# Patient Record
Sex: FEMALE | Race: BLACK OR AFRICAN AMERICAN | NOT HISPANIC OR LATINO | Employment: OTHER | URBAN - METROPOLITAN AREA
[De-identification: names, ages, dates, MRNs, and addresses within clinical notes are randomized per-mention and may not be internally consistent; named-entity substitution may affect disease eponyms.]

---

## 2017-01-04 ENCOUNTER — TELEPHONE (OUTPATIENT)
Dept: INTERNAL MEDICINE | Facility: CLINIC | Age: 72
End: 2017-01-04

## 2017-01-05 NOTE — TELEPHONE ENCOUNTER
Spoke with pt to let her know that A1c is improving but will need to make a follow-up appointment with Dr. Lovell. Pt verbalized understanding and scheduled for 1/9/17 at 9:40 am.

## 2017-01-19 ENCOUNTER — PATIENT OUTREACH (OUTPATIENT)
Dept: ADMINISTRATIVE | Facility: HOSPITAL | Age: 72
End: 2017-01-19

## 2017-01-19 NOTE — PROGRESS NOTES
I spoke with pt that stated she currently owes [employer] $1000.00 and only gets $201.00 a month. And can not afford to have the Dexa Scan done again. She said it was done but the spine part did not come out and they are trying to bill her for the scan and she us unable to pay for it. Pt stated it is not that she doesn't want to have the test she can not afford it. Pt will keep current appt.

## 2017-01-20 ENCOUNTER — PROCEDURE VISIT (OUTPATIENT)
Dept: OPHTHALMOLOGY | Facility: CLINIC | Age: 72
End: 2017-01-20
Payer: COMMERCIAL

## 2017-01-20 DIAGNOSIS — E11.3212 TYPE 2 DIABETES MELLITUS WITH LEFT EYE AFFECTED BY MILD NONPROLIFERATIVE RETINOPATHY AND MACULAR EDEMA, WITHOUT LONG-TERM CURRENT USE OF INSULIN: Primary | ICD-10-CM

## 2017-01-20 DIAGNOSIS — H10.11 ALLERGIC CONJUNCTIVITIS, RIGHT: ICD-10-CM

## 2017-01-20 PROCEDURE — 92134 CPTRZ OPH DX IMG PST SGM RTA: CPT | Mod: S$GLB,,, | Performed by: OPHTHALMOLOGY

## 2017-01-20 PROCEDURE — 67028 INJECTION EYE DRUG: CPT | Mod: LT,S$GLB,, | Performed by: OPHTHALMOLOGY

## 2017-01-20 PROCEDURE — 99499 UNLISTED E&M SERVICE: CPT | Mod: S$GLB,,, | Performed by: OPHTHALMOLOGY

## 2017-01-20 RX ORDER — CIPROFLOXACIN HYDROCHLORIDE 3 MG/ML
SOLUTION/ DROPS OPHTHALMIC
Qty: 5 ML | Refills: 0 | Status: SHIPPED | OUTPATIENT
Start: 2017-01-20 | End: 2017-01-27

## 2017-01-20 RX ORDER — NEOMYCIN SULFATE, POLYMYXIN B SULFATE AND DEXAMETHASONE 3.5; 10000; 1 MG/ML; [USP'U]/ML; MG/ML
1 SUSPENSION/ DROPS OPHTHALMIC 3 TIMES DAILY
Qty: 5 ML | Refills: 0 | Status: SHIPPED | OUTPATIENT
Start: 2017-01-20 | End: 2017-01-27

## 2017-01-20 RX ADMIN — Medication 1.25 MG: at 03:01

## 2017-01-20 NOTE — PROGRESS NOTES
===============================  Julia Schroeder,   72 y.o. female   Last visit [facility]: :11/30/2016   Last visit eye dept. 11/30/2016  [facility]:  Corrected distance visual acuity was 20/50 in the right eye and 20/30 in the left eye.   Not recorded         Not recorded         Not recorded        Chief Complaint   Patient presents with    dme     here for avastin os        HPI     dme    Additional comments: here for avastin os           Comments   Newly diagnosed diabetes by Dr. Muñoz 10/17/16  BDR with DME, [facility]  Avastin OS #2 11/30/16       Last edited by CJ Alfonso on 1/20/2017  3:13 PM. (History)      Read Studies: y  Vitalsy    ________________  1/20/2017  1. 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  Os dme no matt vsion... Better oct     1/20/2017  Diagnosis :  Os dme  Today:   avston  , OS   Follow up: rtc 1 mo >> eyela        Call 24/7 for any worsening of vision. Check  OU QD. Gave my home phone number.      Procedure  Note:   OS}  Eylea (afibercept) 2 mg/0.05 ml Intravitreal Injection    I have explained the Risks, Benefits and Alternatives of the procedure in detail.  The patient voices understanding and all questions have been answered.  The patient agrees to proceed as discussed.  LIDOCAINE 2%  subconj bleb  was used for anesthesia.  Topical betadine was used for antisepsis.  0.05 cc was  injected 3.7 mm from corneal limbus in the inferotemporal quadrant.  Following injection the IOP was less than thirty (<30) by tonopen.  The eye was then thoroughly irrigated with BSS.  Patient tolerated procedure well.  No complications were observed.  The Patient was educated that mild irritation tonight was normal secondary to topical antispsis use.  Pt was advised to call at any time day or night for pain, redness, or any decline in vision. I gave the patient my home number as well as the clinic  on call number. Daily visual checks and Amsler grid testing were reviewed.  ciloxan Antibiotic Drops to be used 4 times daily for 4 days  HUGH Muñoz MD  Procedure ordered: y  Consent: y  Pre auth: y  MAR:y  Opnote: y  Charge capture:y  Sided procedure note: y         ===========================

## 2017-01-20 NOTE — MR AVS SNAPSHOT
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      [facility] On Call     [facility] On Call Nurse [facility] -  Assistance  Registered nurses in the [facility] provide clinical advisement, health education, appointment booking, and other advisory services.  Call for this free service at [phone].             Medications           Message regarding Medications     Verify the changes and/or additions to your medication regime listed below are the same as discussed with your clinician today.  If any of these changes or additions are incorrect, please notify your healthcare provider.             Verify that the below list of medications is an accurate representation of the medications you are currently taking.  If none reported, the list may be blank. If incorrect, please contact your healthcare provider. Carry this list with you in case of emergency.           Current Medications     blood sugar diagnostic (BLOOD GLUCOSE TEST) Strp 1 strip by Misc.(Non-Drug; Combo Route) route once daily.    LANCETS MISC 1 lancet by Misc.(Non-Drug; Combo Route) route once daily.     metformin (GLUCOPHAGE) 500 MG tablet Take 1 tablet (500 mg total) by mouth 2 (two) times daily with meals.           Clinical Reference Information           Vital Signs - Last Recorded     LMP                   (LMP Unknown)           Allergies as of 1/20/2017     No Known Allergies      Immunizations Administered on Date of Encounter - 1/20/2017     None      Orders Placed During Today's Visit      Normal Orders This Visit    Posterior Segment OCT Retina-Both eyes     Prior Authorization Order

## 2017-01-24 ENCOUNTER — OFFICE VISIT (OUTPATIENT)
Dept: OPHTHALMOLOGY | Facility: CLINIC | Age: 72
End: 2017-01-24
Payer: COMMERCIAL

## 2017-01-24 ENCOUNTER — OFFICE VISIT (OUTPATIENT)
Dept: INTERNAL MEDICINE | Facility: CLINIC | Age: 72
End: 2017-01-24
Payer: COMMERCIAL

## 2017-01-24 VITALS
WEIGHT: 135.56 LBS | SYSTOLIC BLOOD PRESSURE: 122 MMHG | BODY MASS INDEX: 24.95 KG/M2 | OXYGEN SATURATION: 100 % | HEIGHT: 62 IN | TEMPERATURE: 97 F | HEART RATE: 77 BPM | DIASTOLIC BLOOD PRESSURE: 78 MMHG

## 2017-01-24 DIAGNOSIS — Z79.4 TYPE 2 DIABETES MELLITUS WITH LEFT EYE AFFECTED BY MILD NONPROLIFERATIVE RETINOPATHY AND MACULAR EDEMA, WITH LONG-TERM CURRENT USE OF INSULIN: Primary | ICD-10-CM

## 2017-01-24 DIAGNOSIS — H16.9 BLEPHAROKERATITIS, UNSPECIFIED LATERALITY: Primary | ICD-10-CM

## 2017-01-24 DIAGNOSIS — H01.009 BLEPHAROKERATITIS, UNSPECIFIED LATERALITY: Primary | ICD-10-CM

## 2017-01-24 DIAGNOSIS — E11.3212 TYPE 2 DIABETES MELLITUS WITH LEFT EYE AFFECTED BY MILD NONPROLIFERATIVE RETINOPATHY AND MACULAR EDEMA, WITH LONG-TERM CURRENT USE OF INSULIN: Primary | ICD-10-CM

## 2017-01-24 DIAGNOSIS — R19.00 PELVIC MASS: ICD-10-CM

## 2017-01-24 PROCEDURE — 1160F RVW MEDS BY RX/DR IN RCRD: CPT | Mod: S$GLB,,, | Performed by: INTERNAL MEDICINE

## 2017-01-24 PROCEDURE — 1157F ADVNC CARE PLAN IN RCRD: CPT | Mod: S$GLB,,, | Performed by: INTERNAL MEDICINE

## 2017-01-24 PROCEDURE — 3045F PR MOST RECENT HEMOGLOBIN A1C LEVEL 7.0-9.0%: CPT | Mod: S$GLB,,, | Performed by: INTERNAL MEDICINE

## 2017-01-24 PROCEDURE — 1159F MED LIST DOCD IN RCRD: CPT | Mod: S$GLB,,, | Performed by: INTERNAL MEDICINE

## 2017-01-24 PROCEDURE — 1126F AMNT PAIN NOTED NONE PRSNT: CPT | Mod: S$GLB,,, | Performed by: INTERNAL MEDICINE

## 2017-01-24 PROCEDURE — 99999 PR PBB SHADOW E&M-EST. PATIENT-LVL I: CPT | Mod: PBBFAC,,, | Performed by: OPTOMETRIST

## 2017-01-24 PROCEDURE — 99999 PR PBB SHADOW E&M-EST. PATIENT-LVL III: CPT | Mod: PBBFAC,,, | Performed by: INTERNAL MEDICINE

## 2017-01-24 PROCEDURE — 99213 OFFICE O/P EST LOW 20 MIN: CPT | Mod: S$GLB,,, | Performed by: INTERNAL MEDICINE

## 2017-01-24 PROCEDURE — 92012 INTRM OPH EXAM EST PATIENT: CPT | Mod: S$GLB,,, | Performed by: OPTOMETRIST

## 2017-01-24 RX ORDER — METFORMIN HYDROCHLORIDE 1000 MG/1
1000 TABLET ORAL 2 TIMES DAILY WITH MEALS
Qty: 180 TABLET | Refills: 1 | Status: SHIPPED | OUTPATIENT
Start: 2017-01-24 | End: 2017-05-22 | Stop reason: SDUPTHER

## 2017-01-24 NOTE — MR AVS SNAPSHOT
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, with long-term current use of insulin    -  Primary     Pelvic mass                To Do List           Future Appointments        Provider Department Dept Phone    2017 8:45 AM Pablo Lindo OD [facility] Ophthalmology [phone]    5/15/2017 7:55 AM LABORATORY, [facility] [phone]    2017 8:00 AM Devon Lovell MD [facility] Internal Medicine [phone]      Goals (5 Years of Data)     None      Follow-Up and Disposition     Return in about 4 months (around 2017), or if symptoms worsen or fail to improve.    Follow-up and Disposition History       These Medications        Disp Refills Start End    metformin (GLUCOPHAGE) 1000 MG tablet 180 tablet 1 2017    Take 1 tablet (1,000 mg total) by mouth 2 (two) times daily with meals. - Oral    Pharmacy: [address] #: [phone]         [facility] On Call     [facility] -  Assistance  Registered nurses in the [facility] provide clinical advisement, health education, appointment booking, and other advisory services.  Call for this free service at [phone].             Medications           Message regarding Medications     Verify the changes and/or additions to your medication regime listed below are the same as discussed with your clinician today.  If any of these changes or additions are incorrect, please notify  "your healthcare provider.        CHANGE how you are taking these medications     Start Taking Instead of    metformin (GLUCOPHAGE) 1000 MG tablet metformin (GLUCOPHAGE) 500 MG tablet    Dosage:  Take 1 tablet (1,000 mg total) by mouth 2 (two) times daily with meals. Dosage:  Take 1 tablet (500 mg total) by mouth 2 (two) times daily with meals.    Reason for Change:  Reorder            Verify that the below list of medications is an accurate representation of the medications you are currently taking.  If none reported, the list may be blank. If incorrect, please contact your healthcare provider. Carry this list with you in case of emergency.           Current Medications     blood sugar diagnostic (BLOOD GLUCOSE TEST) Strp 1 strip by Misc.(Non-Drug; Combo Route) route 2 (two) times daily.     ciprofloxacin HCl (CILOXAN) 0.3 % ophthalmic solution Administer 1 drop in left eye, 4 x a day for 4 days    LANCETS MISC 1 lancet by Misc.(Non-Drug; Combo Route) route 2 (two) times daily.     metformin (GLUCOPHAGE) 1000 MG tablet Take 1 tablet (1,000 mg total) by mouth 2 (two) times daily with meals.    neomycin-polymyxin-dexamethasone (MAXITROL) 3.5mg/mL-10,000 unit/mL-0.1 % DrpS Place 1 drop into the right eye 3 (three) times daily.           Clinical Reference Information           Vital Signs - Last Recorded  Most recent update: 1/24/2017  8:08 AM by Mila Hernandez MA    BP Pulse Temp Ht Wt LMP    122/78 (BP Location: Right arm, Patient Position: Sitting) 77 96.8 °F (36 °C) (Tympanic) 5' 1.5" (1.562 m) 61.5 kg (135 lb 9.3 oz) (LMP Unknown)    SpO2 BMI             100% 25.2 kg/m2         Blood Pressure          Most Recent Value    BP  122/78      Allergies as of 1/24/2017     No Known Allergies      Immunizations Administered on Date of Encounter - 1/24/2017     None      Orders Placed During Today's Visit      Normal Orders This Visit    Ambulatory referral to Gynecology     Future Labs/Procedures Expected by " Expires    Hemoglobin A1c  5/24/2017 (Approximate) 7/23/2017

## 2017-01-24 NOTE — PROGRESS NOTES
"Subjective:      Patient ID: Julia Schroeder is a 72 y.o. female.    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 Doing much better with diet and exercise. Did not do pelvic us as directed.  Compliant with meds without significant side effects.     Review of Systems   Constitutional: Negative for chills and fever.   Respiratory: Negative for cough.    Cardiovascular: Negative for chest pain.   Gastrointestinal: Negative for abdominal pain.     Objective:     Visit Vitals    /78 (BP Location: Right arm, Patient Position: Sitting)    Pulse 77    Temp 96.8 °F (36 °C) (Tympanic)    Ht 5' 1.5" (1.562 m)    Wt 61.5 kg (135 lb 9.3 oz)    LMP  (LMP Unknown)    SpO2 100%    BMI 25.2 kg/m2       Physical Exam   Constitutional: She appears well-developed and well-nourished. No distress.   Cardiovascular: Normal rate.    Pulmonary/Chest: Effort normal.   Neurological: She is alert.   Skin: Skin is warm and dry.   Psychiatric: She has a normal mood and affect. Her behavior is normal.     No visits with results within 2 Week(s) from this visit.  Latest known visit with results is:    Lab Visit on 12/30/2016   Component Date Value Ref Range Status    Hemoglobin A1C 12/30/2016 7.9* 4.5 - 6.2 % Final    Comment: According to ADA guidelines, hemoglobin A1C <7.0% represents  optimal control in non-pregnant diabetic patients.  Different  metrics may apply to specific populations.   Standards of Medical Care in Diabetes - 2016.  For the purpose of screening for the presence of diabetes:  <5.7%     Consistent with the absence of diabetes  5.7-6.4%  Consistent with increasing risk for diabetes   (prediabetes)  >or=6.5%  Consistent with " diabetes  Currently no consensus exists for use of hemoglobin A1C  for diagnosis of diabetes for children.      Estimated Avg Glucose 12/30/2016 180* 68 - 131 mg/dL Final       Assessment:     1. 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  Dispense: 180 tablet; Refill: 1  -     Cancel: Hemoglobin A1c; Future; Expected date: 5/24/17  -     Hemoglobin A1c; Future; Expected date: 5/24/17    Pelvic mass  -     Ambulatory referral to Gynecology    Other orders  -     Cancel: Hemoglobin A1c; Future; Expected date: 4/24/17        Lab Frequency Next Occurrence   US Pelvis Complete Non OB Once 11/28/2016         Return in about 4 months (around 5/24/2017), or if symptoms worsen or fail to improve.

## 2017-01-24 NOTE — PROGRESS NOTES
HPI     Last [facility] visit 01/20/2017  Chief complaint: right eye burning   Onset: about 3 weeks ago  DME, OS  Medication: (CILOXAN) 0.3%   (MAXITROL) 3.5mg/mL  Patient wanted to be seen for discomfort in the right eye.  No blurred vision  No light sensitivity  No flashes of light or floaters           Last edited by Xavier Foster MA on 1/24/2017  9:14 AM.         Assessment /Plan     For exam results, see Encounter Report.    Blepharokeratitis, unspecified laterality    Mild blepharitis causing keratitis OU    [facility] Rx'd Maxitrol tid OS, I instructed pt to start Maxitrol tid OU    Pt states next appt with [facility] is March 1st for retina check.    RTC prn

## 2017-03-10 DIAGNOSIS — Z12.31 OTHER SCREENING MAMMOGRAM: ICD-10-CM

## 2017-05-10 ENCOUNTER — PATIENT OUTREACH (OUTPATIENT)
Dept: ADMINISTRATIVE | Facility: HOSPITAL | Age: 72
End: 2017-05-10

## 2017-05-10 NOTE — LETTER
May 10, 2017    Julia Schroeder  [place] [id number]  [place] [address]             [facility]  [address]  [facility] [address]  Phone: [phone] Dear Mrs. Schroeder:       [facility] is committed to your overall health.  To help you get the most out of each of your visits, we will review your information to make sure you are up to date on all of your recommended tests and/or procedures.      Devon Lovell MD has found that you may be due for   Health Maintenance Due   Topic    TETANUS VACCINE     DEXA SCAN-Bone Density     Zoster Vaccine     Pneumococcal (65+) (1 of 2 - PCV13)    Mammogram         If you have had any of the above done at another facility, please bring the records or information with you so that your record at [facility] will be complete.    If you are currently taking medication, please bring it with you to your appointment for review.    We will be happy to assist you with scheduling any necessary appointments or you may contact the [facility] appointment desk at [phone] to schedule at your convenience.     Thank you for choosing [facility] for your healthcare needs,    Elise WREN LPN  Care Coordination Department  [facility], [facility]  Phone Number: [phone]

## 2017-05-15 ENCOUNTER — LAB VISIT (OUTPATIENT)
Dept: LAB | Facility: HOSPITAL | Age: 72
End: 2017-05-15
Attending: INTERNAL MEDICINE
Payer: COMMERCIAL

## 2017-05-15 DIAGNOSIS — E11.3212 TYPE 2 DIABETES MELLITUS WITH LEFT EYE AFFECTED BY MILD NONPROLIFERATIVE RETINOPATHY AND MACULAR EDEMA, WITH LONG-TERM CURRENT USE OF INSULIN: ICD-10-CM

## 2017-05-15 DIAGNOSIS — Z79.4 TYPE 2 DIABETES MELLITUS WITH LEFT EYE AFFECTED BY MILD NONPROLIFERATIVE RETINOPATHY AND MACULAR EDEMA, WITH LONG-TERM CURRENT USE OF INSULIN: ICD-10-CM

## 2017-05-15 PROCEDURE — 36415 COLL VENOUS BLD VENIPUNCTURE: CPT | Mod: PO

## 2017-05-15 PROCEDURE — 83036 HEMOGLOBIN GLYCOSYLATED A1C: CPT

## 2017-05-16 LAB
ESTIMATED AVG GLUCOSE: 143 MG/DL
HBA1C MFR BLD HPLC: 6.6 %

## 2017-05-22 ENCOUNTER — OFFICE VISIT (OUTPATIENT)
Dept: INTERNAL MEDICINE | Facility: CLINIC | Age: 72
End: 2017-05-22
Payer: COMMERCIAL

## 2017-05-22 VITALS
BODY MASS INDEX: 23.77 KG/M2 | TEMPERATURE: 97 F | HEIGHT: 62 IN | WEIGHT: 129.19 LBS | DIASTOLIC BLOOD PRESSURE: 72 MMHG | SYSTOLIC BLOOD PRESSURE: 122 MMHG

## 2017-05-22 DIAGNOSIS — E78.5 HYPERLIPIDEMIA ASSOCIATED WITH TYPE 2 DIABETES MELLITUS: ICD-10-CM

## 2017-05-22 DIAGNOSIS — R19.05 PERIUMBILICAL MASS: ICD-10-CM

## 2017-05-22 DIAGNOSIS — E11.69 HYPERLIPIDEMIA ASSOCIATED WITH TYPE 2 DIABETES MELLITUS: ICD-10-CM

## 2017-05-22 DIAGNOSIS — E11.3212 TYPE 2 DIABETES MELLITUS WITH LEFT EYE AFFECTED BY MILD NONPROLIFERATIVE RETINOPATHY AND MACULAR EDEMA, WITHOUT LONG-TERM CURRENT USE OF INSULIN: Primary | ICD-10-CM

## 2017-05-22 PROCEDURE — 1157F ADVNC CARE PLAN IN RCRD: CPT | Mod: 8P,S$GLB,, | Performed by: INTERNAL MEDICINE

## 2017-05-22 PROCEDURE — 1159F MED LIST DOCD IN RCRD: CPT | Mod: S$GLB,,, | Performed by: INTERNAL MEDICINE

## 2017-05-22 PROCEDURE — 1126F AMNT PAIN NOTED NONE PRSNT: CPT | Mod: S$GLB,,, | Performed by: INTERNAL MEDICINE

## 2017-05-22 PROCEDURE — 3044F HG A1C LEVEL LT 7.0%: CPT | Mod: S$GLB,,, | Performed by: INTERNAL MEDICINE

## 2017-05-22 PROCEDURE — 99214 OFFICE O/P EST MOD 30 MIN: CPT | Mod: S$GLB,,, | Performed by: INTERNAL MEDICINE

## 2017-05-22 PROCEDURE — 3060F POS MICROALBUMINURIA REV: CPT | Mod: 8P,S$GLB,, | Performed by: INTERNAL MEDICINE

## 2017-05-22 PROCEDURE — 1160F RVW MEDS BY RX/DR IN RCRD: CPT | Mod: S$GLB,,, | Performed by: INTERNAL MEDICINE

## 2017-05-22 PROCEDURE — 99999 PR PBB SHADOW E&M-EST. PATIENT-LVL III: CPT | Mod: PBBFAC,,, | Performed by: INTERNAL MEDICINE

## 2017-05-22 RX ORDER — FLUOROMETHOLONE 1 MG/ML
1 SUSPENSION/ DROPS OPHTHALMIC 2 TIMES DAILY
COMMUNITY
Start: 2017-05-17 | End: 2020-10-06 | Stop reason: ALTCHOICE

## 2017-05-22 RX ORDER — METFORMIN HYDROCHLORIDE 1000 MG/1
1000 TABLET ORAL 2 TIMES DAILY WITH MEALS
Qty: 180 TABLET | Refills: 3 | Status: SHIPPED | OUTPATIENT
Start: 2017-05-22 | End: 2017-09-08 | Stop reason: SDUPTHER

## 2017-05-22 RX ORDER — ROSUVASTATIN CALCIUM 10 MG/1
10 TABLET, COATED ORAL DAILY
Qty: 90 TABLET | Refills: 1 | Status: SHIPPED | OUTPATIENT
Start: 2017-05-22 | End: 2017-09-08 | Stop reason: SDUPTHER

## 2017-05-22 NOTE — PROGRESS NOTES
"Subjective:      Patient ID: Julia Schroeder is a 72 y.o. female.    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  Compliant with meds without significant side effects. Admits to some diet non compliance. Energy ok. Feeling well and in her usu state of health.    Pt reports that she does not want mmg and dxa due to cost. Advised pt to discuss this further with her insurance company.         Review of Systems   Constitutional: Negative for chills and fever.   Respiratory: Negative for cough.    Cardiovascular: Negative for chest pain.   Gastrointestinal: Negative for abdominal pain.     Objective:   /72 (BP Location: Right arm, Patient Position: Sitting)   Temp 96.9 °F (36.1 °C) (Tympanic)   Ht 5' 1.5" (1.562 m)   Wt 58.6 kg (129 lb 3 oz)   LMP  (LMP Unknown)   BMI 24.01 kg/m²     Physical Exam   Constitutional: She appears well-developed and well-nourished. No distress.   Cardiovascular: Normal rate.    Pulmonary/Chest: Effort normal.   Neurological: She is alert.   Skin: Skin is warm and dry.   Psychiatric: She has a normal mood and affect. Her behavior is normal.     Lab Visit on 05/15/2017   Component Date Value Ref Range Status    Hemoglobin A1C 05/16/2017 6.6* 4.5 - 6.2 % Final    Comment: According to ADA guidelines, hemoglobin A1C <7.0% represents  optimal control in non-pregnant diabetic patients.  Different  metrics may apply to specific populations.   Standards of Medical Care in Diabetes - 2016.  For the purpose of screening for the presence of diabetes:  <5.7%     Consistent with the absence of diabetes  5.7-6.4%  Consistent with increasing risk for diabetes   (prediabetes)  >or=6.5%  Consistent with diabetes  Currently no " consensus exists for use of hemoglobin A1C  for diagnosis of diabetes for children.      Estimated Avg Glucose 05/16/2017 143* 68 - 131 mg/dL Final       Assessment:     1. 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 Periumbilical mass    3. 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  Dispense: 180 tablet; Refill: 3  -     Hemoglobin A1c; Future; Expected date: 08/20/2017    Periumbilical mass  Pt reports dx with uterine fibroid  Considering surgery    Hyperlipidemia associated with type 2 diabetes mellitus  Not at goal  start  -     rosuvastatin (CRESTOR) 10 MG tablet; Take 1 tablet (10 mg total) by mouth once daily.  Dispense: 90 tablet; Refill: 1  -     Lipid panel; Future; Expected date: 08/20/2017  -     ALT (SGPT); Future; Expected date: 08/20/2017            Lab Frequency Next Occurrence   US Pelvis Complete Non OB Once 11/28/2016   Mammo Digital Screening Bilat with CAD Once 03/10/2017         Return if symptoms worsen or fail to improve.

## 2017-09-08 DIAGNOSIS — E78.5 HYPERLIPIDEMIA ASSOCIATED WITH TYPE 2 DIABETES MELLITUS: ICD-10-CM

## 2017-09-08 DIAGNOSIS — E11.69 HYPERLIPIDEMIA ASSOCIATED WITH TYPE 2 DIABETES MELLITUS: ICD-10-CM

## 2017-09-08 DIAGNOSIS — E11.3212 TYPE 2 DIABETES MELLITUS WITH LEFT EYE AFFECTED BY MILD NONPROLIFERATIVE RETINOPATHY AND MACULAR EDEMA, WITHOUT LONG-TERM CURRENT USE OF INSULIN: ICD-10-CM

## 2017-09-08 RX ORDER — ROSUVASTATIN CALCIUM 10 MG/1
10 TABLET, COATED ORAL DAILY
Qty: 90 TABLET | Refills: 3 | Status: SHIPPED | OUTPATIENT
Start: 2017-09-08 | End: 2018-08-23 | Stop reason: SDUPTHER

## 2017-09-08 RX ORDER — METFORMIN HYDROCHLORIDE 1000 MG/1
1000 TABLET ORAL 2 TIMES DAILY WITH MEALS
Qty: 180 TABLET | Refills: 3 | Status: SHIPPED | OUTPATIENT
Start: 2017-09-08 | End: 2018-08-17 | Stop reason: SDUPTHER

## 2017-11-10 DIAGNOSIS — E11.9 TYPE 2 DIABETES MELLITUS WITHOUT COMPLICATION: ICD-10-CM

## 2017-12-01 DIAGNOSIS — E11.9 TYPE 2 DIABETES MELLITUS WITHOUT COMPLICATION: ICD-10-CM

## 2018-01-05 DIAGNOSIS — E11.9 TYPE 2 DIABETES MELLITUS WITHOUT COMPLICATION: ICD-10-CM

## 2018-02-19 ENCOUNTER — PATIENT OUTREACH (OUTPATIENT)
Dept: ADMINISTRATIVE | Facility: HOSPITAL | Age: 73
End: 2018-02-19

## 2018-02-19 NOTE — PROGRESS NOTES
I have attempted without success to contact this patient by phone to schedule annual mammogram exam and other health maintenance. Patient not available, declined appointment.

## 2018-08-17 DIAGNOSIS — E11.3212 TYPE 2 DIABETES MELLITUS WITH LEFT EYE AFFECTED BY MILD NONPROLIFERATIVE RETINOPATHY AND MACULAR EDEMA, WITHOUT LONG-TERM CURRENT USE OF INSULIN: ICD-10-CM

## 2018-08-18 RX ORDER — METFORMIN HYDROCHLORIDE 1000 MG/1
TABLET ORAL
Qty: 180 TABLET | Refills: 3 | Status: SHIPPED | OUTPATIENT
Start: 2018-08-18 | End: 2019-05-03 | Stop reason: SDUPTHER

## 2018-08-23 DIAGNOSIS — E78.5 HYPERLIPIDEMIA ASSOCIATED WITH TYPE 2 DIABETES MELLITUS: ICD-10-CM

## 2018-08-23 DIAGNOSIS — E11.69 HYPERLIPIDEMIA ASSOCIATED WITH TYPE 2 DIABETES MELLITUS: ICD-10-CM

## 2018-08-23 RX ORDER — ROSUVASTATIN CALCIUM 10 MG/1
TABLET, COATED ORAL
Qty: 90 TABLET | Refills: 3 | Status: SHIPPED | OUTPATIENT
Start: 2018-08-23 | End: 2019-05-03 | Stop reason: SDUPTHER

## 2019-04-05 DIAGNOSIS — Z12.11 COLON CANCER SCREENING: ICD-10-CM

## 2019-04-30 ENCOUNTER — OFFICE VISIT (OUTPATIENT)
Dept: OPHTHALMOLOGY | Facility: CLINIC | Age: 74
End: 2019-04-30
Payer: MEDICARE

## 2019-04-30 DIAGNOSIS — H52.203 HYPEROPIA WITH ASTIGMATISM AND PRESBYOPIA, BILATERAL: Primary | ICD-10-CM

## 2019-04-30 DIAGNOSIS — H52.4 HYPEROPIA WITH ASTIGMATISM AND PRESBYOPIA, BILATERAL: Primary | ICD-10-CM

## 2019-04-30 DIAGNOSIS — H52.03 HYPEROPIA WITH ASTIGMATISM AND PRESBYOPIA, BILATERAL: Primary | ICD-10-CM

## 2019-04-30 PROCEDURE — 92015 PR REFRACTION: ICD-10-PCS | Mod: S$GLB,,, | Performed by: OPTOMETRIST

## 2019-04-30 PROCEDURE — 99999 PR PBB SHADOW E&M-EST. PATIENT-LVL I: ICD-10-PCS | Mod: PBBFAC,,, | Performed by: OPTOMETRIST

## 2019-04-30 PROCEDURE — 99999 PR PBB SHADOW E&M-EST. PATIENT-LVL I: CPT | Mod: PBBFAC,,, | Performed by: OPTOMETRIST

## 2019-04-30 PROCEDURE — 99211 OFF/OP EST MAY X REQ PHY/QHP: CPT | Mod: PBBFAC,PN | Performed by: OPTOMETRIST

## 2019-04-30 PROCEDURE — 99499 NO LOS: ICD-10-PCS | Mod: S$GLB,,, | Performed by: OPTOMETRIST

## 2019-04-30 PROCEDURE — 92015 DETERMINE REFRACTIVE STATE: CPT | Mod: S$GLB,,, | Performed by: OPTOMETRIST

## 2019-04-30 PROCEDURE — 99499 UNLISTED E&M SERVICE: CPT | Mod: S$GLB,,, | Performed by: OPTOMETRIST

## 2019-04-30 NOTE — PROGRESS NOTES
HPI     Blurred Vision      Additional comments: update gls              Comments     PTs last exam was 10/17/16 with [facility]. Currently seeing Dr. Lovell about   once a month for injections. Scheduled to return on Monday.   Pt here today to update gls only. Wearing an old pair after current gls   broke.     H/o BDR with DME, [employer]  Avastin OS #2 11/30/16               Last edited by Josephine Montano MA on 4/30/2019 10:19 AM. (History)            Assessment /Plan     For exam results, see Encounter Report.    Hyperopia with astigmatism and presbyopia, bilateral      Eyeglass Final Rx     Eyeglass Final Rx       Sphere Cylinder Axis Add    Right +1.00 +1.00 180 +2.50    Left +1.25 +1.00 180 +2.50    Expiration Date:  4/30/2020              RTC as scheduled with Dr Lovell or [facility]

## 2019-05-02 ENCOUNTER — PATIENT OUTREACH (OUTPATIENT)
Dept: ADMINISTRATIVE | Facility: HOSPITAL | Age: 74
End: 2019-05-02

## 2019-05-03 ENCOUNTER — PATIENT OUTREACH (OUTPATIENT)
Dept: ADMINISTRATIVE | Facility: HOSPITAL | Age: 74
End: 2019-05-03

## 2019-05-03 ENCOUNTER — LAB VISIT (OUTPATIENT)
Dept: LAB | Facility: HOSPITAL | Age: 74
End: 2019-05-03
Attending: INTERNAL MEDICINE
Payer: MEDICARE

## 2019-05-03 ENCOUNTER — OFFICE VISIT (OUTPATIENT)
Dept: INTERNAL MEDICINE | Facility: CLINIC | Age: 74
End: 2019-05-03
Payer: MEDICARE

## 2019-05-03 VITALS
WEIGHT: 126.75 LBS | TEMPERATURE: 97 F | HEART RATE: 74 BPM | DIASTOLIC BLOOD PRESSURE: 72 MMHG | OXYGEN SATURATION: 99 % | BODY MASS INDEX: 23.56 KG/M2 | SYSTOLIC BLOOD PRESSURE: 140 MMHG

## 2019-05-03 DIAGNOSIS — R01.1 MURMUR: ICD-10-CM

## 2019-05-03 DIAGNOSIS — E11.3212 TYPE 2 DIABETES MELLITUS WITH LEFT EYE AFFECTED BY MILD NONPROLIFERATIVE RETINOPATHY AND MACULAR EDEMA, WITHOUT LONG-TERM CURRENT USE OF INSULIN: ICD-10-CM

## 2019-05-03 DIAGNOSIS — Z78.0 ASYMPTOMATIC MENOPAUSAL STATE: ICD-10-CM

## 2019-05-03 DIAGNOSIS — E11.69 HYPERLIPIDEMIA ASSOCIATED WITH TYPE 2 DIABETES MELLITUS: ICD-10-CM

## 2019-05-03 DIAGNOSIS — Z23 NEED FOR PNEUMOCOCCAL VACCINATION: ICD-10-CM

## 2019-05-03 DIAGNOSIS — E78.5 HYPERLIPIDEMIA ASSOCIATED WITH TYPE 2 DIABETES MELLITUS: ICD-10-CM

## 2019-05-03 DIAGNOSIS — Z00.00 ROUTINE GENERAL MEDICAL EXAMINATION AT A HEALTH CARE FACILITY: Primary | ICD-10-CM

## 2019-05-03 DIAGNOSIS — N85.8 UTERINE MASS: ICD-10-CM

## 2019-05-03 LAB
ALBUMIN SERPL BCP-MCNC: 4.2 G/DL (ref 3.5–5.2)
ALP SERPL-CCNC: 59 U/L (ref 55–135)
ALT SERPL W/O P-5'-P-CCNC: 14 U/L (ref 10–44)
ANION GAP SERPL CALC-SCNC: 9 MMOL/L (ref 8–16)
AST SERPL-CCNC: 20 U/L (ref 10–40)
BASOPHILS # BLD AUTO: 0.04 K/UL (ref 0–0.2)
BASOPHILS NFR BLD: 0.7 % (ref 0–1.9)
BILIRUB SERPL-MCNC: 0.6 MG/DL (ref 0.1–1)
BUN SERPL-MCNC: 12 MG/DL (ref 8–23)
CALCIUM SERPL-MCNC: 10.1 MG/DL (ref 8.7–10.5)
CHLORIDE SERPL-SCNC: 103 MMOL/L (ref 95–110)
CHOLEST SERPL-MCNC: 182 MG/DL (ref 120–199)
CHOLEST/HDLC SERPL: 2.5 {RATIO} (ref 2–5)
CO2 SERPL-SCNC: 28 MMOL/L (ref 23–29)
CREAT SERPL-MCNC: 0.9 MG/DL (ref 0.5–1.4)
DIFFERENTIAL METHOD: ABNORMAL
EOSINOPHIL # BLD AUTO: 0.1 K/UL (ref 0–0.5)
EOSINOPHIL NFR BLD: 1.7 % (ref 0–8)
ERYTHROCYTE [DISTWIDTH] IN BLOOD BY AUTOMATED COUNT: 13 % (ref 11.5–14.5)
EST. GFR  (AFRICAN AMERICAN): >60 ML/MIN/1.73 M^2
EST. GFR  (NON AFRICAN AMERICAN): >60 ML/MIN/1.73 M^2
ESTIMATED AVG GLUCOSE: 154 MG/DL (ref 68–131)
ESTIMATED AVG GLUCOSE: 154 MG/DL (ref 68–131)
GLUCOSE SERPL-MCNC: 131 MG/DL (ref 70–110)
HBA1C MFR BLD HPLC: 7 % (ref 4–5.6)
HBA1C MFR BLD HPLC: 7 % (ref 4–5.6)
HCT VFR BLD AUTO: 36.3 % (ref 37–48.5)
HDLC SERPL-MCNC: 74 MG/DL (ref 40–75)
HDLC SERPL: 40.7 % (ref 20–50)
HGB BLD-MCNC: 11.8 G/DL (ref 12–16)
IMM GRANULOCYTES # BLD AUTO: 0.01 K/UL (ref 0–0.04)
IMM GRANULOCYTES NFR BLD AUTO: 0.2 % (ref 0–0.5)
LDLC SERPL CALC-MCNC: 97.4 MG/DL (ref 63–159)
LYMPHOCYTES # BLD AUTO: 1.6 K/UL (ref 1–4.8)
LYMPHOCYTES NFR BLD: 29.8 % (ref 18–48)
MCH RBC QN AUTO: 29.6 PG (ref 27–31)
MCHC RBC AUTO-ENTMCNC: 32.5 G/DL (ref 32–36)
MCV RBC AUTO: 91 FL (ref 82–98)
MONOCYTES # BLD AUTO: 0.4 K/UL (ref 0.3–1)
MONOCYTES NFR BLD: 7.2 % (ref 4–15)
NEUTROPHILS # BLD AUTO: 3.3 K/UL (ref 1.8–7.7)
NEUTROPHILS NFR BLD: 60.4 % (ref 38–73)
NONHDLC SERPL-MCNC: 108 MG/DL
NRBC BLD-RTO: 0 /100 WBC
PLATELET # BLD AUTO: 193 K/UL (ref 150–350)
PMV BLD AUTO: 10.9 FL (ref 9.2–12.9)
POTASSIUM SERPL-SCNC: 4.2 MMOL/L (ref 3.5–5.1)
PROT SERPL-MCNC: 7.5 G/DL (ref 6–8.4)
RBC # BLD AUTO: 3.98 M/UL (ref 4–5.4)
SODIUM SERPL-SCNC: 140 MMOL/L (ref 136–145)
T4 FREE SERPL-MCNC: 0.88 NG/DL (ref 0.71–1.51)
TRIGL SERPL-MCNC: 53 MG/DL (ref 30–150)
TSH SERPL DL<=0.005 MIU/L-ACNC: 0.32 UIU/ML (ref 0.4–4)
WBC # BLD AUTO: 5.43 K/UL (ref 3.9–12.7)

## 2019-05-03 PROCEDURE — 84439 ASSAY OF FREE THYROXINE: CPT

## 2019-05-03 PROCEDURE — G0009 ADMIN PNEUMOCOCCAL VACCINE: HCPCS | Mod: S$GLB,,, | Performed by: INTERNAL MEDICINE

## 2019-05-03 PROCEDURE — 83036 HEMOGLOBIN GLYCOSYLATED A1C: CPT

## 2019-05-03 PROCEDURE — 99999 PR PBB SHADOW E&M-EST. PATIENT-LVL IV: ICD-10-PCS | Mod: PBBFAC,,, | Performed by: INTERNAL MEDICINE

## 2019-05-03 PROCEDURE — 90732 PNEUMOCOCCAL POLYSACCHARIDE VACCINE 23-VALENT =>2YO SQ IM: ICD-10-PCS | Mod: S$GLB,,, | Performed by: INTERNAL MEDICINE

## 2019-05-03 PROCEDURE — 85025 COMPLETE CBC W/AUTO DIFF WBC: CPT

## 2019-05-03 PROCEDURE — 80061 LIPID PANEL: CPT

## 2019-05-03 PROCEDURE — 99397 PER PM REEVAL EST PAT 65+ YR: CPT | Mod: 25,S$GLB,, | Performed by: INTERNAL MEDICINE

## 2019-05-03 PROCEDURE — 99999 PR PBB SHADOW E&M-EST. PATIENT-LVL IV: CPT | Mod: PBBFAC,,, | Performed by: INTERNAL MEDICINE

## 2019-05-03 PROCEDURE — 3045F PR MOST RECENT HEMOGLOBIN A1C LEVEL 7.0-9.0%: CPT | Mod: CPTII,S$GLB,, | Performed by: INTERNAL MEDICINE

## 2019-05-03 PROCEDURE — 90732 PPSV23 VACC 2 YRS+ SUBQ/IM: CPT | Mod: S$GLB,,, | Performed by: INTERNAL MEDICINE

## 2019-05-03 PROCEDURE — G0009 PNEUMOCOCCAL POLYSACCHARIDE VACCINE 23-VALENT =>2YO SQ IM: ICD-10-PCS | Mod: S$GLB,,, | Performed by: INTERNAL MEDICINE

## 2019-05-03 PROCEDURE — 36415 COLL VENOUS BLD VENIPUNCTURE: CPT

## 2019-05-03 PROCEDURE — 99397 PR PREVENTIVE VISIT,EST,65 & OVER: ICD-10-PCS | Mod: 25,S$GLB,, | Performed by: INTERNAL MEDICINE

## 2019-05-03 PROCEDURE — 84443 ASSAY THYROID STIM HORMONE: CPT

## 2019-05-03 PROCEDURE — 80053 COMPREHEN METABOLIC PANEL: CPT

## 2019-05-03 PROCEDURE — 3045F PR MOST RECENT HEMOGLOBIN A1C LEVEL 7.0-9.0%: ICD-10-PCS | Mod: CPTII,S$GLB,, | Performed by: INTERNAL MEDICINE

## 2019-05-03 RX ORDER — ROSUVASTATIN CALCIUM 10 MG/1
10 TABLET, COATED ORAL DAILY
Qty: 90 TABLET | Refills: 2 | Status: SHIPPED | OUTPATIENT
Start: 2019-05-03 | End: 2019-05-16 | Stop reason: SDUPTHER

## 2019-05-03 RX ORDER — METFORMIN HYDROCHLORIDE 1000 MG/1
1000 TABLET ORAL 2 TIMES DAILY WITH MEALS
Qty: 180 TABLET | Refills: 2 | Status: SHIPPED | OUTPATIENT
Start: 2019-05-03 | End: 2019-05-16 | Stop reason: SDUPTHER

## 2019-05-03 NOTE — PROGRESS NOTES
Subjective:      Patient ID: Julia Schroeder is a 74 y.o. female.    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  Compliant with meds without significant side effects. Energy and appetite are good.   No new c/o.  Declines mmmg  Review of Systems   Constitutional: Negative for chills and fever.   HENT: Negative for ear pain and sore throat.    Respiratory: Negative for cough.    Cardiovascular: Negative for chest pain.   Gastrointestinal: Negative for abdominal pain and blood in stool.   Genitourinary: Negative for dysuria and hematuria.   Neurological: Negative for seizures and syncope.     Objective:   BP (!) 140/72 (BP Location: Right arm, Patient Position: Sitting, BP Method: Medium (Manual))   Pulse 74   Temp 96.5 °F (35.8 °C) (Tympanic)   Wt 57.5 kg (126 lb 12.2 oz)   LMP  (LMP Unknown)   SpO2 99%   BMI 23.56 kg/m²     Physical Exam   Constitutional: She is oriented to person, place, and time. She appears well-developed and well-nourished. No distress.   HENT:   Head: Normocephalic and atraumatic.   Mouth/Throat: Oropharynx is clear and moist.   Eyes: Pupils are equal, round, and reactive to light. EOM are normal.   Neck: Neck supple. No thyromegaly present.   Cardiovascular: Normal rate and regular rhythm.   Murmur heard.  Pulmonary/Chest: Breath sounds normal. She has no wheezes. She has no rales.   Abdominal: Soft.  Bowel sounds are normal. She exhibits mass. There is no tenderness. There is no rebound.   Musculoskeletal: She exhibits no edema.   Lymphadenopathy:     She has no cervical adenopathy.   Neurological: She is alert and oriented to person, place, and time.   Skin: Skin is warm and dry.   Psychiatric: She has a normal mood and affect. Her behavior is normal.       Assessment:     1. Routine general medical examination at a health care facility    2. 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 Need for pneumococcal vaccination    4. Asymptomatic menopausal state    5. Uterine mass    6. Murmur    7. 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  Dispense: 180 tablet; Refill: 2    Need for pneumococcal vaccination  -     (In Office Administered) Pneumococcal Polysaccharide Vaccine (23 Valent) (SQ/IM)    Asymptomatic menopausal state  -     DXA Bone Density Spine And Hip; Future; Expected date: 05/03/2019    Uterine mass  -     Ambulatory Referral to Gynecology    Murmur  -     2D echo with color flow doppler; Future; Expected date:  05/03/2019    Hyperlipidemia associated with type 2 diabetes mellitus  Continue statin  -     rosuvastatin (CRESTOR) 10 MG tablet; Take 1 tablet (10 mg total) by mouth once daily.  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, without long-term current use of insulin    Relevant Medications    metFORMIN (GLUCOPHAGE) 1000 MG tablet    Other Relevant Orders    Comprehensive metabolic panel    CBC auto differential    TSH    Lipid panel    Hemoglobin A1c    Hemoglobin A1c      Other Visit Diagnoses     Routine general medical examination at a health care facility    -  Primary    Need for pneumococcal vaccination        Relevant Orders    (In Office Administered) Pneumococcal Polysaccharide Vaccine (23 Valent) (SQ/IM)    Asymptomatic menopausal state        Relevant Orders    DXA Bone Density Spine And Hip    Uterine mass        Relevant Orders    Ambulatory Referral to Gynecology    Murmur        Relevant Orders    2D echo with color flow doppler    Hyperlipidemia associated with type 2 diabetes mellitus        Relevant Medications    metFORMIN (GLUCOPHAGE) 1000 MG tablet    rosuvastatin (CRESTOR) 10 MG tablet          Follow up in about 6 months (around 11/3/2019), or if symptoms worsen or fail to improve.

## 2019-05-08 ENCOUNTER — TELEPHONE (OUTPATIENT)
Dept: INTERNAL MEDICINE | Facility: CLINIC | Age: 74
End: 2019-05-08

## 2019-05-08 DIAGNOSIS — E05.90 SUBCLINICAL HYPERTHYROIDISM: Primary | ICD-10-CM

## 2019-05-08 DIAGNOSIS — R79.89 ABNORMAL THYROID BLOOD TEST: ICD-10-CM

## 2019-05-08 NOTE — TELEPHONE ENCOUNTER
Thyroid function is mildly abnormal.  Not at a level that needs treatment currently.  Have recommend recheck thyroid studies in 3 months.  Also let patient know very important to follow up with gynecology and complete echocardiogram.

## 2019-05-10 ENCOUNTER — PATIENT OUTREACH (OUTPATIENT)
Dept: ADMINISTRATIVE | Facility: HOSPITAL | Age: 74
End: 2019-05-10

## 2019-05-16 DIAGNOSIS — E78.5 HYPERLIPIDEMIA ASSOCIATED WITH TYPE 2 DIABETES MELLITUS: ICD-10-CM

## 2019-05-16 DIAGNOSIS — E11.69 HYPERLIPIDEMIA ASSOCIATED WITH TYPE 2 DIABETES MELLITUS: ICD-10-CM

## 2019-05-16 DIAGNOSIS — E11.3212 TYPE 2 DIABETES MELLITUS WITH LEFT EYE AFFECTED BY MILD NONPROLIFERATIVE RETINOPATHY AND MACULAR EDEMA, WITHOUT LONG-TERM CURRENT USE OF INSULIN: ICD-10-CM

## 2019-05-16 RX ORDER — ROSUVASTATIN CALCIUM 10 MG/1
10 TABLET, COATED ORAL DAILY
Qty: 90 TABLET | Refills: 2 | Status: SHIPPED | OUTPATIENT
Start: 2019-05-16 | End: 2020-03-18 | Stop reason: SDUPTHER

## 2019-05-16 RX ORDER — METFORMIN HYDROCHLORIDE 1000 MG/1
1000 TABLET ORAL 2 TIMES DAILY WITH MEALS
Qty: 180 TABLET | Refills: 2 | Status: SHIPPED | OUTPATIENT
Start: 2019-05-16 | End: 2020-03-18 | Stop reason: SDUPTHER

## 2019-05-16 NOTE — TELEPHONE ENCOUNTER
----- Message from Camilla Booth sent at 5/16/2019  9:25 AM CDT -----  Contact: self/[phone]  Type:  Test Results    Who Called: Julia Schroeder    Name of Test (Lab/Mammo/Etc): lab  Date of Test: 05-03-19  Ordering Provider: Dr Lovell  Where the test was performed: [facility] [place]  Would the patient rather a call back or a response via [facility]? Call back   Best Call Back Number: [phone]  Additional Information: Patient states her medication has not been call in. Thanks/ar

## 2019-05-16 NOTE — TELEPHONE ENCOUNTER
Notified pt that thyroid function is mildly abnormal, but not at a point that she needs treatment, and that thyroid level needs to be checked in 3 months.  Notified pt that it is very important to follow up with gynecology and complete the echocardiogram that was ordered.  Pt stated she is not ready to schedule appts and will call back at a later time.    Pt stated she has not received her prescriptions for metformin or rosuvastatin.  Notified pt that scripts were sent to [facility] on 5/3/19.  Pt stated she no longer uses [facility] and needs scripts sent to [facility].  Notified pt that request will be sent to doctor for approval.

## 2019-06-24 ENCOUNTER — PATIENT OUTREACH (OUTPATIENT)
Dept: ADMINISTRATIVE | Facility: HOSPITAL | Age: 74
End: 2019-06-24

## 2019-06-24 ENCOUNTER — OFFICE VISIT (OUTPATIENT)
Dept: INTERNAL MEDICINE | Facility: CLINIC | Age: 74
End: 2019-06-24
Payer: MEDICARE

## 2019-06-24 VITALS
OXYGEN SATURATION: 98 % | TEMPERATURE: 98 F | WEIGHT: 122.81 LBS | DIASTOLIC BLOOD PRESSURE: 79 MMHG | HEART RATE: 85 BPM | SYSTOLIC BLOOD PRESSURE: 133 MMHG | BODY MASS INDEX: 22.6 KG/M2 | HEIGHT: 62 IN

## 2019-06-24 DIAGNOSIS — R19.00 PELVIC MASS: ICD-10-CM

## 2019-06-24 DIAGNOSIS — Z12.31 ENCOUNTER FOR SCREENING MAMMOGRAM FOR BREAST CANCER: ICD-10-CM

## 2019-06-24 DIAGNOSIS — E11.3212 TYPE 2 DIABETES MELLITUS WITH LEFT EYE AFFECTED BY MILD NONPROLIFERATIVE RETINOPATHY AND MACULAR EDEMA, WITHOUT LONG-TERM CURRENT USE OF INSULIN: Primary | ICD-10-CM

## 2019-06-24 PROCEDURE — 1101F PT FALLS ASSESS-DOCD LE1/YR: CPT | Mod: CPTII,S$GLB,, | Performed by: INTERNAL MEDICINE

## 2019-06-24 PROCEDURE — 3045F PR MOST RECENT HEMOGLOBIN A1C LEVEL 7.0-9.0%: ICD-10-PCS | Mod: CPTII,S$GLB,, | Performed by: INTERNAL MEDICINE

## 2019-06-24 PROCEDURE — 1101F PR PT FALLS ASSESS DOC 0-1 FALLS W/OUT INJ PAST YR: ICD-10-PCS | Mod: CPTII,S$GLB,, | Performed by: INTERNAL MEDICINE

## 2019-06-24 PROCEDURE — 99213 OFFICE O/P EST LOW 20 MIN: CPT | Mod: S$GLB,,, | Performed by: INTERNAL MEDICINE

## 2019-06-24 PROCEDURE — 99999 PR PBB SHADOW E&M-EST. PATIENT-LVL IV: ICD-10-PCS | Mod: PBBFAC,,, | Performed by: INTERNAL MEDICINE

## 2019-06-24 PROCEDURE — 99213 PR OFFICE/OUTPT VISIT, EST, LEVL III, 20-29 MIN: ICD-10-PCS | Mod: S$GLB,,, | Performed by: INTERNAL MEDICINE

## 2019-06-24 PROCEDURE — 99999 PR PBB SHADOW E&M-EST. PATIENT-LVL IV: CPT | Mod: PBBFAC,,, | Performed by: INTERNAL MEDICINE

## 2019-06-24 PROCEDURE — 3045F PR MOST RECENT HEMOGLOBIN A1C LEVEL 7.0-9.0%: CPT | Mod: CPTII,S$GLB,, | Performed by: INTERNAL MEDICINE

## 2019-06-24 NOTE — PROGRESS NOTES
"Subjective:      Patient ID: Julia Schroeder is a 74 y.o. female.    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  Compliant with meds without significant side effects. Here today with family member.     Review of Systems   Constitutional: Negative for chills and fever.   HENT: Negative for ear pain and sore throat.    Respiratory: Negative for cough.    Cardiovascular: Negative for chest pain.   Gastrointestinal: Negative for abdominal pain and blood in stool.   Genitourinary: Negative for dysuria and hematuria.   Neurological: Negative for seizures and syncope.     Objective:   /79   Pulse 85   Temp 97.8 °F (36.6 °C) (Tympanic)   Ht 5' 1.5" (1.562 m)   Wt 55.7 kg (122 lb 12.7 oz)   LMP  (LMP Unknown)   SpO2 98%   BMI 22.83 kg/m²     Physical Exam   Constitutional: She is oriented to person, place, and time. She appears well-developed and well-nourished. No distress.   HENT:   Head: Normocephalic and atraumatic.   Mouth/Throat: Oropharynx is clear and moist.   Eyes: Pupils are equal, round, and reactive to light. EOM are normal.   Neck: Neck supple. No thyromegaly present.   Cardiovascular: Normal rate and regular rhythm.   Pulmonary/Chest: Breath sounds normal. She has no wheezes. She has no rales.   Abdominal: Soft. Bowel sounds are normal. She exhibits mass. There is no tenderness.   Lymphadenopathy:     She has no cervical adenopathy.   Neurological: She is alert and oriented to person, place, and time.   Skin: Skin is warm and dry.   Psychiatric: She has a normal mood and affect. Her behavior is normal.       Assessment:     1. Type 2 diabetes mellitus " with left eye affected by mild nonproliferative retinopathy and macular edema, without long-term current use of insulin    2. Encounter for screening mammogram for breast cancer    3. 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, without long-term current use of insulin  Controlled  Cont current meds    Encounter for screening mammogram for breast cancer  -     Mammo Digital Screening Bilat; Future; Expected date: 07/08/2019    Pelvic mass  -     Ambulatory Referral to Gynecology        Lab Frequency Next Occurrence   DXA Bone Density Spine And Hip Once 05/03/2019   TSH Once 08/08/2019   T4, free Once 08/08/2019   Microalbumin/creatinine urine ratio Once 05/31/2019   Mammo Digital Screening Bilat Once 07/08/2019       Problem List Items Addressed This Visit        Ophtho    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

## 2019-07-01 ENCOUNTER — HOSPITAL ENCOUNTER (OUTPATIENT)
Dept: RADIOLOGY | Facility: HOSPITAL | Age: 74
Discharge: HOME | End: 2019-07-01
Attending: INTERNAL MEDICINE
Payer: MEDICARE

## 2019-07-01 ENCOUNTER — OFFICE VISIT (OUTPATIENT)
Dept: OBSTETRICS AND GYNECOLOGY | Facility: CLINIC | Age: 74
End: 2019-07-01
Payer: MEDICARE

## 2019-07-01 VITALS
SYSTOLIC BLOOD PRESSURE: 114 MMHG | BODY MASS INDEX: 23 KG/M2 | HEIGHT: 62 IN | WEIGHT: 125 LBS | DIASTOLIC BLOOD PRESSURE: 70 MMHG

## 2019-07-01 VITALS — BODY MASS INDEX: 22.45 KG/M2 | WEIGHT: 122 LBS | HEIGHT: 62 IN

## 2019-07-01 DIAGNOSIS — Z87.898 HISTORY OF PELVIC MASS: ICD-10-CM

## 2019-07-01 DIAGNOSIS — Z01.419 ENCOUNTER FOR GYNECOLOGICAL EXAMINATION WITHOUT ABNORMAL FINDING: Primary | ICD-10-CM

## 2019-07-01 DIAGNOSIS — Z12.31 ENCOUNTER FOR SCREENING MAMMOGRAM FOR BREAST CANCER: ICD-10-CM

## 2019-07-01 PROCEDURE — 99999 PR PBB SHADOW E&M-EST. PATIENT-LVL III: CPT | Mod: PBBFAC,,, | Performed by: NURSE PRACTITIONER

## 2019-07-01 PROCEDURE — G0101 CA SCREEN;PELVIC/BREAST EXAM: HCPCS | Mod: S$GLB,,, | Performed by: NURSE PRACTITIONER

## 2019-07-01 PROCEDURE — 77063 MAMMO DIGITAL SCREENING BILAT WITH TOMOSYNTHESIS_CAD: ICD-10-PCS | Mod: 26,,, | Performed by: RADIOLOGY

## 2019-07-01 PROCEDURE — 77063 BREAST TOMOSYNTHESIS BI: CPT | Mod: 26,,, | Performed by: RADIOLOGY

## 2019-07-01 PROCEDURE — 99999 PR PBB SHADOW E&M-EST. PATIENT-LVL III: ICD-10-PCS | Mod: PBBFAC,,, | Performed by: NURSE PRACTITIONER

## 2019-07-01 PROCEDURE — 77067 SCR MAMMO BI INCL CAD: CPT | Mod: 26,,, | Performed by: RADIOLOGY

## 2019-07-01 PROCEDURE — G0101 PR CA SCREEN;PELVIC/BREAST EXAM: ICD-10-PCS | Mod: S$GLB,,, | Performed by: NURSE PRACTITIONER

## 2019-07-01 PROCEDURE — 77067 SCR MAMMO BI INCL CAD: CPT | Mod: TC

## 2019-07-01 PROCEDURE — 77067 MAMMO DIGITAL SCREENING BILAT WITH TOMOSYNTHESIS_CAD: ICD-10-PCS | Mod: 26,,, | Performed by: RADIOLOGY
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
## 2019-07-01 NOTE — LETTER
July 1, 2019      Devon Lovell MD  [id number] [facility] [address]           [facility]  [address] [facility] [address]  Phone: [phone]  Fax: [phone]          Patient: Julia Schroeder   MR Number: [id number]   YOB: 1944   Date of Visit: 7/1/2019       Dear Dr. Devon Lovell:    Thank you for referring Julia Schroeder to me for evaluation. Attached you will find relevant portions of my assessment and plan of care.    If you have questions, please do not hesitate to call me. I look forward to following Julia Schroeder along with you.    Sincerely,    Berta Espana, NP    Enclosure  CC:  No Recipients    If you would like to receive this communication electronically, please contact [email or web address] or [phone] to request more information on [facility] access.    For providers and/or their staff who would like to refer a patient to [facility], please contact us through our one-stop-shop provider referral line, [facility], at [phone].    If you feel you have received this communication in error or would no longer like to receive these types of communications, please e-mail [email or web address]

## 2019-07-05 ENCOUNTER — TELEPHONE (OUTPATIENT)
Dept: RADIOLOGY | Facility: HOSPITAL | Age: 74
End: 2019-07-05

## 2019-07-08 ENCOUNTER — TELEPHONE (OUTPATIENT)
Dept: OBSTETRICS AND GYNECOLOGY | Facility: CLINIC | Age: 74
End: 2019-07-08

## 2019-07-08 ENCOUNTER — HOSPITAL ENCOUNTER (OUTPATIENT)
Dept: RADIOLOGY | Facility: HOSPITAL | Age: 74
Discharge: HOME | End: 2019-07-08
Attending: NURSE PRACTITIONER
Payer: MEDICARE

## 2019-07-08 DIAGNOSIS — Z87.898 HISTORY OF PELVIC MASS: ICD-10-CM

## 2019-07-08 PROCEDURE — 76856 US EXAM PELVIC COMPLETE: CPT | Mod: 26,,, | Performed by: RADIOLOGY

## 2019-07-08 PROCEDURE — 76856 US PELVIS COMP WITH TRANSVAG NON-OB (XPD): ICD-10-PCS | Mod: 26,,, | Performed by: RADIOLOGY

## 2019-07-08 PROCEDURE — 76830 TRANSVAGINAL US NON-OB: CPT | Mod: 26,,, | Performed by: RADIOLOGY

## 2019-07-08 PROCEDURE — 76830 US PELVIS COMP WITH TRANSVAG NON-OB (XPD): ICD-10-PCS | Mod: 26,,, | Performed by: RADIOLOGY

## 2019-07-08 PROCEDURE — 76830 TRANSVAGINAL US NON-OB: CPT | Mod: TC

## 2019-07-08 NOTE — TELEPHONE ENCOUNTER
Patient given results of calcified fibroid and will keep appointment with Dr. Janell Leslie.  Patient verbalized understanding.

## 2019-07-08 NOTE — TELEPHONE ENCOUNTER
----- Message from Berta Espana NP sent at 7/8/2019  1:58 PM CDT -----  Limited exam pelvic u/s shows calcified fibroid, she has a f/u with Dr. Janell Leslie at [facility], make sure she keeps her f/u

## 2019-08-07 ENCOUNTER — OFFICE VISIT (OUTPATIENT)
Dept: OBSTETRICS AND GYNECOLOGY | Facility: CLINIC | Age: 74
End: 2019-08-07
Payer: MEDICARE

## 2019-08-07 ENCOUNTER — LAB VISIT (OUTPATIENT)
Dept: LAB | Facility: HOSPITAL | Age: 74
End: 2019-08-07
Attending: OBSTETRICS & GYNECOLOGY
Payer: MEDICARE

## 2019-08-07 VITALS
WEIGHT: 125 LBS | SYSTOLIC BLOOD PRESSURE: 122 MMHG | BODY MASS INDEX: 23 KG/M2 | HEIGHT: 62 IN | DIASTOLIC BLOOD PRESSURE: 78 MMHG

## 2019-08-07 DIAGNOSIS — R19.00 PELVIC MASS: Primary | ICD-10-CM

## 2019-08-07 DIAGNOSIS — R19.00 PELVIC MASS: ICD-10-CM

## 2019-08-07 DIAGNOSIS — D25.9 UTERINE LEIOMYOMA, UNSPECIFIED LOCATION: ICD-10-CM

## 2019-08-07 LAB
CREAT SERPL-MCNC: 0.8 MG/DL (ref 0.5–1.4)
EST. GFR  (AFRICAN AMERICAN): >60 ML/MIN/1.73 M^2
EST. GFR  (NON AFRICAN AMERICAN): >60 ML/MIN/1.73 M^2

## 2019-08-07 PROCEDURE — 1101F PR PT FALLS ASSESS DOC 0-1 FALLS W/OUT INJ PAST YR: ICD-10-PCS | Mod: CPTII,S$GLB,, | Performed by: OBSTETRICS & GYNECOLOGY

## 2019-08-07 PROCEDURE — 99999 PR PBB SHADOW E&M-EST. PATIENT-LVL III: ICD-10-PCS | Mod: PBBFAC,,, | Performed by: OBSTETRICS & GYNECOLOGY

## 2019-08-07 PROCEDURE — 82565 ASSAY OF CREATININE: CPT

## 2019-08-07 PROCEDURE — 99999 PR PBB SHADOW E&M-EST. PATIENT-LVL III: CPT | Mod: PBBFAC,,, | Performed by: OBSTETRICS & GYNECOLOGY

## 2019-08-07 PROCEDURE — 99214 PR OFFICE/OUTPT VISIT, EST, LEVL IV, 30-39 MIN: ICD-10-PCS | Mod: S$GLB,,, | Performed by: OBSTETRICS & GYNECOLOGY

## 2019-08-07 PROCEDURE — 1101F PT FALLS ASSESS-DOCD LE1/YR: CPT | Mod: CPTII,S$GLB,, | Performed by: OBSTETRICS & GYNECOLOGY

## 2019-08-07 PROCEDURE — 36415 COLL VENOUS BLD VENIPUNCTURE: CPT

## 2019-08-07 PROCEDURE — 99214 OFFICE O/P EST MOD 30 MIN: CPT | Mod: S$GLB,,, | Performed by: OBSTETRICS & GYNECOLOGY

## 2019-08-07 NOTE — PROGRESS NOTES
Subjective:       Patient ID: Julia Schroeder is a 74 y.o. female.    Chief Complaint:  Pelvic Mass      History of Present Illness  HPI  Presents for a consultation for a pelvic mass.  The patient is completely asymptomatic.  States the mass was palpated by her PCP.  CT Abd/pelvis done in  shows a 14 cm calcified mass consistent with a calcified fibroid.  The patient declined any further work-up for it at the time.  States she does not notice it all.  Has a good appetite.  Eats well.  Denies any early satiety.  No pelvic or abdominal pain.  No VB.  No urinary frequency or urgency.  No difficulty voiding or emptying her bladder.  No GUS.  No constipation.    GYN & OB History  No LMP recorded (lmp unknown). Patient is postmenopausal.   Date of Last Pap: No result found    OB History    Para Term  AB Living   0 0 0 0 0 0   SAB TAB Ectopic Multiple Live Births   0 0 0 0 0       Review of Systems  Review of Systems   Constitutional: Negative for fatigue, fever and unexpected weight change.   Gastrointestinal: Negative for abdominal pain, bloating, blood in stool, constipation, diarrhea, nausea and vomiting.   Endocrine: Negative for hot flashes.   Genitourinary: Negative for decreased libido, dysuria, flank pain, frequency, genital sores, hematuria, pelvic pain, urgency, vaginal bleeding, vaginal discharge, vaginal pain, urinary incontinence, postmenopausal bleeding and vaginal odor.   Integumentary:  Negative for rash and hair changes.   Psychiatric/Behavioral: Negative for depression. The patient is not nervous/anxious.            Objective:    Physical Exam:   Constitutional: She is oriented to person, place, and time. She appears well-developed and well-nourished. No distress.             Abdominal: Soft. She exhibits mass. She exhibits no distension. There is no tenderness. There is no rebound and no guarding. Hernia confirmed negative in the right inguinal area and confirmed negative in the left  "inguinal area.         Genitourinary: Pelvic exam was performed with patient supine. There is no rash, tenderness, lesion or injury on the right labia. There is no rash, tenderness, lesion or injury on the left labia. Uterus is enlarged. Uterus is not deviated, not fixed, not tender and not experiencing uterine prolapse. Right adnexum displays mass. Right adnexum displays no tenderness and no fullness. Left adnexum displays mass. Left adnexum displays no tenderness and no fullness. There is tenderness in the vagina. No erythema, bleeding, rectocele, cystocele or unspecified prolapse of vaginal walls in the vagina. No foreign body in the vagina. No signs of injury around the vagina. No vaginal discharge found. Cervix exhibits no motion tenderness, no discharge and no friability.   Genitourinary Comments: Bimanual exam is very limited by stenosis and tenderness of the vaginal introitus.  Mass is firm, midline, mobile, and extends to 4 cm above the umbilicus        Uterus Size: 6 cm       Neurological: She is alert and oriented to person, place, and time.     Psychiatric: She has a normal mood and affect.        CT Abd/pelvis in 2016: "No significant findings in the lung bases. No pleural effusions. The liver, spleen, gallbladder, adrenal glands and pancreas are unremarkable. Most of the orally ingested contrast material is within the stomach and proximal small bowel at the time of scanning. There are distal fluid filled loops of small bowel with no air-fluid levels appreciated. There is thickening of the prepyloric gastric antrum which could be physiologic. No biliary dilatation or ascites. Kidneys appear to be functioning bilaterally without a focal mass. there are prominent extrarenal pelves noted bilaterally more pronounced on the left. There is tortuous.   There is a calcified mass within the lower abdomen extending into the pelvis measuring seen 0.4 cm in transverse diameter by about 14 cm in craniocaudal " "dimension and 14.1 cm. It corresponds to the finding noted on ultrasound November 22. Uterine origin is favored. The bladder does not contain contrast at the time of scanning but is unremarkable. The ovaries are not identified with certainty. There is some early arteriosclerotic disease within the infrarenal abdominal aorta and both iliac and femoral arteries. There is grade 1 spondylolisthesis of L5 on S1 and multilevel degenerative change in the spine."    Pelvic ultrasound: limited by pelvic mass which was not completely visualized  Assessment:        1. Pelvic mass    2. Uterine leiomyoma, unspecified location                Plan:      Julia was seen today for pelvic mass.    Diagnoses and all orders for this visit:    Pelvic mass  -     MRI Female Pelvis W W/O Contrast; Future  -     CREATININE, SERUM; Future    Uterine leiomyoma, unspecified location  -     MRI Female Pelvis W W/O Contrast; Future     Reviewed findings with the patient.  Explained this is most likely a calcified fibroid, which is typically benign.  It does not seem to be causing any symptoms or other problems for her at this time.  Ultrasound was unable to completely visualize, measure, or characterize the mass.  At this point, I recommend a pelvic MRI.  If this appears most consistent with a fibroid, and is stable in size from imaging 3 years ago, I recommend observation since she is completely asymptomatic.  If this has grown significantly since imaging in 2016 or appears more concerning for a malignant process, then will recommend surgical management.     "

## 2019-08-14 ENCOUNTER — TELEPHONE (OUTPATIENT)
Dept: RADIOLOGY | Facility: HOSPITAL | Age: 74
End: 2019-08-14

## 2019-08-21 ENCOUNTER — TELEPHONE (OUTPATIENT)
Dept: RADIOLOGY | Facility: HOSPITAL | Age: 74
End: 2019-08-21

## 2019-10-28 ENCOUNTER — PATIENT OUTREACH (OUTPATIENT)
Dept: ADMINISTRATIVE | Facility: HOSPITAL | Age: 74
End: 2019-10-28

## 2019-10-28 DIAGNOSIS — E11.3212 TYPE 2 DIABETES MELLITUS WITH LEFT EYE AFFECTED BY MILD NONPROLIFERATIVE RETINOPATHY AND MACULAR EDEMA, WITHOUT LONG-TERM CURRENT USE OF INSULIN: Primary | ICD-10-CM

## 2019-10-28 DIAGNOSIS — Z12.11 SCREENING FOR COLON CANCER: ICD-10-CM

## 2019-10-29 ENCOUNTER — TELEPHONE (OUTPATIENT)
Dept: ENDOSCOPY | Facility: HOSPITAL | Age: 74
End: 2019-10-29

## 2019-11-01 ENCOUNTER — PATIENT OUTREACH (OUTPATIENT)
Dept: ADMINISTRATIVE | Facility: HOSPITAL | Age: 74
End: 2019-11-01

## 2019-11-16 ENCOUNTER — TELEPHONE (OUTPATIENT)
Dept: ENDOSCOPY | Facility: HOSPITAL | Age: 74
End: 2019-11-16

## 2020-03-18 DIAGNOSIS — E11.69 HYPERLIPIDEMIA ASSOCIATED WITH TYPE 2 DIABETES MELLITUS: ICD-10-CM

## 2020-03-18 DIAGNOSIS — E78.5 HYPERLIPIDEMIA ASSOCIATED WITH TYPE 2 DIABETES MELLITUS: ICD-10-CM

## 2020-03-18 DIAGNOSIS — E11.3212 TYPE 2 DIABETES MELLITUS WITH LEFT EYE AFFECTED BY MILD NONPROLIFERATIVE RETINOPATHY AND MACULAR EDEMA, WITHOUT LONG-TERM CURRENT USE OF INSULIN: ICD-10-CM

## 2020-03-18 RX ORDER — METFORMIN HYDROCHLORIDE 1000 MG/1
1000 TABLET ORAL 2 TIMES DAILY WITH MEALS
Qty: 180 TABLET | Refills: 2 | Status: SHIPPED | OUTPATIENT
Start: 2020-03-18 | End: 2021-01-20

## 2020-03-18 RX ORDER — METFORMIN HYDROCHLORIDE 1000 MG/1
TABLET ORAL
Qty: 180 TABLET | OUTPATIENT
Start: 2020-03-18

## 2020-03-18 RX ORDER — ROSUVASTATIN CALCIUM 10 MG/1
10 TABLET, COATED ORAL DAILY
Qty: 90 TABLET | Refills: 2 | Status: SHIPPED | OUTPATIENT
Start: 2020-03-18 | End: 2021-01-20

## 2020-03-18 RX ORDER — ROSUVASTATIN CALCIUM 10 MG/1
TABLET, COATED ORAL
Qty: 90 TABLET | OUTPATIENT
Start: 2020-03-18

## 2020-09-02 ENCOUNTER — PATIENT OUTREACH (OUTPATIENT)
Dept: ADMINISTRATIVE | Facility: HOSPITAL | Age: 75
End: 2020-09-02

## 2020-09-02 NOTE — PROGRESS NOTES
Called Pt to scheduled Physical and Labs, no answer      Goldie GUAJARDO LPN Care Coordinator  Care Coordination Department  [facility]  [phone]

## 2020-10-06 ENCOUNTER — OFFICE VISIT (OUTPATIENT)
Dept: INTERNAL MEDICINE | Facility: CLINIC | Age: 75
End: 2020-10-06
Payer: MEDICARE

## 2020-10-06 ENCOUNTER — LAB VISIT (OUTPATIENT)
Dept: LAB | Facility: HOSPITAL | Age: 75
End: 2020-10-06
Attending: INTERNAL MEDICINE
Payer: MEDICARE

## 2020-10-06 VITALS
WEIGHT: 127.88 LBS | BODY MASS INDEX: 23.77 KG/M2 | HEART RATE: 93 BPM | DIASTOLIC BLOOD PRESSURE: 80 MMHG | OXYGEN SATURATION: 98 % | SYSTOLIC BLOOD PRESSURE: 136 MMHG | TEMPERATURE: 96 F

## 2020-10-06 DIAGNOSIS — E11.3212 TYPE 2 DIABETES MELLITUS WITH LEFT EYE AFFECTED BY MILD NONPROLIFERATIVE RETINOPATHY AND MACULAR EDEMA, WITHOUT LONG-TERM CURRENT USE OF INSULIN: ICD-10-CM

## 2020-10-06 DIAGNOSIS — R19.00 PELVIC MASS: ICD-10-CM

## 2020-10-06 DIAGNOSIS — E05.90 SUBCLINICAL HYPERTHYROIDISM: ICD-10-CM

## 2020-10-06 DIAGNOSIS — Z12.31 ENCOUNTER FOR SCREENING MAMMOGRAM FOR BREAST CANCER: ICD-10-CM

## 2020-10-06 DIAGNOSIS — Z23 NEED FOR INFLUENZA VACCINATION: ICD-10-CM

## 2020-10-06 DIAGNOSIS — Z00.00 ROUTINE GENERAL MEDICAL EXAMINATION AT A HEALTH CARE FACILITY: Primary | ICD-10-CM

## 2020-10-06 DIAGNOSIS — E78.5 HYPERLIPIDEMIA ASSOCIATED WITH TYPE 2 DIABETES MELLITUS: ICD-10-CM

## 2020-10-06 DIAGNOSIS — Z01.818 PREOP EXAMINATION: ICD-10-CM

## 2020-10-06 DIAGNOSIS — Z78.0 ASYMPTOMATIC MENOPAUSAL STATE: ICD-10-CM

## 2020-10-06 DIAGNOSIS — E11.69 HYPERLIPIDEMIA ASSOCIATED WITH TYPE 2 DIABETES MELLITUS: ICD-10-CM

## 2020-10-06 LAB
ALBUMIN SERPL BCP-MCNC: 4.2 G/DL (ref 3.5–5.2)
ALP SERPL-CCNC: 47 U/L (ref 55–135)
ALT SERPL W/O P-5'-P-CCNC: 12 U/L (ref 10–44)
ANION GAP SERPL CALC-SCNC: 11 MMOL/L (ref 8–16)
AST SERPL-CCNC: 17 U/L (ref 10–40)
BASOPHILS # BLD AUTO: 0.03 K/UL (ref 0–0.2)
BASOPHILS NFR BLD: 0.5 % (ref 0–1.9)
BILIRUB SERPL-MCNC: 0.3 MG/DL (ref 0.1–1)
BUN SERPL-MCNC: 12 MG/DL (ref 8–23)
CALCIUM SERPL-MCNC: 9.3 MG/DL (ref 8.7–10.5)
CHLORIDE SERPL-SCNC: 102 MMOL/L (ref 95–110)
CHOLEST SERPL-MCNC: 139 MG/DL (ref 120–199)
CHOLEST/HDLC SERPL: 2 {RATIO} (ref 2–5)
CO2 SERPL-SCNC: 28 MMOL/L (ref 23–29)
CREAT SERPL-MCNC: 0.8 MG/DL (ref 0.5–1.4)
DIFFERENTIAL METHOD: ABNORMAL
EOSINOPHIL # BLD AUTO: 0.1 K/UL (ref 0–0.5)
EOSINOPHIL NFR BLD: 1.3 % (ref 0–8)
ERYTHROCYTE [DISTWIDTH] IN BLOOD BY AUTOMATED COUNT: 13 % (ref 11.5–14.5)
EST. GFR  (AFRICAN AMERICAN): >60 ML/MIN/1.73 M^2
EST. GFR  (NON AFRICAN AMERICAN): >60 ML/MIN/1.73 M^2
ESTIMATED AVG GLUCOSE: 171 MG/DL (ref 68–131)
GLUCOSE SERPL-MCNC: 193 MG/DL (ref 70–110)
HBA1C MFR BLD HPLC: 7.6 % (ref 4–5.6)
HCT VFR BLD AUTO: 36.6 % (ref 37–48.5)
HDLC SERPL-MCNC: 68 MG/DL (ref 40–75)
HDLC SERPL: 48.9 % (ref 20–50)
HGB BLD-MCNC: 11.7 G/DL (ref 12–16)
IMM GRANULOCYTES # BLD AUTO: 0.02 K/UL (ref 0–0.04)
IMM GRANULOCYTES NFR BLD AUTO: 0.4 % (ref 0–0.5)
LDLC SERPL CALC-MCNC: 63 MG/DL (ref 63–159)
LYMPHOCYTES # BLD AUTO: 1.2 K/UL (ref 1–4.8)
LYMPHOCYTES NFR BLD: 22.2 % (ref 18–48)
MCH RBC QN AUTO: 29.6 PG (ref 27–31)
MCHC RBC AUTO-ENTMCNC: 32 G/DL (ref 32–36)
MCV RBC AUTO: 93 FL (ref 82–98)
MONOCYTES # BLD AUTO: 0.3 K/UL (ref 0.3–1)
MONOCYTES NFR BLD: 6.2 % (ref 4–15)
NEUTROPHILS # BLD AUTO: 3.8 K/UL (ref 1.8–7.7)
NEUTROPHILS NFR BLD: 69.4 % (ref 38–73)
NONHDLC SERPL-MCNC: 71 MG/DL
NRBC BLD-RTO: 0 /100 WBC
PLATELET # BLD AUTO: 201 K/UL (ref 150–350)
PMV BLD AUTO: 11.1 FL (ref 9.2–12.9)
POTASSIUM SERPL-SCNC: 3.9 MMOL/L (ref 3.5–5.1)
PROT SERPL-MCNC: 7.1 G/DL (ref 6–8.4)
RBC # BLD AUTO: 3.95 M/UL (ref 4–5.4)
SODIUM SERPL-SCNC: 141 MMOL/L (ref 136–145)
TRIGL SERPL-MCNC: 40 MG/DL (ref 30–150)
TSH SERPL DL<=0.005 MIU/L-ACNC: 0.41 UIU/ML (ref 0.4–4)
WBC # BLD AUTO: 5.46 K/UL (ref 3.9–12.7)

## 2020-10-06 PROCEDURE — 3051F HG A1C>EQUAL 7.0%<8.0%: CPT | Mod: CPTII,S$GLB,, | Performed by: INTERNAL MEDICINE

## 2020-10-06 PROCEDURE — 84443 ASSAY THYROID STIM HORMONE: CPT

## 2020-10-06 PROCEDURE — 80061 LIPID PANEL: CPT

## 2020-10-06 PROCEDURE — 99999 PR PBB SHADOW E&M-EST. PATIENT-LVL IV: CPT | Mod: PBBFAC,,, | Performed by: INTERNAL MEDICINE

## 2020-10-06 PROCEDURE — 90694 VACC AIIV4 NO PRSRV 0.5ML IM: CPT | Mod: S$GLB,,, | Performed by: INTERNAL MEDICINE

## 2020-10-06 PROCEDURE — 3051F PR MOST RECENT HEMOGLOBIN A1C LEVEL 7.0 - < 8.0%: ICD-10-PCS | Mod: CPTII,S$GLB,, | Performed by: INTERNAL MEDICINE

## 2020-10-06 PROCEDURE — 85025 COMPLETE CBC W/AUTO DIFF WBC: CPT

## 2020-10-06 PROCEDURE — 99397 PR PREVENTIVE VISIT,EST,65 & OVER: ICD-10-PCS | Mod: 25,S$GLB,, | Performed by: INTERNAL MEDICINE

## 2020-10-06 PROCEDURE — 90694 FLU VACCINE - QUADRIVALENT - ADJUVANTED: ICD-10-PCS | Mod: S$GLB,,, | Performed by: INTERNAL MEDICINE

## 2020-10-06 PROCEDURE — 99397 PER PM REEVAL EST PAT 65+ YR: CPT | Mod: 25,S$GLB,, | Performed by: INTERNAL MEDICINE

## 2020-10-06 PROCEDURE — G0008 ADMIN INFLUENZA VIRUS VAC: HCPCS | Mod: S$GLB,,, | Performed by: INTERNAL MEDICINE

## 2020-10-06 PROCEDURE — 83036 HEMOGLOBIN GLYCOSYLATED A1C: CPT

## 2020-10-06 PROCEDURE — 99999 PR PBB SHADOW E&M-EST. PATIENT-LVL IV: ICD-10-PCS | Mod: PBBFAC,,, | Performed by: INTERNAL MEDICINE

## 2020-10-06 PROCEDURE — 36415 COLL VENOUS BLD VENIPUNCTURE: CPT

## 2020-10-06 PROCEDURE — G0008 FLU VACCINE - QUADRIVALENT - ADJUVANTED: ICD-10-PCS | Mod: S$GLB,,, | Performed by: INTERNAL MEDICINE

## 2020-10-06 PROCEDURE — 80053 COMPREHEN METABOLIC PANEL: CPT

## 2020-11-06 ENCOUNTER — PATIENT OUTREACH (OUTPATIENT)
Dept: ADMINISTRATIVE | Facility: HOSPITAL | Age: 75
End: 2020-11-06

## 2021-01-01 ENCOUNTER — PATIENT MESSAGE (OUTPATIENT)
Dept: ADMINISTRATIVE | Facility: HOSPITAL | Age: 76
End: 2021-01-01

## 2021-01-01 ENCOUNTER — IMMUNIZATION (OUTPATIENT)
Dept: PRIMARY CARE CLINIC | Facility: CLINIC | Age: 76
End: 2021-01-01
Payer: MEDICARE

## 2021-01-01 ENCOUNTER — TELEPHONE (OUTPATIENT)
Dept: NEUROLOGY | Facility: CLINIC | Age: 76
End: 2021-01-01

## 2021-01-01 ENCOUNTER — LAB VISIT (OUTPATIENT)
Dept: LAB | Facility: HOSPITAL | Age: 76
End: 2021-01-01
Attending: INTERNAL MEDICINE
Payer: MEDICARE

## 2021-01-01 ENCOUNTER — TELEPHONE (OUTPATIENT)
Dept: INTERNAL MEDICINE | Facility: CLINIC | Age: 76
End: 2021-01-01

## 2021-01-01 ENCOUNTER — HOSPITAL ENCOUNTER (OUTPATIENT)
Dept: RADIOLOGY | Facility: HOSPITAL | Age: 76
Discharge: HOME | End: 2021-11-03
Attending: PSYCHIATRY & NEUROLOGY
Payer: MEDICARE

## 2021-01-01 ENCOUNTER — PATIENT OUTREACH (OUTPATIENT)
Dept: ADMINISTRATIVE | Facility: HOSPITAL | Age: 76
End: 2021-01-01

## 2021-01-01 ENCOUNTER — PATIENT OUTREACH (OUTPATIENT)
Dept: ADMINISTRATIVE | Facility: OTHER | Age: 76
End: 2021-01-01

## 2021-01-01 ENCOUNTER — HOSPITAL ENCOUNTER (OUTPATIENT)
Dept: CARDIOLOGY | Facility: HOSPITAL | Age: 76
Discharge: HOME | End: 2021-09-29
Attending: PSYCHIATRY & NEUROLOGY
Payer: MEDICARE

## 2021-01-01 ENCOUNTER — OFFICE VISIT (OUTPATIENT)
Dept: NEUROLOGY | Facility: CLINIC | Age: 76
End: 2021-01-01
Payer: MEDICARE

## 2021-01-01 ENCOUNTER — OFFICE VISIT (OUTPATIENT)
Dept: INTERNAL MEDICINE | Facility: CLINIC | Age: 76
End: 2021-01-01
Payer: MEDICARE

## 2021-01-01 ENCOUNTER — PATIENT OUTREACH (OUTPATIENT)
Dept: ADMINISTRATIVE | Facility: HOSPITAL | Age: 76
End: 2021-01-01
Payer: MEDICARE

## 2021-01-01 ENCOUNTER — TELEPHONE (OUTPATIENT)
Dept: DIABETES | Facility: CLINIC | Age: 76
End: 2021-01-01

## 2021-01-01 ENCOUNTER — PATIENT MESSAGE (OUTPATIENT)
Dept: ADMINISTRATIVE | Facility: OTHER | Age: 76
End: 2021-01-01

## 2021-01-01 ENCOUNTER — IMMUNIZATION (OUTPATIENT)
Dept: INTERNAL MEDICINE | Facility: CLINIC | Age: 76
End: 2021-01-01
Payer: MEDICARE

## 2021-01-01 ENCOUNTER — TELEPHONE (OUTPATIENT)
Dept: RADIOLOGY | Facility: HOSPITAL | Age: 76
End: 2021-01-01
Payer: MEDICARE

## 2021-01-01 ENCOUNTER — HOSPITAL ENCOUNTER (OUTPATIENT)
Dept: RADIOLOGY | Facility: HOSPITAL | Age: 76
Discharge: HOME | End: 2021-04-08
Attending: INTERNAL MEDICINE
Payer: MEDICARE

## 2021-01-01 ENCOUNTER — PATIENT OUTREACH (OUTPATIENT)
Dept: ADMINISTRATIVE | Facility: OTHER | Age: 76
End: 2021-01-01
Payer: MEDICARE

## 2021-01-01 ENCOUNTER — EXTERNAL HOME HEALTH (OUTPATIENT)
Dept: HOME HEALTH SERVICES | Facility: HOSPITAL | Age: 76
End: 2021-01-01
Payer: MEDICARE

## 2021-01-01 VITALS
HEART RATE: 75 BPM | TEMPERATURE: 96 F | WEIGHT: 134.94 LBS | OXYGEN SATURATION: 97 % | HEIGHT: 62 IN | BODY MASS INDEX: 24.83 KG/M2 | SYSTOLIC BLOOD PRESSURE: 134 MMHG | RESPIRATION RATE: 18 BRPM | DIASTOLIC BLOOD PRESSURE: 72 MMHG

## 2021-01-01 VITALS
RESPIRATION RATE: 16 BRPM | SYSTOLIC BLOOD PRESSURE: 140 MMHG | DIASTOLIC BLOOD PRESSURE: 70 MMHG | HEIGHT: 62 IN | WEIGHT: 145.31 LBS | BODY MASS INDEX: 26.74 KG/M2

## 2021-01-01 VITALS
DIASTOLIC BLOOD PRESSURE: 76 MMHG | HEART RATE: 80 BPM | RESPIRATION RATE: 16 BRPM | WEIGHT: 140.88 LBS | HEIGHT: 62 IN | OXYGEN SATURATION: 97 % | BODY MASS INDEX: 25.92 KG/M2 | SYSTOLIC BLOOD PRESSURE: 120 MMHG

## 2021-01-01 VITALS
RESPIRATION RATE: 18 BRPM | SYSTOLIC BLOOD PRESSURE: 116 MMHG | OXYGEN SATURATION: 96 % | TEMPERATURE: 97 F | DIASTOLIC BLOOD PRESSURE: 64 MMHG | HEIGHT: 62 IN | WEIGHT: 134.69 LBS | BODY MASS INDEX: 24.78 KG/M2 | HEART RATE: 100 BPM

## 2021-01-01 VITALS
WEIGHT: 135.56 LBS | BODY MASS INDEX: 25.2 KG/M2 | DIASTOLIC BLOOD PRESSURE: 80 MMHG | TEMPERATURE: 98 F | SYSTOLIC BLOOD PRESSURE: 122 MMHG | HEART RATE: 75 BPM | OXYGEN SATURATION: 98 %

## 2021-01-01 VITALS
BODY MASS INDEX: 25.36 KG/M2 | SYSTOLIC BLOOD PRESSURE: 104 MMHG | HEART RATE: 82 BPM | TEMPERATURE: 98 F | WEIGHT: 137.81 LBS | DIASTOLIC BLOOD PRESSURE: 68 MMHG | HEIGHT: 62 IN | RESPIRATION RATE: 18 BRPM | OXYGEN SATURATION: 95 %

## 2021-01-01 DIAGNOSIS — F03.90 DEMENTIA WITHOUT BEHAVIORAL DISTURBANCE, UNSPECIFIED DEMENTIA TYPE: ICD-10-CM

## 2021-01-01 DIAGNOSIS — G30.1 LATE ONSET ALZHEIMER'S DISEASE WITHOUT BEHAVIORAL DISTURBANCE: Primary | ICD-10-CM

## 2021-01-01 DIAGNOSIS — E11.3212 TYPE 2 DIABETES MELLITUS WITH LEFT EYE AFFECTED BY MILD NONPROLIFERATIVE RETINOPATHY AND MACULAR EDEMA, WITHOUT LONG-TERM CURRENT USE OF INSULIN: Primary | ICD-10-CM

## 2021-01-01 DIAGNOSIS — Z78.0 ASYMPTOMATIC MENOPAUSAL STATE: ICD-10-CM

## 2021-01-01 DIAGNOSIS — E78.5 HYPERLIPIDEMIA ASSOCIATED WITH TYPE 2 DIABETES MELLITUS: ICD-10-CM

## 2021-01-01 DIAGNOSIS — E11.69 HYPERLIPIDEMIA ASSOCIATED WITH TYPE 2 DIABETES MELLITUS: ICD-10-CM

## 2021-01-01 DIAGNOSIS — G30.9 MODERATE MAJOR NEUROCOGNITIVE DISORDER DUE TO ALZHEIMER'S DISEASE WITH BEHAVIORAL DISTURBANCE: ICD-10-CM

## 2021-01-01 DIAGNOSIS — R41.3 MEMORY DEFICIT: ICD-10-CM

## 2021-01-01 DIAGNOSIS — E11.3212 TYPE 2 DIABETES MELLITUS WITH LEFT EYE AFFECTED BY MILD NONPROLIFERATIVE RETINOPATHY AND MACULAR EDEMA, WITHOUT LONG-TERM CURRENT USE OF INSULIN: ICD-10-CM

## 2021-01-01 DIAGNOSIS — Z00.00 ROUTINE GENERAL MEDICAL EXAMINATION AT A HEALTH CARE FACILITY: Primary | ICD-10-CM

## 2021-01-01 DIAGNOSIS — F02.B18 MODERATE MAJOR NEUROCOGNITIVE DISORDER DUE TO ALZHEIMER'S DISEASE WITH BEHAVIORAL DISTURBANCE: ICD-10-CM

## 2021-01-01 DIAGNOSIS — F02.80 LATE ONSET ALZHEIMER'S DISEASE WITHOUT BEHAVIORAL DISTURBANCE: Primary | ICD-10-CM

## 2021-01-01 DIAGNOSIS — F02.B18 MODERATE MAJOR NEUROCOGNITIVE DISORDER DUE TO ALZHEIMER'S DISEASE WITH BEHAVIORAL DISTURBANCE: Primary | ICD-10-CM

## 2021-01-01 DIAGNOSIS — Z12.31 ENCOUNTER FOR SCREENING MAMMOGRAM FOR MALIGNANT NEOPLASM OF BREAST: ICD-10-CM

## 2021-01-01 DIAGNOSIS — R19.05 PERIUMBILICAL MASS: ICD-10-CM

## 2021-01-01 DIAGNOSIS — Z23 NEED FOR VACCINATION: Primary | ICD-10-CM

## 2021-01-01 DIAGNOSIS — Z12.39 ENCOUNTER FOR SCREENING FOR MALIGNANT NEOPLASM OF BREAST, UNSPECIFIED SCREENING MODALITY: ICD-10-CM
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
## 2021-01-01 DIAGNOSIS — G30.9 MODERATE MAJOR NEUROCOGNITIVE DISORDER DUE TO ALZHEIMER'S DISEASE WITH BEHAVIORAL DISTURBANCE: Primary | ICD-10-CM

## 2021-01-01 DIAGNOSIS — Z82.0 FAMILY HISTORY OF ALZHEIMER'S DISEASE: ICD-10-CM

## 2021-01-01 DIAGNOSIS — F02.80 LATE ONSET ALZHEIMER'S DISEASE WITHOUT BEHAVIORAL DISTURBANCE: ICD-10-CM

## 2021-01-01 DIAGNOSIS — Z12.31 ENCOUNTER FOR SCREENING MAMMOGRAM FOR BREAST CANCER: ICD-10-CM

## 2021-01-01 DIAGNOSIS — S52.601D CLOSED FRACTURE OF DISTAL ENDS OF RIGHT RADIUS AND ULNA WITH ROUTINE HEALING, SUBSEQUENT ENCOUNTER: ICD-10-CM

## 2021-01-01 DIAGNOSIS — G30.1 LATE ONSET ALZHEIMER'S DISEASE WITHOUT BEHAVIORAL DISTURBANCE: ICD-10-CM

## 2021-01-01 DIAGNOSIS — S52.501D CLOSED FRACTURE OF DISTAL ENDS OF RIGHT RADIUS AND ULNA WITH ROUTINE HEALING, SUBSEQUENT ENCOUNTER: ICD-10-CM

## 2021-01-01 DIAGNOSIS — R19.00 PELVIC MASS: ICD-10-CM

## 2021-01-01 LAB
ESTIMATED AVG GLUCOSE: 206 MG/DL (ref 68–131)
ESTIMATED AVG GLUCOSE: 214 MG/DL (ref 68–131)
ESTIMATED AVG GLUCOSE: 217 MG/DL (ref 68–131)
FOLATE SERPL-MCNC: 12.2 NG/ML (ref 4–24)
HBA1C MFR BLD: 8.8 % (ref 4–5.6)
HBA1C MFR BLD: 9.1 % (ref 4–5.6)
HBA1C MFR BLD: 9.2 % (ref 4–5.6)
RPR SER QL: NORMAL
TSH SERPL DL<=0.005 MIU/L-ACNC: 0.5 UIU/ML (ref 0.4–4)
VIT B12 SERPL-MCNC: 422 PG/ML (ref 210–950)

## 2021-01-01 PROCEDURE — 3074F SYST BP LT 130 MM HG: CPT | Mod: CPTII,S$GLB,, | Performed by: INTERNAL MEDICINE

## 2021-01-01 PROCEDURE — 3074F SYST BP LT 130 MM HG: CPT | Mod: CPTII,S$GLB,, | Performed by: PSYCHIATRY & NEUROLOGY

## 2021-01-01 PROCEDURE — 3008F BODY MASS INDEX DOCD: CPT | Mod: CPTII,S$GLB,, | Performed by: INTERNAL MEDICINE

## 2021-01-01 PROCEDURE — 99999 PR PBB SHADOW E&M-EST. PATIENT-LVL IV: CPT | Mod: PBBFAC,,, | Performed by: INTERNAL MEDICINE

## 2021-01-01 PROCEDURE — 99999 PR PBB SHADOW E&M-EST. PATIENT-LVL III: CPT | Mod: PBBFAC,,, | Performed by: NURSE PRACTITIONER

## 2021-01-01 PROCEDURE — 1159F PR MEDICATION LIST DOCUMENTED IN MEDICAL RECORD: ICD-10-PCS | Mod: CPTII,S$GLB,, | Performed by: INTERNAL MEDICINE

## 2021-01-01 PROCEDURE — 3288F PR FALLS RISK ASSESSMENT DOCUMENTED: ICD-10-PCS | Mod: CPTII,S$GLB,, | Performed by: INTERNAL MEDICINE

## 2021-01-01 PROCEDURE — 3074F PR MOST RECENT SYSTOLIC BLOOD PRESSURE < 130 MM HG: ICD-10-PCS | Mod: CPTII,S$GLB,, | Performed by: PSYCHIATRY & NEUROLOGY

## 2021-01-01 PROCEDURE — 0002A COVID-19, MRNA, LNP-S, PF, 30 MCG/0.3 ML DOSE VACCINE: CPT | Mod: PBBFAC | Performed by: FAMILY MEDICINE

## 2021-01-01 PROCEDURE — 99205 OFFICE O/P NEW HI 60 MIN: CPT | Mod: S$GLB,,, | Performed by: PSYCHIATRY & NEUROLOGY

## 2021-01-01 PROCEDURE — 3078F DIAST BP <80 MM HG: CPT | Mod: CPTII,S$GLB,, | Performed by: PSYCHIATRY & NEUROLOGY

## 2021-01-01 PROCEDURE — 1101F PT FALLS ASSESS-DOCD LE1/YR: CPT | Mod: CPTII,S$GLB,, | Performed by: PSYCHIATRY & NEUROLOGY

## 2021-01-01 PROCEDURE — 1160F PR REVIEW ALL MEDS BY PRESCRIBER/CLIN PHARMACIST DOCUMENTED: ICD-10-PCS | Mod: CPTII,S$GLB,, | Performed by: PSYCHIATRY & NEUROLOGY

## 2021-01-01 PROCEDURE — 3074F PR MOST RECENT SYSTOLIC BLOOD PRESSURE < 130 MM HG: ICD-10-PCS | Mod: CPTII,S$GLB,, | Performed by: INTERNAL MEDICINE

## 2021-01-01 PROCEDURE — G0179 MD RECERTIFICATION HHA PT: HCPCS | Mod: ,,, | Performed by: INTERNAL MEDICINE

## 2021-01-01 PROCEDURE — 1126F PR PAIN SEVERITY QUANTIFIED, NO PAIN PRESENT: ICD-10-PCS | Mod: CPTII,S$GLB,, | Performed by: PSYCHIATRY & NEUROLOGY

## 2021-01-01 PROCEDURE — 99212 OFFICE O/P EST SF 10 MIN: CPT | Mod: S$GLB,,, | Performed by: INTERNAL MEDICINE

## 2021-01-01 PROCEDURE — 1159F MED LIST DOCD IN RCRD: CPT | Mod: S$GLB,,, | Performed by: INTERNAL MEDICINE

## 2021-01-01 PROCEDURE — 1126F AMNT PAIN NOTED NONE PRSNT: CPT | Mod: CPTII,S$GLB,, | Performed by: PSYCHIATRY & NEUROLOGY

## 2021-01-01 PROCEDURE — 70551 MRI BRAIN WITHOUT CONTRAST: ICD-10-PCS | Mod: 26,,, | Performed by: RADIOLOGY

## 2021-01-01 PROCEDURE — G0008 ADMIN INFLUENZA VIRUS VAC: HCPCS | Mod: S$GLB,,, | Performed by: INTERNAL MEDICINE

## 2021-01-01 PROCEDURE — 3060F POS MICROALBUMINURIA REV: CPT | Mod: CPTII,S$GLB,, | Performed by: INTERNAL MEDICINE

## 2021-01-01 PROCEDURE — 70551 MRI BRAIN STEM W/O DYE: CPT | Mod: TC

## 2021-01-01 PROCEDURE — 3008F PR BODY MASS INDEX (BMI) DOCUMENTED: ICD-10-PCS | Mod: CPTII,S$GLB,, | Performed by: INTERNAL MEDICINE

## 2021-01-01 PROCEDURE — 36415 COLL VENOUS BLD VENIPUNCTURE: CPT | Performed by: INTERNAL MEDICINE

## 2021-01-01 PROCEDURE — 1159F MED LIST DOCD IN RCRD: CPT | Mod: CPTII,S$GLB,, | Performed by: PSYCHIATRY & NEUROLOGY

## 2021-01-01 PROCEDURE — 3288F PR FALLS RISK ASSESSMENT DOCUMENTED: ICD-10-PCS | Mod: CPTII,S$GLB,, | Performed by: PSYCHIATRY & NEUROLOGY

## 2021-01-01 PROCEDURE — 3078F DIAST BP <80 MM HG: CPT | Mod: CPTII,S$GLB,, | Performed by: INTERNAL MEDICINE

## 2021-01-01 PROCEDURE — 82746 ASSAY OF FOLIC ACID SERUM: CPT | Performed by: INTERNAL MEDICINE

## 2021-01-01 PROCEDURE — 90694 VACC AIIV4 NO PRSRV 0.5ML IM: CPT | Mod: S$GLB,,, | Performed by: INTERNAL MEDICINE

## 2021-01-01 PROCEDURE — 83036 HEMOGLOBIN GLYCOSYLATED A1C: CPT | Performed by: INTERNAL MEDICINE

## 2021-01-01 PROCEDURE — 1101F PR PT FALLS ASSESS DOC 0-1 FALLS W/OUT INJ PAST YR: ICD-10-PCS | Mod: CPTII,S$GLB,, | Performed by: INTERNAL MEDICINE

## 2021-01-01 PROCEDURE — 77067 SCR MAMMO BI INCL CAD: CPT | Mod: TC

## 2021-01-01 PROCEDURE — G0179 PR HOME HEALTH MD RECERTIFICATION: ICD-10-PCS | Mod: ,,, | Performed by: INTERNAL MEDICINE

## 2021-01-01 PROCEDURE — 1101F PR PT FALLS ASSESS DOC 0-1 FALLS W/OUT INJ PAST YR: ICD-10-PCS | Mod: CPTII,S$GLB,, | Performed by: PSYCHIATRY & NEUROLOGY

## 2021-01-01 PROCEDURE — 0003A COVID-19, MRNA, LNP-S, PF, 30 MCG/0.3 ML DOSE VACCINE: CPT | Mod: CV19,PBBFAC | Performed by: FAMILY MEDICINE

## 2021-01-01 PROCEDURE — 1101F PT FALLS ASSESS-DOCD LE1/YR: CPT | Mod: CPTII,S$GLB,, | Performed by: INTERNAL MEDICINE

## 2021-01-01 PROCEDURE — 3046F PR MOST RECENT HEMOGLOBIN A1C LEVEL > 9.0%: ICD-10-PCS | Mod: CPTII,S$GLB,, | Performed by: INTERNAL MEDICINE

## 2021-01-01 PROCEDURE — 3075F SYST BP GE 130 - 139MM HG: CPT | Mod: CPTII,S$GLB,, | Performed by: INTERNAL MEDICINE

## 2021-01-01 PROCEDURE — 3075F PR MOST RECENT SYSTOLIC BLOOD PRESS GE 130-139MM HG: ICD-10-PCS | Mod: CPTII,S$GLB,, | Performed by: INTERNAL MEDICINE

## 2021-01-01 PROCEDURE — 1126F AMNT PAIN NOTED NONE PRSNT: CPT | Mod: S$GLB,,, | Performed by: INTERNAL MEDICINE

## 2021-01-01 PROCEDURE — 99999 PR PBB SHADOW E&M-EST. PATIENT-LVL III: ICD-10-PCS | Mod: PBBFAC,,, | Performed by: INTERNAL MEDICINE

## 2021-01-01 PROCEDURE — 99999 PR PBB SHADOW E&M-EST. PATIENT-LVL IV: ICD-10-PCS | Mod: PBBFAC,,, | Performed by: INTERNAL MEDICINE

## 2021-01-01 PROCEDURE — 99417 PROLNG OP E/M EACH 15 MIN: CPT | Mod: S$GLB,,, | Performed by: PSYCHIATRY & NEUROLOGY

## 2021-01-01 PROCEDURE — 3052F PR MOST RECENT HEMOGLOBIN A1C LEVEL 8.0 - < 9.0%: ICD-10-PCS | Mod: CPTII,S$GLB,, | Performed by: INTERNAL MEDICINE

## 2021-01-01 PROCEDURE — 1159F PR MEDICATION LIST DOCUMENTED IN MEDICAL RECORD: ICD-10-PCS | Mod: S$GLB,,, | Performed by: INTERNAL MEDICINE

## 2021-01-01 PROCEDURE — 3008F PR BODY MASS INDEX (BMI) DOCUMENTED: ICD-10-PCS | Mod: CPTII,S$GLB,, | Performed by: PSYCHIATRY & NEUROLOGY

## 2021-01-01 PROCEDURE — 1159F PR MEDICATION LIST DOCUMENTED IN MEDICAL RECORD: ICD-10-PCS | Mod: CPTII,S$GLB,, | Performed by: PSYCHIATRY & NEUROLOGY

## 2021-01-01 PROCEDURE — 3060F PR POS MICROALBUMINURIA RESULT DOCUMENTED/REVIEW: ICD-10-PCS | Mod: CPTII,S$GLB,, | Performed by: NURSE PRACTITIONER

## 2021-01-01 PROCEDURE — 3052F HG A1C>EQUAL 8.0%<EQUAL 9.0%: CPT | Mod: CPTII,S$GLB,, | Performed by: PSYCHIATRY & NEUROLOGY

## 2021-01-01 PROCEDURE — 1126F PR PAIN SEVERITY QUANTIFIED, NO PAIN PRESENT: ICD-10-PCS | Mod: CPTII,S$GLB,, | Performed by: INTERNAL MEDICINE

## 2021-01-01 PROCEDURE — 99397 PR PREVENTIVE VISIT,EST,65 & OVER: ICD-10-PCS | Mod: S$GLB,,, | Performed by: INTERNAL MEDICINE

## 2021-01-01 PROCEDURE — 1126F AMNT PAIN NOTED NONE PRSNT: CPT | Mod: CPTII,S$GLB,, | Performed by: INTERNAL MEDICINE

## 2021-01-01 PROCEDURE — 1160F RVW MEDS BY RX/DR IN RCRD: CPT | Mod: CPTII,S$GLB,, | Performed by: PSYCHIATRY & NEUROLOGY

## 2021-01-01 PROCEDURE — 99214 PR OFFICE/OUTPT VISIT, EST, LEVL IV, 30-39 MIN: ICD-10-PCS | Mod: S$GLB,,, | Performed by: INTERNAL MEDICINE

## 2021-01-01 PROCEDURE — G0180 MD CERTIFICATION HHA PATIENT: HCPCS | Mod: ,,, | Performed by: PSYCHIATRY & NEUROLOGY

## 2021-01-01 PROCEDURE — 3078F PR MOST RECENT DIASTOLIC BLOOD PRESSURE < 80 MM HG: ICD-10-PCS | Mod: CPTII,S$GLB,, | Performed by: PSYCHIATRY & NEUROLOGY

## 2021-01-01 PROCEDURE — 99417 PR PROLONGED SVC, OUTPT, W/WO DIRECT PT CONTACT,  EA ADDTL 15 MIN: ICD-10-PCS | Mod: S$GLB,,, | Performed by: PSYCHIATRY & NEUROLOGY

## 2021-01-01 PROCEDURE — 3052F HG A1C>EQUAL 8.0%<EQUAL 9.0%: CPT | Mod: CPTII,S$GLB,, | Performed by: INTERNAL MEDICINE

## 2021-01-01 PROCEDURE — 3288F FALL RISK ASSESSMENT DOCD: CPT | Mod: CPTII,S$GLB,, | Performed by: INTERNAL MEDICINE

## 2021-01-01 PROCEDURE — 99214 OFFICE O/P EST MOD 30 MIN: CPT | Mod: S$GLB,,, | Performed by: INTERNAL MEDICINE

## 2021-01-01 PROCEDURE — 3046F HEMOGLOBIN A1C LEVEL >9.0%: CPT | Mod: CPTII,S$GLB,, | Performed by: INTERNAL MEDICINE

## 2021-01-01 PROCEDURE — 1126F PR PAIN SEVERITY QUANTIFIED, NO PAIN PRESENT: ICD-10-PCS | Mod: S$GLB,,, | Performed by: INTERNAL MEDICINE

## 2021-01-01 PROCEDURE — 86592 SYPHILIS TEST NON-TREP QUAL: CPT | Performed by: INTERNAL MEDICINE

## 2021-01-01 PROCEDURE — 1159F MED LIST DOCD IN RCRD: CPT | Mod: CPTII,S$GLB,, | Performed by: INTERNAL MEDICINE

## 2021-01-01 PROCEDURE — 77063 MAMMO DIGITAL SCREENING BILAT WITH TOMO: ICD-10-PCS | Mod: 26,,, | Performed by: RADIOLOGY

## 2021-01-01 PROCEDURE — 3052F PR MOST RECENT HEMOGLOBIN A1C LEVEL 8.0 - < 9.0%: ICD-10-PCS | Mod: CPTII,S$GLB,, | Performed by: PSYCHIATRY & NEUROLOGY

## 2021-01-01 PROCEDURE — 3288F FALL RISK ASSESSMENT DOCD: CPT | Mod: CPTII,S$GLB,, | Performed by: PSYCHIATRY & NEUROLOGY

## 2021-01-01 PROCEDURE — 3078F PR MOST RECENT DIASTOLIC BLOOD PRESSURE < 80 MM HG: ICD-10-PCS | Mod: CPTII,S$GLB,, | Performed by: INTERNAL MEDICINE

## 2021-01-01 PROCEDURE — 99999 PR PBB SHADOW E&M-EST. PATIENT-LVL III: ICD-10-PCS | Mod: PBBFAC,,, | Performed by: NURSE PRACTITIONER

## 2021-01-01 PROCEDURE — 93005 ELECTROCARDIOGRAM TRACING: CPT

## 2021-01-01 PROCEDURE — 77067 SCR MAMMO BI INCL CAD: CPT | Mod: 26,,, | Performed by: RADIOLOGY

## 2021-01-01 PROCEDURE — 99205 PR OFFICE/OUTPT VISIT, NEW, LEVL V, 60-74 MIN: ICD-10-PCS | Mod: S$GLB,,, | Performed by: PSYCHIATRY & NEUROLOGY

## 2021-01-01 PROCEDURE — 99213 PR OFFICE/OUTPT VISIT, EST, LEVL III, 20-29 MIN: ICD-10-PCS | Mod: S$GLB,,, | Performed by: NURSE PRACTITIONER

## 2021-01-01 PROCEDURE — 77067 MAMMO DIGITAL SCREENING BILAT WITH TOMO: ICD-10-PCS | Mod: 26,,, | Performed by: RADIOLOGY

## 2021-01-01 PROCEDURE — G0180 PR HOME HEALTH MD CERTIFICATION: ICD-10-PCS | Mod: ,,, | Performed by: PSYCHIATRY & NEUROLOGY

## 2021-01-01 PROCEDURE — 77063 BREAST TOMOSYNTHESIS BI: CPT | Mod: 26,,, | Performed by: RADIOLOGY

## 2021-01-01 PROCEDURE — 99999 PR PBB SHADOW E&M-EST. PATIENT-LVL V: ICD-10-PCS | Mod: PBBFAC,,, | Performed by: PSYCHIATRY & NEUROLOGY

## 2021-01-01 PROCEDURE — G0008 FLU VACCINE - QUADRIVALENT - ADJUVANTED: ICD-10-PCS | Mod: S$GLB,,, | Performed by: INTERNAL MEDICINE

## 2021-01-01 PROCEDURE — 3066F NEPHROPATHY DOC TX: CPT | Mod: CPTII,S$GLB,, | Performed by: NURSE PRACTITIONER

## 2021-01-01 PROCEDURE — 84443 ASSAY THYROID STIM HORMONE: CPT | Performed by: INTERNAL MEDICINE

## 2021-01-01 PROCEDURE — 3066F PR DOCUMENTATION OF TREATMENT FOR NEPHROPATHY: ICD-10-PCS | Mod: CPTII,S$GLB,, | Performed by: INTERNAL MEDICINE

## 2021-01-01 PROCEDURE — 99999 PR PBB SHADOW E&M-EST. PATIENT-LVL III: CPT | Mod: PBBFAC,,, | Performed by: INTERNAL MEDICINE

## 2021-01-01 PROCEDURE — 99213 OFFICE O/P EST LOW 20 MIN: CPT | Mod: S$GLB,,, | Performed by: NURSE PRACTITIONER

## 2021-01-01 PROCEDURE — 3066F NEPHROPATHY DOC TX: CPT | Mod: CPTII,S$GLB,, | Performed by: INTERNAL MEDICINE

## 2021-01-01 PROCEDURE — 93010 ELECTROCARDIOGRAM REPORT: CPT | Mod: ,,, | Performed by: INTERNAL MEDICINE

## 2021-01-01 PROCEDURE — 3008F BODY MASS INDEX DOCD: CPT | Mod: CPTII,S$GLB,, | Performed by: PSYCHIATRY & NEUROLOGY

## 2021-01-01 PROCEDURE — 3060F POS MICROALBUMINURIA REV: CPT | Mod: CPTII,S$GLB,, | Performed by: NURSE PRACTITIONER

## 2021-01-01 PROCEDURE — 82607 VITAMIN B-12: CPT | Performed by: INTERNAL MEDICINE

## 2021-01-01 PROCEDURE — 70551 MRI BRAIN STEM W/O DYE: CPT | Mod: 26,,, | Performed by: RADIOLOGY

## 2021-01-01 PROCEDURE — 99999 PR PBB SHADOW E&M-EST. PATIENT-LVL V: CPT | Mod: PBBFAC,,, | Performed by: PSYCHIATRY & NEUROLOGY

## 2021-01-01 PROCEDURE — 91300 COVID-19, MRNA, LNP-S, PF, 30 MCG/0.3 ML DOSE VACCINE: CPT | Mod: PBBFAC | Performed by: FAMILY MEDICINE

## 2021-01-01 PROCEDURE — 3060F PR POS MICROALBUMINURIA RESULT DOCUMENTED/REVIEW: ICD-10-PCS | Mod: CPTII,S$GLB,, | Performed by: INTERNAL MEDICINE

## 2021-01-01 PROCEDURE — 93010 EKG 12-LEAD: ICD-10-PCS | Mod: ,,, | Performed by: INTERNAL MEDICINE

## 2021-01-01 PROCEDURE — 90694 FLU VACCINE - QUADRIVALENT - ADJUVANTED: ICD-10-PCS | Mod: S$GLB,,, | Performed by: INTERNAL MEDICINE

## 2021-01-01 PROCEDURE — 99212 PR OFFICE/OUTPT VISIT, EST, LEVL II, 10-19 MIN: ICD-10-PCS | Mod: S$GLB,,, | Performed by: INTERNAL MEDICINE

## 2021-01-01 PROCEDURE — 3066F PR DOCUMENTATION OF TREATMENT FOR NEPHROPATHY: ICD-10-PCS | Mod: CPTII,S$GLB,, | Performed by: NURSE PRACTITIONER

## 2021-01-01 PROCEDURE — 99397 PER PM REEVAL EST PAT 65+ YR: CPT | Mod: S$GLB,,, | Performed by: INTERNAL MEDICINE

## 2021-01-01 RX ORDER — EMPAGLIFLOZIN 10 MG/1
10 TABLET, FILM COATED ORAL DAILY
Qty: 90 TABLET | Refills: 0 | Status: SHIPPED | OUTPATIENT
Start: 2021-01-01 | End: 2021-01-01 | Stop reason: SDUPTHER

## 2021-01-01 RX ORDER — METFORMIN HYDROCHLORIDE 1000 MG/1
1000 TABLET ORAL 2 TIMES DAILY WITH MEALS
Qty: 180 TABLET | Refills: 0 | Status: ON HOLD | OUTPATIENT
Start: 2021-01-01 | End: 2022-01-01

## 2021-01-01 RX ORDER — EMPAGLIFLOZIN 25 MG/1
25 TABLET, FILM COATED ORAL DAILY
Qty: 90 TABLET | Refills: 0 | Status: SHIPPED | OUTPATIENT
Start: 2021-01-01 | End: 2021-01-01 | Stop reason: SDUPTHER

## 2021-01-01 RX ORDER — MEMANTINE HYDROCHLORIDE 10 MG/1
10 TABLET ORAL 2 TIMES DAILY
Qty: 180 TABLET | Refills: 3 | Status: SHIPPED | OUTPATIENT
Start: 2021-01-01 | End: 2021-01-01 | Stop reason: SDUPTHER

## 2021-01-01 RX ORDER — LOTEPREDNOL ETABONATE 5 MG/ML
SUSPENSION/ DROPS OPHTHALMIC
COMMUNITY
Start: 2021-01-01 | End: 2021-01-01

## 2021-01-01 RX ORDER — INSULIN PUMP SYRINGE, 3 ML
EACH MISCELLANEOUS
Qty: 1 EACH | Refills: 0 | Status: SHIPPED | OUTPATIENT
Start: 2021-01-01 | End: 2022-12-01

## 2021-01-01 RX ORDER — ROSUVASTATIN CALCIUM 10 MG/1
10 TABLET, COATED ORAL DAILY
Qty: 90 TABLET | Refills: 0 | Status: SHIPPED | OUTPATIENT
Start: 2021-01-01 | End: 2021-01-01 | Stop reason: SDUPTHER

## 2021-01-01 RX ORDER — MEMANTINE HYDROCHLORIDE 10 MG/1
10 TABLET ORAL 2 TIMES DAILY
Qty: 180 TABLET | Refills: 3 | Status: SHIPPED | OUTPATIENT
Start: 2021-01-01 | End: 2022-11-26

## 2021-01-01 RX ORDER — DONEPEZIL HYDROCHLORIDE 10 MG/1
10 TABLET, FILM COATED ORAL NIGHTLY
Qty: 30 TABLET | Refills: 11 | Status: SHIPPED | OUTPATIENT
Start: 2021-01-01 | End: 2021-01-01

## 2021-01-01 RX ORDER — EMPAGLIFLOZIN 25 MG/1
25 TABLET, FILM COATED ORAL DAILY
Qty: 90 TABLET | Refills: 0 | Status: ON HOLD | OUTPATIENT
Start: 2021-01-01 | End: 2022-01-01

## 2021-01-01 RX ORDER — DONEPEZIL HYDROCHLORIDE 10 MG/1
10 TABLET, FILM COATED ORAL NIGHTLY
Qty: 30 TABLET | Refills: 11 | Status: SHIPPED | OUTPATIENT
Start: 2021-01-01 | End: 2021-01-01 | Stop reason: SDUPTHER

## 2021-01-01 RX ORDER — GLIMEPIRIDE 1 MG/1
1 TABLET ORAL
Qty: 90 TABLET | Refills: 0 | Status: CANCELLED | OUTPATIENT
Start: 2021-01-01 | End: 2022-10-14

## 2021-01-01 RX ORDER — ROSUVASTATIN CALCIUM 10 MG/1
10 TABLET, COATED ORAL DAILY
Qty: 90 TABLET | Refills: 0 | Status: ON HOLD | OUTPATIENT
Start: 2021-01-01 | End: 2022-01-01

## 2021-01-01 RX ORDER — LANCETS
EACH MISCELLANEOUS
Qty: 200 EACH | Refills: 1 | Status: SHIPPED | OUTPATIENT
Start: 2021-01-01

## 2021-01-01 RX ORDER — EMPAGLIFLOZIN 10 MG/1
10 TABLET, FILM COATED ORAL DAILY
Qty: 90 TABLET | Refills: 0 | Status: SHIPPED | OUTPATIENT
Start: 2021-01-01 | End: 2021-01-01

## 2021-01-01 RX ORDER — EMPAGLIFLOZIN 25 MG/1
25 TABLET, FILM COATED ORAL DAILY
Qty: 90 TABLET | Refills: 0 | Status: SHIPPED | OUTPATIENT
Start: 2021-01-01 | End: 2021-01-01

## 2021-01-01 RX ORDER — METFORMIN HYDROCHLORIDE 1000 MG/1
1000 TABLET ORAL 2 TIMES DAILY WITH MEALS
Qty: 180 TABLET | Refills: 0 | Status: SHIPPED | OUTPATIENT
Start: 2021-01-01 | End: 2021-01-01 | Stop reason: SDUPTHER

## 2021-01-14 ENCOUNTER — LAB VISIT (OUTPATIENT)
Dept: LAB | Facility: HOSPITAL | Age: 76
End: 2021-01-14
Attending: NURSE PRACTITIONER
Payer: MEDICARE

## 2021-01-14 ENCOUNTER — OFFICE VISIT (OUTPATIENT)
Dept: INTERNAL MEDICINE | Facility: CLINIC | Age: 76
End: 2021-01-14
Payer: MEDICARE

## 2021-01-14 VITALS
SYSTOLIC BLOOD PRESSURE: 106 MMHG | DIASTOLIC BLOOD PRESSURE: 66 MMHG | TEMPERATURE: 98 F | WEIGHT: 138.25 LBS | RESPIRATION RATE: 16 BRPM | OXYGEN SATURATION: 98 % | BODY MASS INDEX: 25.7 KG/M2 | HEART RATE: 88 BPM

## 2021-01-14 DIAGNOSIS — E11.3212 TYPE 2 DIABETES MELLITUS WITH LEFT EYE AFFECTED BY MILD NONPROLIFERATIVE RETINOPATHY AND MACULAR EDEMA, WITHOUT LONG-TERM CURRENT USE OF INSULIN: ICD-10-CM

## 2021-01-14 DIAGNOSIS — E11.3212 TYPE 2 DIABETES MELLITUS WITH LEFT EYE AFFECTED BY MILD NONPROLIFERATIVE RETINOPATHY AND MACULAR EDEMA, WITHOUT LONG-TERM CURRENT USE OF INSULIN: Primary | ICD-10-CM

## 2021-01-14 LAB — GLUCOSE SERPL-MCNC: 238 MG/DL (ref 70–110)

## 2021-01-14 PROCEDURE — 3008F BODY MASS INDEX DOCD: CPT | Mod: CPTII,S$GLB,, | Performed by: NURSE PRACTITIONER

## 2021-01-14 PROCEDURE — 83036 HEMOGLOBIN GLYCOSYLATED A1C: CPT

## 2021-01-14 PROCEDURE — 99999 PR PBB SHADOW E&M-EST. PATIENT-LVL III: ICD-10-PCS | Mod: PBBFAC,,, | Performed by: NURSE PRACTITIONER

## 2021-01-14 PROCEDURE — 36415 COLL VENOUS BLD VENIPUNCTURE: CPT

## 2021-01-14 PROCEDURE — 99214 OFFICE O/P EST MOD 30 MIN: CPT | Mod: S$GLB,,, | Performed by: NURSE PRACTITIONER

## 2021-01-14 PROCEDURE — 3008F PR BODY MASS INDEX (BMI) DOCUMENTED: ICD-10-PCS | Mod: CPTII,S$GLB,, | Performed by: NURSE PRACTITIONER

## 2021-01-14 PROCEDURE — 80048 BASIC METABOLIC PNL TOTAL CA: CPT

## 2021-01-14 PROCEDURE — 99999 PR PBB SHADOW E&M-EST. PATIENT-LVL III: CPT | Mod: PBBFAC,,, | Performed by: NURSE PRACTITIONER

## 2021-01-14 PROCEDURE — 1159F MED LIST DOCD IN RCRD: CPT | Mod: S$GLB,,, | Performed by: NURSE PRACTITIONER

## 2021-01-14 PROCEDURE — 82962 GLUCOSE BLOOD TEST: CPT | Mod: S$GLB,,, | Performed by: NURSE PRACTITIONER

## 2021-01-14 PROCEDURE — 3051F PR MOST RECENT HEMOGLOBIN A1C LEVEL 7.0 - < 8.0%: ICD-10-PCS | Mod: CPTII,S$GLB,, | Performed by: NURSE PRACTITIONER

## 2021-01-14 PROCEDURE — 1159F PR MEDICATION LIST DOCUMENTED IN MEDICAL RECORD: ICD-10-PCS | Mod: S$GLB,,, | Performed by: NURSE PRACTITIONER

## 2021-01-14 PROCEDURE — 3051F HG A1C>EQUAL 7.0%<8.0%: CPT | Mod: CPTII,S$GLB,, | Performed by: NURSE PRACTITIONER

## 2021-01-14 PROCEDURE — 99214 PR OFFICE/OUTPT VISIT, EST, LEVL IV, 30-39 MIN: ICD-10-PCS | Mod: S$GLB,,, | Performed by: NURSE PRACTITIONER

## 2021-01-14 PROCEDURE — 82962 POCT GLUCOSE, HAND-HELD DEVICE: ICD-10-PCS | Mod: S$GLB,,, | Performed by: NURSE PRACTITIONER

## 2021-01-14 RX ORDER — LOTEPREDNOL ETABONATE 3.8 MG/G
GEL OPHTHALMIC
COMMUNITY
Start: 2021-01-11 | End: 2021-01-01

## 2021-01-15 ENCOUNTER — TELEPHONE (OUTPATIENT)
Dept: INTERNAL MEDICINE | Facility: CLINIC | Age: 76
End: 2021-01-15

## 2021-01-15 LAB
ANION GAP SERPL CALC-SCNC: 8 MMOL/L (ref 8–16)
BUN SERPL-MCNC: 17 MG/DL (ref 8–23)
CALCIUM SERPL-MCNC: 9.4 MG/DL (ref 8.7–10.5)
CHLORIDE SERPL-SCNC: 100 MMOL/L (ref 95–110)
CO2 SERPL-SCNC: 28 MMOL/L (ref 23–29)
CREAT SERPL-MCNC: 1 MG/DL (ref 0.5–1.4)
EST. GFR  (AFRICAN AMERICAN): >60 ML/MIN/1.73 M^2
EST. GFR  (NON AFRICAN AMERICAN): 56 ML/MIN/1.73 M^2
GLUCOSE SERPL-MCNC: 256 MG/DL (ref 70–110)
POTASSIUM SERPL-SCNC: 4.2 MMOL/L (ref 3.5–5.1)
SODIUM SERPL-SCNC: 136 MMOL/L (ref 136–145)

## 2021-01-16 LAB
ESTIMATED AVG GLUCOSE: 232 MG/DL (ref 68–131)
HBA1C MFR BLD HPLC: 9.7 % (ref 4–5.6)

## 2021-01-20 RX ORDER — EMPAGLIFLOZIN 10 MG/1
10 TABLET, FILM COATED ORAL DAILY
Qty: 30 TABLET | Refills: 0 | Status: SHIPPED | OUTPATIENT
Start: 2021-01-20 | End: 2021-02-11

## 2021-01-22 ENCOUNTER — TELEPHONE (OUTPATIENT)
Dept: INTERNAL MEDICINE | Facility: CLINIC | Age: 76
End: 2021-01-22

## 2021-02-11 RX ORDER — EMPAGLIFLOZIN 10 MG/1
TABLET, FILM COATED ORAL
Qty: 30 TABLET | Refills: 0 | Status: SHIPPED | OUTPATIENT
Start: 2021-02-11 | End: 2021-01-01 | Stop reason: SDUPTHER

## 2021-02-12 ENCOUNTER — IMMUNIZATION (OUTPATIENT)
Dept: INTERNAL MEDICINE | Facility: CLINIC | Age: 76
End: 2021-02-12
Payer: MEDICARE

## 2021-02-12 DIAGNOSIS — Z23 NEED FOR VACCINATION: Primary | ICD-10-CM

## 2021-02-12 PROCEDURE — 91300 COVID-19, MRNA, LNP-S, PF, 30 MCG/0.3 ML DOSE VACCINE: CPT | Mod: PBBFAC | Performed by: FAMILY MEDICINE

## 2021-05-23 PROBLEM — E11.69 HYPERLIPIDEMIA ASSOCIATED WITH TYPE 2 DIABETES MELLITUS: Status: ACTIVE | Noted: 2021-01-01

## 2021-05-23 PROBLEM — E78.5 HYPERLIPIDEMIA ASSOCIATED WITH TYPE 2 DIABETES MELLITUS: Status: ACTIVE | Noted: 2021-01-01

## 2021-09-29 PROBLEM — Z82.0 FAMILY HISTORY OF ALZHEIMER'S DISEASE: Status: ACTIVE | Noted: 2021-01-01

## 2021-09-29 PROBLEM — R41.3 MEMORY DEFICIT: Status: ACTIVE | Noted: 2021-01-01

## 2021-09-29 PROBLEM — F02.80 LATE ONSET ALZHEIMER'S DISEASE WITHOUT BEHAVIORAL DISTURBANCE: Status: ACTIVE | Noted: 2021-01-01

## 2021-09-29 PROBLEM — F03.90 DEMENTIA WITHOUT BEHAVIORAL DISTURBANCE: Status: ACTIVE | Noted: 2021-01-01

## 2021-09-29 PROBLEM — G30.1 LATE ONSET ALZHEIMER'S DISEASE WITHOUT BEHAVIORAL DISTURBANCE: Status: ACTIVE | Noted: 2021-01-01

## 2022-01-01 ENCOUNTER — DOCUMENTATION ONLY (OUTPATIENT)
Dept: TRANSPLANT | Facility: CLINIC | Age: 77
End: 2022-01-01
Payer: MEDICARE

## 2022-01-01 ENCOUNTER — HOSPITAL ENCOUNTER (INPATIENT)
Facility: HOSPITAL | Age: 77
LOS: 22 days | DRG: 023 | End: 2022-02-26
Attending: EMERGENCY MEDICINE | Admitting: PSYCHIATRY & NEUROLOGY
Payer: MEDICARE

## 2022-01-01 ENCOUNTER — DOCUMENTATION ONLY (OUTPATIENT)
Dept: CARDIOLOGY | Facility: HOSPITAL | Age: 77
End: 2022-01-01
Payer: MEDICARE

## 2022-01-01 ENCOUNTER — LAB VISIT (OUTPATIENT)
Dept: LAB | Facility: HOSPITAL | Age: 77
End: 2022-01-01
Attending: INTERNAL MEDICINE
Payer: MEDICARE

## 2022-01-01 ENCOUNTER — EXTERNAL HOME HEALTH (OUTPATIENT)
Dept: HOME HEALTH SERVICES | Facility: HOSPITAL | Age: 77
End: 2022-01-01
Payer: MEDICARE

## 2022-01-01 ENCOUNTER — OFFICE VISIT (OUTPATIENT)
Dept: INTERNAL MEDICINE | Facility: CLINIC | Age: 77
End: 2022-01-01
Payer: MEDICARE

## 2022-01-01 ENCOUNTER — HOSPITAL ENCOUNTER (INPATIENT)
Facility: HOSPITAL | Age: 77
LOS: 4 days | Discharge: HOME HEALTH | DRG: 280 | End: 2022-02-03
Attending: EMERGENCY MEDICINE | Admitting: INTERNAL MEDICINE
Payer: MEDICARE

## 2022-01-01 ENCOUNTER — HOSPITAL ENCOUNTER (EMERGENCY)
Facility: HOSPITAL | Age: 77
Discharge: ANOTHER HOSPITAL | End: 2022-02-04
Attending: EMERGENCY MEDICINE
Payer: MEDICARE

## 2022-01-01 ENCOUNTER — PATIENT OUTREACH (OUTPATIENT)
Dept: ADMINISTRATIVE | Facility: OTHER | Age: 77
End: 2022-01-01
Payer: MEDICARE

## 2022-01-01 ENCOUNTER — ANESTHESIA EVENT (OUTPATIENT)
Dept: NEUROLOGY | Facility: HOSPITAL | Age: 77
DRG: 023 | End: 2022-01-01
Payer: MEDICARE

## 2022-01-01 ENCOUNTER — PATIENT MESSAGE (OUTPATIENT)
Dept: CARDIOLOGY | Facility: HOSPITAL | Age: 77
End: 2022-01-01
Payer: MEDICARE

## 2022-01-01 ENCOUNTER — ANESTHESIA (OUTPATIENT)
Dept: NEUROLOGY | Facility: HOSPITAL | Age: 77
DRG: 023 | End: 2022-01-01
Payer: MEDICARE

## 2022-01-01 ENCOUNTER — OFFICE VISIT (OUTPATIENT)
Dept: NEUROLOGY | Facility: CLINIC | Age: 77
End: 2022-01-01
Payer: MEDICARE

## 2022-01-01 ENCOUNTER — HOSPITAL ENCOUNTER (EMERGENCY)
Facility: HOSPITAL | Age: 77
Discharge: HOME | End: 2022-01-20
Attending: FAMILY MEDICINE
Payer: MEDICARE

## 2022-01-01 ENCOUNTER — OFFICE VISIT (OUTPATIENT)
Dept: CARDIOLOGY | Facility: CLINIC | Age: 77
End: 2022-01-01
Payer: MEDICARE

## 2022-01-01 ENCOUNTER — PATIENT OUTREACH (OUTPATIENT)
Dept: ADMINISTRATIVE | Facility: CLINIC | Age: 77
End: 2022-01-01
Payer: MEDICARE

## 2022-01-01 ENCOUNTER — TELEPHONE (OUTPATIENT)
Dept: CARDIOLOGY | Facility: HOSPITAL | Age: 77
End: 2022-01-01
Payer: MEDICARE

## 2022-01-01 VITALS
TEMPERATURE: 100 F | OXYGEN SATURATION: 79 % | HEART RATE: 91 BPM | BODY MASS INDEX: 28.8 KG/M2 | WEIGHT: 156.5 LBS | RESPIRATION RATE: 9 BRPM | SYSTOLIC BLOOD PRESSURE: 63 MMHG | HEIGHT: 62 IN | DIASTOLIC BLOOD PRESSURE: 42 MMHG

## 2022-01-01 VITALS
RESPIRATION RATE: 16 BRPM | BODY MASS INDEX: 27.77 KG/M2 | OXYGEN SATURATION: 92 % | HEIGHT: 61 IN | HEART RATE: 93 BPM | WEIGHT: 147.06 LBS | SYSTOLIC BLOOD PRESSURE: 95 MMHG | TEMPERATURE: 98 F | DIASTOLIC BLOOD PRESSURE: 61 MMHG

## 2022-01-01 VITALS
TEMPERATURE: 99 F | HEART RATE: 71 BPM | DIASTOLIC BLOOD PRESSURE: 64 MMHG | BODY MASS INDEX: 25.87 KG/M2 | RESPIRATION RATE: 25 BRPM | SYSTOLIC BLOOD PRESSURE: 116 MMHG | OXYGEN SATURATION: 95 % | WEIGHT: 140.56 LBS | HEIGHT: 62 IN

## 2022-01-01 VITALS
RESPIRATION RATE: 16 BRPM | OXYGEN SATURATION: 99 % | HEART RATE: 79 BPM | WEIGHT: 142.19 LBS | SYSTOLIC BLOOD PRESSURE: 108 MMHG | BODY MASS INDEX: 26.85 KG/M2 | DIASTOLIC BLOOD PRESSURE: 62 MMHG | HEIGHT: 61 IN

## 2022-01-01 VITALS
RESPIRATION RATE: 18 BRPM | WEIGHT: 142.63 LBS | DIASTOLIC BLOOD PRESSURE: 66 MMHG | OXYGEN SATURATION: 98 % | TEMPERATURE: 98 F | SYSTOLIC BLOOD PRESSURE: 110 MMHG | BODY MASS INDEX: 26.95 KG/M2 | HEART RATE: 78 BPM

## 2022-01-01 VITALS
SYSTOLIC BLOOD PRESSURE: 136 MMHG | DIASTOLIC BLOOD PRESSURE: 75 MMHG | HEIGHT: 62 IN | WEIGHT: 141.56 LBS | TEMPERATURE: 97 F | BODY MASS INDEX: 26.05 KG/M2 | OXYGEN SATURATION: 96 % | HEART RATE: 50 BPM

## 2022-01-01 VITALS
WEIGHT: 147.69 LBS | HEIGHT: 61 IN | SYSTOLIC BLOOD PRESSURE: 122 MMHG | HEART RATE: 83 BPM | BODY MASS INDEX: 27.88 KG/M2 | OXYGEN SATURATION: 99 % | DIASTOLIC BLOOD PRESSURE: 72 MMHG

## 2022-01-01 DIAGNOSIS — G30.1 LATE ONSET ALZHEIMER'S DISEASE WITHOUT BEHAVIORAL DISTURBANCE: ICD-10-CM

## 2022-01-01 DIAGNOSIS — R94.31 ABNORMAL EKG: ICD-10-CM

## 2022-01-01 DIAGNOSIS — E78.5 HYPERLIPIDEMIA ASSOCIATED WITH TYPE 2 DIABETES MELLITUS: ICD-10-CM

## 2022-01-01 DIAGNOSIS — E11.3212 TYPE 2 DIABETES MELLITUS WITH LEFT EYE AFFECTED BY MILD NONPROLIFERATIVE RETINOPATHY AND MACULAR EDEMA, WITHOUT LONG-TERM CURRENT USE OF INSULIN: Primary | ICD-10-CM

## 2022-01-01 DIAGNOSIS — Z82.0 FAMILY HISTORY OF ALZHEIMER'S DISEASE: ICD-10-CM

## 2022-01-01 DIAGNOSIS — R13.10 DYSPHAGIA, UNSPECIFIED TYPE: ICD-10-CM

## 2022-01-01 DIAGNOSIS — R06.02 SHORTNESS OF BREATH: ICD-10-CM

## 2022-01-01 DIAGNOSIS — I63.9 CEREBROVASCULAR ACCIDENT (CVA), UNSPECIFIED MECHANISM: Primary | ICD-10-CM

## 2022-01-01 DIAGNOSIS — R07.9 CHEST PAIN, UNSPECIFIED TYPE: ICD-10-CM

## 2022-01-01 DIAGNOSIS — I95.9 HYPOTENSION: ICD-10-CM

## 2022-01-01 DIAGNOSIS — J18.9 PNEUMONIA OF LOWER LOBE DUE TO INFECTIOUS ORGANISM, UNSPECIFIED LATERALITY: Primary | ICD-10-CM

## 2022-01-01 DIAGNOSIS — F03.90 DEMENTIA WITHOUT BEHAVIORAL DISTURBANCE, UNSPECIFIED DEMENTIA TYPE: ICD-10-CM

## 2022-01-01 DIAGNOSIS — R09.02 HYPOXEMIA: ICD-10-CM

## 2022-01-01 DIAGNOSIS — I50.9 CHF (CONGESTIVE HEART FAILURE): ICD-10-CM

## 2022-01-01 DIAGNOSIS — I21.4 NSTEMI (NON-ST ELEVATED MYOCARDIAL INFARCTION): ICD-10-CM

## 2022-01-01 DIAGNOSIS — R07.9 CHEST PAIN: Primary | ICD-10-CM

## 2022-01-01 DIAGNOSIS — E11.69 HYPERLIPIDEMIA ASSOCIATED WITH TYPE 2 DIABETES MELLITUS: ICD-10-CM

## 2022-01-01 DIAGNOSIS — R41.3 MEMORY DEFICIT: ICD-10-CM

## 2022-01-01 DIAGNOSIS — A41.9 SEVERE SEPSIS: ICD-10-CM

## 2022-01-01 DIAGNOSIS — E11.3212 TYPE 2 DIABETES MELLITUS WITH LEFT EYE AFFECTED BY MILD NONPROLIFERATIVE RETINOPATHY AND MACULAR EDEMA, WITHOUT LONG-TERM CURRENT USE OF INSULIN: ICD-10-CM

## 2022-01-01 DIAGNOSIS — F02.80 LATE ONSET ALZHEIMER'S DISEASE WITHOUT BEHAVIORAL DISTURBANCE: ICD-10-CM

## 2022-01-01 DIAGNOSIS — I63.9 STROKE: ICD-10-CM

## 2022-01-01 DIAGNOSIS — R01.1 MURMUR: ICD-10-CM

## 2022-01-01 DIAGNOSIS — R07.9 CHEST PAIN SYNDROME: Primary | ICD-10-CM

## 2022-01-01 DIAGNOSIS — R79.89 TROPONIN LEVEL ELEVATED: ICD-10-CM

## 2022-01-01 DIAGNOSIS — R65.20 SEVERE SEPSIS: ICD-10-CM

## 2022-01-01 DIAGNOSIS — R60.9 EDEMA: ICD-10-CM

## 2022-01-01 DIAGNOSIS — F02.B18 MODERATE MAJOR NEUROCOGNITIVE DISORDER DUE TO ALZHEIMER'S DISEASE WITH BEHAVIORAL DISTURBANCE: Primary | ICD-10-CM

## 2022-01-01 DIAGNOSIS — Z78.9 IMPAIRED MOBILITY AND ADLS: ICD-10-CM

## 2022-01-01 DIAGNOSIS — Z74.09 IMPAIRED MOBILITY AND ADLS: ICD-10-CM

## 2022-01-01 DIAGNOSIS — G30.9 MODERATE MAJOR NEUROCOGNITIVE DISORDER DUE TO ALZHEIMER'S DISEASE WITH BEHAVIORAL DISTURBANCE: Primary | ICD-10-CM

## 2022-01-01 DIAGNOSIS — I63.231 STROKE DUE TO OCCLUSION OF RIGHT CAROTID ARTERY: ICD-10-CM

## 2022-01-01 DIAGNOSIS — I25.10 CAD, MULTIPLE VESSEL: ICD-10-CM

## 2022-01-01 DIAGNOSIS — I50.41 ACUTE COMBINED SYSTOLIC AND DIASTOLIC CONGESTIVE HEART FAILURE: ICD-10-CM

## 2022-01-01 LAB
ABO + RH BLD: NORMAL
ABO + RH BLD: NORMAL
ALBUMIN SERPL BCP-MCNC: 2 G/DL (ref 3.5–5.2)
ALBUMIN SERPL BCP-MCNC: 2.1 G/DL (ref 3.5–5.2)
ALBUMIN SERPL BCP-MCNC: 2.2 G/DL (ref 3.5–5.2)
ALBUMIN SERPL BCP-MCNC: 2.3 G/DL (ref 3.5–5.2)
ALBUMIN SERPL BCP-MCNC: 2.4 G/DL (ref 3.5–5.2)
ALBUMIN SERPL BCP-MCNC: 2.5 G/DL (ref 3.5–5.2)
ALBUMIN SERPL BCP-MCNC: 2.5 G/DL (ref 3.5–5.2)
ALBUMIN SERPL BCP-MCNC: 2.6 G/DL (ref 3.5–5.2)
ALBUMIN SERPL BCP-MCNC: 2.6 G/DL (ref 3.5–5.2)
ALBUMIN SERPL BCP-MCNC: 2.7 G/DL (ref 3.5–5.2)
ALBUMIN SERPL BCP-MCNC: 2.8 G/DL (ref 3.5–5.2)
ALBUMIN SERPL BCP-MCNC: 2.8 G/DL (ref 3.5–5.2)
ALBUMIN SERPL BCP-MCNC: 2.9 G/DL (ref 3.5–5.2)
ALBUMIN SERPL BCP-MCNC: 2.9 G/DL (ref 3.5–5.2)
ALBUMIN SERPL BCP-MCNC: 3 G/DL (ref 3.5–5.2)
ALBUMIN SERPL BCP-MCNC: 3 G/DL (ref 3.5–5.2)
ALBUMIN SERPL BCP-MCNC: 3.1 G/DL (ref 3.5–5.2)
ALBUMIN SERPL BCP-MCNC: 3.1 G/DL (ref 3.5–5.2)
ALBUMIN SERPL BCP-MCNC: 3.3 G/DL (ref 3.5–5.2)
ALBUMIN SERPL BCP-MCNC: 3.3 G/DL (ref 3.5–5.2)
ALBUMIN SERPL BCP-MCNC: 3.5 G/DL (ref 3.5–5.2)
ALBUMIN SERPL BCP-MCNC: 4.1 G/DL (ref 3.5–5.2)
ALLENS TEST: ABNORMAL
ALP SERPL-CCNC: 107 U/L (ref 55–135)
ALP SERPL-CCNC: 113 U/L (ref 55–135)
ALP SERPL-CCNC: 128 U/L (ref 55–135)
ALP SERPL-CCNC: 133 U/L (ref 55–135)
ALP SERPL-CCNC: 140 U/L (ref 55–135)
ALP SERPL-CCNC: 152 U/L (ref 55–135)
ALP SERPL-CCNC: 162 U/L (ref 55–135)
ALP SERPL-CCNC: 164 U/L (ref 55–135)
ALP SERPL-CCNC: 175 U/L (ref 55–135)
ALP SERPL-CCNC: 194 U/L (ref 55–135)
ALP SERPL-CCNC: 265 U/L (ref 55–135)
ALP SERPL-CCNC: 308 U/L (ref 55–135)
ALP SERPL-CCNC: 369 U/L (ref 55–135)
ALP SERPL-CCNC: 386 U/L (ref 55–135)
ALP SERPL-CCNC: 52 U/L (ref 55–135)
ALP SERPL-CCNC: 52 U/L (ref 55–135)
ALP SERPL-CCNC: 63 U/L (ref 55–135)
ALP SERPL-CCNC: 64 U/L (ref 55–135)
ALP SERPL-CCNC: 67 U/L (ref 55–135)
ALP SERPL-CCNC: 68 U/L (ref 55–135)
ALP SERPL-CCNC: 69 U/L (ref 55–135)
ALP SERPL-CCNC: 70 U/L (ref 55–135)
ALP SERPL-CCNC: 72 U/L (ref 55–135)
ALP SERPL-CCNC: 72 U/L (ref 55–135)
ALP SERPL-CCNC: 75 U/L (ref 55–135)
ALP SERPL-CCNC: 88 U/L (ref 55–135)
ALP SERPL-CCNC: 90 U/L (ref 55–135)
ALP SERPL-CCNC: 90 U/L (ref 55–135)
ALP SERPL-CCNC: 91 U/L (ref 55–135)
ALP SERPL-CCNC: 91 U/L (ref 55–135)
ALP SERPL-CCNC: 93 U/L (ref 55–135)
ALT SERPL W/O P-5'-P-CCNC: 13 U/L (ref 10–44)
ALT SERPL W/O P-5'-P-CCNC: 133 U/L (ref 10–44)
ALT SERPL W/O P-5'-P-CCNC: 137 U/L (ref 10–44)
ALT SERPL W/O P-5'-P-CCNC: 15 U/L (ref 10–44)
ALT SERPL W/O P-5'-P-CCNC: 153 U/L (ref 10–44)
ALT SERPL W/O P-5'-P-CCNC: 16 U/L (ref 10–44)
ALT SERPL W/O P-5'-P-CCNC: 16 U/L (ref 10–44)
ALT SERPL W/O P-5'-P-CCNC: 165 U/L (ref 10–44)
ALT SERPL W/O P-5'-P-CCNC: 177 U/L (ref 10–44)
ALT SERPL W/O P-5'-P-CCNC: 18 U/L (ref 10–44)
ALT SERPL W/O P-5'-P-CCNC: 19 U/L (ref 10–44)
ALT SERPL W/O P-5'-P-CCNC: 20 U/L (ref 10–44)
ALT SERPL W/O P-5'-P-CCNC: 20 U/L (ref 10–44)
ALT SERPL W/O P-5'-P-CCNC: 21 U/L (ref 10–44)
ALT SERPL W/O P-5'-P-CCNC: 22 U/L (ref 10–44)
ALT SERPL W/O P-5'-P-CCNC: 22 U/L (ref 10–44)
ALT SERPL W/O P-5'-P-CCNC: 23 U/L (ref 10–44)
ALT SERPL W/O P-5'-P-CCNC: 23 U/L (ref 10–44)
ALT SERPL W/O P-5'-P-CCNC: 24 U/L (ref 10–44)
ALT SERPL W/O P-5'-P-CCNC: 24 U/L (ref 10–44)
ALT SERPL W/O P-5'-P-CCNC: 25 U/L (ref 10–44)
ALT SERPL W/O P-5'-P-CCNC: 25 U/L (ref 10–44)
ALT SERPL W/O P-5'-P-CCNC: 26 U/L (ref 10–44)
ALT SERPL W/O P-5'-P-CCNC: 26 U/L (ref 10–44)
ALT SERPL W/O P-5'-P-CCNC: 32 U/L (ref 10–44)
ALT SERPL W/O P-5'-P-CCNC: 34 U/L (ref 10–44)
ALT SERPL W/O P-5'-P-CCNC: 36 U/L (ref 10–44)
ALT SERPL W/O P-5'-P-CCNC: 39 U/L (ref 10–44)
ALT SERPL W/O P-5'-P-CCNC: 44 U/L (ref 10–44)
AMPHET+METHAMPHET UR QL: NEGATIVE
ANION GAP SERPL CALC-SCNC: 10 MMOL/L (ref 8–16)
ANION GAP SERPL CALC-SCNC: 11 MMOL/L (ref 8–16)
ANION GAP SERPL CALC-SCNC: 12 MMOL/L (ref 8–16)
ANION GAP SERPL CALC-SCNC: 13 MMOL/L (ref 8–16)
ANION GAP SERPL CALC-SCNC: 14 MMOL/L (ref 8–16)
ANION GAP SERPL CALC-SCNC: 15 MMOL/L (ref 8–16)
ANION GAP SERPL CALC-SCNC: 16 MMOL/L (ref 8–16)
ANION GAP SERPL CALC-SCNC: 17 MMOL/L (ref 8–16)
ANION GAP SERPL CALC-SCNC: 18 MMOL/L (ref 8–16)
ANION GAP SERPL CALC-SCNC: 18 MMOL/L (ref 8–16)
ANION GAP SERPL CALC-SCNC: 19 MMOL/L (ref 8–16)
ANION GAP SERPL CALC-SCNC: 20 MMOL/L (ref 8–16)
ANION GAP SERPL CALC-SCNC: 21 MMOL/L (ref 8–16)
ANION GAP SERPL CALC-SCNC: 23 MMOL/L (ref 8–16)
ANION GAP SERPL CALC-SCNC: 24 MMOL/L (ref 8–16)
ANION GAP SERPL CALC-SCNC: 26 MMOL/L (ref 8–16)
ANION GAP SERPL CALC-SCNC: 27 MMOL/L (ref 8–16)
ANION GAP SERPL CALC-SCNC: 6 MMOL/L (ref 8–16)
ANION GAP SERPL CALC-SCNC: 6 MMOL/L (ref 8–16)
ANION GAP SERPL CALC-SCNC: 8 MMOL/L (ref 8–16)
ANION GAP SERPL CALC-SCNC: 9 MMOL/L (ref 8–16)
ANISOCYTOSIS BLD QL SMEAR: SLIGHT
ANISOCYTOSIS BLD QL SMEAR: SLIGHT
AORTIC ROOT ANNULUS: 2.04 CM
APTT BLDCRRT: 24 SEC (ref 21–32)
APTT BLDCRRT: 24.5 SEC (ref 21–32)
APTT BLDCRRT: 24.9 SEC (ref 21–32)
APTT BLDCRRT: 25.4 SEC (ref 21–32)
APTT BLDCRRT: 29.3 SEC (ref 21–32)
APTT BLDCRRT: 31.4 SEC (ref 21–32)
APTT BLDCRRT: 35 SEC (ref 21–32)
APTT BLDCRRT: 36.8 SEC (ref 21–32)
APTT BLDCRRT: 39.9 SEC (ref 21–32)
APTT BLDCRRT: 40 SEC (ref 21–32)
APTT BLDCRRT: 43.4 SEC (ref 21–32)
ASCENDING AORTA: 2.29 CM
ASCENDING AORTA: 2.82 CM
AST SERPL-CCNC: 136 U/L (ref 10–40)
AST SERPL-CCNC: 16 U/L (ref 10–40)
AST SERPL-CCNC: 173 U/L (ref 10–40)
AST SERPL-CCNC: 18 U/L (ref 10–40)
AST SERPL-CCNC: 19 U/L (ref 10–40)
AST SERPL-CCNC: 199 U/L (ref 10–40)
AST SERPL-CCNC: 20 U/L (ref 10–40)
AST SERPL-CCNC: 203 U/L (ref 10–40)
AST SERPL-CCNC: 21 U/L (ref 10–40)
AST SERPL-CCNC: 22 U/L (ref 10–40)
AST SERPL-CCNC: 25 U/L (ref 10–40)
AST SERPL-CCNC: 25 U/L (ref 10–40)
AST SERPL-CCNC: 28 U/L (ref 10–40)
AST SERPL-CCNC: 28 U/L (ref 10–40)
AST SERPL-CCNC: 31 U/L (ref 10–40)
AST SERPL-CCNC: 34 U/L (ref 10–40)
AST SERPL-CCNC: 35 U/L (ref 10–40)
AST SERPL-CCNC: 36 U/L (ref 10–40)
AST SERPL-CCNC: 37 U/L (ref 10–40)
AST SERPL-CCNC: 37 U/L (ref 10–40)
AST SERPL-CCNC: 38 U/L (ref 10–40)
AST SERPL-CCNC: 39 U/L (ref 10–40)
AST SERPL-CCNC: 39 U/L (ref 10–40)
AST SERPL-CCNC: 41 U/L (ref 10–40)
AST SERPL-CCNC: 47 U/L (ref 10–40)
AST SERPL-CCNC: 51 U/L (ref 10–40)
AST SERPL-CCNC: 55 U/L (ref 10–40)
AST SERPL-CCNC: 73 U/L (ref 10–40)
AST SERPL-CCNC: 83 U/L (ref 10–40)
AST SERPL-CCNC: 85 U/L (ref 10–40)
AST SERPL-CCNC: 89 U/L (ref 10–40)
AV INDEX (PROSTH): 0.37
AV INDEX (PROSTH): 0.54
AV MEAN GRADIENT: 5 MMHG
AV MEAN GRADIENT: 7 MMHG
AV PEAK GRADIENT: 10 MMHG
AV PEAK GRADIENT: 9 MMHG
AV VALVE AREA: 1.14 CM2
AV VALVE AREA: 1.54 CM2
AV VELOCITY RATIO: 0.4
AV VELOCITY RATIO: 0.49
B-OH-BUTYR BLD STRIP-SCNC: 2.5 MMOL/L (ref 0–0.5)
B-OH-BUTYR BLD STRIP-SCNC: 5 MMOL/L (ref 0–0.5)
B-OH-BUTYR BLD STRIP-SCNC: 5.5 MMOL/L (ref 0–0.5)
BACTERIA #/AREA URNS AUTO: ABNORMAL /HPF
BACTERIA #/AREA URNS HPF: NORMAL /HPF
BACTERIA #/AREA URNS HPF: NORMAL /HPF
BACTERIA BLD CULT: NORMAL
BACTERIA SPEC AEROBE CULT: NORMAL
BACTERIA SPEC AEROBE CULT: NORMAL
BARBITURATES UR QL SCN>200 NG/ML: NEGATIVE
BASOPHILS # BLD AUTO: 0.01 K/UL (ref 0–0.2)
BASOPHILS # BLD AUTO: 0.02 K/UL (ref 0–0.2)
BASOPHILS # BLD AUTO: 0.03 K/UL (ref 0–0.2)
BASOPHILS # BLD AUTO: 0.04 K/UL (ref 0–0.2)
BASOPHILS # BLD AUTO: ABNORMAL K/UL (ref 0–0.2)
BASOPHILS NFR BLD: 0 % (ref 0–1.9)
BASOPHILS NFR BLD: 0 % (ref 0–1.9)
BASOPHILS NFR BLD: 0.1 % (ref 0–1.9)
BASOPHILS NFR BLD: 0.2 % (ref 0–1.9)
BASOPHILS NFR BLD: 0.3 % (ref 0–1.9)
BASOPHILS NFR BLD: 0.4 % (ref 0–1.9)
BASOPHILS NFR BLD: 0.4 % (ref 0–1.9)
BASOPHILS NFR BLD: 0.5 % (ref 0–1.9)
BASOPHILS NFR BLD: 0.5 % (ref 0–1.9)
BASOPHILS NFR BLD: 0.7 % (ref 0–1.9)
BENZODIAZ UR QL SCN>200 NG/ML: NEGATIVE
BILIRUB SERPL-MCNC: 0.3 MG/DL (ref 0.1–1)
BILIRUB SERPL-MCNC: 0.4 MG/DL (ref 0.1–1)
BILIRUB SERPL-MCNC: 0.5 MG/DL (ref 0.1–1)
BILIRUB SERPL-MCNC: 0.6 MG/DL (ref 0.1–1)
BILIRUB SERPL-MCNC: 0.6 MG/DL (ref 0.1–1)
BILIRUB SERPL-MCNC: 0.7 MG/DL (ref 0.1–1)
BILIRUB SERPL-MCNC: 0.7 MG/DL (ref 0.1–1)
BILIRUB SERPL-MCNC: 0.9 MG/DL (ref 0.1–1)
BILIRUB SERPL-MCNC: 1.2 MG/DL (ref 0.1–1)
BILIRUB SERPL-MCNC: 1.5 MG/DL (ref 0.1–1)
BILIRUB UR QL STRIP: ABNORMAL
BILIRUB UR QL STRIP: NEGATIVE
BILIRUB UR QL STRIP: NEGATIVE
BLD GP AB SCN CELLS X3 SERPL QL: NORMAL
BLD GP AB SCN CELLS X3 SERPL QL: NORMAL
BLD PROD TYP BPU: NORMAL
BLOOD UNIT EXPIRATION DATE: NORMAL
BLOOD UNIT TYPE CODE: 5100
BLOOD UNIT TYPE: NORMAL
BNP SERPL-MCNC: 292 PG/ML (ref 0–99)
BNP SERPL-MCNC: 533 PG/ML (ref 0–99)
BNP SERPL-MCNC: 583 PG/ML (ref 0–99)
BNP SERPL-MCNC: 957 PG/ML (ref 0–99)
BSA FOR ECHO PROCEDURE: 1.67 M2
BSA FOR ECHO PROCEDURE: 1.68 M2
BSA FOR ECHO PROCEDURE: 1.69 M2
BUN SERPL-MCNC: 12 MG/DL (ref 8–23)
BUN SERPL-MCNC: 12 MG/DL (ref 8–23)
BUN SERPL-MCNC: 14 MG/DL (ref 8–23)
BUN SERPL-MCNC: 14 MG/DL (ref 8–23)
BUN SERPL-MCNC: 15 MG/DL (ref 8–23)
BUN SERPL-MCNC: 15 MG/DL (ref 8–23)
BUN SERPL-MCNC: 16 MG/DL (ref 8–23)
BUN SERPL-MCNC: 17 MG/DL (ref 8–23)
BUN SERPL-MCNC: 18 MG/DL (ref 8–23)
BUN SERPL-MCNC: 18 MG/DL (ref 8–23)
BUN SERPL-MCNC: 19 MG/DL (ref 8–23)
BUN SERPL-MCNC: 20 MG/DL (ref 8–23)
BUN SERPL-MCNC: 20 MG/DL (ref 8–23)
BUN SERPL-MCNC: 21 MG/DL (ref 8–23)
BUN SERPL-MCNC: 22 MG/DL (ref 8–23)
BUN SERPL-MCNC: 23 MG/DL (ref 8–23)
BUN SERPL-MCNC: 24 MG/DL (ref 8–23)
BUN SERPL-MCNC: 25 MG/DL (ref 8–23)
BUN SERPL-MCNC: 26 MG/DL (ref 8–23)
BUN SERPL-MCNC: 26 MG/DL (ref 8–23)
BUN SERPL-MCNC: 27 MG/DL (ref 8–23)
BUN SERPL-MCNC: 29 MG/DL (ref 8–23)
BUN SERPL-MCNC: 29 MG/DL (ref 8–23)
BUN SERPL-MCNC: 30 MG/DL (ref 8–23)
BUN SERPL-MCNC: 30 MG/DL (ref 8–23)
BUN SERPL-MCNC: 32 MG/DL (ref 8–23)
BUN SERPL-MCNC: 36 MG/DL (ref 8–23)
BUN SERPL-MCNC: 41 MG/DL (ref 8–23)
BZE UR QL SCN: NEGATIVE
CA-I BLDV-SCNC: 1.1 MMOL/L (ref 1.06–1.42)
CALCIUM SERPL-MCNC: 7.7 MG/DL (ref 8.7–10.5)
CALCIUM SERPL-MCNC: 7.9 MG/DL (ref 8.7–10.5)
CALCIUM SERPL-MCNC: 7.9 MG/DL (ref 8.7–10.5)
CALCIUM SERPL-MCNC: 8 MG/DL (ref 8.7–10.5)
CALCIUM SERPL-MCNC: 8.1 MG/DL (ref 8.7–10.5)
CALCIUM SERPL-MCNC: 8.2 MG/DL (ref 8.7–10.5)
CALCIUM SERPL-MCNC: 8.3 MG/DL (ref 8.7–10.5)
CALCIUM SERPL-MCNC: 8.4 MG/DL (ref 8.7–10.5)
CALCIUM SERPL-MCNC: 8.6 MG/DL (ref 8.7–10.5)
CALCIUM SERPL-MCNC: 8.7 MG/DL (ref 8.7–10.5)
CALCIUM SERPL-MCNC: 8.8 MG/DL (ref 8.7–10.5)
CALCIUM SERPL-MCNC: 8.9 MG/DL (ref 8.7–10.5)
CALCIUM SERPL-MCNC: 9 MG/DL (ref 8.7–10.5)
CALCIUM SERPL-MCNC: 9.1 MG/DL (ref 8.7–10.5)
CALCIUM SERPL-MCNC: 9.2 MG/DL (ref 8.7–10.5)
CALCIUM SERPL-MCNC: 9.3 MG/DL (ref 8.7–10.5)
CALCIUM SERPL-MCNC: 9.3 MG/DL (ref 8.7–10.5)
CALCIUM SERPL-MCNC: 9.4 MG/DL (ref 8.7–10.5)
CALCIUM SERPL-MCNC: 9.8 MG/DL (ref 8.7–10.5)
CALCIUM SERPL-MCNC: 9.8 MG/DL (ref 8.7–10.5)
CANNABINOIDS UR QL SCN: NEGATIVE
CATH EF QUANTITATIVE: 45 %
CHLORIDE SERPL-SCNC: 100 MMOL/L (ref 95–110)
CHLORIDE SERPL-SCNC: 101 MMOL/L (ref 95–110)
CHLORIDE SERPL-SCNC: 102 MMOL/L (ref 95–110)
CHLORIDE SERPL-SCNC: 103 MMOL/L (ref 95–110)
CHLORIDE SERPL-SCNC: 104 MMOL/L (ref 95–110)
CHLORIDE SERPL-SCNC: 106 MMOL/L (ref 95–110)
CHLORIDE SERPL-SCNC: 106 MMOL/L (ref 95–110)
CHLORIDE SERPL-SCNC: 107 MMOL/L (ref 95–110)
CHLORIDE SERPL-SCNC: 107 MMOL/L (ref 95–110)
CHLORIDE SERPL-SCNC: 108 MMOL/L (ref 95–110)
CHLORIDE SERPL-SCNC: 110 MMOL/L (ref 95–110)
CHLORIDE SERPL-SCNC: 111 MMOL/L (ref 95–110)
CHLORIDE SERPL-SCNC: 112 MMOL/L (ref 95–110)
CHLORIDE SERPL-SCNC: 112 MMOL/L (ref 95–110)
CHLORIDE SERPL-SCNC: 113 MMOL/L (ref 95–110)
CHLORIDE SERPL-SCNC: 113 MMOL/L (ref 95–110)
CHLORIDE SERPL-SCNC: 114 MMOL/L (ref 95–110)
CHLORIDE SERPL-SCNC: 115 MMOL/L (ref 95–110)
CHLORIDE SERPL-SCNC: 99 MMOL/L (ref 95–110)
CHLORIDE SERPL-SCNC: 99 MMOL/L (ref 95–110)
CHOLEST SERPL-MCNC: 153 MG/DL (ref 120–199)
CHOLEST/HDLC SERPL: 2.2 {RATIO} (ref 2–5)
CK SERPL-CCNC: 152 U/L (ref 20–180)
CLARITY UR REFRACT.AUTO: ABNORMAL
CLARITY UR: CLEAR
CLARITY UR: CLEAR
CO2 SERPL-SCNC: 12 MMOL/L (ref 23–29)
CO2 SERPL-SCNC: 14 MMOL/L (ref 23–29)
CO2 SERPL-SCNC: 18 MMOL/L (ref 23–29)
CO2 SERPL-SCNC: 19 MMOL/L (ref 23–29)
CO2 SERPL-SCNC: 19 MMOL/L (ref 23–29)
CO2 SERPL-SCNC: 20 MMOL/L (ref 23–29)
CO2 SERPL-SCNC: 21 MMOL/L (ref 23–29)
CO2 SERPL-SCNC: 22 MMOL/L (ref 23–29)
CO2 SERPL-SCNC: 23 MMOL/L (ref 23–29)
CO2 SERPL-SCNC: 24 MMOL/L (ref 23–29)
CO2 SERPL-SCNC: 25 MMOL/L (ref 23–29)
CO2 SERPL-SCNC: 26 MMOL/L (ref 23–29)
CO2 SERPL-SCNC: 27 MMOL/L (ref 23–29)
CO2 SERPL-SCNC: 28 MMOL/L (ref 23–29)
CO2 SERPL-SCNC: 29 MMOL/L (ref 23–29)
CO2 SERPL-SCNC: 31 MMOL/L (ref 23–29)
CO2 SERPL-SCNC: 31 MMOL/L (ref 23–29)
CO2 SERPL-SCNC: 32 MMOL/L (ref 23–29)
CO2 SERPL-SCNC: 33 MMOL/L (ref 23–29)
CO2 SERPL-SCNC: 34 MMOL/L (ref 23–29)
CO2 SERPL-SCNC: 35 MMOL/L (ref 23–29)
CO2 SERPL-SCNC: 36 MMOL/L (ref 23–29)
CO2 SERPL-SCNC: 7 MMOL/L (ref 23–29)
CO2 SERPL-SCNC: 9 MMOL/L (ref 23–29)
CODING SYSTEM: NORMAL
COLOR UR AUTO: ABNORMAL
COLOR UR: YELLOW
COLOR UR: YELLOW
CREAT SERPL-MCNC: 0.6 MG/DL (ref 0.5–1.4)
CREAT SERPL-MCNC: 0.7 MG/DL (ref 0.5–1.4)
CREAT SERPL-MCNC: 0.8 MG/DL (ref 0.5–1.4)
CREAT SERPL-MCNC: 0.9 MG/DL (ref 0.5–1.4)
CREAT SERPL-MCNC: 1 MG/DL (ref 0.5–1.4)
CREAT SERPL-MCNC: 1.1 MG/DL (ref 0.5–1.4)
CREAT SERPL-MCNC: 1.2 MG/DL (ref 0.5–1.4)
CREAT SERPL-MCNC: 1.3 MG/DL (ref 0.5–1.4)
CREAT SERPL-MCNC: 1.5 MG/DL (ref 0.5–1.4)
CREAT SERPL-MCNC: 1.5 MG/DL (ref 0.5–1.4)
CREAT SERPL-MCNC: 1.6 MG/DL (ref 0.5–1.4)
CREAT SERPL-MCNC: 1.6 MG/DL (ref 0.5–1.4)
CREAT UR-MCNC: 50 MG/DL (ref 15–325)
CTP QC/QA: YES
CV ECHO LV RWT: 0.24 CM
CV ECHO LV RWT: 0.24 CM
CV ECHO LV RWT: 0.5 CM
DELSYS: ABNORMAL
DIFFERENTIAL METHOD: ABNORMAL
DIFFERENTIAL METHOD: NORMAL
DISPENSE STATUS: NORMAL
DOP CALC AO PEAK VEL: 1.48 M/S
DOP CALC AO PEAK VEL: 1.62 M/S
DOP CALC AO VTI: 25.5 CM
DOP CALC AO VTI: 29 CM
DOP CALC LVOT AREA: 2.8 CM2
DOP CALC LVOT AREA: 3 CM2
DOP CALC LVOT DIAMETER: 1.9 CM
DOP CALC LVOT DIAMETER: 1.97 CM
DOP CALC LVOT PEAK VEL: 0.65 M/S
DOP CALC LVOT PEAK VEL: 0.72 M/S
DOP CALC LVOT STROKE VOLUME: 29.06 CM3
DOP CALC LVOT STROKE VOLUME: 44.77 CM3
DOP CALC MV VTI: 26.1 CM
DOP CALC RVOT PEAK VEL: 0.51 M/S
DOP CALC RVOT VTI: 9.3 CM
DOP CALCLVOT PEAK VEL VTI: 15.8 CM
DOP CALCLVOT PEAK VEL VTI: 9.54 CM
E WAVE DECELERATION TIME: 133.94 MSEC
E WAVE DECELERATION TIME: 175.74 MSEC
E/A RATIO: 1.28
E/A RATIO: 1.92
E/E' RATIO: 15.54 M/S
E/E' RATIO: 27.64 M/S
ECHO EF ESTIMATED: 41 %
ECHO LV POSTERIOR WALL: 0.58 CM (ref 0.6–1.1)
ECHO LV POSTERIOR WALL: 0.6 CM (ref 0.6–1.1)
ECHO LV POSTERIOR WALL: 1.03 CM (ref 0.6–1.1)
EJECTION FRACTION: 45 %
EJECTION FRACTION: 45 %
EJECTION FRACTION: 55 %
EOSINOPHIL # BLD AUTO: 0 K/UL (ref 0–0.5)
EOSINOPHIL # BLD AUTO: 0.1 K/UL (ref 0–0.5)
EOSINOPHIL # BLD AUTO: 0.2 K/UL (ref 0–0.5)
EOSINOPHIL # BLD AUTO: ABNORMAL K/UL (ref 0–0.5)
EOSINOPHIL NFR BLD: 0 % (ref 0–8)
EOSINOPHIL NFR BLD: 0.2 % (ref 0–8)
EOSINOPHIL NFR BLD: 0.4 % (ref 0–8)
EOSINOPHIL NFR BLD: 0.4 % (ref 0–8)
EOSINOPHIL NFR BLD: 0.5 % (ref 0–8)
EOSINOPHIL NFR BLD: 0.6 % (ref 0–8)
EOSINOPHIL NFR BLD: 0.7 % (ref 0–8)
EOSINOPHIL NFR BLD: 0.7 % (ref 0–8)
EOSINOPHIL NFR BLD: 0.8 % (ref 0–8)
EOSINOPHIL NFR BLD: 0.8 % (ref 0–8)
EOSINOPHIL NFR BLD: 0.9 % (ref 0–8)
EOSINOPHIL NFR BLD: 1.2 % (ref 0–8)
EOSINOPHIL NFR BLD: 1.3 % (ref 0–8)
EOSINOPHIL NFR BLD: 1.3 % (ref 0–8)
EOSINOPHIL NFR BLD: 1.4 % (ref 0–8)
EOSINOPHIL NFR BLD: 2.5 % (ref 0–8)
EOSINOPHIL NFR BLD: 2.7 % (ref 0–8)
EP: 5
EP: 6
ERYTHROCYTE [DISTWIDTH] IN BLOOD BY AUTOMATED COUNT: 12.7 % (ref 11.5–14.5)
ERYTHROCYTE [DISTWIDTH] IN BLOOD BY AUTOMATED COUNT: 12.8 % (ref 11.5–14.5)
ERYTHROCYTE [DISTWIDTH] IN BLOOD BY AUTOMATED COUNT: 12.9 % (ref 11.5–14.5)
ERYTHROCYTE [DISTWIDTH] IN BLOOD BY AUTOMATED COUNT: 13 % (ref 11.5–14.5)
ERYTHROCYTE [DISTWIDTH] IN BLOOD BY AUTOMATED COUNT: 13.1 % (ref 11.5–14.5)
ERYTHROCYTE [DISTWIDTH] IN BLOOD BY AUTOMATED COUNT: 13.2 % (ref 11.5–14.5)
ERYTHROCYTE [DISTWIDTH] IN BLOOD BY AUTOMATED COUNT: 13.3 % (ref 11.5–14.5)
ERYTHROCYTE [DISTWIDTH] IN BLOOD BY AUTOMATED COUNT: 13.4 % (ref 11.5–14.5)
ERYTHROCYTE [DISTWIDTH] IN BLOOD BY AUTOMATED COUNT: 13.4 % (ref 11.5–14.5)
ERYTHROCYTE [DISTWIDTH] IN BLOOD BY AUTOMATED COUNT: 13.5 % (ref 11.5–14.5)
ERYTHROCYTE [DISTWIDTH] IN BLOOD BY AUTOMATED COUNT: 13.6 % (ref 11.5–14.5)
ERYTHROCYTE [DISTWIDTH] IN BLOOD BY AUTOMATED COUNT: 13.7 % (ref 11.5–14.5)
ERYTHROCYTE [DISTWIDTH] IN BLOOD BY AUTOMATED COUNT: 13.7 % (ref 11.5–14.5)
ERYTHROCYTE [DISTWIDTH] IN BLOOD BY AUTOMATED COUNT: 13.8 % (ref 11.5–14.5)
ERYTHROCYTE [DISTWIDTH] IN BLOOD BY AUTOMATED COUNT: 13.9 % (ref 11.5–14.5)
ERYTHROCYTE [DISTWIDTH] IN BLOOD BY AUTOMATED COUNT: 14 % (ref 11.5–14.5)
ERYTHROCYTE [DISTWIDTH] IN BLOOD BY AUTOMATED COUNT: 14 % (ref 11.5–14.5)
ERYTHROCYTE [DISTWIDTH] IN BLOOD BY AUTOMATED COUNT: 14.1 % (ref 11.5–14.5)
ERYTHROCYTE [DISTWIDTH] IN BLOOD BY AUTOMATED COUNT: 14.4 % (ref 11.5–14.5)
ERYTHROCYTE [SEDIMENTATION RATE] IN BLOOD BY WESTERGREN METHOD: 14 MM/H
ERYTHROCYTE [SEDIMENTATION RATE] IN BLOOD BY WESTERGREN METHOD: 16 MM/H
EST. GFR  (AFRICAN AMERICAN): 36.3 ML/MIN/1.73 M^2
EST. GFR  (AFRICAN AMERICAN): 36.3 ML/MIN/1.73 M^2
EST. GFR  (AFRICAN AMERICAN): 39.3 ML/MIN/1.73 M^2
EST. GFR  (AFRICAN AMERICAN): 39.3 ML/MIN/1.73 M^2
EST. GFR  (AFRICAN AMERICAN): 46.7 ML/MIN/1.73 M^2
EST. GFR  (AFRICAN AMERICAN): 46.7 ML/MIN/1.73 M^2
EST. GFR  (AFRICAN AMERICAN): 47 ML/MIN/1.73 M^2
EST. GFR  (AFRICAN AMERICAN): 51 ML/MIN/1.73 M^2
EST. GFR  (AFRICAN AMERICAN): 51.4 ML/MIN/1.73 M^2
EST. GFR  (AFRICAN AMERICAN): 57.2 ML/MIN/1.73 M^2
EST. GFR  (AFRICAN AMERICAN): >60 ML/MIN/1.73 M^2
EST. GFR  (NON AFRICAN AMERICAN): 31.5 ML/MIN/1.73 M^2
EST. GFR  (NON AFRICAN AMERICAN): 31.5 ML/MIN/1.73 M^2
EST. GFR  (NON AFRICAN AMERICAN): 34.1 ML/MIN/1.73 M^2
EST. GFR  (NON AFRICAN AMERICAN): 34.1 ML/MIN/1.73 M^2
EST. GFR  (NON AFRICAN AMERICAN): 40.5 ML/MIN/1.73 M^2
EST. GFR  (NON AFRICAN AMERICAN): 40.5 ML/MIN/1.73 M^2
EST. GFR  (NON AFRICAN AMERICAN): 41 ML/MIN/1.73 M^2
EST. GFR  (NON AFRICAN AMERICAN): 44.6 ML/MIN/1.73 M^2
EST. GFR  (NON AFRICAN AMERICAN): 45 ML/MIN/1.73 M^2
EST. GFR  (NON AFRICAN AMERICAN): 49.6 ML/MIN/1.73 M^2
EST. GFR  (NON AFRICAN AMERICAN): 55.6 ML/MIN/1.73 M^2
EST. GFR  (NON AFRICAN AMERICAN): 56 ML/MIN/1.73 M^2
EST. GFR  (NON AFRICAN AMERICAN): >60 ML/MIN/1.73 M^2
ESTIMATED AVG GLUCOSE: 192 MG/DL (ref 68–131)
ETHANOL SERPL-MCNC: <10 MG/DL
FERRITIN SERPL-MCNC: 351 NG/ML (ref 20–300)
FIBRINOGEN PPP-MCNC: 621 MG/DL (ref 182–400)
FIO2: 28
FIO2: 40
FIO2: 45
FIO2: 45
FIO2: 50
FIO2: 60
FIO2: 60
FLOW: 1
FLOW: 2
FRACTIONAL SHORTENING: 17 % (ref 28–44)
FRACTIONAL SHORTENING: 20 % (ref 28–44)
FRACTIONAL SHORTENING: 38 % (ref 28–44)
GLUCOSE SERPL-MCNC: 105 MG/DL (ref 70–110)
GLUCOSE SERPL-MCNC: 117 MG/DL (ref 70–110)
GLUCOSE SERPL-MCNC: 122 MG/DL (ref 70–110)
GLUCOSE SERPL-MCNC: 130 MG/DL (ref 70–110)
GLUCOSE SERPL-MCNC: 139 MG/DL (ref 70–110)
GLUCOSE SERPL-MCNC: 143 MG/DL (ref 70–110)
GLUCOSE SERPL-MCNC: 144 MG/DL (ref 70–110)
GLUCOSE SERPL-MCNC: 148 MG/DL (ref 70–110)
GLUCOSE SERPL-MCNC: 154 MG/DL (ref 70–110)
GLUCOSE SERPL-MCNC: 155 MG/DL (ref 70–110)
GLUCOSE SERPL-MCNC: 157 MG/DL (ref 70–110)
GLUCOSE SERPL-MCNC: 158 MG/DL (ref 70–110)
GLUCOSE SERPL-MCNC: 158 MG/DL (ref 70–110)
GLUCOSE SERPL-MCNC: 159 MG/DL (ref 70–110)
GLUCOSE SERPL-MCNC: 159 MG/DL (ref 70–110)
GLUCOSE SERPL-MCNC: 168 MG/DL (ref 70–110)
GLUCOSE SERPL-MCNC: 169 MG/DL (ref 70–110)
GLUCOSE SERPL-MCNC: 181 MG/DL (ref 70–110)
GLUCOSE SERPL-MCNC: 184 MG/DL (ref 70–110)
GLUCOSE SERPL-MCNC: 187 MG/DL (ref 70–110)
GLUCOSE SERPL-MCNC: 192 MG/DL (ref 70–110)
GLUCOSE SERPL-MCNC: 194 MG/DL (ref 70–110)
GLUCOSE SERPL-MCNC: 197 MG/DL (ref 70–110)
GLUCOSE SERPL-MCNC: 200 MG/DL (ref 70–110)
GLUCOSE SERPL-MCNC: 202 MG/DL (ref 70–110)
GLUCOSE SERPL-MCNC: 203 MG/DL (ref 70–110)
GLUCOSE SERPL-MCNC: 207 MG/DL (ref 70–110)
GLUCOSE SERPL-MCNC: 207 MG/DL (ref 70–110)
GLUCOSE SERPL-MCNC: 212 MG/DL (ref 70–110)
GLUCOSE SERPL-MCNC: 222 MG/DL (ref 70–110)
GLUCOSE SERPL-MCNC: 235 MG/DL (ref 70–110)
GLUCOSE SERPL-MCNC: 251 MG/DL (ref 70–110)
GLUCOSE SERPL-MCNC: 254 MG/DL (ref 70–110)
GLUCOSE SERPL-MCNC: 261 MG/DL (ref 70–110)
GLUCOSE SERPL-MCNC: 273 MG/DL (ref 70–110)
GLUCOSE SERPL-MCNC: 283 MG/DL (ref 70–110)
GLUCOSE SERPL-MCNC: 295 MG/DL (ref 70–110)
GLUCOSE SERPL-MCNC: 57 MG/DL (ref 70–110)
GLUCOSE SERPL-MCNC: 74 MG/DL (ref 70–110)
GLUCOSE UR QL STRIP: ABNORMAL
GRAM STN SPEC: NORMAL
HBA1C MFR BLD: 8.3 % (ref 4–5.6)
HCO3 UR-SCNC: 13.8 MMOL/L (ref 24–28)
HCO3 UR-SCNC: 16.9 MMOL/L (ref 24–28)
HCO3 UR-SCNC: 19.7 MMOL/L (ref 24–28)
HCO3 UR-SCNC: 21.7 MMOL/L (ref 24–28)
HCO3 UR-SCNC: 23.9 MMOL/L (ref 24–28)
HCO3 UR-SCNC: 23.9 MMOL/L (ref 24–28)
HCO3 UR-SCNC: 25 MMOL/L (ref 24–28)
HCO3 UR-SCNC: 25.3 MMOL/L (ref 24–28)
HCO3 UR-SCNC: 27.2 MMOL/L (ref 24–28)
HCO3 UR-SCNC: 27.3 MMOL/L (ref 24–28)
HCO3 UR-SCNC: 27.4 MMOL/L (ref 24–28)
HCO3 UR-SCNC: 27.5 MMOL/L (ref 24–28)
HCO3 UR-SCNC: 28.3 MMOL/L (ref 24–28)
HCO3 UR-SCNC: 29.9 MMOL/L (ref 24–28)
HCO3 UR-SCNC: 30.7 MMOL/L (ref 24–28)
HCO3 UR-SCNC: 31.7 MMOL/L (ref 24–28)
HCO3 UR-SCNC: 32 MMOL/L (ref 24–28)
HCO3 UR-SCNC: 32.2 MMOL/L (ref 24–28)
HCO3 UR-SCNC: 32.3 MMOL/L (ref 24–28)
HCO3 UR-SCNC: 32.6 MMOL/L (ref 24–28)
HCO3 UR-SCNC: 35 MMOL/L (ref 24–28)
HCO3 UR-SCNC: 36.8 MMOL/L (ref 24–28)
HCO3 UR-SCNC: 37.7 MMOL/L (ref 24–28)
HCO3 UR-SCNC: 4.7 MMOL/L (ref 24–28)
HCO3 UR-SCNC: 6.3 MMOL/L (ref 24–28)
HCO3 UR-SCNC: 8.8 MMOL/L (ref 24–28)
HCT VFR BLD AUTO: 22.1 % (ref 37–48.5)
HCT VFR BLD AUTO: 24.6 % (ref 37–48.5)
HCT VFR BLD AUTO: 26.6 % (ref 37–48.5)
HCT VFR BLD AUTO: 26.8 % (ref 37–48.5)
HCT VFR BLD AUTO: 27.1 % (ref 37–48.5)
HCT VFR BLD AUTO: 27.8 % (ref 37–48.5)
HCT VFR BLD AUTO: 27.8 % (ref 37–48.5)
HCT VFR BLD AUTO: 27.9 % (ref 37–48.5)
HCT VFR BLD AUTO: 28.2 % (ref 37–48.5)
HCT VFR BLD AUTO: 28.2 % (ref 37–48.5)
HCT VFR BLD AUTO: 29.3 % (ref 37–48.5)
HCT VFR BLD AUTO: 29.6 % (ref 37–48.5)
HCT VFR BLD AUTO: 29.6 % (ref 37–48.5)
HCT VFR BLD AUTO: 30 % (ref 37–48.5)
HCT VFR BLD AUTO: 30.1 % (ref 37–48.5)
HCT VFR BLD AUTO: 30.1 % (ref 37–48.5)
HCT VFR BLD AUTO: 31.3 % (ref 37–48.5)
HCT VFR BLD AUTO: 31.9 % (ref 37–48.5)
HCT VFR BLD AUTO: 32.1 % (ref 37–48.5)
HCT VFR BLD AUTO: 32.3 % (ref 37–48.5)
HCT VFR BLD AUTO: 33 % (ref 37–48.5)
HCT VFR BLD AUTO: 33.7 % (ref 37–48.5)
HCT VFR BLD AUTO: 33.8 % (ref 37–48.5)
HCT VFR BLD AUTO: 34 % (ref 37–48.5)
HCT VFR BLD AUTO: 34.6 % (ref 37–48.5)
HCT VFR BLD AUTO: 34.6 % (ref 37–48.5)
HCT VFR BLD AUTO: 36.2 % (ref 37–48.5)
HCT VFR BLD AUTO: 37.1 % (ref 37–48.5)
HCT VFR BLD AUTO: 38.8 % (ref 37–48.5)
HCT VFR BLD AUTO: 38.9 % (ref 37–48.5)
HCT VFR BLD AUTO: 40.3 % (ref 37–48.5)
HCT VFR BLD AUTO: 43.3 % (ref 37–48.5)
HCT VFR BLD CALC: 25 %PCV (ref 36–54)
HCT VFR BLD CALC: 35 %PCV (ref 36–54)
HCV AB SERPL QL IA: NEGATIVE
HDLC SERPL-MCNC: 70 MG/DL (ref 40–75)
HDLC SERPL: 45.8 % (ref 20–50)
HGB BLD-MCNC: 10 G/DL (ref 12–16)
HGB BLD-MCNC: 10 G/DL (ref 12–16)
HGB BLD-MCNC: 10.2 G/DL (ref 12–16)
HGB BLD-MCNC: 10.4 G/DL (ref 12–16)
HGB BLD-MCNC: 10.5 G/DL (ref 12–16)
HGB BLD-MCNC: 10.6 G/DL (ref 12–16)
HGB BLD-MCNC: 11.1 G/DL (ref 12–16)
HGB BLD-MCNC: 11.3 G/DL (ref 12–16)
HGB BLD-MCNC: 11.6 G/DL (ref 12–16)
HGB BLD-MCNC: 12 G/DL (ref 12–16)
HGB BLD-MCNC: 12.4 G/DL (ref 12–16)
HGB BLD-MCNC: 12.4 G/DL (ref 12–16)
HGB BLD-MCNC: 12.5 G/DL (ref 12–16)
HGB BLD-MCNC: 12.7 G/DL (ref 12–16)
HGB BLD-MCNC: 6.9 G/DL (ref 12–16)
HGB BLD-MCNC: 7.4 G/DL (ref 12–16)
HGB BLD-MCNC: 8.3 G/DL (ref 12–16)
HGB BLD-MCNC: 8.4 G/DL (ref 12–16)
HGB BLD-MCNC: 8.5 G/DL (ref 12–16)
HGB BLD-MCNC: 8.7 G/DL (ref 12–16)
HGB BLD-MCNC: 8.9 G/DL (ref 12–16)
HGB BLD-MCNC: 8.9 G/DL (ref 12–16)
HGB BLD-MCNC: 9.3 G/DL (ref 12–16)
HGB BLD-MCNC: 9.4 G/DL (ref 12–16)
HGB BLD-MCNC: 9.6 G/DL (ref 12–16)
HGB BLD-MCNC: 9.7 G/DL (ref 12–16)
HGB BLD-MCNC: 9.7 G/DL (ref 12–16)
HGB UR QL STRIP: ABNORMAL
HGB UR QL STRIP: NEGATIVE
HGB UR QL STRIP: NEGATIVE
HYALINE CASTS UR QL AUTO: 20 /LPF
HYPOCHROMIA BLD QL SMEAR: ABNORMAL
HYPOCHROMIA BLD QL SMEAR: ABNORMAL
IMM GRANULOCYTES # BLD AUTO: 0.02 K/UL (ref 0–0.04)
IMM GRANULOCYTES # BLD AUTO: 0.03 K/UL (ref 0–0.04)
IMM GRANULOCYTES # BLD AUTO: 0.03 K/UL (ref 0–0.04)
IMM GRANULOCYTES # BLD AUTO: 0.04 K/UL (ref 0–0.04)
IMM GRANULOCYTES # BLD AUTO: 0.04 K/UL (ref 0–0.04)
IMM GRANULOCYTES # BLD AUTO: 0.05 K/UL (ref 0–0.04)
IMM GRANULOCYTES # BLD AUTO: 0.06 K/UL (ref 0–0.04)
IMM GRANULOCYTES # BLD AUTO: 0.06 K/UL (ref 0–0.04)
IMM GRANULOCYTES # BLD AUTO: 0.07 K/UL (ref 0–0.04)
IMM GRANULOCYTES # BLD AUTO: 0.07 K/UL (ref 0–0.04)
IMM GRANULOCYTES # BLD AUTO: 0.08 K/UL (ref 0–0.04)
IMM GRANULOCYTES # BLD AUTO: 0.09 K/UL (ref 0–0.04)
IMM GRANULOCYTES # BLD AUTO: 0.1 K/UL (ref 0–0.04)
IMM GRANULOCYTES # BLD AUTO: 0.1 K/UL (ref 0–0.04)
IMM GRANULOCYTES # BLD AUTO: 0.11 K/UL (ref 0–0.04)
IMM GRANULOCYTES # BLD AUTO: 0.11 K/UL (ref 0–0.04)
IMM GRANULOCYTES # BLD AUTO: 0.13 K/UL (ref 0–0.04)
IMM GRANULOCYTES # BLD AUTO: 0.14 K/UL (ref 0–0.04)
IMM GRANULOCYTES # BLD AUTO: ABNORMAL K/UL (ref 0–0.04)
IMM GRANULOCYTES # BLD AUTO: ABNORMAL K/UL (ref 0–0.04)
IMM GRANULOCYTES NFR BLD AUTO: 0.3 % (ref 0–0.5)
IMM GRANULOCYTES NFR BLD AUTO: 0.4 % (ref 0–0.5)
IMM GRANULOCYTES NFR BLD AUTO: 0.5 % (ref 0–0.5)
IMM GRANULOCYTES NFR BLD AUTO: 0.6 % (ref 0–0.5)
IMM GRANULOCYTES NFR BLD AUTO: 0.6 % (ref 0–0.5)
IMM GRANULOCYTES NFR BLD AUTO: 0.7 % (ref 0–0.5)
IMM GRANULOCYTES NFR BLD AUTO: 0.8 % (ref 0–0.5)
IMM GRANULOCYTES NFR BLD AUTO: 1 % (ref 0–0.5)
IMM GRANULOCYTES NFR BLD AUTO: 1.1 % (ref 0–0.5)
IMM GRANULOCYTES NFR BLD AUTO: ABNORMAL % (ref 0–0.5)
IMM GRANULOCYTES NFR BLD AUTO: ABNORMAL % (ref 0–0.5)
INR PPP: 0.9 (ref 0.8–1.2)
INR PPP: 1 (ref 0.8–1.2)
INR PPP: 1 (ref 0.8–1.2)
INTERVENTRICULAR SEPTUM: 0.93 CM (ref 0.6–1.1)
INTERVENTRICULAR SEPTUM: 1 CM (ref 0.6–1.1)
INTERVENTRICULAR SEPTUM: 1.3 CM (ref 0.6–1.1)
IP: 10
IP: 12
IP: 15
IRON SERPL-MCNC: 12 UG/DL (ref 30–160)
IVC DIAMETER: 1.93 CM
IVRT: 53.28 MSEC
IVRT: 53.28 MSEC
KETONES UR QL STRIP: ABNORMAL
KETONES UR QL STRIP: ABNORMAL
KETONES UR QL STRIP: NEGATIVE
LA MAJOR: 4.66 CM
LA MAJOR: 5.59 CM
LA MINOR: 3.22 CM
LA MINOR: 5.45 CM
LA WIDTH: 3.33 CM
LA WIDTH: 3.81 CM
LACTATE SERPL-SCNC: 1.6 MMOL/L (ref 0.5–2.2)
LACTATE SERPL-SCNC: 1.7 MMOL/L (ref 0.5–2.2)
LACTATE SERPL-SCNC: 1.9 MMOL/L (ref 0.5–2.2)
LACTATE SERPL-SCNC: 1.9 MMOL/L (ref 0.5–2.2)
LACTATE SERPL-SCNC: 2 MMOL/L (ref 0.5–2.2)
LACTATE SERPL-SCNC: 2 MMOL/L (ref 0.5–2.2)
LACTATE SERPL-SCNC: 2.3 MMOL/L (ref 0.5–2.2)
LACTATE SERPL-SCNC: 2.8 MMOL/L (ref 0.5–2.2)
LACTATE SERPL-SCNC: 2.9 MMOL/L (ref 0.5–2.2)
LACTATE SERPL-SCNC: 2.9 MMOL/L (ref 0.5–2.2)
LACTATE SERPL-SCNC: 6 MMOL/L (ref 0.5–2.2)
LACTATE SERPL-SCNC: 6.1 MMOL/L (ref 0.5–2.2)
LDLC SERPL CALC-MCNC: 70.8 MG/DL (ref 63–159)
LEFT ATRIUM SIZE: 2.75 CM
LEFT ATRIUM SIZE: 3.63 CM
LEFT ATRIUM VOLUME INDEX MOD: 34 ML/M2
LEFT ATRIUM VOLUME INDEX: 17.9 ML/M2
LEFT ATRIUM VOLUME INDEX: 39.3 ML/M2
LEFT ATRIUM VOLUME MOD: 56.11 CM3
LEFT ATRIUM VOLUME: 29.64 CM3
LEFT ATRIUM VOLUME: 64.88 CM3
LEFT INTERNAL DIMENSION IN SYSTOLE: 3.1 CM (ref 2.1–4)
LEFT INTERNAL DIMENSION IN SYSTOLE: 3.32 CM (ref 2.1–4)
LEFT INTERNAL DIMENSION IN SYSTOLE: 3.99 CM (ref 2.1–4)
LEFT VENTRICLE DIASTOLIC VOLUME INDEX: 33.13 ML/M2
LEFT VENTRICLE DIASTOLIC VOLUME INDEX: 46.13 ML/M2
LEFT VENTRICLE DIASTOLIC VOLUME INDEX: 64.5 ML/M2
LEFT VENTRICLE DIASTOLIC VOLUME: 106.43 ML
LEFT VENTRICLE DIASTOLIC VOLUME: 54.67 ML
LEFT VENTRICLE DIASTOLIC VOLUME: 76.58 ML
LEFT VENTRICLE MASS INDEX: 101 G/M2
LEFT VENTRICLE MASS INDEX: 71 G/M2
LEFT VENTRICLE MASS INDEX: 82 G/M2
LEFT VENTRICLE SYSTOLIC VOLUME INDEX: 14.8 ML/M2
LEFT VENTRICLE SYSTOLIC VOLUME INDEX: 27.1 ML/M2
LEFT VENTRICLE SYSTOLIC VOLUME INDEX: 42.3 ML/M2
LEFT VENTRICLE SYSTOLIC VOLUME: 24.41 ML
LEFT VENTRICLE SYSTOLIC VOLUME: 44.93 ML
LEFT VENTRICLE SYSTOLIC VOLUME: 69.73 ML
LEFT VENTRICULAR INTERNAL DIMENSION IN DIASTOLE: 4.15 CM (ref 3.5–6)
LEFT VENTRICULAR INTERNAL DIMENSION IN DIASTOLE: 4.78 CM (ref 3.5–6)
LEFT VENTRICULAR INTERNAL DIMENSION IN DIASTOLE: 5 CM (ref 3.5–6)
LEFT VENTRICULAR MASS: 116.78 G
LEFT VENTRICULAR MASS: 135.8 G
LEFT VENTRICULAR MASS: 167.52 G
LEUKOCYTE ESTERASE UR QL STRIP: NEGATIVE
LV LATERAL E/E' RATIO: 20.2 M/S
LV LATERAL E/E' RATIO: 38 M/S
LV SEPTAL E/E' RATIO: 12.63 M/S
LV SEPTAL E/E' RATIO: 21.71 M/S
LVOT MG: 1.26 MMHG
LVOT MV: 0.54 CM/S
LYMPHOCYTES # BLD AUTO: 0.4 K/UL (ref 1–4.8)
LYMPHOCYTES # BLD AUTO: 0.4 K/UL (ref 1–4.8)
LYMPHOCYTES # BLD AUTO: 0.6 K/UL (ref 1–4.8)
LYMPHOCYTES # BLD AUTO: 0.7 K/UL (ref 1–4.8)
LYMPHOCYTES # BLD AUTO: 0.8 K/UL (ref 1–4.8)
LYMPHOCYTES # BLD AUTO: 0.8 K/UL (ref 1–4.8)
LYMPHOCYTES # BLD AUTO: 0.9 K/UL (ref 1–4.8)
LYMPHOCYTES # BLD AUTO: 0.9 K/UL (ref 1–4.8)
LYMPHOCYTES # BLD AUTO: 1 K/UL (ref 1–4.8)
LYMPHOCYTES # BLD AUTO: 1.1 K/UL (ref 1–4.8)
LYMPHOCYTES # BLD AUTO: 1.1 K/UL (ref 1–4.8)
LYMPHOCYTES # BLD AUTO: 1.3 K/UL (ref 1–4.8)
LYMPHOCYTES # BLD AUTO: 1.4 K/UL (ref 1–4.8)
LYMPHOCYTES # BLD AUTO: 1.5 K/UL (ref 1–4.8)
LYMPHOCYTES # BLD AUTO: 1.7 K/UL (ref 1–4.8)
LYMPHOCYTES # BLD AUTO: 1.8 K/UL (ref 1–4.8)
LYMPHOCYTES # BLD AUTO: ABNORMAL K/UL (ref 1–4.8)
LYMPHOCYTES NFR BLD: 1 % (ref 18–48)
LYMPHOCYTES NFR BLD: 10 % (ref 18–48)
LYMPHOCYTES NFR BLD: 11.5 % (ref 18–48)
LYMPHOCYTES NFR BLD: 11.7 % (ref 18–48)
LYMPHOCYTES NFR BLD: 12.2 % (ref 18–48)
LYMPHOCYTES NFR BLD: 12.2 % (ref 18–48)
LYMPHOCYTES NFR BLD: 12.5 % (ref 18–48)
LYMPHOCYTES NFR BLD: 12.6 % (ref 18–48)
LYMPHOCYTES NFR BLD: 13.7 % (ref 18–48)
LYMPHOCYTES NFR BLD: 14.9 % (ref 18–48)
LYMPHOCYTES NFR BLD: 17.6 % (ref 18–48)
LYMPHOCYTES NFR BLD: 18.2 % (ref 18–48)
LYMPHOCYTES NFR BLD: 3.4 % (ref 18–48)
LYMPHOCYTES NFR BLD: 3.7 % (ref 18–48)
LYMPHOCYTES NFR BLD: 3.9 % (ref 18–48)
LYMPHOCYTES NFR BLD: 30.3 % (ref 18–48)
LYMPHOCYTES NFR BLD: 36 % (ref 18–48)
LYMPHOCYTES NFR BLD: 4.8 % (ref 18–48)
LYMPHOCYTES NFR BLD: 4.9 % (ref 18–48)
LYMPHOCYTES NFR BLD: 4.9 % (ref 18–48)
LYMPHOCYTES NFR BLD: 5 % (ref 18–48)
LYMPHOCYTES NFR BLD: 6.1 % (ref 18–48)
LYMPHOCYTES NFR BLD: 6.5 % (ref 18–48)
LYMPHOCYTES NFR BLD: 6.8 % (ref 18–48)
LYMPHOCYTES NFR BLD: 7.2 % (ref 18–48)
LYMPHOCYTES NFR BLD: 7.2 % (ref 18–48)
LYMPHOCYTES NFR BLD: 7.7 % (ref 18–48)
LYMPHOCYTES NFR BLD: 7.7 % (ref 18–48)
LYMPHOCYTES NFR BLD: 7.8 % (ref 18–48)
LYMPHOCYTES NFR BLD: 9.3 % (ref 18–48)
LYMPHOCYTES NFR BLD: 9.3 % (ref 18–48)
LYMPHOCYTES NFR BLD: 9.5 % (ref 18–48)
MAGNESIUM SERPL-MCNC: 1.8 MG/DL (ref 1.6–2.6)
MAGNESIUM SERPL-MCNC: 2 MG/DL (ref 1.6–2.6)
MAGNESIUM SERPL-MCNC: 2 MG/DL (ref 1.6–2.6)
MAGNESIUM SERPL-MCNC: 2.1 MG/DL (ref 1.6–2.6)
MAGNESIUM SERPL-MCNC: 2.1 MG/DL (ref 1.6–2.6)
MAGNESIUM SERPL-MCNC: 2.2 MG/DL (ref 1.6–2.6)
MAGNESIUM SERPL-MCNC: 2.3 MG/DL (ref 1.6–2.6)
MAGNESIUM SERPL-MCNC: 2.5 MG/DL (ref 1.6–2.6)
MAGNESIUM SERPL-MCNC: 2.6 MG/DL (ref 1.6–2.6)
MAGNESIUM SERPL-MCNC: 2.6 MG/DL (ref 1.6–2.6)
MAGNESIUM SERPL-MCNC: 2.7 MG/DL (ref 1.6–2.6)
MAGNESIUM SERPL-MCNC: 2.8 MG/DL (ref 1.6–2.6)
MAGNESIUM SERPL-MCNC: 2.9 MG/DL (ref 1.6–2.6)
MAP: 7.7
MAP: 8.2
MAP: 8.7
MCH RBC QN AUTO: 28.2 PG (ref 27–31)
MCH RBC QN AUTO: 28.3 PG (ref 27–31)
MCH RBC QN AUTO: 28.3 PG (ref 27–31)
MCH RBC QN AUTO: 28.4 PG (ref 27–31)
MCH RBC QN AUTO: 28.4 PG (ref 27–31)
MCH RBC QN AUTO: 28.6 PG (ref 27–31)
MCH RBC QN AUTO: 28.7 PG (ref 27–31)
MCH RBC QN AUTO: 28.8 PG (ref 27–31)
MCH RBC QN AUTO: 28.9 PG (ref 27–31)
MCH RBC QN AUTO: 29 PG (ref 27–31)
MCH RBC QN AUTO: 29.1 PG (ref 27–31)
MCH RBC QN AUTO: 29.2 PG (ref 27–31)
MCH RBC QN AUTO: 29.2 PG (ref 27–31)
MCH RBC QN AUTO: 29.3 PG (ref 27–31)
MCH RBC QN AUTO: 29.4 PG (ref 27–31)
MCH RBC QN AUTO: 29.4 PG (ref 27–31)
MCH RBC QN AUTO: 29.8 PG (ref 27–31)
MCHC RBC AUTO-ENTMCNC: 28.9 G/DL (ref 32–36)
MCHC RBC AUTO-ENTMCNC: 29 G/DL (ref 32–36)
MCHC RBC AUTO-ENTMCNC: 29.6 G/DL (ref 32–36)
MCHC RBC AUTO-ENTMCNC: 29.7 G/DL (ref 32–36)
MCHC RBC AUTO-ENTMCNC: 29.8 G/DL (ref 32–36)
MCHC RBC AUTO-ENTMCNC: 29.9 G/DL (ref 32–36)
MCHC RBC AUTO-ENTMCNC: 30.1 G/DL (ref 32–36)
MCHC RBC AUTO-ENTMCNC: 30.4 G/DL (ref 32–36)
MCHC RBC AUTO-ENTMCNC: 30.6 G/DL (ref 32–36)
MCHC RBC AUTO-ENTMCNC: 30.8 G/DL (ref 32–36)
MCHC RBC AUTO-ENTMCNC: 30.9 G/DL (ref 32–36)
MCHC RBC AUTO-ENTMCNC: 31.2 G/DL (ref 32–36)
MCHC RBC AUTO-ENTMCNC: 31.3 G/DL (ref 32–36)
MCHC RBC AUTO-ENTMCNC: 31.6 G/DL (ref 32–36)
MCHC RBC AUTO-ENTMCNC: 31.7 G/DL (ref 32–36)
MCHC RBC AUTO-ENTMCNC: 32 G/DL (ref 32–36)
MCHC RBC AUTO-ENTMCNC: 32 G/DL (ref 32–36)
MCHC RBC AUTO-ENTMCNC: 32.1 G/DL (ref 32–36)
MCHC RBC AUTO-ENTMCNC: 32.2 G/DL (ref 32–36)
MCHC RBC AUTO-ENTMCNC: 32.3 G/DL (ref 32–36)
MCHC RBC AUTO-ENTMCNC: 32.6 G/DL (ref 32–36)
MCHC RBC AUTO-ENTMCNC: 32.6 G/DL (ref 32–36)
MCHC RBC AUTO-ENTMCNC: 32.7 G/DL (ref 32–36)
MCHC RBC AUTO-ENTMCNC: 33.5 G/DL (ref 32–36)
MCV RBC AUTO: 100 FL (ref 82–98)
MCV RBC AUTO: 101 FL (ref 82–98)
MCV RBC AUTO: 87 FL (ref 82–98)
MCV RBC AUTO: 90 FL (ref 82–98)
MCV RBC AUTO: 91 FL (ref 82–98)
MCV RBC AUTO: 92 FL (ref 82–98)
MCV RBC AUTO: 93 FL (ref 82–98)
MCV RBC AUTO: 93 FL (ref 82–98)
MCV RBC AUTO: 94 FL (ref 82–98)
MCV RBC AUTO: 95 FL (ref 82–98)
MCV RBC AUTO: 96 FL (ref 82–98)
MCV RBC AUTO: 97 FL (ref 82–98)
MCV RBC AUTO: 98 FL (ref 82–98)
METAMYELOCYTES NFR BLD MANUAL: 4 %
METHADONE UR QL SCN>300 NG/ML: NEGATIVE
MICROSCOPIC COMMENT: ABNORMAL
MICROSCOPIC COMMENT: NORMAL
MICROSCOPIC COMMENT: NORMAL
MIN VOL: 10
MIN VOL: 11.5
MIN VOL: 11.9
MIN VOL: 22.6
MIN VOL: 666
MIN VOL: 7
MIN VOL: 7.57
MIN VOL: 9
MIN VOL: 9.9
MODE: ABNORMAL
MONOCYTES # BLD AUTO: 0.4 K/UL (ref 0.3–1)
MONOCYTES # BLD AUTO: 0.5 K/UL (ref 0.3–1)
MONOCYTES # BLD AUTO: 0.6 K/UL (ref 0.3–1)
MONOCYTES # BLD AUTO: 0.7 K/UL (ref 0.3–1)
MONOCYTES # BLD AUTO: 0.7 K/UL (ref 0.3–1)
MONOCYTES # BLD AUTO: 0.8 K/UL (ref 0.3–1)
MONOCYTES # BLD AUTO: 0.9 K/UL (ref 0.3–1)
MONOCYTES # BLD AUTO: 1 K/UL (ref 0.3–1)
MONOCYTES # BLD AUTO: 1.1 K/UL (ref 0.3–1)
MONOCYTES # BLD AUTO: 1.2 K/UL (ref 0.3–1)
MONOCYTES # BLD AUTO: 1.2 K/UL (ref 0.3–1)
MONOCYTES # BLD AUTO: ABNORMAL K/UL (ref 0.3–1)
MONOCYTES NFR BLD: 1 % (ref 4–15)
MONOCYTES NFR BLD: 3.1 % (ref 4–15)
MONOCYTES NFR BLD: 3.9 % (ref 4–15)
MONOCYTES NFR BLD: 4.2 % (ref 4–15)
MONOCYTES NFR BLD: 4.3 % (ref 4–15)
MONOCYTES NFR BLD: 4.6 % (ref 4–15)
MONOCYTES NFR BLD: 4.6 % (ref 4–15)
MONOCYTES NFR BLD: 4.8 % (ref 4–15)
MONOCYTES NFR BLD: 5.1 % (ref 4–15)
MONOCYTES NFR BLD: 5.3 % (ref 4–15)
MONOCYTES NFR BLD: 5.6 % (ref 4–15)
MONOCYTES NFR BLD: 5.9 % (ref 4–15)
MONOCYTES NFR BLD: 6 % (ref 4–15)
MONOCYTES NFR BLD: 6.4 % (ref 4–15)
MONOCYTES NFR BLD: 6.7 % (ref 4–15)
MONOCYTES NFR BLD: 6.7 % (ref 4–15)
MONOCYTES NFR BLD: 6.9 % (ref 4–15)
MONOCYTES NFR BLD: 6.9 % (ref 4–15)
MONOCYTES NFR BLD: 7.1 % (ref 4–15)
MONOCYTES NFR BLD: 7.2 % (ref 4–15)
MONOCYTES NFR BLD: 7.3 % (ref 4–15)
MONOCYTES NFR BLD: 7.4 % (ref 4–15)
MONOCYTES NFR BLD: 7.5 % (ref 4–15)
MONOCYTES NFR BLD: 7.6 % (ref 4–15)
MONOCYTES NFR BLD: 8 % (ref 4–15)
MONOCYTES NFR BLD: 8.1 % (ref 4–15)
MONOCYTES NFR BLD: 8.3 % (ref 4–15)
MONOCYTES NFR BLD: 8.3 % (ref 4–15)
MONOCYTES NFR BLD: 8.5 % (ref 4–15)
MONOCYTES NFR BLD: 8.7 % (ref 4–15)
MONOCYTES NFR BLD: 9 % (ref 4–15)
MONOCYTES NFR BLD: 9.5 % (ref 4–15)
MV MEAN GRADIENT: 2 MMHG
MV PEAK A VEL: 0.79 M/S
MV PEAK A VEL: 0.79 M/S
MV PEAK E VEL: 1.01 M/S
MV PEAK E VEL: 1.52 M/S
MV PEAK GRADIENT: 4 MMHG
MV STENOSIS PRESSURE HALF TIME: 38.84 MS
MV STENOSIS PRESSURE HALF TIME: 50.96 MS
MV VALVE AREA BY CONTINUITY EQUATION: 1.72 CM2
MV VALVE AREA P 1/2 METHOD: 4.32 CM2
MV VALVE AREA P 1/2 METHOD: 5.66 CM2
NEUTROPHILS # BLD AUTO: 10.1 K/UL (ref 1.8–7.7)
NEUTROPHILS # BLD AUTO: 10.4 K/UL (ref 1.8–7.7)
NEUTROPHILS # BLD AUTO: 10.6 K/UL (ref 1.8–7.7)
NEUTROPHILS # BLD AUTO: 10.8 K/UL (ref 1.8–7.7)
NEUTROPHILS # BLD AUTO: 10.8 K/UL (ref 1.8–7.7)
NEUTROPHILS # BLD AUTO: 11.1 K/UL (ref 1.8–7.7)
NEUTROPHILS # BLD AUTO: 11.2 K/UL (ref 1.8–7.7)
NEUTROPHILS # BLD AUTO: 11.5 K/UL (ref 1.8–7.7)
NEUTROPHILS # BLD AUTO: 11.6 K/UL (ref 1.8–7.7)
NEUTROPHILS # BLD AUTO: 11.7 K/UL (ref 1.8–7.7)
NEUTROPHILS # BLD AUTO: 12.2 K/UL (ref 1.8–7.7)
NEUTROPHILS # BLD AUTO: 12.3 K/UL (ref 1.8–7.7)
NEUTROPHILS # BLD AUTO: 3.3 K/UL (ref 1.8–7.7)
NEUTROPHILS # BLD AUTO: 5.4 K/UL (ref 1.8–7.7)
NEUTROPHILS # BLD AUTO: 6.7 K/UL (ref 1.8–7.7)
NEUTROPHILS # BLD AUTO: 7.5 K/UL (ref 1.8–7.7)
NEUTROPHILS # BLD AUTO: 7.5 K/UL (ref 1.8–7.7)
NEUTROPHILS # BLD AUTO: 8.1 K/UL (ref 1.8–7.7)
NEUTROPHILS # BLD AUTO: 8.2 K/UL (ref 1.8–7.7)
NEUTROPHILS # BLD AUTO: 8.5 K/UL (ref 1.8–7.7)
NEUTROPHILS # BLD AUTO: 8.5 K/UL (ref 1.8–7.7)
NEUTROPHILS # BLD AUTO: 8.9 K/UL (ref 1.8–7.7)
NEUTROPHILS # BLD AUTO: 9.1 K/UL (ref 1.8–7.7)
NEUTROPHILS # BLD AUTO: 9.1 K/UL (ref 1.8–7.7)
NEUTROPHILS # BLD AUTO: 9.2 K/UL (ref 1.8–7.7)
NEUTROPHILS # BLD AUTO: 9.3 K/UL (ref 1.8–7.7)
NEUTROPHILS # BLD AUTO: 9.8 K/UL (ref 1.8–7.7)
NEUTROPHILS # BLD AUTO: 9.9 K/UL (ref 1.8–7.7)
NEUTROPHILS NFR BLD: 48 % (ref 38–73)
NEUTROPHILS NFR BLD: 58.7 % (ref 38–73)
NEUTROPHILS NFR BLD: 71.3 % (ref 38–73)
NEUTROPHILS NFR BLD: 74.9 % (ref 38–73)
NEUTROPHILS NFR BLD: 76 % (ref 38–73)
NEUTROPHILS NFR BLD: 76.2 % (ref 38–73)
NEUTROPHILS NFR BLD: 76.6 % (ref 38–73)
NEUTROPHILS NFR BLD: 77 % (ref 38–73)
NEUTROPHILS NFR BLD: 78.5 % (ref 38–73)
NEUTROPHILS NFR BLD: 78.5 % (ref 38–73)
NEUTROPHILS NFR BLD: 78.9 % (ref 38–73)
NEUTROPHILS NFR BLD: 78.9 % (ref 38–73)
NEUTROPHILS NFR BLD: 80.2 % (ref 38–73)
NEUTROPHILS NFR BLD: 80.5 % (ref 38–73)
NEUTROPHILS NFR BLD: 80.9 % (ref 38–73)
NEUTROPHILS NFR BLD: 81.5 % (ref 38–73)
NEUTROPHILS NFR BLD: 81.9 % (ref 38–73)
NEUTROPHILS NFR BLD: 83 % (ref 38–73)
NEUTROPHILS NFR BLD: 83.7 % (ref 38–73)
NEUTROPHILS NFR BLD: 84.6 % (ref 38–73)
NEUTROPHILS NFR BLD: 84.8 % (ref 38–73)
NEUTROPHILS NFR BLD: 86.2 % (ref 38–73)
NEUTROPHILS NFR BLD: 86.8 % (ref 38–73)
NEUTROPHILS NFR BLD: 87 % (ref 38–73)
NEUTROPHILS NFR BLD: 87.7 % (ref 38–73)
NEUTROPHILS NFR BLD: 88.2 % (ref 38–73)
NEUTROPHILS NFR BLD: 88.4 % (ref 38–73)
NEUTROPHILS NFR BLD: 89.3 % (ref 38–73)
NEUTROPHILS NFR BLD: 89.7 % (ref 38–73)
NEUTROPHILS NFR BLD: 89.7 % (ref 38–73)
NEUTROPHILS NFR BLD: 89.8 % (ref 38–73)
NEUTROPHILS NFR BLD: 92.6 % (ref 38–73)
NEUTS BAND NFR BLD MANUAL: 22 %
NEUTS BAND NFR BLD MANUAL: 4 %
NITRITE UR QL STRIP: NEGATIVE
NONHDLC SERPL-MCNC: 83 MG/DL
NRBC BLD-RTO: 0 /100 WBC
NRBC BLD-RTO: 1 /100 WBC
NRBC BLD-RTO: 12 /100 WBC
NRBC BLD-RTO: 4 /100 WBC
NRBC BLD-RTO: 4 /100 WBC
NRBC BLD-RTO: 9 /100 WBC
OPIATES UR QL SCN: NEGATIVE
OSMOLALITY SERPL: 363 MOSM/KG (ref 275–295)
OSMOLALITY UR: 861 MOSM/KG (ref 50–1200)
OSMOLALITY UR: 980 MOSM/KG (ref 50–1200)
OVALOCYTES BLD QL SMEAR: ABNORMAL
PCO2 BLDA: 11.9 MMHG (ref 35–45)
PCO2 BLDA: 16.4 MMHG (ref 35–45)
PCO2 BLDA: 19.2 MMHG (ref 35–45)
PCO2 BLDA: 24.4 MMHG (ref 35–45)
PCO2 BLDA: 28.3 MMHG (ref 35–45)
PCO2 BLDA: 29.8 MMHG (ref 35–45)
PCO2 BLDA: 31.7 MMHG (ref 35–45)
PCO2 BLDA: 31.9 MMHG (ref 35–45)
PCO2 BLDA: 32.6 MMHG (ref 35–45)
PCO2 BLDA: 32.8 MMHG (ref 35–45)
PCO2 BLDA: 32.9 MMHG (ref 35–45)
PCO2 BLDA: 33.3 MMHG (ref 35–45)
PCO2 BLDA: 35 MMHG (ref 35–45)
PCO2 BLDA: 36.6 MMHG (ref 35–45)
PCO2 BLDA: 36.7 MMHG (ref 35–45)
PCO2 BLDA: 37.7 MMHG (ref 35–45)
PCO2 BLDA: 37.8 MMHG (ref 35–45)
PCO2 BLDA: 39.1 MMHG (ref 35–45)
PCO2 BLDA: 39.8 MMHG (ref 35–45)
PCO2 BLDA: 41.5 MMHG (ref 35–45)
PCO2 BLDA: 41.7 MMHG (ref 35–45)
PCO2 BLDA: 42.1 MMHG (ref 35–45)
PCO2 BLDA: 42.2 MMHG (ref 35–45)
PCO2 BLDA: 43 MMHG (ref 35–45)
PCO2 BLDA: 44.5 MMHG (ref 35–45)
PCO2 BLDA: 46.4 MMHG (ref 35–45)
PCP UR QL SCN>25 NG/ML: NEGATIVE
PEEP: 5
PH SMN: 7.19 [PH] (ref 7.35–7.45)
PH SMN: 7.21 [PH] (ref 7.35–7.45)
PH SMN: 7.27 [PH] (ref 7.35–7.45)
PH SMN: 7.32 [PH] (ref 7.35–7.45)
PH SMN: 7.32 [PH] (ref 7.35–7.45)
PH SMN: 7.36 [PH] (ref 7.35–7.45)
PH SMN: 7.44 [PH] (ref 7.35–7.45)
PH SMN: 7.45 [PH] (ref 7.35–7.45)
PH SMN: 7.47 [PH] (ref 7.35–7.45)
PH SMN: 7.47 [PH] (ref 7.35–7.45)
PH SMN: 7.48 [PH] (ref 7.35–7.45)
PH SMN: 7.48 [PH] (ref 7.35–7.45)
PH SMN: 7.49 [PH] (ref 7.35–7.45)
PH SMN: 7.5 [PH] (ref 7.35–7.45)
PH SMN: 7.51 [PH] (ref 7.35–7.45)
PH SMN: 7.52 [PH] (ref 7.35–7.45)
PH SMN: 7.52 [PH] (ref 7.35–7.45)
PH SMN: 7.53 [PH] (ref 7.35–7.45)
PH SMN: 7.54 [PH] (ref 7.35–7.45)
PH SMN: 7.55 [PH] (ref 7.35–7.45)
PH SMN: 7.56 [PH] (ref 7.35–7.45)
PH UR STRIP: 5 [PH] (ref 5–8)
PH UR STRIP: 6 [PH] (ref 5–8)
PH UR STRIP: 6 [PH] (ref 5–8)
PHOSPHATE SERPL-MCNC: 1.6 MG/DL (ref 2.7–4.5)
PHOSPHATE SERPL-MCNC: 1.8 MG/DL (ref 2.7–4.5)
PHOSPHATE SERPL-MCNC: 1.8 MG/DL (ref 2.7–4.5)
PHOSPHATE SERPL-MCNC: 1.9 MG/DL (ref 2.7–4.5)
PHOSPHATE SERPL-MCNC: 2 MG/DL (ref 2.7–4.5)
PHOSPHATE SERPL-MCNC: 2.4 MG/DL (ref 2.7–4.5)
PHOSPHATE SERPL-MCNC: 2.6 MG/DL (ref 2.7–4.5)
PHOSPHATE SERPL-MCNC: 2.6 MG/DL (ref 2.7–4.5)
PHOSPHATE SERPL-MCNC: 2.7 MG/DL (ref 2.7–4.5)
PHOSPHATE SERPL-MCNC: 2.7 MG/DL (ref 2.7–4.5)
PHOSPHATE SERPL-MCNC: 2.8 MG/DL (ref 2.7–4.5)
PHOSPHATE SERPL-MCNC: 3 MG/DL (ref 2.7–4.5)
PHOSPHATE SERPL-MCNC: 3.3 MG/DL (ref 2.7–4.5)
PHOSPHATE SERPL-MCNC: 3.5 MG/DL (ref 2.7–4.5)
PHOSPHATE SERPL-MCNC: 3.7 MG/DL (ref 2.7–4.5)
PHOSPHATE SERPL-MCNC: 4.4 MG/DL (ref 2.7–4.5)
PISA MRMAX VEL: 5.22 M/S
PISA TR MAX VEL: 3.4 M/S
PISA TR MAX VEL: 3.49 M/S
PISA TR MAX VEL: 3.5 M/S
PLATELET # BLD AUTO: 144 K/UL (ref 150–450)
PLATELET # BLD AUTO: 152 K/UL (ref 150–450)
PLATELET # BLD AUTO: 154 K/UL (ref 150–450)
PLATELET # BLD AUTO: 164 K/UL (ref 150–450)
PLATELET # BLD AUTO: 172 K/UL (ref 150–450)
PLATELET # BLD AUTO: 180 K/UL (ref 150–450)
PLATELET # BLD AUTO: 205 K/UL (ref 150–450)
PLATELET # BLD AUTO: 217 K/UL (ref 150–450)
PLATELET # BLD AUTO: 219 K/UL (ref 150–450)
PLATELET # BLD AUTO: 219 K/UL (ref 150–450)
PLATELET # BLD AUTO: 225 K/UL (ref 150–450)
PLATELET # BLD AUTO: 236 K/UL (ref 150–450)
PLATELET # BLD AUTO: 246 K/UL (ref 150–450)
PLATELET # BLD AUTO: 250 K/UL (ref 150–450)
PLATELET # BLD AUTO: 256 K/UL (ref 150–450)
PLATELET # BLD AUTO: 262 K/UL (ref 150–450)
PLATELET # BLD AUTO: 263 K/UL (ref 150–450)
PLATELET # BLD AUTO: 270 K/UL (ref 150–450)
PLATELET # BLD AUTO: 274 K/UL (ref 150–450)
PLATELET # BLD AUTO: 275 K/UL (ref 150–450)
PLATELET # BLD AUTO: 277 K/UL (ref 150–450)
PLATELET # BLD AUTO: 280 K/UL (ref 150–450)
PLATELET # BLD AUTO: 280 K/UL (ref 150–450)
PLATELET # BLD AUTO: 284 K/UL (ref 150–450)
PLATELET # BLD AUTO: 294 K/UL (ref 150–450)
PLATELET # BLD AUTO: 298 K/UL (ref 150–450)
PLATELET # BLD AUTO: 327 K/UL (ref 150–450)
PLATELET # BLD AUTO: 328 K/UL (ref 150–450)
PLATELET # BLD AUTO: 340 K/UL (ref 150–450)
PLATELET # BLD AUTO: 346 K/UL (ref 150–450)
PLATELET # BLD AUTO: 427 K/UL (ref 150–450)
PLATELET # BLD AUTO: 427 K/UL (ref 150–450)
PLATELET BLD QL SMEAR: ABNORMAL
PMV BLD AUTO: 10 FL (ref 9.2–12.9)
PMV BLD AUTO: 10.1 FL (ref 9.2–12.9)
PMV BLD AUTO: 10.1 FL (ref 9.2–12.9)
PMV BLD AUTO: 10.2 FL (ref 9.2–12.9)
PMV BLD AUTO: 10.3 FL (ref 9.2–12.9)
PMV BLD AUTO: 10.3 FL (ref 9.2–12.9)
PMV BLD AUTO: 10.4 FL (ref 9.2–12.9)
PMV BLD AUTO: 10.5 FL (ref 9.2–12.9)
PMV BLD AUTO: 10.6 FL (ref 9.2–12.9)
PMV BLD AUTO: 10.6 FL (ref 9.2–12.9)
PMV BLD AUTO: 10.7 FL (ref 9.2–12.9)
PMV BLD AUTO: 10.7 FL (ref 9.2–12.9)
PMV BLD AUTO: 10.8 FL (ref 9.2–12.9)
PMV BLD AUTO: 10.9 FL (ref 9.2–12.9)
PMV BLD AUTO: 10.9 FL (ref 9.2–12.9)
PMV BLD AUTO: 11 FL (ref 9.2–12.9)
PMV BLD AUTO: 11.1 FL (ref 9.2–12.9)
PMV BLD AUTO: 11.1 FL (ref 9.2–12.9)
PMV BLD AUTO: 11.2 FL (ref 9.2–12.9)
PMV BLD AUTO: 11.2 FL (ref 9.2–12.9)
PMV BLD AUTO: 11.3 FL (ref 9.2–12.9)
PMV BLD AUTO: 11.6 FL (ref 9.2–12.9)
PMV BLD AUTO: 9.8 FL (ref 9.2–12.9)
PO2 BLDA: 101 MMHG (ref 80–100)
PO2 BLDA: 109 MMHG (ref 80–100)
PO2 BLDA: 114 MMHG (ref 80–100)
PO2 BLDA: 124 MMHG (ref 80–100)
PO2 BLDA: 159 MMHG (ref 80–100)
PO2 BLDA: 162 MMHG (ref 80–100)
PO2 BLDA: 165 MMHG (ref 80–100)
PO2 BLDA: 171 MMHG (ref 80–100)
PO2 BLDA: 178 MMHG (ref 80–100)
PO2 BLDA: 190 MMHG (ref 80–100)
PO2 BLDA: 193 MMHG (ref 80–100)
PO2 BLDA: 61 MMHG (ref 80–100)
PO2 BLDA: 62 MMHG (ref 80–100)
PO2 BLDA: 69 MMHG (ref 80–100)
PO2 BLDA: 76 MMHG (ref 80–100)
PO2 BLDA: 77 MMHG (ref 80–100)
PO2 BLDA: 77 MMHG (ref 80–100)
PO2 BLDA: 78 MMHG (ref 80–100)
PO2 BLDA: 84 MMHG (ref 80–100)
PO2 BLDA: 86 MMHG (ref 80–100)
PO2 BLDA: 86 MMHG (ref 80–100)
PO2 BLDA: 88 MMHG (ref 80–100)
PO2 BLDA: 89 MMHG (ref 80–100)
PO2 BLDA: 89 MMHG (ref 80–100)
PO2 BLDA: 94 MMHG (ref 80–100)
PO2 BLDA: 97 MMHG (ref 80–100)
POC BE: -12 MMOL/L
POC BE: -18 MMOL/L
POC BE: -22 MMOL/L
POC BE: -23 MMOL/L
POC BE: -4 MMOL/L
POC BE: -4 MMOL/L
POC BE: -9 MMOL/L
POC BE: 0 MMOL/L
POC BE: 1 MMOL/L
POC BE: 10 MMOL/L
POC BE: 13 MMOL/L
POC BE: 14 MMOL/L
POC BE: 15 MMOL/L
POC BE: 2 MMOL/L
POC BE: 3 MMOL/L
POC BE: 4 MMOL/L
POC BE: 4 MMOL/L
POC BE: 5 MMOL/L
POC BE: 7 MMOL/L
POC BE: 7 MMOL/L
POC BE: 8 MMOL/L
POC BE: 9 MMOL/L
POC IONIZED CALCIUM: 1.13 MMOL/L (ref 1.06–1.42)
POC IONIZED CALCIUM: 1.15 MMOL/L (ref 1.06–1.42)
POC PCO2 TEMP: 31.7 MMHG
POC PCO2 TEMP: 37.7 MMHG
POC PH TEMP: 7.5
POC PH TEMP: 7.51
POC PO2 TEMP: 114 MMHG
POC PO2 TEMP: 178 MMHG
POC SATURATED O2: 100 % (ref 95–100)
POC SATURATED O2: 89 % (ref 95–100)
POC SATURATED O2: 92 % (ref 95–100)
POC SATURATED O2: 93 % (ref 95–100)
POC SATURATED O2: 95 % (ref 95–100)
POC SATURATED O2: 96 % (ref 95–100)
POC SATURATED O2: 97 % (ref 95–100)
POC SATURATED O2: 98 % (ref 95–100)
POC SATURATED O2: 99 % (ref 95–100)
POC TCO2: 15 MMOL/L (ref 23–27)
POC TCO2: 21 MMOL/L (ref 23–27)
POC TCO2: 25 MMOL/L (ref 23–27)
POC TCO2: 25 MMOL/L (ref 23–27)
POC TCO2: 26 MMOL/L (ref 23–27)
POC TCO2: 26 MMOL/L (ref 23–27)
POC TCO2: 28 MMOL/L (ref 23–27)
POC TCO2: 29 MMOL/L (ref 23–27)
POC TCO2: 31 MMOL/L (ref 23–27)
POC TCO2: 32 MMOL/L (ref 23–27)
POC TCO2: 33 MMOL/L (ref 23–27)
POC TCO2: 34 MMOL/L (ref 23–27)
POC TCO2: 34 MMOL/L (ref 23–27)
POC TCO2: 36 MMOL/L (ref 23–27)
POC TCO2: 38 MMOL/L (ref 23–27)
POC TCO2: 39 MMOL/L (ref 23–27)
POC TCO2: 5 MMOL/L (ref 23–27)
POC TCO2: 7 MMOL/L (ref 23–27)
POC TCO2: 9 MMOL/L (ref 23–27)
POC TEMPERATURE: ABNORMAL
POC TEMPERATURE: ABNORMAL
POCT GLUCOSE: 102 MG/DL (ref 70–110)
POCT GLUCOSE: 103 MG/DL (ref 70–110)
POCT GLUCOSE: 104 MG/DL (ref 70–110)
POCT GLUCOSE: 105 MG/DL (ref 70–110)
POCT GLUCOSE: 105 MG/DL (ref 70–110)
POCT GLUCOSE: 112 MG/DL (ref 70–110)
POCT GLUCOSE: 115 MG/DL (ref 70–110)
POCT GLUCOSE: 118 MG/DL (ref 70–110)
POCT GLUCOSE: 120 MG/DL (ref 70–110)
POCT GLUCOSE: 121 MG/DL (ref 70–110)
POCT GLUCOSE: 124 MG/DL (ref 70–110)
POCT GLUCOSE: 124 MG/DL (ref 70–110)
POCT GLUCOSE: 126 MG/DL (ref 70–110)
POCT GLUCOSE: 127 MG/DL (ref 70–110)
POCT GLUCOSE: 130 MG/DL (ref 70–110)
POCT GLUCOSE: 131 MG/DL (ref 70–110)
POCT GLUCOSE: 133 MG/DL (ref 70–110)
POCT GLUCOSE: 133 MG/DL (ref 70–110)
POCT GLUCOSE: 134 MG/DL (ref 70–110)
POCT GLUCOSE: 135 MG/DL (ref 70–110)
POCT GLUCOSE: 135 MG/DL (ref 70–110)
POCT GLUCOSE: 136 MG/DL (ref 70–110)
POCT GLUCOSE: 137 MG/DL (ref 70–110)
POCT GLUCOSE: 137 MG/DL (ref 70–110)
POCT GLUCOSE: 138 MG/DL (ref 70–110)
POCT GLUCOSE: 139 MG/DL (ref 70–110)
POCT GLUCOSE: 140 MG/DL (ref 70–110)
POCT GLUCOSE: 141 MG/DL (ref 70–110)
POCT GLUCOSE: 142 MG/DL (ref 70–110)
POCT GLUCOSE: 143 MG/DL (ref 70–110)
POCT GLUCOSE: 144 MG/DL (ref 70–110)
POCT GLUCOSE: 145 MG/DL (ref 70–110)
POCT GLUCOSE: 146 MG/DL (ref 70–110)
POCT GLUCOSE: 148 MG/DL (ref 70–110)
POCT GLUCOSE: 149 MG/DL (ref 70–110)
POCT GLUCOSE: 149 MG/DL (ref 70–110)
POCT GLUCOSE: 152 MG/DL (ref 70–110)
POCT GLUCOSE: 152 MG/DL (ref 70–110)
POCT GLUCOSE: 153 MG/DL (ref 70–110)
POCT GLUCOSE: 154 MG/DL (ref 70–110)
POCT GLUCOSE: 155 MG/DL (ref 70–110)
POCT GLUCOSE: 157 MG/DL (ref 70–110)
POCT GLUCOSE: 158 MG/DL (ref 70–110)
POCT GLUCOSE: 158 MG/DL (ref 70–110)
POCT GLUCOSE: 159 MG/DL (ref 70–110)
POCT GLUCOSE: 159 MG/DL (ref 70–110)
POCT GLUCOSE: 161 MG/DL (ref 70–110)
POCT GLUCOSE: 162 MG/DL (ref 70–110)
POCT GLUCOSE: 163 MG/DL (ref 70–110)
POCT GLUCOSE: 163 MG/DL (ref 70–110)
POCT GLUCOSE: 164 MG/DL (ref 70–110)
POCT GLUCOSE: 164 MG/DL (ref 70–110)
POCT GLUCOSE: 165 MG/DL (ref 70–110)
POCT GLUCOSE: 165 MG/DL (ref 70–110)
POCT GLUCOSE: 166 MG/DL (ref 70–110)
POCT GLUCOSE: 167 MG/DL (ref 70–110)
POCT GLUCOSE: 167 MG/DL (ref 70–110)
POCT GLUCOSE: 169 MG/DL (ref 70–110)
POCT GLUCOSE: 170 MG/DL (ref 70–110)
POCT GLUCOSE: 174 MG/DL (ref 70–110)
POCT GLUCOSE: 176 MG/DL (ref 70–110)
POCT GLUCOSE: 176 MG/DL (ref 70–110)
POCT GLUCOSE: 177 MG/DL (ref 70–110)
POCT GLUCOSE: 177 MG/DL (ref 70–110)
POCT GLUCOSE: 178 MG/DL (ref 70–110)
POCT GLUCOSE: 179 MG/DL (ref 70–110)
POCT GLUCOSE: 179 MG/DL (ref 70–110)
POCT GLUCOSE: 180 MG/DL (ref 70–110)
POCT GLUCOSE: 181 MG/DL (ref 70–110)
POCT GLUCOSE: 181 MG/DL (ref 70–110)
POCT GLUCOSE: 182 MG/DL (ref 70–110)
POCT GLUCOSE: 183 MG/DL (ref 70–110)
POCT GLUCOSE: 183 MG/DL (ref 70–110)
POCT GLUCOSE: 184 MG/DL (ref 70–110)
POCT GLUCOSE: 186 MG/DL (ref 70–110)
POCT GLUCOSE: 187 MG/DL (ref 70–110)
POCT GLUCOSE: 188 MG/DL (ref 70–110)
POCT GLUCOSE: 190 MG/DL (ref 70–110)
POCT GLUCOSE: 191 MG/DL (ref 70–110)
POCT GLUCOSE: 191 MG/DL (ref 70–110)
POCT GLUCOSE: 192 MG/DL (ref 70–110)
POCT GLUCOSE: 193 MG/DL (ref 70–110)
POCT GLUCOSE: 194 MG/DL (ref 70–110)
POCT GLUCOSE: 195 MG/DL (ref 70–110)
POCT GLUCOSE: 196 MG/DL (ref 70–110)
POCT GLUCOSE: 197 MG/DL (ref 70–110)
POCT GLUCOSE: 199 MG/DL (ref 70–110)
POCT GLUCOSE: 199 MG/DL (ref 70–110)
POCT GLUCOSE: 200 MG/DL (ref 70–110)
POCT GLUCOSE: 201 MG/DL (ref 70–110)
POCT GLUCOSE: 202 MG/DL (ref 70–110)
POCT GLUCOSE: 203 MG/DL (ref 70–110)
POCT GLUCOSE: 204 MG/DL (ref 70–110)
POCT GLUCOSE: 204 MG/DL (ref 70–110)
POCT GLUCOSE: 205 MG/DL (ref 70–110)
POCT GLUCOSE: 205 MG/DL (ref 70–110)
POCT GLUCOSE: 207 MG/DL (ref 70–110)
POCT GLUCOSE: 208 MG/DL (ref 70–110)
POCT GLUCOSE: 208 MG/DL (ref 70–110)
POCT GLUCOSE: 209 MG/DL (ref 70–110)
POCT GLUCOSE: 210 MG/DL (ref 70–110)
POCT GLUCOSE: 211 MG/DL (ref 70–110)
POCT GLUCOSE: 211 MG/DL (ref 70–110)
POCT GLUCOSE: 213 MG/DL (ref 70–110)
POCT GLUCOSE: 214 MG/DL (ref 70–110)
POCT GLUCOSE: 215 MG/DL (ref 70–110)
POCT GLUCOSE: 215 MG/DL (ref 70–110)
POCT GLUCOSE: 216 MG/DL (ref 70–110)
POCT GLUCOSE: 217 MG/DL (ref 70–110)
POCT GLUCOSE: 218 MG/DL (ref 70–110)
POCT GLUCOSE: 218 MG/DL (ref 70–110)
POCT GLUCOSE: 219 MG/DL (ref 70–110)
POCT GLUCOSE: 220 MG/DL (ref 70–110)
POCT GLUCOSE: 221 MG/DL (ref 70–110)
POCT GLUCOSE: 222 MG/DL (ref 70–110)
POCT GLUCOSE: 225 MG/DL (ref 70–110)
POCT GLUCOSE: 226 MG/DL (ref 70–110)
POCT GLUCOSE: 227 MG/DL (ref 70–110)
POCT GLUCOSE: 227 MG/DL (ref 70–110)
POCT GLUCOSE: 229 MG/DL (ref 70–110)
POCT GLUCOSE: 229 MG/DL (ref 70–110)
POCT GLUCOSE: 230 MG/DL (ref 70–110)
POCT GLUCOSE: 232 MG/DL (ref 70–110)
POCT GLUCOSE: 233 MG/DL (ref 70–110)
POCT GLUCOSE: 234 MG/DL (ref 70–110)
POCT GLUCOSE: 235 MG/DL (ref 70–110)
POCT GLUCOSE: 235 MG/DL (ref 70–110)
POCT GLUCOSE: 236 MG/DL (ref 70–110)
POCT GLUCOSE: 236 MG/DL (ref 70–110)
POCT GLUCOSE: 237 MG/DL (ref 70–110)
POCT GLUCOSE: 238 MG/DL (ref 70–110)
POCT GLUCOSE: 239 MG/DL (ref 70–110)
POCT GLUCOSE: 242 MG/DL (ref 70–110)
POCT GLUCOSE: 242 MG/DL (ref 70–110)
POCT GLUCOSE: 243 MG/DL (ref 70–110)
POCT GLUCOSE: 244 MG/DL (ref 70–110)
POCT GLUCOSE: 244 MG/DL (ref 70–110)
POCT GLUCOSE: 245 MG/DL (ref 70–110)
POCT GLUCOSE: 247 MG/DL (ref 70–110)
POCT GLUCOSE: 249 MG/DL (ref 70–110)
POCT GLUCOSE: 249 MG/DL (ref 70–110)
POCT GLUCOSE: 250 MG/DL (ref 70–110)
POCT GLUCOSE: 251 MG/DL (ref 70–110)
POCT GLUCOSE: 253 MG/DL (ref 70–110)
POCT GLUCOSE: 254 MG/DL (ref 70–110)
POCT GLUCOSE: 254 MG/DL (ref 70–110)
POCT GLUCOSE: 255 MG/DL (ref 70–110)
POCT GLUCOSE: 257 MG/DL (ref 70–110)
POCT GLUCOSE: 259 MG/DL (ref 70–110)
POCT GLUCOSE: 260 MG/DL (ref 70–110)
POCT GLUCOSE: 262 MG/DL (ref 70–110)
POCT GLUCOSE: 263 MG/DL (ref 70–110)
POCT GLUCOSE: 264 MG/DL (ref 70–110)
POCT GLUCOSE: 264 MG/DL (ref 70–110)
POCT GLUCOSE: 265 MG/DL (ref 70–110)
POCT GLUCOSE: 265 MG/DL (ref 70–110)
POCT GLUCOSE: 266 MG/DL (ref 70–110)
POCT GLUCOSE: 272 MG/DL (ref 70–110)
POCT GLUCOSE: 276 MG/DL (ref 70–110)
POCT GLUCOSE: 280 MG/DL (ref 70–110)
POCT GLUCOSE: 282 MG/DL (ref 70–110)
POCT GLUCOSE: 286 MG/DL (ref 70–110)
POCT GLUCOSE: 288 MG/DL (ref 70–110)
POCT GLUCOSE: 290 MG/DL (ref 70–110)
POCT GLUCOSE: 291 MG/DL (ref 70–110)
POCT GLUCOSE: 293 MG/DL (ref 70–110)
POCT GLUCOSE: 294 MG/DL (ref 70–110)
POCT GLUCOSE: 296 MG/DL (ref 70–110)
POCT GLUCOSE: 296 MG/DL (ref 70–110)
POCT GLUCOSE: 298 MG/DL (ref 70–110)
POCT GLUCOSE: 299 MG/DL (ref 70–110)
POCT GLUCOSE: 301 MG/DL (ref 70–110)
POCT GLUCOSE: 306 MG/DL (ref 70–110)
POCT GLUCOSE: 312 MG/DL (ref 70–110)
POCT GLUCOSE: 314 MG/DL (ref 70–110)
POCT GLUCOSE: 318 MG/DL (ref 70–110)
POCT GLUCOSE: 323 MG/DL (ref 70–110)
POCT GLUCOSE: 324 MG/DL (ref 70–110)
POCT GLUCOSE: 325 MG/DL (ref 70–110)
POCT GLUCOSE: 333 MG/DL (ref 70–110)
POCT GLUCOSE: 342 MG/DL (ref 70–110)
POCT GLUCOSE: 346 MG/DL (ref 70–110)
POCT GLUCOSE: 352 MG/DL (ref 70–110)
POCT GLUCOSE: 375 MG/DL (ref 70–110)
POCT GLUCOSE: 385 MG/DL (ref 70–110)
POCT GLUCOSE: 47 MG/DL (ref 70–110)
POCT GLUCOSE: 55 MG/DL (ref 70–110)
POCT GLUCOSE: 57 MG/DL (ref 70–110)
POCT GLUCOSE: 69 MG/DL (ref 70–110)
POCT GLUCOSE: 74 MG/DL (ref 70–110)
POCT GLUCOSE: 79 MG/DL (ref 70–110)
POCT GLUCOSE: 88 MG/DL (ref 70–110)
POCT GLUCOSE: 90 MG/DL (ref 70–110)
POCT GLUCOSE: 94 MG/DL (ref 70–110)
POCT GLUCOSE: 96 MG/DL (ref 70–110)
POCT GLUCOSE: 98 MG/DL (ref 70–110)
POCT GLUCOSE: 99 MG/DL (ref 70–110)
POCT GLUCOSE: >500 MG/DL (ref 70–110)
POIKILOCYTOSIS BLD QL SMEAR: SLIGHT
POLYCHROMASIA BLD QL SMEAR: ABNORMAL
POLYCHROMASIA BLD QL SMEAR: ABNORMAL
POTASSIUM BLD-SCNC: 3.4 MMOL/L (ref 3.5–5.1)
POTASSIUM BLD-SCNC: 3.9 MMOL/L (ref 3.5–5.1)
POTASSIUM SERPL-SCNC: 2.7 MMOL/L (ref 3.5–5.1)
POTASSIUM SERPL-SCNC: 3 MMOL/L (ref 3.5–5.1)
POTASSIUM SERPL-SCNC: 3 MMOL/L (ref 3.5–5.1)
POTASSIUM SERPL-SCNC: 3.4 MMOL/L (ref 3.5–5.1)
POTASSIUM SERPL-SCNC: 3.5 MMOL/L (ref 3.5–5.1)
POTASSIUM SERPL-SCNC: 3.6 MMOL/L (ref 3.5–5.1)
POTASSIUM SERPL-SCNC: 3.7 MMOL/L (ref 3.5–5.1)
POTASSIUM SERPL-SCNC: 3.8 MMOL/L (ref 3.5–5.1)
POTASSIUM SERPL-SCNC: 3.9 MMOL/L (ref 3.5–5.1)
POTASSIUM SERPL-SCNC: 4 MMOL/L (ref 3.5–5.1)
POTASSIUM SERPL-SCNC: 4.1 MMOL/L (ref 3.5–5.1)
POTASSIUM SERPL-SCNC: 4.1 MMOL/L (ref 3.5–5.1)
POTASSIUM SERPL-SCNC: 4.2 MMOL/L (ref 3.5–5.1)
POTASSIUM SERPL-SCNC: 4.3 MMOL/L (ref 3.5–5.1)
POTASSIUM SERPL-SCNC: 4.4 MMOL/L (ref 3.5–5.1)
POTASSIUM SERPL-SCNC: 4.5 MMOL/L (ref 3.5–5.1)
POTASSIUM SERPL-SCNC: 4.9 MMOL/L (ref 3.5–5.1)
POTASSIUM SERPL-SCNC: 4.9 MMOL/L (ref 3.5–5.1)
PROCALCITONIN SERPL IA-MCNC: 0.1 NG/ML
PROCALCITONIN SERPL IA-MCNC: 1.82 NG/ML
PROCALCITONIN SERPL IA-MCNC: 1.82 NG/ML
PROCALCITONIN SERPL IA-MCNC: 7.4 NG/ML
PROT SERPL-MCNC: 5.3 G/DL (ref 6–8.4)
PROT SERPL-MCNC: 5.6 G/DL (ref 6–8.4)
PROT SERPL-MCNC: 5.7 G/DL (ref 6–8.4)
PROT SERPL-MCNC: 5.7 G/DL (ref 6–8.4)
PROT SERPL-MCNC: 5.8 G/DL (ref 6–8.4)
PROT SERPL-MCNC: 5.9 G/DL (ref 6–8.4)
PROT SERPL-MCNC: 6 G/DL (ref 6–8.4)
PROT SERPL-MCNC: 6 G/DL (ref 6–8.4)
PROT SERPL-MCNC: 6.1 G/DL (ref 6–8.4)
PROT SERPL-MCNC: 6.3 G/DL (ref 6–8.4)
PROT SERPL-MCNC: 6.4 G/DL (ref 6–8.4)
PROT SERPL-MCNC: 6.5 G/DL (ref 6–8.4)
PROT SERPL-MCNC: 6.5 G/DL (ref 6–8.4)
PROT SERPL-MCNC: 6.6 G/DL (ref 6–8.4)
PROT SERPL-MCNC: 6.7 G/DL (ref 6–8.4)
PROT SERPL-MCNC: 6.9 G/DL (ref 6–8.4)
PROT SERPL-MCNC: 7 G/DL (ref 6–8.4)
PROT SERPL-MCNC: 7 G/DL (ref 6–8.4)
PROT SERPL-MCNC: 7.2 G/DL (ref 6–8.4)
PROT SERPL-MCNC: 7.3 G/DL (ref 6–8.4)
PROT UR QL STRIP: ABNORMAL
PROT UR QL STRIP: ABNORMAL
PROT UR QL STRIP: NEGATIVE
PROTHROMBIN TIME: 10.4 SEC (ref 9–12.5)
PROTHROMBIN TIME: 10.7 SEC (ref 9–12.5)
PROTHROMBIN TIME: 10.8 SEC (ref 9–12.5)
PS: 12
PS: 8
PV MEAN GRADIENT: 0.55 MMHG
RA MAJOR: 3.78 CM
RA MAJOR: 4.74 CM
RA PRESSURE: 15 MMHG
RA PRESSURE: 8 MMHG
RA WIDTH: 2.88 CM
RA WIDTH: 3.19 CM
RBC # BLD AUTO: 2.43 M/UL (ref 4–5.4)
RBC # BLD AUTO: 2.62 M/UL (ref 4–5.4)
RBC # BLD AUTO: 2.85 M/UL (ref 4–5.4)
RBC # BLD AUTO: 2.85 M/UL (ref 4–5.4)
RBC # BLD AUTO: 2.88 M/UL (ref 4–5.4)
RBC # BLD AUTO: 2.9 M/UL (ref 4–5.4)
RBC # BLD AUTO: 2.9 M/UL (ref 4–5.4)
RBC # BLD AUTO: 2.92 M/UL (ref 4–5.4)
RBC # BLD AUTO: 2.93 M/UL (ref 4–5.4)
RBC # BLD AUTO: 2.93 M/UL (ref 4–5.4)
RBC # BLD AUTO: 3 M/UL (ref 4–5.4)
RBC # BLD AUTO: 3.06 M/UL (ref 4–5.4)
RBC # BLD AUTO: 3.14 M/UL (ref 4–5.4)
RBC # BLD AUTO: 3.21 M/UL (ref 4–5.4)
RBC # BLD AUTO: 3.26 M/UL (ref 4–5.4)
RBC # BLD AUTO: 3.27 M/UL (ref 4–5.4)
RBC # BLD AUTO: 3.28 M/UL (ref 4–5.4)
RBC # BLD AUTO: 3.31 M/UL (ref 4–5.4)
RBC # BLD AUTO: 3.46 M/UL (ref 4–5.4)
RBC # BLD AUTO: 3.48 M/UL (ref 4–5.4)
RBC # BLD AUTO: 3.6 M/UL (ref 4–5.4)
RBC # BLD AUTO: 3.61 M/UL (ref 4–5.4)
RBC # BLD AUTO: 3.61 M/UL (ref 4–5.4)
RBC # BLD AUTO: 3.64 M/UL (ref 4–5.4)
RBC # BLD AUTO: 3.77 M/UL (ref 4–5.4)
RBC # BLD AUTO: 3.86 M/UL (ref 4–5.4)
RBC # BLD AUTO: 3.94 M/UL (ref 4–5.4)
RBC # BLD AUTO: 4.09 M/UL (ref 4–5.4)
RBC # BLD AUTO: 4.25 M/UL (ref 4–5.4)
RBC # BLD AUTO: 4.31 M/UL (ref 4–5.4)
RBC # BLD AUTO: 4.31 M/UL (ref 4–5.4)
RBC # BLD AUTO: 4.33 M/UL (ref 4–5.4)
RBC #/AREA URNS AUTO: 10 /HPF (ref 0–4)
RBC #/AREA URNS HPF: 2 /HPF (ref 0–4)
RIGHT VENTRICULAR END-DIASTOLIC DIMENSION: 2.57 CM
RV TISSUE DOPPLER FREE WALL SYSTOLIC VELOCITY 1 (APICAL 4 CHAMBER VIEW): 13.32 CM/S
SAMPLE: ABNORMAL
SARS-COV-2 RDRP RESP QL NAA+PROBE: NEGATIVE
SARS-COV-2 RDRP RESP QL NAA+PROBE: NEGATIVE
SARS-COV-2 RNA RESP QL NAA+PROBE: NOT DETECTED
SATURATED IRON: 6 % (ref 20–50)
SINUS: 2.44 CM
SINUS: 2.75 CM
SITE: ABNORMAL
SODIUM BLD-SCNC: 138 MMOL/L (ref 136–145)
SODIUM BLD-SCNC: 152 MMOL/L (ref 136–145)
SODIUM SERPL-SCNC: 137 MMOL/L (ref 136–145)
SODIUM SERPL-SCNC: 138 MMOL/L (ref 136–145)
SODIUM SERPL-SCNC: 139 MMOL/L (ref 136–145)
SODIUM SERPL-SCNC: 140 MMOL/L (ref 136–145)
SODIUM SERPL-SCNC: 141 MMOL/L (ref 136–145)
SODIUM SERPL-SCNC: 142 MMOL/L (ref 136–145)
SODIUM SERPL-SCNC: 144 MMOL/L (ref 136–145)
SODIUM SERPL-SCNC: 146 MMOL/L (ref 136–145)
SODIUM SERPL-SCNC: 146 MMOL/L (ref 136–145)
SODIUM SERPL-SCNC: 147 MMOL/L (ref 136–145)
SODIUM SERPL-SCNC: 147 MMOL/L (ref 136–145)
SODIUM SERPL-SCNC: 149 MMOL/L (ref 136–145)
SODIUM SERPL-SCNC: 151 MMOL/L (ref 136–145)
SODIUM SERPL-SCNC: 152 MMOL/L (ref 136–145)
SODIUM SERPL-SCNC: 152 MMOL/L (ref 136–145)
SODIUM SERPL-SCNC: 153 MMOL/L (ref 136–145)
SODIUM SERPL-SCNC: 153 MMOL/L (ref 136–145)
SODIUM SERPL-SCNC: 154 MMOL/L (ref 136–145)
SODIUM SERPL-SCNC: 155 MMOL/L (ref 136–145)
SODIUM SERPL-SCNC: 155 MMOL/L (ref 136–145)
SODIUM SERPL-SCNC: 156 MMOL/L (ref 136–145)
SODIUM SERPL-SCNC: 157 MMOL/L (ref 136–145)
SODIUM SERPL-SCNC: 159 MMOL/L (ref 136–145)
SODIUM SERPL-SCNC: 160 MMOL/L (ref 136–145)
SODIUM SERPL-SCNC: 161 MMOL/L (ref 136–145)
SODIUM UR-SCNC: 20 MMOL/L (ref 20–250)
SODIUM UR-SCNC: 47 MMOL/L (ref 20–250)
SP GR UR STRIP: 1.01 (ref 1–1.03)
SP GR UR STRIP: 1.02 (ref 1–1.03)
SP GR UR STRIP: >=1.03 (ref 1–1.03)
SP02: 100
SP02: 94
SP02: 95
SP02: 98
SP02: 99
SP02: 99
SPHEROCYTES BLD QL SMEAR: ABNORMAL
SPONT RATE: 17
SPONT RATE: 20
SPONT RATE: 23
SPONT RATE: 24
SPONT RATE: 27
SPONT RATE: 35
SPONT RATE: 45
SQUAMOUS #/AREA URNS AUTO: 2 /HPF
SQUAMOUS #/AREA URNS HPF: 1 /HPF
SQUAMOUS #/AREA URNS HPF: 2 /HPF
STJ: 1.92 CM
STJ: 2.1 CM
T4 FREE SERPL-MCNC: 1.02 NG/DL (ref 0.71–1.51)
TDI LATERAL: 0.04 M/S
TDI LATERAL: 0.05 M/S
TDI SEPTAL: 0.07 M/S
TDI SEPTAL: 0.08 M/S
TDI: 0.06 M/S
TDI: 0.07 M/S
TOTAL IRON BINDING CAPACITY: 207 UG/DL (ref 250–450)
TOXICOLOGY INFORMATION: NORMAL
TR MAX PG: 46 MMHG
TR MAX PG: 49 MMHG
TR MAX PG: 49 MMHG
TRANS ERYTHROCYTES VOL PATIENT: NORMAL ML
TRANSFERRIN SERPL-MCNC: 140 MG/DL (ref 200–375)
TRICUSPID ANNULAR PLANE SYSTOLIC EXCURSION: 1.16 CM
TRICUSPID ANNULAR PLANE SYSTOLIC EXCURSION: 1.6 CM
TRIGL SERPL-MCNC: 61 MG/DL (ref 30–150)
TROPONIN I SERPL DL<=0.01 NG/ML-MCNC: 0.03 NG/ML (ref 0–0.03)
TROPONIN I SERPL DL<=0.01 NG/ML-MCNC: 0.03 NG/ML (ref 0–0.03)
TROPONIN I SERPL DL<=0.01 NG/ML-MCNC: 11.79 NG/ML (ref 0–0.03)
TROPONIN I SERPL DL<=0.01 NG/ML-MCNC: 14.28 NG/ML (ref 0–0.03)
TROPONIN I SERPL DL<=0.01 NG/ML-MCNC: 15.5 NG/ML (ref 0–0.03)
TROPONIN I SERPL DL<=0.01 NG/ML-MCNC: 19.5 NG/ML (ref 0–0.03)
TROPONIN I SERPL DL<=0.01 NG/ML-MCNC: 20.39 NG/ML (ref 0–0.03)
TROPONIN I SERPL DL<=0.01 NG/ML-MCNC: 21.57 NG/ML (ref 0–0.03)
TROPONIN I SERPL DL<=0.01 NG/ML-MCNC: 3.26 NG/ML (ref 0–0.03)
TROPONIN I SERPL DL<=0.01 NG/ML-MCNC: 3.26 NG/ML (ref 0–0.03)
TROPONIN I SERPL DL<=0.01 NG/ML-MCNC: 3.38 NG/ML (ref 0–0.03)
TSH SERPL DL<=0.005 MIU/L-ACNC: 0.2 UIU/ML (ref 0.4–4)
TV REST PULMONARY ARTERY PRESSURE: 57 MMHG
TV REST PULMONARY ARTERY PRESSURE: 61 MMHG
URN SPEC COLLECT METH UR: ABNORMAL
UROBILINOGEN UR STRIP-ACNC: NEGATIVE EU/DL
UROBILINOGEN UR STRIP-ACNC: NEGATIVE EU/DL
VANCOMYCIN SERPL-MCNC: 6.9 UG/ML
VANCOMYCIN TROUGH SERPL-MCNC: 18.3 UG/ML (ref 10–22)
VANCOMYCIN TROUGH SERPL-MCNC: 22.5 UG/ML (ref 10–22)
VANCOMYCIN TROUGH SERPL-MCNC: 66.3 UG/ML (ref 10–22)
VERBAL RESULT READBACK PERFORMED: YES
VOL: 336
VOL: 375
VOL: 571
WBC # BLD AUTO: 0.83 K/UL (ref 3.9–12.7)
WBC # BLD AUTO: 10.06 K/UL (ref 3.9–12.7)
WBC # BLD AUTO: 10.12 K/UL (ref 3.9–12.7)
WBC # BLD AUTO: 10.16 K/UL (ref 3.9–12.7)
WBC # BLD AUTO: 10.36 K/UL (ref 3.9–12.7)
WBC # BLD AUTO: 10.42 K/UL (ref 3.9–12.7)
WBC # BLD AUTO: 10.55 K/UL (ref 3.9–12.7)
WBC # BLD AUTO: 10.8 K/UL (ref 3.9–12.7)
WBC # BLD AUTO: 11.29 K/UL (ref 3.9–12.7)
WBC # BLD AUTO: 11.41 K/UL (ref 3.9–12.7)
WBC # BLD AUTO: 11.49 K/UL (ref 3.9–12.7)
WBC # BLD AUTO: 11.57 K/UL (ref 3.9–12.7)
WBC # BLD AUTO: 11.87 K/UL (ref 3.9–12.7)
WBC # BLD AUTO: 11.98 K/UL (ref 3.9–12.7)
WBC # BLD AUTO: 11.98 K/UL (ref 3.9–12.7)
WBC # BLD AUTO: 12.13 K/UL (ref 3.9–12.7)
WBC # BLD AUTO: 12.29 K/UL (ref 3.9–12.7)
WBC # BLD AUTO: 12.47 K/UL (ref 3.9–12.7)
WBC # BLD AUTO: 12.92 K/UL (ref 3.9–12.7)
WBC # BLD AUTO: 13.3 K/UL (ref 3.9–12.7)
WBC # BLD AUTO: 13.51 K/UL (ref 3.9–12.7)
WBC # BLD AUTO: 13.6 K/UL (ref 3.9–12.7)
WBC # BLD AUTO: 13.71 K/UL (ref 3.9–12.7)
WBC # BLD AUTO: 13.74 K/UL (ref 3.9–12.7)
WBC # BLD AUTO: 13.82 K/UL (ref 3.9–12.7)
WBC # BLD AUTO: 13.88 K/UL (ref 3.9–12.7)
WBC # BLD AUTO: 14.19 K/UL (ref 3.9–12.7)
WBC # BLD AUTO: 5.55 K/UL (ref 3.9–12.7)
WBC # BLD AUTO: 7.52 K/UL (ref 3.9–12.7)
WBC # BLD AUTO: 7.99 K/UL (ref 3.9–12.7)
WBC # BLD AUTO: 8.75 K/UL (ref 3.9–12.7)
WBC # BLD AUTO: 9.54 K/UL (ref 3.9–12.7)
WBC #/AREA URNS AUTO: 11 /HPF (ref 0–5)
WBC #/AREA URNS HPF: 1 /HPF (ref 0–5)
YEAST UR QL AUTO: ABNORMAL
YEAST URNS QL MICRO: NORMAL
YEAST URNS QL MICRO: NORMAL

## 2022-01-01 PROCEDURE — 25000003 PHARM REV CODE 250: Performed by: STUDENT IN AN ORGANIZED HEALTH CARE EDUCATION/TRAINING PROGRAM

## 2022-01-01 PROCEDURE — 25000003 PHARM REV CODE 250: Performed by: PHYSICIAN ASSISTANT

## 2022-01-01 PROCEDURE — 25000003 PHARM REV CODE 250: Performed by: NURSE PRACTITIONER

## 2022-01-01 PROCEDURE — 3074F PR MOST RECENT SYSTOLIC BLOOD PRESSURE < 130 MM HG: ICD-10-PCS | Mod: CPTII,S$GLB,, | Performed by: NURSE PRACTITIONER

## 2022-01-01 PROCEDURE — 3075F PR MOST RECENT SYSTOLIC BLOOD PRESS GE 130-139MM HG: ICD-10-PCS | Mod: CPTII,S$GLB,, | Performed by: INTERNAL MEDICINE

## 2022-01-01 PROCEDURE — 27000221 HC OXYGEN, UP TO 24 HOURS

## 2022-01-01 PROCEDURE — 99999 PR PBB SHADOW E&M-EST. PATIENT-LVL IV: CPT | Mod: PBBFAC,,, | Performed by: INTERNAL MEDICINE

## 2022-01-01 PROCEDURE — 83735 ASSAY OF MAGNESIUM: CPT | Performed by: PSYCHIATRY & NEUROLOGY

## 2022-01-01 PROCEDURE — 83880 ASSAY OF NATRIURETIC PEPTIDE: CPT | Performed by: NURSE PRACTITIONER

## 2022-01-01 PROCEDURE — 99233 PR SUBSEQUENT HOSPITAL CARE,LEVL III: ICD-10-PCS | Mod: ,,, | Performed by: PSYCHIATRY & NEUROLOGY

## 2022-01-01 PROCEDURE — 80048 BASIC METABOLIC PNL TOTAL CA: CPT | Performed by: PHYSICIAN ASSISTANT

## 2022-01-01 PROCEDURE — 97112 NEUROMUSCULAR REEDUCATION: CPT | Mod: CQ

## 2022-01-01 PROCEDURE — 25000003 PHARM REV CODE 250: Performed by: PSYCHIATRY & NEUROLOGY

## 2022-01-01 PROCEDURE — 20000000 HC ICU ROOM

## 2022-01-01 PROCEDURE — C1894 INTRO/SHEATH, NON-LASER: HCPCS | Performed by: INTERNAL MEDICINE

## 2022-01-01 PROCEDURE — 85025 COMPLETE CBC W/AUTO DIFF WBC: CPT | Performed by: STUDENT IN AN ORGANIZED HEALTH CARE EDUCATION/TRAINING PROGRAM

## 2022-01-01 PROCEDURE — 84439 ASSAY OF FREE THYROXINE: CPT | Performed by: EMERGENCY MEDICINE

## 2022-01-01 PROCEDURE — 84484 ASSAY OF TROPONIN QUANT: CPT | Performed by: PHYSICIAN ASSISTANT

## 2022-01-01 PROCEDURE — 80053 COMPREHEN METABOLIC PANEL: CPT | Performed by: PHYSICIAN ASSISTANT

## 2022-01-01 PROCEDURE — 87070 CULTURE OTHR SPECIMN AEROBIC: CPT | Performed by: PSYCHIATRY & NEUROLOGY

## 2022-01-01 PROCEDURE — 3008F BODY MASS INDEX DOCD: CPT | Mod: CPTII,S$GLB,, | Performed by: INTERNAL MEDICINE

## 2022-01-01 PROCEDURE — 99233 SBSQ HOSP IP/OBS HIGH 50: CPT | Mod: GC,,, | Performed by: PSYCHIATRY & NEUROLOGY

## 2022-01-01 PROCEDURE — 25000242 PHARM REV CODE 250 ALT 637 W/ HCPCS: Performed by: PSYCHIATRY & NEUROLOGY

## 2022-01-01 PROCEDURE — 85025 COMPLETE CBC W/AUTO DIFF WBC: CPT | Mod: 91 | Performed by: EMERGENCY MEDICINE

## 2022-01-01 PROCEDURE — 99999 PR PBB SHADOW E&M-EST. PATIENT-LVL V: ICD-10-PCS | Mod: PBBFAC,,, | Performed by: NURSE PRACTITIONER

## 2022-01-01 PROCEDURE — 97530 THERAPEUTIC ACTIVITIES: CPT

## 2022-01-01 PROCEDURE — 1100F PTFALLS ASSESS-DOCD GE2>/YR: CPT | Mod: CPTII,S$GLB,, | Performed by: INTERNAL MEDICINE

## 2022-01-01 PROCEDURE — 25000003 PHARM REV CODE 250: Performed by: INTERNAL MEDICINE

## 2022-01-01 PROCEDURE — 99900035 HC TECH TIME PER 15 MIN (STAT)

## 2022-01-01 PROCEDURE — 11000001 HC ACUTE MED/SURG PRIVATE ROOM

## 2022-01-01 PROCEDURE — 94761 N-INVAS EAR/PLS OXIMETRY MLT: CPT

## 2022-01-01 PROCEDURE — 85610 PROTHROMBIN TIME: CPT | Performed by: EMERGENCY MEDICINE

## 2022-01-01 PROCEDURE — 36600 WITHDRAWAL OF ARTERIAL BLOOD: CPT

## 2022-01-01 PROCEDURE — 95700 EEG CONT REC W/VID EEG TECH: CPT

## 2022-01-01 PROCEDURE — 83735 ASSAY OF MAGNESIUM: CPT | Performed by: STUDENT IN AN ORGANIZED HEALTH CARE EDUCATION/TRAINING PROGRAM

## 2022-01-01 PROCEDURE — 99233 PR SUBSEQUENT HOSPITAL CARE,LEVL III: ICD-10-PCS | Mod: ,,, | Performed by: INTERNAL MEDICINE

## 2022-01-01 PROCEDURE — 92526 ORAL FUNCTION THERAPY: CPT

## 2022-01-01 PROCEDURE — 3288F FALL RISK ASSESSMENT DOCD: CPT | Mod: CPTII,S$GLB,, | Performed by: INTERNAL MEDICINE

## 2022-01-01 PROCEDURE — 3075F SYST BP GE 130 - 139MM HG: CPT | Mod: CPTII,S$GLB,, | Performed by: INTERNAL MEDICINE

## 2022-01-01 PROCEDURE — 97535 SELF CARE MNGMENT TRAINING: CPT

## 2022-01-01 PROCEDURE — 63600175 PHARM REV CODE 636 W HCPCS: Performed by: STUDENT IN AN ORGANIZED HEALTH CARE EDUCATION/TRAINING PROGRAM

## 2022-01-01 PROCEDURE — 25500020 PHARM REV CODE 255: Performed by: EMERGENCY MEDICINE

## 2022-01-01 PROCEDURE — G0179 MD RECERTIFICATION HHA PT: HCPCS | Mod: ,,, | Performed by: INTERNAL MEDICINE

## 2022-01-01 PROCEDURE — 37799 UNLISTED PX VASCULAR SURGERY: CPT

## 2022-01-01 PROCEDURE — 63600175 PHARM REV CODE 636 W HCPCS

## 2022-01-01 PROCEDURE — 93005 ELECTROCARDIOGRAM TRACING: CPT

## 2022-01-01 PROCEDURE — 1126F AMNT PAIN NOTED NONE PRSNT: CPT | Mod: CPTII,S$GLB,, | Performed by: NURSE PRACTITIONER

## 2022-01-01 PROCEDURE — 99233 SBSQ HOSP IP/OBS HIGH 50: CPT | Mod: ,,, | Performed by: INTERNAL MEDICINE

## 2022-01-01 PROCEDURE — 99152 PR MOD CONSCIOUS SEDATION, SAME PHYS, 5+ YRS, FIRST 15 MIN: ICD-10-PCS | Mod: ,,, | Performed by: INTERNAL MEDICINE

## 2022-01-01 PROCEDURE — 36556 INSERT NON-TUNNEL CV CATH: CPT | Mod: GC,,, | Performed by: PSYCHIATRY & NEUROLOGY

## 2022-01-01 PROCEDURE — 27200966 HC CLOSED SUCTION SYSTEM

## 2022-01-01 PROCEDURE — 82803 BLOOD GASES ANY COMBINATION: CPT

## 2022-01-01 PROCEDURE — 97112 NEUROMUSCULAR REEDUCATION: CPT

## 2022-01-01 PROCEDURE — 84100 ASSAY OF PHOSPHORUS: CPT

## 2022-01-01 PROCEDURE — 94640 AIRWAY INHALATION TREATMENT: CPT

## 2022-01-01 PROCEDURE — G0427 INPT/ED TELECONSULT70: HCPCS | Mod: 95,,, | Performed by: PSYCHIATRY & NEUROLOGY

## 2022-01-01 PROCEDURE — 36415 COLL VENOUS BLD VENIPUNCTURE: CPT | Performed by: INTERNAL MEDICINE

## 2022-01-01 PROCEDURE — 63600175 PHARM REV CODE 636 W HCPCS: Performed by: NURSE PRACTITIONER

## 2022-01-01 PROCEDURE — 25000003 PHARM REV CODE 250

## 2022-01-01 PROCEDURE — 99291 PR CRITICAL CARE, E/M 30-74 MINUTES: ICD-10-PCS | Mod: ,,, | Performed by: PSYCHIATRY & NEUROLOGY

## 2022-01-01 PROCEDURE — 83735 ASSAY OF MAGNESIUM: CPT

## 2022-01-01 PROCEDURE — 81000 URINALYSIS NONAUTO W/SCOPE: CPT | Performed by: EMERGENCY MEDICINE

## 2022-01-01 PROCEDURE — 27000190 HC CPAP FULL FACE MASK W/VALVE

## 2022-01-01 PROCEDURE — 94003 VENT MGMT INPAT SUBQ DAY: CPT

## 2022-01-01 PROCEDURE — 83735 ASSAY OF MAGNESIUM: CPT | Performed by: PHYSICIAN ASSISTANT

## 2022-01-01 PROCEDURE — 84295 ASSAY OF SERUM SODIUM: CPT | Performed by: PHYSICIAN ASSISTANT

## 2022-01-01 PROCEDURE — 99204 OFFICE O/P NEW MOD 45 MIN: CPT | Mod: S$GLB,,, | Performed by: INTERNAL MEDICINE

## 2022-01-01 PROCEDURE — 95718 PR EEG, W/VIDEO, CONT RECORD, I&R, 2-12 HRS: ICD-10-PCS | Mod: ,,, | Performed by: PSYCHIATRY & NEUROLOGY

## 2022-01-01 PROCEDURE — 99291 PR CRITICAL CARE, E/M 30-74 MINUTES: ICD-10-PCS | Mod: 25,,, | Performed by: PHYSICIAN ASSISTANT

## 2022-01-01 PROCEDURE — 86803 HEPATITIS C AB TEST: CPT | Performed by: EMERGENCY MEDICINE

## 2022-01-01 PROCEDURE — 84100 ASSAY OF PHOSPHORUS: CPT | Performed by: PSYCHIATRY & NEUROLOGY

## 2022-01-01 PROCEDURE — 1159F PR MEDICATION LIST DOCUMENTED IN MEDICAL RECORD: ICD-10-PCS | Mod: CPTII,S$GLB,, | Performed by: NURSE PRACTITIONER

## 2022-01-01 PROCEDURE — 1126F AMNT PAIN NOTED NONE PRSNT: CPT | Mod: CPTII,S$GLB,, | Performed by: INTERNAL MEDICINE

## 2022-01-01 PROCEDURE — 1159F MED LIST DOCD IN RCRD: CPT | Mod: CPTII,S$GLB,, | Performed by: NURSE PRACTITIONER

## 2022-01-01 PROCEDURE — 1126F PR PAIN SEVERITY QUANTIFIED, NO PAIN PRESENT: ICD-10-PCS | Mod: CPTII,S$GLB,, | Performed by: INTERNAL MEDICINE

## 2022-01-01 PROCEDURE — 84484 ASSAY OF TROPONIN QUANT: CPT | Performed by: INTERNAL MEDICINE

## 2022-01-01 PROCEDURE — 80053 COMPREHEN METABOLIC PANEL: CPT | Performed by: INTERNAL MEDICINE

## 2022-01-01 PROCEDURE — 83880 ASSAY OF NATRIURETIC PEPTIDE: CPT | Performed by: EMERGENCY MEDICINE

## 2022-01-01 PROCEDURE — 99291 CRITICAL CARE FIRST HOUR: CPT | Mod: ,,, | Performed by: PSYCHIATRY & NEUROLOGY

## 2022-01-01 PROCEDURE — 36620 ARTERIAL LINE: ICD-10-PCS | Mod: ,,, | Performed by: PHYSICIAN ASSISTANT

## 2022-01-01 PROCEDURE — 25000003 PHARM REV CODE 250: Performed by: EMERGENCY MEDICINE

## 2022-01-01 PROCEDURE — 96365 THER/PROPH/DIAG IV INF INIT: CPT

## 2022-01-01 PROCEDURE — 63600175 PHARM REV CODE 636 W HCPCS: Performed by: PHYSICIAN ASSISTANT

## 2022-01-01 PROCEDURE — 99233 PR SUBSEQUENT HOSPITAL CARE,LEVL III: ICD-10-PCS | Mod: GC,,, | Performed by: PSYCHIATRY & NEUROLOGY

## 2022-01-01 PROCEDURE — 63600175 PHARM REV CODE 636 W HCPCS: Performed by: PSYCHIATRY & NEUROLOGY

## 2022-01-01 PROCEDURE — 25500020 PHARM REV CODE 255: Performed by: INTERNAL MEDICINE

## 2022-01-01 PROCEDURE — 84295 ASSAY OF SERUM SODIUM: CPT | Mod: 91 | Performed by: PHYSICIAN ASSISTANT

## 2022-01-01 PROCEDURE — 36415 COLL VENOUS BLD VENIPUNCTURE: CPT | Performed by: PHYSICIAN ASSISTANT

## 2022-01-01 PROCEDURE — S5010 5% DEXTROSE AND 0.45% SALINE: HCPCS | Performed by: PHYSICIAN ASSISTANT

## 2022-01-01 PROCEDURE — 84484 ASSAY OF TROPONIN QUANT: CPT | Mod: 91 | Performed by: INTERNAL MEDICINE

## 2022-01-01 PROCEDURE — 25000003 PHARM REV CODE 250: Performed by: FAMILY MEDICINE

## 2022-01-01 PROCEDURE — 1101F PT FALLS ASSESS-DOCD LE1/YR: CPT | Mod: CPTII,S$GLB,, | Performed by: INTERNAL MEDICINE

## 2022-01-01 PROCEDURE — 63600175 PHARM REV CODE 636 W HCPCS: Performed by: RADIOLOGY

## 2022-01-01 PROCEDURE — 83605 ASSAY OF LACTIC ACID: CPT | Mod: 91 | Performed by: NURSE PRACTITIONER

## 2022-01-01 PROCEDURE — 99233 SBSQ HOSP IP/OBS HIGH 50: CPT | Mod: ,,, | Performed by: PSYCHIATRY & NEUROLOGY

## 2022-01-01 PROCEDURE — 99222 PR INITIAL HOSPITAL CARE,LEVL II: ICD-10-PCS | Mod: ,,, | Performed by: NURSE PRACTITIONER

## 2022-01-01 PROCEDURE — 51798 US URINE CAPACITY MEASURE: CPT

## 2022-01-01 PROCEDURE — A9698 NON-RAD CONTRAST MATERIALNOC: HCPCS | Performed by: STUDENT IN AN ORGANIZED HEALTH CARE EDUCATION/TRAINING PROGRAM

## 2022-01-01 PROCEDURE — C1751 CATH, INF, PER/CENT/MIDLINE: HCPCS

## 2022-01-01 PROCEDURE — 3288F FALL RISK ASSESSMENT DOCD: CPT | Mod: CPTII,S$GLB,, | Performed by: NURSE PRACTITIONER

## 2022-01-01 PROCEDURE — 85007 BL SMEAR W/DIFF WBC COUNT: CPT | Performed by: STUDENT IN AN ORGANIZED HEALTH CARE EDUCATION/TRAINING PROGRAM

## 2022-01-01 PROCEDURE — 99291 CRITICAL CARE FIRST HOUR: CPT | Mod: 25

## 2022-01-01 PROCEDURE — 99900026 HC AIRWAY MAINTENANCE (STAT)

## 2022-01-01 PROCEDURE — 36620 INSERTION CATHETER ARTERY: CPT | Mod: ,,, | Performed by: PHYSICIAN ASSISTANT

## 2022-01-01 PROCEDURE — 1159F PR MEDICATION LIST DOCUMENTED IN MEDICAL RECORD: ICD-10-PCS | Mod: CPTII,S$GLB,, | Performed by: INTERNAL MEDICINE

## 2022-01-01 PROCEDURE — 99291 CRITICAL CARE FIRST HOUR: CPT | Mod: ,,, | Performed by: INTERNAL MEDICINE

## 2022-01-01 PROCEDURE — 99999 PR PBB SHADOW E&M-EST. PATIENT-LVL V: CPT | Mod: PBBFAC,,, | Performed by: NURSE PRACTITIONER

## 2022-01-01 PROCEDURE — 36620 INSERTION CATHETER ARTERY: CPT

## 2022-01-01 PROCEDURE — 82077 ASSAY SPEC XCP UR&BREATH IA: CPT | Performed by: EMERGENCY MEDICINE

## 2022-01-01 PROCEDURE — 63600175 PHARM REV CODE 636 W HCPCS: Performed by: EMERGENCY MEDICINE

## 2022-01-01 PROCEDURE — 92507 TX SP LANG VOICE COMM INDIV: CPT

## 2022-01-01 PROCEDURE — 84484 ASSAY OF TROPONIN QUANT: CPT | Performed by: NURSE PRACTITIONER

## 2022-01-01 PROCEDURE — 84145 PROCALCITONIN (PCT): CPT | Performed by: PHYSICIAN ASSISTANT

## 2022-01-01 PROCEDURE — U0002 COVID-19 LAB TEST NON-CDC: HCPCS | Performed by: EMERGENCY MEDICINE

## 2022-01-01 PROCEDURE — 36415 COLL VENOUS BLD VENIPUNCTURE: CPT | Performed by: STUDENT IN AN ORGANIZED HEALTH CARE EDUCATION/TRAINING PROGRAM

## 2022-01-01 PROCEDURE — 83735 ASSAY OF MAGNESIUM: CPT | Mod: 91 | Performed by: PHYSICIAN ASSISTANT

## 2022-01-01 PROCEDURE — 99999 PR PBB SHADOW E&M-EST. PATIENT-LVL IV: ICD-10-PCS | Mod: PBBFAC,,, | Performed by: INTERNAL MEDICINE

## 2022-01-01 PROCEDURE — U0003 INFECTIOUS AGENT DETECTION BY NUCLEIC ACID (DNA OR RNA); SEVERE ACUTE RESPIRATORY SYNDROME CORONAVIRUS 2 (SARS-COV-2) (CORONAVIRUS DISEASE [COVID-19]), AMPLIFIED PROBE TECHNIQUE, MAKING USE OF HIGH THROUGHPUT TECHNOLOGIES AS DESCRIBED BY CMS-2020-01-R: HCPCS | Performed by: STUDENT IN AN ORGANIZED HEALTH CARE EDUCATION/TRAINING PROGRAM

## 2022-01-01 PROCEDURE — 3074F SYST BP LT 130 MM HG: CPT | Mod: CPTII,S$GLB,, | Performed by: NURSE PRACTITIONER

## 2022-01-01 PROCEDURE — 3052F HG A1C>EQUAL 8.0%<EQUAL 9.0%: CPT | Mod: CPTII,S$GLB,, | Performed by: NURSE PRACTITIONER

## 2022-01-01 PROCEDURE — 94760 N-INVAS EAR/PLS OXIMETRY 1: CPT

## 2022-01-01 PROCEDURE — 99214 PR OFFICE/OUTPT VISIT, EST, LEVL IV, 30-39 MIN: ICD-10-PCS | Mod: S$GLB,,, | Performed by: INTERNAL MEDICINE

## 2022-01-01 PROCEDURE — 84100 ASSAY OF PHOSPHORUS: CPT | Mod: 91 | Performed by: PHYSICIAN ASSISTANT

## 2022-01-01 PROCEDURE — 93458 L HRT ARTERY/VENTRICLE ANGIO: CPT | Performed by: INTERNAL MEDICINE

## 2022-01-01 PROCEDURE — 99291 PR CRITICAL CARE, E/M 30-74 MINUTES: ICD-10-PCS | Mod: GC,,, | Performed by: PSYCHIATRY & NEUROLOGY

## 2022-01-01 PROCEDURE — 25500020 PHARM REV CODE 255: Performed by: PSYCHIATRY & NEUROLOGY

## 2022-01-01 PROCEDURE — 99223 1ST HOSP IP/OBS HIGH 75: CPT | Mod: GC,,, | Performed by: PSYCHIATRY & NEUROLOGY

## 2022-01-01 PROCEDURE — 80202 ASSAY OF VANCOMYCIN: CPT | Performed by: PSYCHIATRY & NEUROLOGY

## 2022-01-01 PROCEDURE — 80048 BASIC METABOLIC PNL TOTAL CA: CPT | Performed by: NURSE PRACTITIONER

## 2022-01-01 PROCEDURE — 1126F PR PAIN SEVERITY QUANTIFIED, NO PAIN PRESENT: ICD-10-PCS | Mod: CPTII,S$GLB,, | Performed by: NURSE PRACTITIONER

## 2022-01-01 PROCEDURE — 85025 COMPLETE CBC W/AUTO DIFF WBC: CPT | Performed by: PHYSICIAN ASSISTANT

## 2022-01-01 PROCEDURE — 93010 EKG 12-LEAD: ICD-10-PCS | Mod: ,,, | Performed by: STUDENT IN AN ORGANIZED HEALTH CARE EDUCATION/TRAINING PROGRAM

## 2022-01-01 PROCEDURE — 3008F PR BODY MASS INDEX (BMI) DOCUMENTED: ICD-10-PCS | Mod: CPTII,S$GLB,, | Performed by: INTERNAL MEDICINE

## 2022-01-01 PROCEDURE — 99291 CRITICAL CARE FIRST HOUR: CPT | Mod: ,,, | Performed by: EMERGENCY MEDICINE

## 2022-01-01 PROCEDURE — 94660 CPAP INITIATION&MGMT: CPT

## 2022-01-01 PROCEDURE — 80048 BASIC METABOLIC PNL TOTAL CA: CPT | Performed by: PSYCHIATRY & NEUROLOGY

## 2022-01-01 PROCEDURE — 99232 PR SUBSEQUENT HOSPITAL CARE,LEVL II: ICD-10-PCS | Mod: ,,, | Performed by: NURSE PRACTITIONER

## 2022-01-01 PROCEDURE — 87040 BLOOD CULTURE FOR BACTERIA: CPT | Mod: 59 | Performed by: EMERGENCY MEDICINE

## 2022-01-01 PROCEDURE — 85027 COMPLETE CBC AUTOMATED: CPT | Performed by: STUDENT IN AN ORGANIZED HEALTH CARE EDUCATION/TRAINING PROGRAM

## 2022-01-01 PROCEDURE — 83605 ASSAY OF LACTIC ACID: CPT | Mod: 91 | Performed by: PSYCHIATRY & NEUROLOGY

## 2022-01-01 PROCEDURE — C9399 UNCLASSIFIED DRUGS OR BIOLOG: HCPCS | Performed by: NURSE PRACTITIONER

## 2022-01-01 PROCEDURE — 99291 CRITICAL CARE FIRST HOUR: CPT | Mod: GC,,, | Performed by: PSYCHIATRY & NEUROLOGY

## 2022-01-01 PROCEDURE — 80053 COMPREHEN METABOLIC PANEL: CPT | Mod: 91 | Performed by: PHYSICIAN ASSISTANT

## 2022-01-01 PROCEDURE — 85730 THROMBOPLASTIN TIME PARTIAL: CPT | Mod: 91 | Performed by: STUDENT IN AN ORGANIZED HEALTH CARE EDUCATION/TRAINING PROGRAM

## 2022-01-01 PROCEDURE — 99214 OFFICE O/P EST MOD 30 MIN: CPT | Mod: S$GLB,,, | Performed by: INTERNAL MEDICINE

## 2022-01-01 PROCEDURE — P9047 ALBUMIN (HUMAN), 25%, 50ML: HCPCS | Mod: JG

## 2022-01-01 PROCEDURE — 3078F PR MOST RECENT DIASTOLIC BLOOD PRESSURE < 80 MM HG: ICD-10-PCS | Mod: CPTII,S$GLB,, | Performed by: NURSE PRACTITIONER

## 2022-01-01 PROCEDURE — 81001 URINALYSIS AUTO W/SCOPE: CPT

## 2022-01-01 PROCEDURE — 83605 ASSAY OF LACTIC ACID: CPT | Mod: 91 | Performed by: FAMILY MEDICINE

## 2022-01-01 PROCEDURE — 36415 COLL VENOUS BLD VENIPUNCTURE: CPT | Performed by: PSYCHIATRY & NEUROLOGY

## 2022-01-01 PROCEDURE — 83930 ASSAY OF BLOOD OSMOLALITY: CPT | Performed by: PSYCHIATRY & NEUROLOGY

## 2022-01-01 PROCEDURE — 85730 THROMBOPLASTIN TIME PARTIAL: CPT | Performed by: EMERGENCY MEDICINE

## 2022-01-01 PROCEDURE — 99291 PR CRITICAL CARE, E/M 30-74 MINUTES: ICD-10-PCS | Mod: ,,, | Performed by: EMERGENCY MEDICINE

## 2022-01-01 PROCEDURE — 84484 ASSAY OF TROPONIN QUANT: CPT | Performed by: STUDENT IN AN ORGANIZED HEALTH CARE EDUCATION/TRAINING PROGRAM

## 2022-01-01 PROCEDURE — 82010 KETONE BODYS QUAN: CPT | Performed by: NURSE PRACTITIONER

## 2022-01-01 PROCEDURE — 3052F HG A1C>EQUAL 8.0%<EQUAL 9.0%: CPT | Mod: CPTII,S$GLB,, | Performed by: INTERNAL MEDICINE

## 2022-01-01 PROCEDURE — 85014 HEMATOCRIT: CPT

## 2022-01-01 PROCEDURE — 80053 COMPREHEN METABOLIC PANEL: CPT | Performed by: STUDENT IN AN ORGANIZED HEALTH CARE EDUCATION/TRAINING PROGRAM

## 2022-01-01 PROCEDURE — 99292 CRITICAL CARE ADDL 30 MIN: CPT | Mod: 25,,, | Performed by: PSYCHIATRY & NEUROLOGY

## 2022-01-01 PROCEDURE — 1101F PT FALLS ASSESS-DOCD LE1/YR: CPT | Mod: CPTII,S$GLB,, | Performed by: NURSE PRACTITIONER

## 2022-01-01 PROCEDURE — 84443 ASSAY THYROID STIM HORMONE: CPT | Performed by: EMERGENCY MEDICINE

## 2022-01-01 PROCEDURE — 82330 ASSAY OF CALCIUM: CPT

## 2022-01-01 PROCEDURE — 97110 THERAPEUTIC EXERCISES: CPT | Mod: CQ

## 2022-01-01 PROCEDURE — 83605 ASSAY OF LACTIC ACID: CPT | Performed by: PHYSICIAN ASSISTANT

## 2022-01-01 PROCEDURE — 85730 THROMBOPLASTIN TIME PARTIAL: CPT | Mod: 91 | Performed by: FAMILY MEDICINE

## 2022-01-01 PROCEDURE — 3078F PR MOST RECENT DIASTOLIC BLOOD PRESSURE < 80 MM HG: ICD-10-PCS | Mod: CPTII,S$GLB,, | Performed by: INTERNAL MEDICINE

## 2022-01-01 PROCEDURE — 84300 ASSAY OF URINE SODIUM: CPT | Performed by: PSYCHIATRY & NEUROLOGY

## 2022-01-01 PROCEDURE — 80053 COMPREHEN METABOLIC PANEL: CPT | Performed by: PSYCHIATRY & NEUROLOGY

## 2022-01-01 PROCEDURE — 21400001 HC TELEMETRY ROOM

## 2022-01-01 PROCEDURE — 85025 COMPLETE CBC W/AUTO DIFF WBC: CPT | Performed by: EMERGENCY MEDICINE

## 2022-01-01 PROCEDURE — 63600175 PHARM REV CODE 636 W HCPCS: Mod: JG | Performed by: PHYSICIAN ASSISTANT

## 2022-01-01 PROCEDURE — 82010 KETONE BODYS QUAN: CPT | Mod: 91 | Performed by: STUDENT IN AN ORGANIZED HEALTH CARE EDUCATION/TRAINING PROGRAM

## 2022-01-01 PROCEDURE — 3074F PR MOST RECENT SYSTOLIC BLOOD PRESSURE < 130 MM HG: ICD-10-PCS | Mod: CPTII,S$GLB,, | Performed by: INTERNAL MEDICINE

## 2022-01-01 PROCEDURE — 1101F PR PT FALLS ASSESS DOC 0-1 FALLS W/OUT INJ PAST YR: ICD-10-PCS | Mod: CPTII,S$GLB,, | Performed by: NURSE PRACTITIONER

## 2022-01-01 PROCEDURE — P9021 RED BLOOD CELLS UNIT: HCPCS | Performed by: NURSE PRACTITIONER

## 2022-01-01 PROCEDURE — 97530 THERAPEUTIC ACTIVITIES: CPT | Mod: CQ

## 2022-01-01 PROCEDURE — 93010 EKG 12-LEAD: ICD-10-PCS | Mod: ,,, | Performed by: INTERNAL MEDICINE

## 2022-01-01 PROCEDURE — 80053 COMPREHEN METABOLIC PANEL: CPT | Performed by: EMERGENCY MEDICINE

## 2022-01-01 PROCEDURE — 63600175 PHARM REV CODE 636 W HCPCS: Performed by: INTERNAL MEDICINE

## 2022-01-01 PROCEDURE — 83036 HEMOGLOBIN GLYCOSYLATED A1C: CPT | Performed by: INTERNAL MEDICINE

## 2022-01-01 PROCEDURE — 86900 BLOOD TYPING SEROLOGIC ABO: CPT | Performed by: PSYCHIATRY & NEUROLOGY

## 2022-01-01 PROCEDURE — 83605 ASSAY OF LACTIC ACID: CPT | Performed by: NURSE PRACTITIONER

## 2022-01-01 PROCEDURE — 85610 PROTHROMBIN TIME: CPT | Mod: 91 | Performed by: STUDENT IN AN ORGANIZED HEALTH CARE EDUCATION/TRAINING PROGRAM

## 2022-01-01 PROCEDURE — 99153 MOD SED SAME PHYS/QHP EA: CPT | Performed by: INTERNAL MEDICINE

## 2022-01-01 PROCEDURE — 36415 COLL VENOUS BLD VENIPUNCTURE: CPT | Performed by: FAMILY MEDICINE

## 2022-01-01 PROCEDURE — 99152 MOD SED SAME PHYS/QHP 5/>YRS: CPT | Mod: ,,, | Performed by: INTERNAL MEDICINE

## 2022-01-01 PROCEDURE — G0427 PR INPT TELEHEALTH CON 70/>M: ICD-10-PCS | Mod: 95,,, | Performed by: PSYCHIATRY & NEUROLOGY

## 2022-01-01 PROCEDURE — 99291 CRITICAL CARE FIRST HOUR: CPT | Mod: 25,,, | Performed by: PSYCHIATRY & NEUROLOGY

## 2022-01-01 PROCEDURE — 93010 ELECTROCARDIOGRAM REPORT: CPT | Mod: ,,, | Performed by: STUDENT IN AN ORGANIZED HEALTH CARE EDUCATION/TRAINING PROGRAM

## 2022-01-01 PROCEDURE — 86850 RBC ANTIBODY SCREEN: CPT | Performed by: STUDENT IN AN ORGANIZED HEALTH CARE EDUCATION/TRAINING PROGRAM

## 2022-01-01 PROCEDURE — 3074F SYST BP LT 130 MM HG: CPT | Mod: CPTII,S$GLB,, | Performed by: INTERNAL MEDICINE

## 2022-01-01 PROCEDURE — 1159F MED LIST DOCD IN RCRD: CPT | Mod: CPTII,S$GLB,, | Performed by: INTERNAL MEDICINE

## 2022-01-01 PROCEDURE — 83935 ASSAY OF URINE OSMOLALITY: CPT | Performed by: PHYSICIAN ASSISTANT

## 2022-01-01 PROCEDURE — 83605 ASSAY OF LACTIC ACID: CPT | Performed by: EMERGENCY MEDICINE

## 2022-01-01 PROCEDURE — 80053 COMPREHEN METABOLIC PANEL: CPT | Mod: 91 | Performed by: PSYCHIATRY & NEUROLOGY

## 2022-01-01 PROCEDURE — 36415 COLL VENOUS BLD VENIPUNCTURE: CPT | Performed by: NURSE PRACTITIONER

## 2022-01-01 PROCEDURE — 3052F PR MOST RECENT HEMOGLOBIN A1C LEVEL 8.0 - < 9.0%: ICD-10-PCS | Mod: CPTII,S$GLB,, | Performed by: INTERNAL MEDICINE

## 2022-01-01 PROCEDURE — 36556 PR INSERT NON-TUNNEL CV CATH 5+ YRS OLD: ICD-10-PCS | Mod: GC,,, | Performed by: PSYCHIATRY & NEUROLOGY

## 2022-01-01 PROCEDURE — 84100 ASSAY OF PHOSPHORUS: CPT | Performed by: STUDENT IN AN ORGANIZED HEALTH CARE EDUCATION/TRAINING PROGRAM

## 2022-01-01 PROCEDURE — 80307 DRUG TEST PRSMV CHEM ANLYZR: CPT | Performed by: EMERGENCY MEDICINE

## 2022-01-01 PROCEDURE — 99232 SBSQ HOSP IP/OBS MODERATE 35: CPT | Mod: ,,, | Performed by: NURSE PRACTITIONER

## 2022-01-01 PROCEDURE — 99214 OFFICE O/P EST MOD 30 MIN: CPT | Mod: S$GLB,,, | Performed by: NURSE PRACTITIONER

## 2022-01-01 PROCEDURE — 99223 1ST HOSP IP/OBS HIGH 75: CPT | Mod: ,,, | Performed by: INTERNAL MEDICINE

## 2022-01-01 PROCEDURE — P9047 ALBUMIN (HUMAN), 25%, 50ML: HCPCS | Mod: JG | Performed by: PHYSICIAN ASSISTANT

## 2022-01-01 PROCEDURE — 36430 TRANSFUSION BLD/BLD COMPNT: CPT

## 2022-01-01 PROCEDURE — 84484 ASSAY OF TROPONIN QUANT: CPT | Mod: 91 | Performed by: FAMILY MEDICINE

## 2022-01-01 PROCEDURE — 96361 HYDRATE IV INFUSION ADD-ON: CPT

## 2022-01-01 PROCEDURE — 84466 ASSAY OF TRANSFERRIN: CPT | Performed by: PHYSICIAN ASSISTANT

## 2022-01-01 PROCEDURE — 51702 INSERT TEMP BLADDER CATH: CPT

## 2022-01-01 PROCEDURE — 84484 ASSAY OF TROPONIN QUANT: CPT | Performed by: EMERGENCY MEDICINE

## 2022-01-01 PROCEDURE — 92610 EVALUATE SWALLOWING FUNCTION: CPT

## 2022-01-01 PROCEDURE — 27201423 OPTIME MED/SURG SUP & DEVICES STERILE SUPPLY: Performed by: INTERNAL MEDICINE

## 2022-01-01 PROCEDURE — 99291 PR CRITICAL CARE, E/M 30-74 MINUTES: ICD-10-PCS | Mod: 25,,, | Performed by: PSYCHIATRY & NEUROLOGY

## 2022-01-01 PROCEDURE — 84300 ASSAY OF URINE SODIUM: CPT | Performed by: PHYSICIAN ASSISTANT

## 2022-01-01 PROCEDURE — U0005 INFEC AGEN DETEC AMPLI PROBE: HCPCS | Performed by: STUDENT IN AN ORGANIZED HEALTH CARE EDUCATION/TRAINING PROGRAM

## 2022-01-01 PROCEDURE — 25500020 PHARM REV CODE 255: Performed by: STUDENT IN AN ORGANIZED HEALTH CARE EDUCATION/TRAINING PROGRAM

## 2022-01-01 PROCEDURE — 3288F PR FALLS RISK ASSESSMENT DOCUMENTED: ICD-10-PCS | Mod: CPTII,S$GLB,, | Performed by: INTERNAL MEDICINE

## 2022-01-01 PROCEDURE — 1160F PR REVIEW ALL MEDS BY PRESCRIBER/CLIN PHARMACIST DOCUMENTED: ICD-10-PCS | Mod: CPTII,S$GLB,, | Performed by: NURSE PRACTITIONER

## 2022-01-01 PROCEDURE — 1100F PR PT FALLS ASSESS DOC 2+ FALLS/FALL W/INJURY/YR: ICD-10-PCS | Mod: CPTII,S$GLB,, | Performed by: INTERNAL MEDICINE

## 2022-01-01 PROCEDURE — 84484 ASSAY OF TROPONIN QUANT: CPT | Mod: 91 | Performed by: STUDENT IN AN ORGANIZED HEALTH CARE EDUCATION/TRAINING PROGRAM

## 2022-01-01 PROCEDURE — 95720 EEG PHY/QHP EA INCR W/VEEG: CPT | Mod: ,,, | Performed by: PSYCHIATRY & NEUROLOGY

## 2022-01-01 PROCEDURE — 99238 HOSP IP/OBS DSCHRG MGMT 30/<: CPT | Mod: ,,, | Performed by: NURSE PRACTITIONER

## 2022-01-01 PROCEDURE — 93010 ELECTROCARDIOGRAM REPORT: CPT | Mod: ,,, | Performed by: INTERNAL MEDICINE

## 2022-01-01 PROCEDURE — 84145 PROCALCITONIN (PCT): CPT | Performed by: EMERGENCY MEDICINE

## 2022-01-01 PROCEDURE — 27200188 HC TRANSDUCER, ART ADULT/PEDS

## 2022-01-01 PROCEDURE — 99152 MOD SED SAME PHYS/QHP 5/>YRS: CPT | Performed by: INTERNAL MEDICINE

## 2022-01-01 PROCEDURE — 85025 COMPLETE CBC W/AUTO DIFF WBC: CPT | Mod: 91 | Performed by: PSYCHIATRY & NEUROLOGY

## 2022-01-01 PROCEDURE — 3078F DIAST BP <80 MM HG: CPT | Mod: CPTII,S$GLB,, | Performed by: NURSE PRACTITIONER

## 2022-01-01 PROCEDURE — 81000 URINALYSIS NONAUTO W/SCOPE: CPT | Mod: 59 | Performed by: EMERGENCY MEDICINE

## 2022-01-01 PROCEDURE — 95720 PR EEG, W/VIDEO, CONT RECORD, I&R, >12<26 HRS: ICD-10-PCS | Mod: ,,, | Performed by: PSYCHIATRY & NEUROLOGY

## 2022-01-01 PROCEDURE — 84132 ASSAY OF SERUM POTASSIUM: CPT | Performed by: PSYCHIATRY & NEUROLOGY

## 2022-01-01 PROCEDURE — 84100 ASSAY OF PHOSPHORUS: CPT | Performed by: PHYSICIAN ASSISTANT

## 2022-01-01 PROCEDURE — 3078F DIAST BP <80 MM HG: CPT | Mod: CPTII,S$GLB,, | Performed by: INTERNAL MEDICINE

## 2022-01-01 PROCEDURE — 99223 PR INITIAL HOSPITAL CARE,LEVL III: ICD-10-PCS | Mod: ,,, | Performed by: INTERNAL MEDICINE

## 2022-01-01 PROCEDURE — 84484 ASSAY OF TROPONIN QUANT: CPT | Performed by: PSYCHIATRY & NEUROLOGY

## 2022-01-01 PROCEDURE — 84145 PROCALCITONIN (PCT): CPT

## 2022-01-01 PROCEDURE — 97165 OT EVAL LOW COMPLEX 30 MIN: CPT

## 2022-01-01 PROCEDURE — 87040 BLOOD CULTURE FOR BACTERIA: CPT | Mod: 59 | Performed by: PHYSICIAN ASSISTANT

## 2022-01-01 PROCEDURE — 87205 SMEAR GRAM STAIN: CPT | Performed by: PSYCHIATRY & NEUROLOGY

## 2022-01-01 PROCEDURE — 82728 ASSAY OF FERRITIN: CPT | Performed by: PHYSICIAN ASSISTANT

## 2022-01-01 PROCEDURE — 84100 ASSAY OF PHOSPHORUS: CPT | Performed by: NURSE PRACTITIONER

## 2022-01-01 PROCEDURE — 95718 EEG PHYS/QHP 2-12 HR W/VEEG: CPT | Mod: ,,, | Performed by: PSYCHIATRY & NEUROLOGY

## 2022-01-01 PROCEDURE — 99285 EMERGENCY DEPT VISIT HI MDM: CPT | Mod: 25

## 2022-01-01 PROCEDURE — 99223 PR INITIAL HOSPITAL CARE,LEVL III: ICD-10-PCS | Mod: ,,, | Performed by: NURSE PRACTITIONER

## 2022-01-01 PROCEDURE — 84295 ASSAY OF SERUM SODIUM: CPT

## 2022-01-01 PROCEDURE — 99238 PR HOSPITAL DISCHARGE DAY,<30 MIN: ICD-10-PCS | Mod: ,,, | Performed by: NURSE PRACTITIONER

## 2022-01-01 PROCEDURE — 3008F BODY MASS INDEX DOCD: CPT | Mod: CPTII,S$GLB,, | Performed by: NURSE PRACTITIONER

## 2022-01-01 PROCEDURE — 82962 GLUCOSE BLOOD TEST: CPT

## 2022-01-01 PROCEDURE — 99222 1ST HOSP IP/OBS MODERATE 55: CPT | Mod: ,,, | Performed by: NURSE PRACTITIONER

## 2022-01-01 PROCEDURE — C9399 UNCLASSIFIED DRUGS OR BIOLOG: HCPCS | Performed by: STUDENT IN AN ORGANIZED HEALTH CARE EDUCATION/TRAINING PROGRAM

## 2022-01-01 PROCEDURE — 83605 ASSAY OF LACTIC ACID: CPT | Mod: 91

## 2022-01-01 PROCEDURE — 99223 1ST HOSP IP/OBS HIGH 75: CPT | Mod: ,,, | Performed by: NURSE PRACTITIONER

## 2022-01-01 PROCEDURE — 84132 ASSAY OF SERUM POTASSIUM: CPT | Performed by: PHYSICIAN ASSISTANT

## 2022-01-01 PROCEDURE — 43752 NASAL/OROGASTRIC W/TUBE PLMT: CPT

## 2022-01-01 PROCEDURE — 92523 SPEECH SOUND LANG COMPREHEN: CPT

## 2022-01-01 PROCEDURE — 27100171 HC OXYGEN HIGH FLOW UP TO 24 HOURS

## 2022-01-01 PROCEDURE — 3052F PR MOST RECENT HEMOGLOBIN A1C LEVEL 8.0 - < 9.0%: ICD-10-PCS | Mod: CPTII,S$GLB,, | Performed by: NURSE PRACTITIONER

## 2022-01-01 PROCEDURE — 86901 BLOOD TYPING SEROLOGIC RH(D): CPT | Performed by: PSYCHIATRY & NEUROLOGY

## 2022-01-01 PROCEDURE — 84132 ASSAY OF SERUM POTASSIUM: CPT

## 2022-01-01 PROCEDURE — 99204 PR OFFICE/OUTPT VISIT, NEW, LEVL IV, 45-59 MIN: ICD-10-PCS | Mod: S$GLB,,, | Performed by: INTERNAL MEDICINE

## 2022-01-01 PROCEDURE — 83735 ASSAY OF MAGNESIUM: CPT | Performed by: INTERNAL MEDICINE

## 2022-01-01 PROCEDURE — G0179 PR HOME HEALTH MD RECERTIFICATION: ICD-10-PCS | Mod: ,,, | Performed by: INTERNAL MEDICINE

## 2022-01-01 PROCEDURE — 83880 ASSAY OF NATRIURETIC PEPTIDE: CPT | Performed by: INTERNAL MEDICINE

## 2022-01-01 PROCEDURE — 3008F PR BODY MASS INDEX (BMI) DOCUMENTED: ICD-10-PCS | Mod: CPTII,S$GLB,, | Performed by: NURSE PRACTITIONER

## 2022-01-01 PROCEDURE — 85384 FIBRINOGEN ACTIVITY: CPT | Performed by: STUDENT IN AN ORGANIZED HEALTH CARE EDUCATION/TRAINING PROGRAM

## 2022-01-01 PROCEDURE — C1769 GUIDE WIRE: HCPCS | Performed by: INTERNAL MEDICINE

## 2022-01-01 PROCEDURE — 86920 COMPATIBILITY TEST SPIN: CPT | Performed by: NURSE PRACTITIONER

## 2022-01-01 PROCEDURE — 82010 KETONE BODYS QUAN: CPT | Performed by: PSYCHIATRY & NEUROLOGY

## 2022-01-01 PROCEDURE — 31720 CLEARANCE OF AIRWAYS: CPT

## 2022-01-01 PROCEDURE — 85025 COMPLETE CBC W/AUTO DIFF WBC: CPT | Mod: 91 | Performed by: INTERNAL MEDICINE

## 2022-01-01 PROCEDURE — 85730 THROMBOPLASTIN TIME PARTIAL: CPT | Performed by: FAMILY MEDICINE

## 2022-01-01 PROCEDURE — 99900031 HC PATIENT EDUCATION (STAT)

## 2022-01-01 PROCEDURE — 85730 THROMBOPLASTIN TIME PARTIAL: CPT | Mod: 91 | Performed by: NURSE PRACTITIONER

## 2022-01-01 PROCEDURE — 97110 THERAPEUTIC EXERCISES: CPT

## 2022-01-01 PROCEDURE — 96374 THER/PROPH/DIAG INJ IV PUSH: CPT

## 2022-01-01 PROCEDURE — 80061 LIPID PANEL: CPT | Performed by: INTERNAL MEDICINE

## 2022-01-01 PROCEDURE — 83735 ASSAY OF MAGNESIUM: CPT | Performed by: NURSE PRACTITIONER

## 2022-01-01 PROCEDURE — 93458 L HRT ARTERY/VENTRICLE ANGIO: CPT | Mod: 26,,, | Performed by: INTERNAL MEDICINE

## 2022-01-01 PROCEDURE — 99291 PR CRITICAL CARE, E/M 30-74 MINUTES: ICD-10-PCS | Mod: ,,, | Performed by: INTERNAL MEDICINE

## 2022-01-01 PROCEDURE — 83605 ASSAY OF LACTIC ACID: CPT

## 2022-01-01 PROCEDURE — 99291 CRITICAL CARE FIRST HOUR: CPT | Mod: 25,,, | Performed by: PHYSICIAN ASSISTANT

## 2022-01-01 PROCEDURE — 3288F PR FALLS RISK ASSESSMENT DOCUMENTED: ICD-10-PCS | Mod: CPTII,S$GLB,, | Performed by: NURSE PRACTITIONER

## 2022-01-01 PROCEDURE — 99214 PR OFFICE/OUTPT VISIT, EST, LEVL IV, 30-39 MIN: ICD-10-PCS | Mod: S$GLB,,, | Performed by: NURSE PRACTITIONER

## 2022-01-01 PROCEDURE — 83935 ASSAY OF URINE OSMOLALITY: CPT | Performed by: PSYCHIATRY & NEUROLOGY

## 2022-01-01 PROCEDURE — 1101F PR PT FALLS ASSESS DOC 0-1 FALLS W/OUT INJ PAST YR: ICD-10-PCS | Mod: CPTII,S$GLB,, | Performed by: INTERNAL MEDICINE

## 2022-01-01 PROCEDURE — 1160F RVW MEDS BY RX/DR IN RCRD: CPT | Mod: CPTII,S$GLB,, | Performed by: NURSE PRACTITIONER

## 2022-01-01 PROCEDURE — 97162 PT EVAL MOD COMPLEX 30 MIN: CPT

## 2022-01-01 PROCEDURE — 95714 VEEG EA 12-26 HR UNMNTR: CPT

## 2022-01-01 PROCEDURE — 93458 PR CATH PLACE/CORON ANGIO, IMG SUPER/INTERP,W LEFT HEART VENTRICULOGRAPHY: ICD-10-PCS | Mod: 26,,, | Performed by: INTERNAL MEDICINE

## 2022-01-01 PROCEDURE — 82800 BLOOD PH: CPT

## 2022-01-01 PROCEDURE — 99223 PR INITIAL HOSPITAL CARE,LEVL III: ICD-10-PCS | Mod: GC,,, | Performed by: PSYCHIATRY & NEUROLOGY

## 2022-01-01 PROCEDURE — 82550 ASSAY OF CK (CPK): CPT | Performed by: NURSE PRACTITIONER

## 2022-01-01 PROCEDURE — 94002 VENT MGMT INPAT INIT DAY: CPT

## 2022-01-01 PROCEDURE — 99292 PR CRITICAL CARE, ADDL 30 MIN: ICD-10-PCS | Mod: 25,,, | Performed by: PSYCHIATRY & NEUROLOGY

## 2022-01-01 RX ORDER — MODAFINIL 100 MG/1
100 TABLET ORAL DAILY
Status: DISCONTINUED | OUTPATIENT
Start: 2022-01-01 | End: 2022-01-01

## 2022-01-01 RX ORDER — INSULIN ASPART 100 [IU]/ML
8 INJECTION, SOLUTION INTRAVENOUS; SUBCUTANEOUS EVERY 4 HOURS
Status: DISCONTINUED | OUTPATIENT
Start: 2022-01-01 | End: 2022-01-01

## 2022-01-01 RX ORDER — FUROSEMIDE 10 MG/ML
60 INJECTION INTRAMUSCULAR; INTRAVENOUS ONCE
Status: COMPLETED | OUTPATIENT
Start: 2022-01-01 | End: 2022-01-01

## 2022-01-01 RX ORDER — FUROSEMIDE 20 MG/1
20 TABLET ORAL DAILY
Status: DISCONTINUED | OUTPATIENT
Start: 2022-01-01 | End: 2022-01-01

## 2022-01-01 RX ORDER — FENTANYL CITRATE-0.9 % NACL/PF 10 MCG/ML
0-200 PLASTIC BAG, INJECTION (ML) INTRAVENOUS CONTINUOUS
Status: DISCONTINUED | OUTPATIENT
Start: 2022-01-01 | End: 2022-01-01

## 2022-01-01 RX ORDER — LANOLIN ALCOHOL/MO/W.PET/CERES
800 CREAM (GRAM) TOPICAL
Status: DISCONTINUED | OUTPATIENT
Start: 2022-01-01 | End: 2022-01-01

## 2022-01-01 RX ORDER — SODIUM,POTASSIUM PHOSPHATES 280-250MG
2 POWDER IN PACKET (EA) ORAL
Status: DISCONTINUED | OUTPATIENT
Start: 2022-01-01 | End: 2022-01-01

## 2022-01-01 RX ORDER — ATORVASTATIN CALCIUM 40 MG/1
40 TABLET, FILM COATED ORAL DAILY
Status: DISCONTINUED | OUTPATIENT
Start: 2022-01-01 | End: 2022-01-01

## 2022-01-01 RX ORDER — INSULIN ASPART 100 [IU]/ML
4 INJECTION, SOLUTION INTRAVENOUS; SUBCUTANEOUS EVERY 4 HOURS
Status: DISCONTINUED | OUTPATIENT
Start: 2022-01-01 | End: 2022-01-01

## 2022-01-01 RX ORDER — NAPROXEN SODIUM 220 MG/1
81 TABLET, FILM COATED ORAL DAILY
Status: DISCONTINUED | OUTPATIENT
Start: 2022-01-01 | End: 2022-01-01

## 2022-01-01 RX ORDER — ETOMIDATE 2 MG/ML
INJECTION INTRAVENOUS
Status: DISCONTINUED
Start: 2022-01-01 | End: 2022-01-01 | Stop reason: WASHOUT

## 2022-01-01 RX ORDER — INSULIN ASPART 100 [IU]/ML
0-10 INJECTION, SOLUTION INTRAVENOUS; SUBCUTANEOUS
Status: DISCONTINUED | OUTPATIENT
Start: 2022-01-01 | End: 2022-01-01

## 2022-01-01 RX ORDER — SODIUM BICARBONATE 1 MEQ/ML
50 SYRINGE (ML) INTRAVENOUS ONCE
Status: COMPLETED | OUTPATIENT
Start: 2022-01-01 | End: 2022-01-01

## 2022-01-01 RX ORDER — PROPOFOL 10 MG/ML
40 VIAL (ML) INTRAVENOUS ONCE
Status: COMPLETED | OUTPATIENT
Start: 2022-01-01 | End: 2022-01-01

## 2022-01-01 RX ORDER — FUROSEMIDE 10 MG/ML
40 INJECTION INTRAMUSCULAR; INTRAVENOUS
Status: DISPENSED | OUTPATIENT
Start: 2022-01-01 | End: 2022-01-01

## 2022-01-01 RX ORDER — OLANZAPINE 2.5 MG/1
5 TABLET ORAL EVERY 6 HOURS PRN
Status: DISCONTINUED | OUTPATIENT
Start: 2022-01-01 | End: 2022-01-01

## 2022-01-01 RX ORDER — DONEPEZIL HYDROCHLORIDE 10 MG/1
10 TABLET, FILM COATED ORAL DAILY
Status: DISCONTINUED | OUTPATIENT
Start: 2022-01-01 | End: 2022-01-01

## 2022-01-01 RX ORDER — FUROSEMIDE 10 MG/ML
20 INJECTION INTRAMUSCULAR; INTRAVENOUS
Status: COMPLETED | OUTPATIENT
Start: 2022-01-01 | End: 2022-01-01

## 2022-01-01 RX ORDER — ONDANSETRON 2 MG/ML
4 INJECTION INTRAMUSCULAR; INTRAVENOUS EVERY 8 HOURS PRN
Status: DISCONTINUED | OUTPATIENT
Start: 2022-01-01 | End: 2022-01-01 | Stop reason: HOSPADM

## 2022-01-01 RX ORDER — MORPHINE SULFATE 4 MG/ML
2 INJECTION, SOLUTION INTRAMUSCULAR; INTRAVENOUS EVERY 4 HOURS PRN
Status: DISCONTINUED | OUTPATIENT
Start: 2022-01-01 | End: 2022-01-01 | Stop reason: HOSPADM

## 2022-01-01 RX ORDER — LANOLIN ALCOHOL/MO/W.PET/CERES
800 CREAM (GRAM) TOPICAL
Status: DISCONTINUED | OUTPATIENT
Start: 2022-01-01 | End: 2022-01-01 | Stop reason: HOSPADM

## 2022-01-01 RX ORDER — DEXTROSE MONOHYDRATE AND SODIUM CHLORIDE 5; .9 G/100ML; G/100ML
INJECTION, SOLUTION INTRAVENOUS CONTINUOUS
Status: DISCONTINUED | OUTPATIENT
Start: 2022-01-01 | End: 2022-01-01

## 2022-01-01 RX ORDER — ATORVASTATIN CALCIUM 20 MG/1
40 TABLET, FILM COATED ORAL DAILY
Status: DISCONTINUED | OUTPATIENT
Start: 2022-01-01 | End: 2022-01-01

## 2022-01-01 RX ORDER — SODIUM CHLORIDE 0.9 % (FLUSH) 0.9 %
10 SYRINGE (ML) INJECTION
Status: DISCONTINUED | OUTPATIENT
Start: 2022-01-01 | End: 2022-01-01

## 2022-01-01 RX ORDER — CLOPIDOGREL BISULFATE 75 MG/1
75 TABLET ORAL DAILY
Status: DISCONTINUED | OUTPATIENT
Start: 2022-01-01 | End: 2022-01-01

## 2022-01-01 RX ORDER — DONEPEZIL HYDROCHLORIDE 5 MG/1
10 TABLET, FILM COATED ORAL DAILY
Status: DISCONTINUED | OUTPATIENT
Start: 2022-01-01 | End: 2022-01-01 | Stop reason: HOSPADM

## 2022-01-01 RX ORDER — GLUCAGON 1 MG
1 KIT INJECTION
Status: DISCONTINUED | OUTPATIENT
Start: 2022-01-01 | End: 2022-01-01 | Stop reason: HOSPADM

## 2022-01-01 RX ORDER — MORPHINE SULFATE IN 0.9 % NACL 30 MG/30ML
0-10 PATIENT CONTROLLED ANALGESIA SYRINGE INTRAVENOUS CONTINUOUS
Status: DISCONTINUED | OUTPATIENT
Start: 2022-01-01 | End: 2022-02-27 | Stop reason: HOSPADM

## 2022-01-01 RX ORDER — MEMANTINE HYDROCHLORIDE 5 MG/1
10 TABLET ORAL 2 TIMES DAILY
Status: DISCONTINUED | OUTPATIENT
Start: 2022-01-01 | End: 2022-01-01

## 2022-01-01 RX ORDER — IBUPROFEN 200 MG
24 TABLET ORAL
Status: DISCONTINUED | OUTPATIENT
Start: 2022-01-01 | End: 2022-01-01

## 2022-01-01 RX ORDER — HEPARIN SODIUM 1000 [USP'U]/ML
INJECTION, SOLUTION INTRAVENOUS; SUBCUTANEOUS
Status: DISCONTINUED | OUTPATIENT
Start: 2022-01-01 | End: 2022-01-01

## 2022-01-01 RX ORDER — GLUCAGON 1 MG
1 KIT INJECTION
Status: DISCONTINUED | OUTPATIENT
Start: 2022-01-01 | End: 2022-01-01

## 2022-01-01 RX ORDER — ACETAMINOPHEN 325 MG/1
650 TABLET ORAL EVERY 6 HOURS PRN
Status: DISCONTINUED | OUTPATIENT
Start: 2022-01-01 | End: 2022-01-01 | Stop reason: HOSPADM

## 2022-01-01 RX ORDER — DEXTROSE MONOHYDRATE 50 MG/ML
INJECTION, SOLUTION INTRAVENOUS CONTINUOUS
Status: DISCONTINUED | OUTPATIENT
Start: 2022-01-01 | End: 2022-01-01

## 2022-01-01 RX ORDER — DEXTROSE MONOHYDRATE 100 MG/ML
INJECTION, SOLUTION INTRAVENOUS
Status: DISCONTINUED | OUTPATIENT
Start: 2022-01-01 | End: 2022-01-01

## 2022-01-01 RX ORDER — CLOPIDOGREL BISULFATE 75 MG/1
300 TABLET ORAL ONCE
Status: COMPLETED | OUTPATIENT
Start: 2022-01-01 | End: 2022-01-01

## 2022-01-01 RX ORDER — NITROGLYCERIN 20 MG/1
1 PATCH TRANSDERMAL DAILY
Status: DISCONTINUED | OUTPATIENT
Start: 2022-01-01 | End: 2022-01-01 | Stop reason: HOSPADM

## 2022-01-01 RX ORDER — NOREPINEPHRINE BITARTRATE/D5W 4MG/250ML
0-3 PLASTIC BAG, INJECTION (ML) INTRAVENOUS CONTINUOUS
Status: DISCONTINUED | OUTPATIENT
Start: 2022-01-01 | End: 2022-01-01

## 2022-01-01 RX ORDER — SODIUM,POTASSIUM PHOSPHATES 280-250MG
2 POWDER IN PACKET (EA) ORAL
Status: DISCONTINUED | OUTPATIENT
Start: 2022-01-01 | End: 2022-01-01 | Stop reason: HOSPADM

## 2022-01-01 RX ORDER — SODIUM CHLORIDE 450 MG/100ML
INJECTION, SOLUTION INTRAVENOUS CONTINUOUS
Status: DISCONTINUED | OUTPATIENT
Start: 2022-01-01 | End: 2022-01-01

## 2022-01-01 RX ORDER — ROCURONIUM BROMIDE 10 MG/ML
100 INJECTION, SOLUTION INTRAVENOUS ONCE
Status: COMPLETED | OUTPATIENT
Start: 2022-01-01 | End: 2022-01-01

## 2022-01-01 RX ORDER — NOREPINEPHRINE BITARTRATE/D5W 4MG/250ML
PLASTIC BAG, INJECTION (ML) INTRAVENOUS
Status: COMPLETED
Start: 2022-01-01 | End: 2022-01-01

## 2022-01-01 RX ORDER — ALBUMIN HUMAN 250 G/1000ML
25 SOLUTION INTRAVENOUS ONCE
Status: COMPLETED | OUTPATIENT
Start: 2022-01-01 | End: 2022-01-01

## 2022-01-01 RX ORDER — VANCOMYCIN HCL IN 5 % DEXTROSE 1G/250ML
1000 PLASTIC BAG, INJECTION (ML) INTRAVENOUS
Status: DISCONTINUED | OUTPATIENT
Start: 2022-01-01 | End: 2022-01-01

## 2022-01-01 RX ORDER — IODIXANOL 320 MG/ML
65 INJECTION, SOLUTION INTRAVASCULAR
Status: DISCONTINUED | OUTPATIENT
Start: 2022-01-01 | End: 2022-01-01

## 2022-01-01 RX ORDER — HYDROCODONE BITARTRATE AND ACETAMINOPHEN 500; 5 MG/1; MG/1
TABLET ORAL
Status: DISCONTINUED | OUTPATIENT
Start: 2022-01-01 | End: 2022-01-01

## 2022-01-01 RX ORDER — INSULIN ASPART 100 [IU]/ML
1-10 INJECTION, SOLUTION INTRAVENOUS; SUBCUTANEOUS EVERY 4 HOURS PRN
Status: DISCONTINUED | OUTPATIENT
Start: 2022-01-01 | End: 2022-01-01

## 2022-01-01 RX ORDER — CLOPIDOGREL BISULFATE 75 MG/1
75 TABLET ORAL DAILY
Qty: 30 TABLET | Refills: 11 | Status: SHIPPED | OUTPATIENT
Start: 2022-01-01 | End: 2023-02-04

## 2022-01-01 RX ORDER — SODIUM CHLORIDE FOR INHALATION 3 %
4 VIAL, NEBULIZER (ML) INHALATION EVERY 8 HOURS
Status: DISCONTINUED | OUTPATIENT
Start: 2022-01-01 | End: 2022-01-01

## 2022-01-01 RX ORDER — INDOMETHACIN 25 MG/1
100 CAPSULE ORAL ONCE
Status: COMPLETED | OUTPATIENT
Start: 2022-01-01 | End: 2022-01-01

## 2022-01-01 RX ORDER — DIVALPROEX SODIUM 125 MG/1
250 CAPSULE, COATED PELLETS ORAL NIGHTLY
Status: DISCONTINUED | OUTPATIENT
Start: 2022-01-01 | End: 2022-01-01

## 2022-01-01 RX ORDER — ATORVASTATIN CALCIUM 40 MG/1
40 TABLET, FILM COATED ORAL NIGHTLY
Status: DISCONTINUED | OUTPATIENT
Start: 2022-01-01 | End: 2022-01-01 | Stop reason: HOSPADM

## 2022-01-01 RX ORDER — ACETAMINOPHEN 325 MG/1
650 TABLET ORAL ONCE
Status: COMPLETED | OUTPATIENT
Start: 2022-01-01 | End: 2022-01-01

## 2022-01-01 RX ORDER — DEXTROSE MONOHYDRATE 100 MG/ML
INJECTION, SOLUTION INTRAVENOUS CONTINUOUS PRN
Status: DISCONTINUED | OUTPATIENT
Start: 2022-01-01 | End: 2022-01-01

## 2022-01-01 RX ORDER — ROCURONIUM BROMIDE 10 MG/ML
INJECTION, SOLUTION INTRAVENOUS
Status: COMPLETED
Start: 2022-01-01 | End: 2022-01-01

## 2022-01-01 RX ORDER — POTASSIUM CHLORIDE 7.45 MG/ML
10 INJECTION INTRAVENOUS
Status: COMPLETED | OUTPATIENT
Start: 2022-01-01 | End: 2022-01-01

## 2022-01-01 RX ORDER — ASPIRIN 81 MG/1
81 TABLET ORAL DAILY
Qty: 90 TABLET | Refills: 3 | Status: SHIPPED | OUTPATIENT
Start: 2022-01-01 | End: 2023-02-04

## 2022-01-01 RX ORDER — EMPAGLIFLOZIN 25 MG/1
TABLET, FILM COATED ORAL
Qty: 90 TABLET | Refills: 0 | Status: SHIPPED | OUTPATIENT
Start: 2022-01-01

## 2022-01-01 RX ORDER — DULAGLUTIDE 1.5 MG/.5ML
1.5 INJECTION, SOLUTION SUBCUTANEOUS
Qty: 12 PEN | Refills: 1 | Status: SHIPPED | OUTPATIENT
Start: 2022-01-01 | End: 2023-01-14

## 2022-01-01 RX ORDER — ASPIRIN 81 MG/1
81 TABLET ORAL DAILY
Status: DISCONTINUED | OUTPATIENT
Start: 2022-01-01 | End: 2022-01-01 | Stop reason: HOSPADM

## 2022-01-01 RX ORDER — MIDAZOLAM HYDROCHLORIDE 1 MG/ML
INJECTION INTRAMUSCULAR; INTRAVENOUS
Status: COMPLETED
Start: 2022-01-01 | End: 2022-01-01

## 2022-01-01 RX ORDER — FUROSEMIDE 10 MG/ML
80 INJECTION INTRAMUSCULAR; INTRAVENOUS ONCE
Status: COMPLETED | OUTPATIENT
Start: 2022-01-01 | End: 2022-01-01

## 2022-01-01 RX ORDER — NITROGLYCERIN 20 MG/1
1 PATCH TRANSDERMAL DAILY
Qty: 30 PATCH | Refills: 11 | Status: SHIPPED | OUTPATIENT
Start: 2022-01-01 | End: 2023-02-04

## 2022-01-01 RX ORDER — FUROSEMIDE 10 MG/ML
100 INJECTION INTRAMUSCULAR; INTRAVENOUS ONCE
Status: COMPLETED | OUTPATIENT
Start: 2022-01-01 | End: 2022-01-01

## 2022-01-01 RX ORDER — PROPOFOL 10 MG/ML
VIAL (ML) INTRAVENOUS
Status: COMPLETED
Start: 2022-01-01 | End: 2022-01-01

## 2022-01-01 RX ORDER — SODIUM CHLORIDE 9 MG/ML
INJECTION, SOLUTION INTRAVENOUS CONTINUOUS
Status: DISCONTINUED | OUTPATIENT
Start: 2022-01-01 | End: 2022-01-01

## 2022-01-01 RX ORDER — ONDANSETRON 2 MG/ML
4 INJECTION INTRAMUSCULAR; INTRAVENOUS ONCE
Status: COMPLETED | OUTPATIENT
Start: 2022-01-01 | End: 2022-01-01

## 2022-01-01 RX ORDER — MIDAZOLAM HYDROCHLORIDE 1 MG/ML
4 INJECTION INTRAMUSCULAR; INTRAVENOUS ONCE
Status: COMPLETED | OUTPATIENT
Start: 2022-01-01 | End: 2022-01-01

## 2022-01-01 RX ORDER — IBUPROFEN 200 MG
24 TABLET ORAL
Status: DISCONTINUED | OUTPATIENT
Start: 2022-01-01 | End: 2022-01-01 | Stop reason: HOSPADM

## 2022-01-01 RX ORDER — LIDOCAINE HYDROCHLORIDE 20 MG/ML
INJECTION, SOLUTION INFILTRATION; PERINEURAL
Status: DISCONTINUED | OUTPATIENT
Start: 2022-01-01 | End: 2022-01-01

## 2022-01-01 RX ORDER — SUCCINYLCHOLINE CHLORIDE 20 MG/ML
INJECTION INTRAMUSCULAR; INTRAVENOUS
Status: DISCONTINUED
Start: 2022-01-01 | End: 2022-01-01 | Stop reason: WASHOUT

## 2022-01-01 RX ORDER — MODAFINIL 100 MG/1
100 TABLET ORAL ONCE
Status: DISCONTINUED | OUTPATIENT
Start: 2022-01-01 | End: 2022-01-01

## 2022-01-01 RX ORDER — LORAZEPAM 2 MG/ML
1 INJECTION INTRAMUSCULAR EVERY 30 MIN PRN
Status: DISCONTINUED | OUTPATIENT
Start: 2022-01-01 | End: 2022-02-27 | Stop reason: HOSPADM

## 2022-01-01 RX ORDER — GUAIFENESIN 100 MG/5ML
200 SOLUTION ORAL EVERY 4 HOURS PRN
Status: DISCONTINUED | OUTPATIENT
Start: 2022-01-01 | End: 2022-01-01 | Stop reason: HOSPADM

## 2022-01-01 RX ORDER — LORAZEPAM 2 MG/ML
INJECTION INTRAMUSCULAR
Status: COMPLETED
Start: 2022-01-01 | End: 2022-01-01

## 2022-01-01 RX ORDER — ASPIRIN 81 MG/1
81 TABLET ORAL DAILY
Status: DISCONTINUED | OUTPATIENT
Start: 2022-01-01 | End: 2022-01-01

## 2022-01-01 RX ORDER — FENTANYL CITRATE 50 UG/ML
INJECTION, SOLUTION INTRAMUSCULAR; INTRAVENOUS
Status: DISCONTINUED | OUTPATIENT
Start: 2022-01-01 | End: 2022-01-01

## 2022-01-01 RX ORDER — CALCIUM CARBONATE 200(500)MG
1000 TABLET,CHEWABLE ORAL 3 TIMES DAILY PRN
Status: DISCONTINUED | OUTPATIENT
Start: 2022-01-01 | End: 2022-01-01 | Stop reason: HOSPADM

## 2022-01-01 RX ORDER — INSULIN ASPART 100 [IU]/ML
3 INJECTION, SOLUTION INTRAVENOUS; SUBCUTANEOUS EVERY 4 HOURS
Status: DISCONTINUED | OUTPATIENT
Start: 2022-01-01 | End: 2022-01-01

## 2022-01-01 RX ORDER — RANOLAZINE 500 MG/1
500 TABLET, EXTENDED RELEASE ORAL 2 TIMES DAILY
Qty: 60 TABLET | Refills: 11 | Status: SHIPPED | OUTPATIENT
Start: 2022-01-01 | End: 2023-02-03

## 2022-01-01 RX ORDER — ASPIRIN 325 MG
325 TABLET ORAL
Status: COMPLETED | OUTPATIENT
Start: 2022-01-01 | End: 2022-01-01

## 2022-01-01 RX ORDER — FAMOTIDINE 20 MG/1
20 TABLET, FILM COATED ORAL DAILY
Status: DISCONTINUED | OUTPATIENT
Start: 2022-01-01 | End: 2022-01-01

## 2022-01-01 RX ORDER — PHENYLEPHRINE HCL IN 0.9% NACL 1 MG/10 ML
SYRINGE (ML) INTRAVENOUS
Status: DISPENSED
Start: 2022-01-01 | End: 2022-01-01

## 2022-01-01 RX ORDER — ATROPINE SULFATE 10 MG/ML
2 SOLUTION/ DROPS OPHTHALMIC EVERY 4 HOURS PRN
Status: DISCONTINUED | OUTPATIENT
Start: 2022-01-01 | End: 2022-02-27 | Stop reason: HOSPADM

## 2022-01-01 RX ORDER — CLOPIDOGREL BISULFATE 75 MG/1
75 TABLET ORAL DAILY
Status: DISCONTINUED | OUTPATIENT
Start: 2022-01-01 | End: 2022-01-01 | Stop reason: HOSPADM

## 2022-01-01 RX ORDER — SODIUM BICARBONATE 1 MEQ/ML
SYRINGE (ML) INTRAVENOUS
Status: DISPENSED
Start: 2022-01-01 | End: 2022-01-01

## 2022-01-01 RX ORDER — FUROSEMIDE 10 MG/ML
20 INJECTION INTRAMUSCULAR; INTRAVENOUS ONCE
Status: COMPLETED | OUTPATIENT
Start: 2022-01-01 | End: 2022-01-01

## 2022-01-01 RX ORDER — MAGNESIUM SULFATE HEPTAHYDRATE 40 MG/ML
INJECTION, SOLUTION INTRAVENOUS
Status: DISPENSED
Start: 2022-01-01 | End: 2022-01-01

## 2022-01-01 RX ORDER — DULAGLUTIDE 1.5 MG/.5ML
1.5 INJECTION, SOLUTION SUBCUTANEOUS WEEKLY
COMMUNITY
Start: 2022-01-01

## 2022-01-01 RX ORDER — RANOLAZINE 500 MG/1
500 TABLET, EXTENDED RELEASE ORAL 2 TIMES DAILY
Status: DISCONTINUED | OUTPATIENT
Start: 2022-01-01 | End: 2022-01-01

## 2022-01-01 RX ORDER — SODIUM CHLORIDE 9 MG/ML
INJECTION, SOLUTION INTRAVENOUS CONTINUOUS
Status: ACTIVE | OUTPATIENT
Start: 2022-01-01 | End: 2022-01-01

## 2022-01-01 RX ORDER — DEXAMETHASONE SODIUM PHOSPHATE 4 MG/ML
10 INJECTION, SOLUTION INTRA-ARTICULAR; INTRALESIONAL; INTRAMUSCULAR; INTRAVENOUS; SOFT TISSUE EVERY 24 HOURS
Status: DISCONTINUED | OUTPATIENT
Start: 2022-01-01 | End: 2022-01-01

## 2022-01-01 RX ORDER — MEMANTINE HYDROCHLORIDE 10 MG/1
10 TABLET ORAL 2 TIMES DAILY
Status: DISCONTINUED | OUTPATIENT
Start: 2022-01-01 | End: 2022-01-01

## 2022-01-01 RX ORDER — INSULIN ASPART 100 [IU]/ML
0-5 INJECTION, SOLUTION INTRAVENOUS; SUBCUTANEOUS
Status: DISCONTINUED | OUTPATIENT
Start: 2022-01-01 | End: 2022-01-01 | Stop reason: HOSPADM

## 2022-01-01 RX ORDER — MAGNESIUM SULFATE HEPTAHYDRATE 40 MG/ML
2 INJECTION, SOLUTION INTRAVENOUS
Status: DISCONTINUED | OUTPATIENT
Start: 2022-01-01 | End: 2022-01-01

## 2022-01-01 RX ORDER — HEPARIN SODIUM,PORCINE/D5W 25000/250
0-40 INTRAVENOUS SOLUTION INTRAVENOUS CONTINUOUS
Status: DISCONTINUED | OUTPATIENT
Start: 2022-01-01 | End: 2022-01-01

## 2022-01-01 RX ORDER — LIDOCAINE HYDROCHLORIDE 10 MG/ML
INJECTION INFILTRATION; PERINEURAL
Status: COMPLETED
Start: 2022-01-01 | End: 2022-01-01

## 2022-01-01 RX ORDER — TALC
6 POWDER (GRAM) TOPICAL NIGHTLY PRN
Status: DISCONTINUED | OUTPATIENT
Start: 2022-01-01 | End: 2022-01-01 | Stop reason: HOSPADM

## 2022-01-01 RX ORDER — MAGNESIUM SULFATE HEPTAHYDRATE 40 MG/ML
4 INJECTION, SOLUTION INTRAVENOUS
Status: DISCONTINUED | OUTPATIENT
Start: 2022-01-01 | End: 2022-01-01

## 2022-01-01 RX ORDER — DIPHENHYDRAMINE HYDROCHLORIDE 50 MG/ML
INJECTION INTRAMUSCULAR; INTRAVENOUS
Status: DISCONTINUED | OUTPATIENT
Start: 2022-01-01 | End: 2022-01-01

## 2022-01-01 RX ORDER — MEMANTINE HYDROCHLORIDE 10 MG/1
10 TABLET ORAL 2 TIMES DAILY
Status: DISCONTINUED | OUTPATIENT
Start: 2022-01-01 | End: 2022-01-01 | Stop reason: HOSPADM

## 2022-01-01 RX ORDER — ASPIRIN 300 MG/1
600 SUPPOSITORY RECTAL
Status: COMPLETED | OUTPATIENT
Start: 2022-01-01 | End: 2022-01-01

## 2022-01-01 RX ORDER — DEXAMETHASONE SODIUM PHOSPHATE 4 MG/ML
20 INJECTION, SOLUTION INTRA-ARTICULAR; INTRALESIONAL; INTRAMUSCULAR; INTRAVENOUS; SOFT TISSUE EVERY 24 HOURS
Status: COMPLETED | OUTPATIENT
Start: 2022-01-01 | End: 2022-01-01

## 2022-01-01 RX ORDER — HEPARIN SODIUM 5000 [USP'U]/ML
5000 INJECTION, SOLUTION INTRAVENOUS; SUBCUTANEOUS EVERY 8 HOURS
Status: COMPLETED | OUTPATIENT
Start: 2022-01-01 | End: 2022-01-01

## 2022-01-01 RX ORDER — VANCOMYCIN HCL IN 5 % DEXTROSE 1G/250ML
15 PLASTIC BAG, INJECTION (ML) INTRAVENOUS
Status: DISCONTINUED | OUTPATIENT
Start: 2022-01-01 | End: 2022-01-01

## 2022-01-01 RX ORDER — IBUPROFEN 200 MG
16 TABLET ORAL
Status: DISCONTINUED | OUTPATIENT
Start: 2022-01-01 | End: 2022-01-01 | Stop reason: HOSPADM

## 2022-01-01 RX ORDER — FAMOTIDINE 20 MG/1
20 TABLET, FILM COATED ORAL DAILY
Status: DISCONTINUED | OUTPATIENT
Start: 2022-01-01 | End: 2022-01-01 | Stop reason: HOSPADM

## 2022-01-01 RX ORDER — VERAPAMIL HYDROCHLORIDE 2.5 MG/ML
INJECTION, SOLUTION INTRAVENOUS
Status: DISCONTINUED | OUTPATIENT
Start: 2022-01-01 | End: 2022-01-01

## 2022-01-01 RX ORDER — GLUCAGON 1 MG
1 KIT INJECTION
Status: DISCONTINUED | OUTPATIENT
Start: 2022-01-01 | End: 2022-01-01 | Stop reason: SDUPTHER

## 2022-01-01 RX ORDER — DEXTROSE MONOHYDRATE AND SODIUM CHLORIDE 5; .45 G/100ML; G/100ML
INJECTION, SOLUTION INTRAVENOUS CONTINUOUS
Status: DISCONTINUED | OUTPATIENT
Start: 2022-01-01 | End: 2022-01-01

## 2022-01-01 RX ORDER — ACETAMINOPHEN 325 MG/1
650 TABLET ORAL EVERY 6 HOURS PRN
Status: DISCONTINUED | OUTPATIENT
Start: 2022-01-01 | End: 2022-02-27 | Stop reason: HOSPADM

## 2022-01-01 RX ORDER — DEXAMETHASONE SODIUM PHOSPHATE 4 MG/ML
20 INJECTION, SOLUTION INTRA-ARTICULAR; INTRALESIONAL; INTRAMUSCULAR; INTRAVENOUS; SOFT TISSUE EVERY 24 HOURS
Status: DISCONTINUED | OUTPATIENT
Start: 2022-01-01 | End: 2022-01-01

## 2022-01-01 RX ORDER — DIVALPROEX SODIUM 250 MG/1
250 TABLET, DELAYED RELEASE ORAL NIGHTLY
Status: DISCONTINUED | OUTPATIENT
Start: 2022-01-01 | End: 2022-01-01

## 2022-01-01 RX ORDER — ROSUVASTATIN CALCIUM 10 MG/1
TABLET, COATED ORAL
Qty: 90 TABLET | Refills: 0 | Status: SHIPPED | OUTPATIENT
Start: 2022-01-01

## 2022-01-01 RX ORDER — MAGNESIUM SULFATE HEPTAHYDRATE 40 MG/ML
2 INJECTION, SOLUTION INTRAVENOUS ONCE
Status: COMPLETED | OUTPATIENT
Start: 2022-01-01 | End: 2022-01-01

## 2022-01-01 RX ORDER — FUROSEMIDE 10 MG/ML
40 INJECTION INTRAMUSCULAR; INTRAVENOUS ONCE
Status: DISCONTINUED | OUTPATIENT
Start: 2022-01-01 | End: 2022-01-01

## 2022-01-01 RX ORDER — LORAZEPAM 0.5 MG/1
1 TABLET ORAL EVERY 30 MIN PRN
Status: DISCONTINUED | OUTPATIENT
Start: 2022-01-01 | End: 2022-01-01

## 2022-01-01 RX ORDER — ALBUMIN HUMAN 250 G/1000ML
12.5 SOLUTION INTRAVENOUS EVERY 8 HOURS
Status: COMPLETED | OUTPATIENT
Start: 2022-01-01 | End: 2022-01-01

## 2022-01-01 RX ORDER — RANOLAZINE 500 MG/1
500 TABLET, EXTENDED RELEASE ORAL 2 TIMES DAILY
Status: DISCONTINUED | OUTPATIENT
Start: 2022-01-01 | End: 2022-01-01 | Stop reason: HOSPADM

## 2022-01-01 RX ORDER — INSULIN ASPART 100 [IU]/ML
0-5 INJECTION, SOLUTION INTRAVENOUS; SUBCUTANEOUS EVERY 6 HOURS PRN
Status: DISCONTINUED | OUTPATIENT
Start: 2022-01-01 | End: 2022-01-01

## 2022-01-01 RX ORDER — INSULIN ASPART 100 [IU]/ML
6 INJECTION, SOLUTION INTRAVENOUS; SUBCUTANEOUS EVERY 4 HOURS
Status: DISCONTINUED | OUTPATIENT
Start: 2022-01-01 | End: 2022-01-01

## 2022-01-01 RX ORDER — FUROSEMIDE 10 MG/ML
60 INJECTION INTRAMUSCULAR; INTRAVENOUS
Status: COMPLETED | OUTPATIENT
Start: 2022-01-01 | End: 2022-01-01

## 2022-01-01 RX ORDER — MIDAZOLAM HYDROCHLORIDE 1 MG/ML
INJECTION, SOLUTION INTRAMUSCULAR; INTRAVENOUS
Status: DISCONTINUED | OUTPATIENT
Start: 2022-01-01 | End: 2022-01-01

## 2022-01-01 RX ORDER — MUPIROCIN 20 MG/G
OINTMENT TOPICAL 2 TIMES DAILY
Status: DISCONTINUED | OUTPATIENT
Start: 2022-01-01 | End: 2022-01-01 | Stop reason: HOSPADM

## 2022-01-01 RX ORDER — MUPIROCIN 20 MG/G
OINTMENT TOPICAL 2 TIMES DAILY
Status: COMPLETED | OUTPATIENT
Start: 2022-01-01 | End: 2022-01-01

## 2022-01-01 RX ORDER — ONDANSETRON 2 MG/ML
INJECTION INTRAMUSCULAR; INTRAVENOUS
Status: COMPLETED
Start: 2022-01-01 | End: 2022-01-01

## 2022-01-01 RX ORDER — FUROSEMIDE 20 MG/1
20 TABLET ORAL DAILY
Status: DISCONTINUED | OUTPATIENT
Start: 2022-01-01 | End: 2022-01-01 | Stop reason: HOSPADM

## 2022-01-01 RX ORDER — HEPARIN SODIUM 5000 [USP'U]/ML
5000 INJECTION, SOLUTION INTRAVENOUS; SUBCUTANEOUS EVERY 8 HOURS
Status: DISCONTINUED | OUTPATIENT
Start: 2022-01-01 | End: 2022-01-01

## 2022-01-01 RX ORDER — FENTANYL CITRATE 50 UG/ML
INJECTION, SOLUTION INTRAMUSCULAR; INTRAVENOUS CODE/TRAUMA/SEDATION MEDICATION
Status: COMPLETED | OUTPATIENT
Start: 2022-01-01 | End: 2022-01-01

## 2022-01-01 RX ORDER — METFORMIN HYDROCHLORIDE 1000 MG/1
TABLET ORAL
Qty: 180 TABLET | Refills: 0 | Status: SHIPPED | OUTPATIENT
Start: 2022-01-01

## 2022-01-01 RX ORDER — FUROSEMIDE 20 MG/1
20 TABLET ORAL DAILY
Qty: 30 TABLET | Refills: 11 | Status: SHIPPED | OUTPATIENT
Start: 2022-01-01 | End: 2023-02-03

## 2022-01-01 RX ORDER — INSULIN ASPART 100 [IU]/ML
1-10 INJECTION, SOLUTION INTRAVENOUS; SUBCUTANEOUS EVERY 6 HOURS PRN
Status: DISCONTINUED | OUTPATIENT
Start: 2022-01-01 | End: 2022-01-01

## 2022-01-01 RX ORDER — LORAZEPAM 2 MG/ML
1 INJECTION INTRAMUSCULAR
Status: COMPLETED | OUTPATIENT
Start: 2022-01-01 | End: 2022-01-01

## 2022-01-01 RX ORDER — IBUPROFEN 200 MG
16 TABLET ORAL
Status: DISCONTINUED | OUTPATIENT
Start: 2022-01-01 | End: 2022-01-01

## 2022-01-01 RX ORDER — SODIUM CHLORIDE 450 MG/100ML
INJECTION, SOLUTION INTRAVENOUS CONTINUOUS
Status: ACTIVE | OUTPATIENT
Start: 2022-01-01 | End: 2022-01-01

## 2022-01-01 RX ADMIN — ATORVASTATIN CALCIUM 40 MG: 40 TABLET, FILM COATED ORAL at 08:02

## 2022-01-01 RX ADMIN — Medication 450 ML: at 12:02

## 2022-01-01 RX ADMIN — DONEPEZIL HYDROCHLORIDE 10 MG: 5 TABLET, FILM COATED ORAL at 08:02

## 2022-01-01 RX ADMIN — FUROSEMIDE 60 MG: 10 INJECTION, SOLUTION INTRAMUSCULAR; INTRAVENOUS at 10:01

## 2022-01-01 RX ADMIN — NOREPINEPHRINE BITARTRATE 0.24 MCG/KG/MIN: 4 INJECTION, SOLUTION INTRAVENOUS at 01:02

## 2022-01-01 RX ADMIN — HEPARIN SODIUM 5000 UNITS: 5000 INJECTION INTRAVENOUS; SUBCUTANEOUS at 08:02

## 2022-01-01 RX ADMIN — HEPARIN SODIUM 5000 UNITS: 5000 INJECTION INTRAVENOUS; SUBCUTANEOUS at 01:02

## 2022-01-01 RX ADMIN — DONEPEZIL HYDROCHLORIDE 10 MG: 10 TABLET ORAL at 08:02

## 2022-01-01 RX ADMIN — IOHEXOL 100 ML: 350 INJECTION, SOLUTION INTRAVENOUS at 12:02

## 2022-01-01 RX ADMIN — POTASSIUM & SODIUM PHOSPHATES POWDER PACK 280-160-250 MG 2 PACKET: 280-160-250 PACK at 01:02

## 2022-01-01 RX ADMIN — SODIUM CHLORIDE SOLN NEBU 3% 4 ML: 3 NEBU SOLN at 08:02

## 2022-01-01 RX ADMIN — HEPARIN SODIUM 5000 UNITS: 5000 INJECTION INTRAVENOUS; SUBCUTANEOUS at 05:02

## 2022-01-01 RX ADMIN — INSULIN ASPART 4 UNITS: 100 INJECTION, SOLUTION INTRAVENOUS; SUBCUTANEOUS at 09:02

## 2022-01-01 RX ADMIN — ALBUMIN (HUMAN) 25 G: 5 SOLUTION INTRAVENOUS at 02:02

## 2022-01-01 RX ADMIN — CLOPIDOGREL 75 MG: 75 TABLET, FILM COATED ORAL at 08:02

## 2022-01-01 RX ADMIN — HEPARIN SODIUM 5000 UNITS: 5000 INJECTION INTRAVENOUS; SUBCUTANEOUS at 06:02

## 2022-01-01 RX ADMIN — MEMANTINE 10 MG: 10 TABLET ORAL at 08:02

## 2022-01-01 RX ADMIN — DEXAMETHASONE SODIUM PHOSPHATE 20 MG: 4 INJECTION INTRA-ARTICULAR; INTRALESIONAL; INTRAMUSCULAR; INTRAVENOUS; SOFT TISSUE at 09:02

## 2022-01-01 RX ADMIN — DEXTROSE AND SODIUM CHLORIDE: 5; .9 INJECTION, SOLUTION INTRAVENOUS at 05:02

## 2022-01-01 RX ADMIN — SODIUM CHLORIDE: 0.45 INJECTION, SOLUTION INTRAVENOUS at 08:02

## 2022-01-01 RX ADMIN — Medication 6 MG: at 09:02

## 2022-01-01 RX ADMIN — MEMANTINE 10 MG: 10 TABLET ORAL at 09:02

## 2022-01-01 RX ADMIN — INSULIN ASPART 1 UNITS: 100 INJECTION, SOLUTION INTRAVENOUS; SUBCUTANEOUS at 11:02

## 2022-01-01 RX ADMIN — INSULIN ASPART 3 UNITS: 100 INJECTION, SOLUTION INTRAVENOUS; SUBCUTANEOUS at 06:02

## 2022-01-01 RX ADMIN — CLOPIDOGREL BISULFATE 75 MG: 75 TABLET, FILM COATED ORAL at 03:02

## 2022-01-01 RX ADMIN — INSULIN ASPART 2 UNITS: 100 INJECTION, SOLUTION INTRAVENOUS; SUBCUTANEOUS at 03:02

## 2022-01-01 RX ADMIN — INSULIN ASPART 2 UNITS: 100 INJECTION, SOLUTION INTRAVENOUS; SUBCUTANEOUS at 10:02

## 2022-01-01 RX ADMIN — INSULIN ASPART 8 UNITS: 100 INJECTION, SOLUTION INTRAVENOUS; SUBCUTANEOUS at 05:02

## 2022-01-01 RX ADMIN — MEMANTINE 10 MG: 10 TABLET ORAL at 09:01

## 2022-01-01 RX ADMIN — HEPARIN SODIUM 5000 UNITS: 5000 INJECTION INTRAVENOUS; SUBCUTANEOUS at 02:02

## 2022-01-01 RX ADMIN — POTASSIUM BICARBONATE 50 MEQ: 977.5 TABLET, EFFERVESCENT ORAL at 12:01

## 2022-01-01 RX ADMIN — DEXTROSE 250 ML: 10 SOLUTION INTRAVENOUS at 08:02

## 2022-01-01 RX ADMIN — NOREPINEPHRINE BITARTRATE 0.02 MCG/KG/MIN: 4 INJECTION, SOLUTION INTRAVENOUS at 08:02

## 2022-01-01 RX ADMIN — SODIUM CHLORIDE SOLN NEBU 3% 4 ML: 3 NEBU SOLN at 04:02

## 2022-01-01 RX ADMIN — SODIUM BICARBONATE 50 MEQ: 84 INJECTION INTRAVENOUS at 02:02

## 2022-01-01 RX ADMIN — MODAFINIL 100 MG: 100 TABLET ORAL at 09:02

## 2022-01-01 RX ADMIN — INSULIN ASPART 8 UNITS: 100 INJECTION, SOLUTION INTRAVENOUS; SUBCUTANEOUS at 08:02

## 2022-01-01 RX ADMIN — PIPERACILLIN AND TAZOBACTAM 4.5 G: 4; .5 INJECTION, POWDER, LYOPHILIZED, FOR SOLUTION INTRAVENOUS; PARENTERAL at 05:02

## 2022-01-01 RX ADMIN — DEXTROSE AND SODIUM CHLORIDE: 5; .45 INJECTION, SOLUTION INTRAVENOUS at 07:02

## 2022-01-01 RX ADMIN — DONEPEZIL HYDROCHLORIDE 10 MG: 10 TABLET ORAL at 09:02

## 2022-01-01 RX ADMIN — DIVALPROEX SODIUM 250 MG: 125 CAPSULE, COATED PELLETS ORAL at 09:02

## 2022-01-01 RX ADMIN — INSULIN ASPART 6 UNITS: 100 INJECTION, SOLUTION INTRAVENOUS; SUBCUTANEOUS at 06:02

## 2022-01-01 RX ADMIN — ACETAMINOPHEN 650 MG: 325 TABLET ORAL at 04:02

## 2022-01-01 RX ADMIN — CLOPIDOGREL 75 MG: 75 TABLET, FILM COATED ORAL at 10:02

## 2022-01-01 RX ADMIN — ASPIRIN 81 MG CHEWABLE TABLET 81 MG: 81 TABLET CHEWABLE at 09:02

## 2022-01-01 RX ADMIN — POTASSIUM & SODIUM PHOSPHATES POWDER PACK 280-160-250 MG 2 PACKET: 280-160-250 PACK at 02:02

## 2022-01-01 RX ADMIN — DONEPEZIL HYDROCHLORIDE 10 MG: 10 TABLET ORAL at 12:02

## 2022-01-01 RX ADMIN — INSULIN ASPART 2 UNITS: 100 INJECTION, SOLUTION INTRAVENOUS; SUBCUTANEOUS at 11:02

## 2022-01-01 RX ADMIN — INSULIN ASPART 1 UNITS: 100 INJECTION, SOLUTION INTRAVENOUS; SUBCUTANEOUS at 09:02

## 2022-01-01 RX ADMIN — FENTANYL CITRATE 25 MCG: 50 INJECTION, SOLUTION INTRAMUSCULAR; INTRAVENOUS at 02:02

## 2022-01-01 RX ADMIN — RANOLAZINE 500 MG: 500 TABLET, EXTENDED RELEASE ORAL at 08:02

## 2022-01-01 RX ADMIN — DONEPEZIL HYDROCHLORIDE 10 MG: 10 TABLET ORAL at 10:02

## 2022-01-01 RX ADMIN — INSULIN ASPART 3 UNITS: 100 INJECTION, SOLUTION INTRAVENOUS; SUBCUTANEOUS at 01:02

## 2022-01-01 RX ADMIN — CLOPIDOGREL BISULFATE 75 MG: 75 TABLET, FILM COATED ORAL at 08:02

## 2022-01-01 RX ADMIN — FUROSEMIDE 20 MG: 20 TABLET ORAL at 09:02

## 2022-01-01 RX ADMIN — FUROSEMIDE 20 MG: 40 INJECTION, SOLUTION INTRAMUSCULAR; INTRAVENOUS at 12:02

## 2022-01-01 RX ADMIN — INSULIN ASPART 3 UNITS: 100 INJECTION, SOLUTION INTRAVENOUS; SUBCUTANEOUS at 05:02

## 2022-01-01 RX ADMIN — MODAFINIL 100 MG: 100 TABLET ORAL at 08:02

## 2022-01-01 RX ADMIN — NOREPINEPHRINE BITARTRATE 0.2 MCG/KG/MIN: 4 INJECTION, SOLUTION INTRAVENOUS at 02:02

## 2022-01-01 RX ADMIN — INSULIN DETEMIR 8 UNITS: 100 INJECTION, SOLUTION SUBCUTANEOUS at 09:02

## 2022-01-01 RX ADMIN — POTASSIUM BICARBONATE 60 MEQ: 391 TABLET, EFFERVESCENT ORAL at 06:02

## 2022-01-01 RX ADMIN — MAGNESIUM SULFATE 2 G: 2 INJECTION INTRAVENOUS at 08:02

## 2022-01-01 RX ADMIN — INSULIN DETEMIR 9 UNITS: 100 INJECTION, SOLUTION SUBCUTANEOUS at 08:02

## 2022-01-01 RX ADMIN — Medication 40 MG: at 06:02

## 2022-01-01 RX ADMIN — PIPERACILLIN AND TAZOBACTAM 4.5 G: 4; .5 INJECTION, POWDER, LYOPHILIZED, FOR SOLUTION INTRAVENOUS; PARENTERAL at 01:02

## 2022-01-01 RX ADMIN — MUPIROCIN: 20 OINTMENT TOPICAL at 09:02

## 2022-01-01 RX ADMIN — INSULIN ASPART 4 UNITS: 100 INJECTION, SOLUTION INTRAVENOUS; SUBCUTANEOUS at 01:02

## 2022-01-01 RX ADMIN — ALBUMIN (HUMAN) 12.5 G: 12.5 SOLUTION INTRAVENOUS at 01:02

## 2022-01-01 RX ADMIN — SODIUM CHLORIDE 500 ML: 0.9 INJECTION, SOLUTION INTRAVENOUS at 08:02

## 2022-01-01 RX ADMIN — POTASSIUM CHLORIDE 10 MEQ: 7.46 INJECTION, SOLUTION INTRAVENOUS at 06:02

## 2022-01-01 RX ADMIN — POTASSIUM BICARBONATE 60 MEQ: 391 TABLET, EFFERVESCENT ORAL at 07:02

## 2022-01-01 RX ADMIN — HEPARIN SODIUM 5000 UNITS: 5000 INJECTION INTRAVENOUS; SUBCUTANEOUS at 09:02

## 2022-01-01 RX ADMIN — INSULIN ASPART 1 UNITS: 100 INJECTION, SOLUTION INTRAVENOUS; SUBCUTANEOUS at 02:02

## 2022-01-01 RX ADMIN — ALBUMIN (HUMAN) 12.5 G: 12.5 SOLUTION INTRAVENOUS at 10:02

## 2022-01-01 RX ADMIN — CLOPIDOGREL BISULFATE 75 MG: 75 TABLET, FILM COATED ORAL at 09:02

## 2022-01-01 RX ADMIN — FAMOTIDINE 20 MG: 20 TABLET ORAL at 09:02

## 2022-01-01 RX ADMIN — INSULIN ASPART 2 UNITS: 100 INJECTION, SOLUTION INTRAVENOUS; SUBCUTANEOUS at 06:02

## 2022-01-01 RX ADMIN — MAGNESIUM OXIDE TAB 400 MG (241.3 MG ELEMENTAL MG) 800 MG: 400 (241.3 MG) TAB at 05:01

## 2022-01-01 RX ADMIN — DEXAMETHASONE SODIUM PHOSPHATE 20 MG: 4 INJECTION INTRA-ARTICULAR; INTRALESIONAL; INTRAMUSCULAR; INTRAVENOUS; SOFT TISSUE at 08:02

## 2022-01-01 RX ADMIN — NOREPINEPHRINE BITARTRATE 0.02 MCG/KG/MIN: 4 INJECTION, SOLUTION INTRAVENOUS at 10:02

## 2022-01-01 RX ADMIN — DEXTROSE 250 ML: 10 SOLUTION INTRAVENOUS at 09:02

## 2022-01-01 RX ADMIN — HEPARIN SODIUM 5000 UNITS: 5000 INJECTION INTRAVENOUS; SUBCUTANEOUS at 10:02

## 2022-01-01 RX ADMIN — LORAZEPAM 1 MG: 2 INJECTION INTRAMUSCULAR at 08:02

## 2022-01-01 RX ADMIN — ASPIRIN 81 MG CHEWABLE TABLET 81 MG: 81 TABLET CHEWABLE at 08:02

## 2022-01-01 RX ADMIN — SODIUM CHLORIDE 500 ML: 0.45 INJECTION, SOLUTION INTRAVENOUS at 02:02

## 2022-01-01 RX ADMIN — ATORVASTATIN CALCIUM 40 MG: 40 TABLET, FILM COATED ORAL at 09:02

## 2022-01-01 RX ADMIN — PIPERACILLIN AND TAZOBACTAM 4.5 G: 4; .5 INJECTION, POWDER, LYOPHILIZED, FOR SOLUTION INTRAVENOUS; PARENTERAL at 09:02

## 2022-01-01 RX ADMIN — MUPIROCIN: 20 OINTMENT TOPICAL at 08:02

## 2022-01-01 RX ADMIN — INSULIN ASPART 4 UNITS: 100 INJECTION, SOLUTION INTRAVENOUS; SUBCUTANEOUS at 05:02

## 2022-01-01 RX ADMIN — SODIUM CHLORIDE 500 ML: 0.9 INJECTION, SOLUTION INTRAVENOUS at 03:02

## 2022-01-01 RX ADMIN — SODIUM CHLORIDE: 0.9 INJECTION, SOLUTION INTRAVENOUS at 02:02

## 2022-01-01 RX ADMIN — POTASSIUM BICARBONATE 50 MEQ: 978 TABLET, EFFERVESCENT ORAL at 04:02

## 2022-01-01 RX ADMIN — INSULIN ASPART 8 UNITS: 100 INJECTION, SOLUTION INTRAVENOUS; SUBCUTANEOUS at 02:02

## 2022-01-01 RX ADMIN — INSULIN HUMAN 2.33 UNITS/HR: 1 INJECTION, SOLUTION INTRAVENOUS at 05:02

## 2022-01-01 RX ADMIN — SODIUM CHLORIDE 250 ML: 0.9 INJECTION, SOLUTION INTRAVENOUS at 03:02

## 2022-01-01 RX ADMIN — LORAZEPAM 1 MG: 2 INJECTION INTRAMUSCULAR; INTRAVENOUS at 10:01

## 2022-01-01 RX ADMIN — PIPERACILLIN AND TAZOBACTAM 4.5 G: 4; .5 INJECTION, POWDER, LYOPHILIZED, FOR SOLUTION INTRAVENOUS; PARENTERAL at 06:02

## 2022-01-01 RX ADMIN — FUROSEMIDE 20 MG: 20 TABLET ORAL at 08:02

## 2022-01-01 RX ADMIN — INSULIN ASPART 4 UNITS: 100 INJECTION, SOLUTION INTRAVENOUS; SUBCUTANEOUS at 08:02

## 2022-01-01 RX ADMIN — VANCOMYCIN HYDROCHLORIDE 1000 MG: 1 INJECTION, POWDER, LYOPHILIZED, FOR SOLUTION INTRAVENOUS at 10:02

## 2022-01-01 RX ADMIN — Medication 6 MG: at 08:02

## 2022-01-01 RX ADMIN — POTASSIUM BICARBONATE 50 MEQ: 978 TABLET, EFFERVESCENT ORAL at 05:02

## 2022-01-01 RX ADMIN — POTASSIUM & SODIUM PHOSPHATES POWDER PACK 280-160-250 MG 2 PACKET: 280-160-250 PACK at 08:02

## 2022-01-01 RX ADMIN — INSULIN DETEMIR 8 UNITS: 100 INJECTION, SOLUTION SUBCUTANEOUS at 10:02

## 2022-01-01 RX ADMIN — HEPARIN SODIUM 18 UNITS/KG/HR: 1000 INJECTION, SOLUTION INTRAVENOUS; SUBCUTANEOUS at 06:02

## 2022-01-01 RX ADMIN — Medication 50 MCG/HR: at 05:02

## 2022-01-01 RX ADMIN — RANOLAZINE 500 MG: 500 TABLET, EXTENDED RELEASE ORAL at 11:02

## 2022-01-01 RX ADMIN — CLOPIDOGREL 75 MG: 75 TABLET, FILM COATED ORAL at 09:02

## 2022-01-01 RX ADMIN — INSULIN ASPART 3 UNITS: 100 INJECTION, SOLUTION INTRAVENOUS; SUBCUTANEOUS at 09:02

## 2022-01-01 RX ADMIN — INSULIN DETEMIR 8 UNITS: 100 INJECTION, SOLUTION SUBCUTANEOUS at 12:02

## 2022-01-01 RX ADMIN — INSULIN DETEMIR 2 UNITS: 100 INJECTION, SOLUTION SUBCUTANEOUS at 09:02

## 2022-01-01 RX ADMIN — SODIUM CHLORIDE: 0.45 INJECTION, SOLUTION INTRAVENOUS at 06:02

## 2022-01-01 RX ADMIN — MEMANTINE HYDROCHLORIDE 10 MG: 5 TABLET ORAL at 10:02

## 2022-01-01 RX ADMIN — FAMOTIDINE 20 MG: 20 TABLET ORAL at 08:02

## 2022-01-01 RX ADMIN — INSULIN ASPART 6 UNITS: 100 INJECTION, SOLUTION INTRAVENOUS; SUBCUTANEOUS at 01:02

## 2022-01-01 RX ADMIN — SODIUM CHLORIDE SOLN NEBU 3% 4 ML: 3 NEBU SOLN at 01:02

## 2022-01-01 RX ADMIN — INSULIN ASPART 4 UNITS: 100 INJECTION, SOLUTION INTRAVENOUS; SUBCUTANEOUS at 10:02

## 2022-01-01 RX ADMIN — CHLOROTHIAZIDE SODIUM 500 MG: 500 INJECTION, POWDER, LYOPHILIZED, FOR SOLUTION INTRAVENOUS at 01:02

## 2022-01-01 RX ADMIN — FUROSEMIDE 80 MG: 10 INJECTION, SOLUTION INTRAMUSCULAR; INTRAVENOUS at 12:02

## 2022-01-01 RX ADMIN — INSULIN HUMAN 2 UNITS/HR: 1 INJECTION, SOLUTION INTRAVENOUS at 05:02

## 2022-01-01 RX ADMIN — INSULIN ASPART 3 UNITS: 100 INJECTION, SOLUTION INTRAVENOUS; SUBCUTANEOUS at 02:02

## 2022-01-01 RX ADMIN — Medication 25 MCG/HR: at 06:02

## 2022-01-01 RX ADMIN — ONDANSETRON 4 MG: 2 INJECTION INTRAMUSCULAR; INTRAVENOUS at 03:02

## 2022-01-01 RX ADMIN — ASPIRIN 81 MG: 81 TABLET ORAL at 08:02

## 2022-01-01 RX ADMIN — FUROSEMIDE 60 MG: 10 INJECTION, SOLUTION INTRAVENOUS at 02:02

## 2022-01-01 RX ADMIN — NOREPINEPHRINE BITARTRATE 0.12 MCG/KG/MIN: 4 INJECTION, SOLUTION INTRAVENOUS at 08:02

## 2022-01-01 RX ADMIN — CLOPIDOGREL BISULFATE 75 MG: 75 TABLET, FILM COATED ORAL at 12:02

## 2022-01-01 RX ADMIN — INSULIN ASPART 6 UNITS: 100 INJECTION, SOLUTION INTRAVENOUS; SUBCUTANEOUS at 09:02

## 2022-01-01 RX ADMIN — SODIUM CHLORIDE SOLN NEBU 3% 4 ML: 3 NEBU SOLN at 07:02

## 2022-01-01 RX ADMIN — INSULIN ASPART 8 UNITS: 100 INJECTION, SOLUTION INTRAVENOUS; SUBCUTANEOUS at 11:02

## 2022-01-01 RX ADMIN — SODIUM CHLORIDE 250 ML: 0.9 INJECTION, SOLUTION INTRAVENOUS at 11:02

## 2022-01-01 RX ADMIN — VASOPRESSIN 0.04 UNITS/MIN: 20 INJECTION PARENTERAL at 08:02

## 2022-01-01 RX ADMIN — FUROSEMIDE 150 MG: 10 INJECTION, SOLUTION INTRAMUSCULAR; INTRAVENOUS at 01:02

## 2022-01-01 RX ADMIN — VASOPRESSIN 0.04 UNITS/MIN: 20 INJECTION PARENTERAL at 01:02

## 2022-01-01 RX ADMIN — MEMANTINE 10 MG: 10 TABLET ORAL at 10:02

## 2022-01-01 RX ADMIN — NOREPINEPHRINE BITARTRATE 0.02 MCG/KG/MIN: 4 INJECTION, SOLUTION INTRAVENOUS at 05:02

## 2022-01-01 RX ADMIN — MIDAZOLAM 4 MG: 1 INJECTION INTRAMUSCULAR; INTRAVENOUS at 06:02

## 2022-01-01 RX ADMIN — CEFTRIAXONE 1 G: 1 INJECTION, SOLUTION INTRAVENOUS at 08:01

## 2022-01-01 RX ADMIN — POTASSIUM & SODIUM PHOSPHATES POWDER PACK 280-160-250 MG 2 PACKET: 280-160-250 PACK at 05:02

## 2022-01-01 RX ADMIN — LORAZEPAM 1 MG: 2 INJECTION INTRAMUSCULAR; INTRAVENOUS at 08:02

## 2022-01-01 RX ADMIN — PIPERACILLIN AND TAZOBACTAM 4.5 G: 4; .5 INJECTION, POWDER, LYOPHILIZED, FOR SOLUTION INTRAVENOUS; PARENTERAL at 10:02

## 2022-01-01 RX ADMIN — SODIUM CHLORIDE 1000 ML: 0.9 INJECTION, SOLUTION INTRAVENOUS at 06:02

## 2022-01-01 RX ADMIN — Medication 6 MG: at 11:01

## 2022-01-01 RX ADMIN — HEPARIN SODIUM 5000 UNITS: 5000 INJECTION INTRAVENOUS; SUBCUTANEOUS at 03:02

## 2022-01-01 RX ADMIN — POTASSIUM & SODIUM PHOSPHATES POWDER PACK 280-160-250 MG 2 PACKET: 280-160-250 PACK at 09:02

## 2022-01-01 RX ADMIN — VANCOMYCIN HYDROCHLORIDE 1000 MG: 1 INJECTION, POWDER, LYOPHILIZED, FOR SOLUTION INTRAVENOUS at 12:02

## 2022-01-01 RX ADMIN — POTASSIUM BICARBONATE 60 MEQ: 391 TABLET, EFFERVESCENT ORAL at 05:02

## 2022-01-01 RX ADMIN — MUPIROCIN: 20 OINTMENT TOPICAL at 10:01

## 2022-01-01 RX ADMIN — INSULIN ASPART 4 UNITS: 100 INJECTION, SOLUTION INTRAVENOUS; SUBCUTANEOUS at 06:02

## 2022-01-01 RX ADMIN — INSULIN ASPART 8 UNITS: 100 INJECTION, SOLUTION INTRAVENOUS; SUBCUTANEOUS at 01:02

## 2022-01-01 RX ADMIN — SODIUM CHLORIDE: 0.9 INJECTION, SOLUTION INTRAVENOUS at 10:02

## 2022-01-01 RX ADMIN — INSULIN ASPART 2 UNITS: 100 INJECTION, SOLUTION INTRAVENOUS; SUBCUTANEOUS at 01:02

## 2022-01-01 RX ADMIN — INSULIN ASPART 3 UNITS: 100 INJECTION, SOLUTION INTRAVENOUS; SUBCUTANEOUS at 10:02

## 2022-01-01 RX ADMIN — VASOPRESSIN 0.04 UNITS/MIN: 20 INJECTION PARENTERAL at 05:02

## 2022-01-01 RX ADMIN — INSULIN ASPART 8 UNITS: 100 INJECTION, SOLUTION INTRAVENOUS; SUBCUTANEOUS at 09:02

## 2022-01-01 RX ADMIN — INSULIN ASPART 2 UNITS: 100 INJECTION, SOLUTION INTRAVENOUS; SUBCUTANEOUS at 05:02

## 2022-01-01 RX ADMIN — POTASSIUM BICARBONATE 35 MEQ: 391 TABLET, EFFERVESCENT ORAL at 01:02

## 2022-01-01 RX ADMIN — INSULIN ASPART 2 UNITS: 100 INJECTION, SOLUTION INTRAVENOUS; SUBCUTANEOUS at 07:02

## 2022-01-01 RX ADMIN — FUROSEMIDE 100 MG: 10 INJECTION, SOLUTION INTRAMUSCULAR; INTRAVENOUS at 10:02

## 2022-01-01 RX ADMIN — SODIUM CHLORIDE SOLN NEBU 3% 4 ML: 3 NEBU SOLN at 12:02

## 2022-01-01 RX ADMIN — SODIUM CHLORIDE: 0.9 INJECTION, SOLUTION INTRAVENOUS at 09:02

## 2022-01-01 RX ADMIN — MODAFINIL 100 MG: 100 TABLET ORAL at 04:02

## 2022-01-01 RX ADMIN — ATORVASTATIN CALCIUM 40 MG: 40 TABLET, FILM COATED ORAL at 12:02

## 2022-01-01 RX ADMIN — CLOPIDOGREL 300 MG: 75 TABLET, FILM COATED ORAL at 08:02

## 2022-01-01 RX ADMIN — INSULIN ASPART 4 UNITS: 100 INJECTION, SOLUTION INTRAVENOUS; SUBCUTANEOUS at 12:02

## 2022-01-01 RX ADMIN — MAGNESIUM OXIDE TAB 400 MG (241.3 MG ELEMENTAL MG) 800 MG: 400 (241.3 MG) TAB at 12:01

## 2022-01-01 RX ADMIN — FUROSEMIDE 40 MG: 40 INJECTION, SOLUTION INTRAMUSCULAR; INTRAVENOUS at 09:01

## 2022-01-01 RX ADMIN — DEXTROSE: 10 SOLUTION INTRAVENOUS at 09:02

## 2022-01-01 RX ADMIN — VANCOMYCIN HYDROCHLORIDE 1500 MG: 1.5 INJECTION, POWDER, LYOPHILIZED, FOR SOLUTION INTRAVENOUS at 07:02

## 2022-01-01 RX ADMIN — VANCOMYCIN HYDROCHLORIDE 1000 MG: 1 INJECTION, POWDER, LYOPHILIZED, FOR SOLUTION INTRAVENOUS at 09:02

## 2022-01-01 RX ADMIN — FUROSEMIDE 20 MG: 20 TABLET ORAL at 10:02

## 2022-01-01 RX ADMIN — IOHEXOL 40 ML: 300 INJECTION, SOLUTION INTRAVENOUS at 02:02

## 2022-01-01 RX ADMIN — INSULIN ASPART 8 UNITS: 100 INJECTION, SOLUTION INTRAVENOUS; SUBCUTANEOUS at 10:02

## 2022-01-01 RX ADMIN — INSULIN HUMAN 2.6 UNITS/HR: 1 INJECTION, SOLUTION INTRAVENOUS at 09:02

## 2022-01-01 RX ADMIN — INSULIN ASPART 8 UNITS: 100 INJECTION, SOLUTION INTRAVENOUS; SUBCUTANEOUS at 06:02

## 2022-01-01 RX ADMIN — INSULIN ASPART 6 UNITS: 100 INJECTION, SOLUTION INTRAVENOUS; SUBCUTANEOUS at 02:02

## 2022-01-01 RX ADMIN — INSULIN HUMAN 2 UNITS/HR: 1 INJECTION, SOLUTION INTRAVENOUS at 02:02

## 2022-01-01 RX ADMIN — FENTANYL CITRATE 25 MCG: 50 INJECTION, SOLUTION INTRAMUSCULAR; INTRAVENOUS at 03:02

## 2022-01-01 RX ADMIN — SODIUM CHLORIDE: 0.45 INJECTION, SOLUTION INTRAVENOUS at 10:02

## 2022-01-01 RX ADMIN — NOREPINEPHRINE BITARTRATE 0.22 MCG/KG/MIN: 4 INJECTION, SOLUTION INTRAVENOUS at 09:02

## 2022-01-01 RX ADMIN — INSULIN ASPART 4 UNITS: 100 INJECTION, SOLUTION INTRAVENOUS; SUBCUTANEOUS at 02:02

## 2022-01-01 RX ADMIN — DEXAMETHASONE SODIUM PHOSPHATE 10 MG: 4 INJECTION INTRA-ARTICULAR; INTRALESIONAL; INTRAMUSCULAR; INTRAVENOUS; SOFT TISSUE at 09:02

## 2022-01-01 RX ADMIN — SODIUM BICARBONATE 100 MEQ: 84 INJECTION, SOLUTION INTRAVENOUS at 03:02

## 2022-01-01 RX ADMIN — INSULIN HUMAN 7.7 UNITS/HR: 1 INJECTION, SOLUTION INTRAVENOUS at 08:02

## 2022-01-01 RX ADMIN — MORPHINE SULFATE 2 MG/HR: 10 INJECTION, SOLUTION INTRAMUSCULAR; INTRAVENOUS at 01:02

## 2022-01-01 RX ADMIN — SODIUM CHLORIDE SOLN NEBU 3% 4 ML: 3 NEBU SOLN at 05:02

## 2022-01-01 RX ADMIN — SODIUM CHLORIDE 250 ML: 0.9 INJECTION, SOLUTION INTRAVENOUS at 04:02

## 2022-01-01 RX ADMIN — INSULIN ASPART 2 UNITS: 100 INJECTION, SOLUTION INTRAVENOUS; SUBCUTANEOUS at 09:02

## 2022-01-01 RX ADMIN — ATORVASTATIN CALCIUM 40 MG: 20 TABLET, FILM COATED ORAL at 10:02

## 2022-01-01 RX ADMIN — FUROSEMIDE 20 MG: 40 INJECTION, SOLUTION INTRAMUSCULAR; INTRAVENOUS at 11:02

## 2022-01-01 RX ADMIN — POTASSIUM & SODIUM PHOSPHATES POWDER PACK 280-160-250 MG 2 PACKET: 280-160-250 PACK at 10:02

## 2022-01-01 RX ADMIN — ACETAMINOPHEN 650 MG: 325 TABLET ORAL at 01:02

## 2022-01-01 RX ADMIN — MUPIROCIN: 20 OINTMENT TOPICAL at 10:02

## 2022-01-01 RX ADMIN — CALCIUM CARBONATE (ANTACID) CHEW TAB 500 MG 1000 MG: 500 CHEW TAB at 11:01

## 2022-01-01 RX ADMIN — POTASSIUM BICARBONATE 35 MEQ: 391 TABLET, EFFERVESCENT ORAL at 03:02

## 2022-01-01 RX ADMIN — RANOLAZINE 500 MG: 500 TABLET, EXTENDED RELEASE ORAL at 09:02

## 2022-01-01 RX ADMIN — DONEPEZIL HYDROCHLORIDE 10 MG: 5 TABLET, FILM COATED ORAL at 02:01

## 2022-01-01 RX ADMIN — POTASSIUM & SODIUM PHOSPHATES POWDER PACK 280-160-250 MG 2 PACKET: 280-160-250 PACK at 03:02

## 2022-01-01 RX ADMIN — PIPERACILLIN AND TAZOBACTAM 4.5 G: 4; .5 INJECTION, POWDER, LYOPHILIZED, FOR SOLUTION INTRAVENOUS; PARENTERAL at 02:02

## 2022-01-01 RX ADMIN — ROCURONIUM BROMIDE 100 MG: 10 INJECTION, SOLUTION INTRAVENOUS at 06:02

## 2022-01-01 RX ADMIN — SODIUM CHLORIDE 250 ML: 0.9 INJECTION, SOLUTION INTRAVENOUS at 08:02

## 2022-01-01 RX ADMIN — SODIUM CHLORIDE SOLN NEBU 3% 4 ML: 3 NEBU SOLN at 11:02

## 2022-01-01 RX ADMIN — MODAFINIL 100 MG: 100 TABLET ORAL at 12:02

## 2022-01-01 RX ADMIN — CLOPIDOGREL BISULFATE 75 MG: 75 TABLET, FILM COATED ORAL at 10:02

## 2022-01-01 RX ADMIN — ATORVASTATIN CALCIUM 40 MG: 40 TABLET, FILM COATED ORAL at 09:01

## 2022-01-01 RX ADMIN — INSULIN ASPART 2 UNITS: 100 INJECTION, SOLUTION INTRAVENOUS; SUBCUTANEOUS at 02:02

## 2022-01-01 RX ADMIN — POTASSIUM CHLORIDE 10 MEQ: 7.46 INJECTION, SOLUTION INTRAVENOUS at 05:02

## 2022-01-01 RX ADMIN — ASPIRIN 81 MG: 81 TABLET ORAL at 10:01

## 2022-01-01 RX ADMIN — LIDOCAINE HYDROCHLORIDE: 10 INJECTION, SOLUTION INFILTRATION; PERINEURAL at 09:02

## 2022-01-01 RX ADMIN — SODIUM CHLORIDE, SODIUM LACTATE, POTASSIUM CHLORIDE, AND CALCIUM CHLORIDE 250 ML: .6; .31; .03; .02 INJECTION, SOLUTION INTRAVENOUS at 09:02

## 2022-01-01 RX ADMIN — INSULIN ASPART 3 UNITS: 100 INJECTION, SOLUTION INTRAVENOUS; SUBCUTANEOUS at 03:02

## 2022-01-01 RX ADMIN — ASPIRIN 81 MG CHEWABLE TABLET 81 MG: 81 TABLET CHEWABLE at 12:02

## 2022-01-01 RX ADMIN — SODIUM CHLORIDE: 0.9 INJECTION, SOLUTION INTRAVENOUS at 11:02

## 2022-01-01 RX ADMIN — SODIUM CHLORIDE 250 ML: 0.9 INJECTION, SOLUTION INTRAVENOUS at 02:02

## 2022-01-01 RX ADMIN — SODIUM CHLORIDE 2010 ML: 0.9 INJECTION, SOLUTION INTRAVENOUS at 08:01

## 2022-01-01 RX ADMIN — INSULIN ASPART 2 UNITS: 100 INJECTION, SOLUTION INTRAVENOUS; SUBCUTANEOUS at 04:02

## 2022-01-01 RX ADMIN — MUPIROCIN: 20 OINTMENT TOPICAL at 09:01

## 2022-01-01 RX ADMIN — ASPIRIN 81 MG CHEWABLE TABLET 81 MG: 81 TABLET CHEWABLE at 10:02

## 2022-01-01 RX ADMIN — ASPIRIN 600 MG: 300 SUPPOSITORY RECTAL at 12:02

## 2022-01-01 RX ADMIN — ASPIRIN 81 MG CHEWABLE TABLET 81 MG: 81 TABLET CHEWABLE at 03:02

## 2022-01-01 RX ADMIN — MIDAZOLAM HYDROCHLORIDE 4 MG: 1 INJECTION INTRAMUSCULAR; INTRAVENOUS at 06:02

## 2022-01-01 RX ADMIN — SODIUM BICARBONATE: 84 INJECTION, SOLUTION INTRAVENOUS at 03:02

## 2022-01-01 RX ADMIN — INSULIN ASPART 3 UNITS: 100 INJECTION, SOLUTION INTRAVENOUS; SUBCUTANEOUS at 04:02

## 2022-01-01 RX ADMIN — SODIUM CHLORIDE: 0.45 INJECTION, SOLUTION INTRAVENOUS at 09:02

## 2022-01-01 RX ADMIN — FUROSEMIDE 20 MG: 10 INJECTION, SOLUTION INTRAVENOUS at 09:02

## 2022-01-01 RX ADMIN — INSULIN ASPART 3 UNITS: 100 INJECTION, SOLUTION INTRAVENOUS; SUBCUTANEOUS at 11:02

## 2022-01-01 RX ADMIN — PROPOFOL 40 MG: 10 INJECTION, EMULSION INTRAVENOUS at 06:02

## 2022-01-01 RX ADMIN — POTASSIUM CHLORIDE 10 MEQ: 7.46 INJECTION, SOLUTION INTRAVENOUS at 07:02

## 2022-01-01 RX ADMIN — MEMANTINE 10 MG: 10 TABLET ORAL at 12:02

## 2022-01-01 RX ADMIN — HEPARIN SODIUM 12 UNITS/KG/HR: 1000 INJECTION, SOLUTION INTRAVENOUS; SUBCUTANEOUS at 10:01

## 2022-01-01 RX ADMIN — HEPARIN SODIUM 5000 UNITS: 5000 INJECTION INTRAVENOUS; SUBCUTANEOUS at 07:02

## 2022-01-01 RX ADMIN — DEXTROSE AND SODIUM CHLORIDE: 5; .9 INJECTION, SOLUTION INTRAVENOUS at 02:02

## 2022-01-01 RX ADMIN — HEPARIN SODIUM 15 UNITS/KG/HR: 1000 INJECTION, SOLUTION INTRAVENOUS; SUBCUTANEOUS at 07:01

## 2022-01-01 RX ADMIN — DEXTROSE 250 MG: 50 INJECTION, SOLUTION INTRAVENOUS at 07:02

## 2022-01-01 RX ADMIN — ATORVASTATIN CALCIUM 40 MG: 40 TABLET, FILM COATED ORAL at 10:02

## 2022-01-01 RX ADMIN — FAMOTIDINE 20 MG: 20 TABLET ORAL at 02:01

## 2022-01-01 RX ADMIN — SODIUM CHLORIDE: 0.9 INJECTION, SOLUTION INTRAVENOUS at 03:02

## 2022-01-01 RX ADMIN — INSULIN HUMAN 2 UNITS/HR: 1 INJECTION, SOLUTION INTRAVENOUS at 08:02

## 2022-01-01 RX ADMIN — INSULIN HUMAN 0.5 UNITS/HR: 1 INJECTION, SOLUTION INTRAVENOUS at 01:02

## 2022-01-01 RX ADMIN — ASPIRIN 325 MG ORAL TABLET 325 MG: 325 PILL ORAL at 09:01

## 2022-01-01 RX ADMIN — MODAFINIL 100 MG: 100 TABLET ORAL at 10:02
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
## 2022-01-20 NOTE — FIRST PROVIDER EVALUATION
Emergency Department TeleTriage Encounter Note      CHIEF COMPLAINT    Chief Complaint   Patient presents with    Chest Discomfort     Pt states was talking and ha a moment of chest discomfort (denies pain at that time), pt denies anxiety. Symptoms have since resolved.       VITAL SIGNS   Initial Vitals [01/20/22 1346]   BP Pulse Resp Temp SpO2   123/73 72 16 97.5 °F (36.4 °C) 96 %      MAP       --            ALLERGIES    Review of patient's allergies indicates:  No Known Allergies    PROVIDER TRIAGE NOTE  Patient is a 76-year-old female who presents with chest pain that started today and has since resolved.  She denies any pain at this time.    Initial orders will be placed and care will be transferred to an alternate provider when patient is roomed for a full evaluation. Any additional orders and the final disposition will be determined by that provider.        ORDERS  Labs Reviewed   CBC W/ AUTO DIFFERENTIAL   COMPREHENSIVE METABOLIC PANEL   TROPONIN I       ED Orders (720h ago, onward)    Start Ordered     Status Ordering Provider    01/20/22 1431 01/20/22 1430  X-Ray Chest PA And Lateral  1 time imaging         Ordered NEGROTTO MARISA, SHIRA    01/20/22 1430 01/20/22 1430  Vital signs  Every 15 min         Ordered NEGROTTO MARISA, SHIRA    01/20/22 1430 01/20/22 1430  Cardiac Monitoring - Adult  Continuous        Comments: Notify Physician If:    Ordered NEGROTTO MARISA, SHIRA    01/20/22 1430 01/20/22 1430  Pulse Oximetry Continuous  Continuous         Ordered NEGROTTO MARISA, SHIRA    01/20/22 1430 01/20/22 1430  Diet NPO  Diet effective now         Ordered NEGROTTO MARISA, SHIRA    01/20/22 1430 01/20/22 1430  Saline lock IV  Once         Ordered NEGROTTO MARISA, SHIRA    01/20/22 1430 01/20/22 1430  EKG 12-lead  Once        Comments: Do not perform if previously done during this visit/ triage    Ordered NEGROTTO MARISA SHIRA    01/20/22 1430 01/20/22 1430  CBC auto  differential  STAT         Ordered SHIRA MONTEZ    01/20/22 1430 01/20/22 1430  Comprehensive metabolic panel  STAT         Ordered SHIRA MONTEZ    01/20/22 1430 01/20/22 1430  Troponin I #1  STAT         Ordered SHIRA MONTEZ            Virtual Visit Note: The provider triage portion of this emergency department evaluation and documentation was performed via [employer], a HIPAA-compliant telemedicine application, in concert with a tele-presenter in the room. A face to face patient evaluation with one of my colleagues will occur once the patient is placed in an emergency department room.      DISCLAIMER: This note was prepared with [employer] voice recognition transcription software. Garbled syntax, mangled pronouns, and other bizarre constructions may be attributed to that software system.
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
## 2022-01-22 NOTE — PROGRESS NOTES
Health Maintenance Due   Topic Date Due    TETANUS VACCINE  Never done    Colorectal Cancer Screening  Never done    Shingles Vaccine (1 of 2) Never done    Eye Exam  09/24/2021     Updates were requested from care everywhere.  Chart was reviewed for overdue Proactive [facility] Encounters ([facility]) topics (CRS, Breast Cancer Screening, Eye exam)  Health Maintenance has been updated.  LINKS immunization registry triggered.  Immunizations were reconciled.
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
## 2022-01-24 NOTE — PATIENT INSTRUCTIONS
Patient Education       Lamotrigine (la RAGHAVENDRA tri jeen)   Brand Names: US LaMICtal; LaMICtal ODT; LaMICtal Starter; LaMICtal XR; Subvenite; Subvenite Starter Kit-Blue; Subvenite Starter Kit-Green; Subvenite Starter Kit-Orange   Brand Names: Ridge APO-LamoTRIgine; Auro-LamoTRIgine; LaMICtal; LamoTRIgine-100; LamoTRIgine-150; LamoTRIgine-25; MYLAN-LamoTRIgine; PMS-LamoTRIgine; TEVA-LamoTRIgine   Warning   All products:   · A severe skin reaction (Pizano-Chon syndrome/toxic epidermal necrolysis) may happen. It can cause severe health problems that may not go away, and sometimes death. Get medical help right away if you have signs like red, swollen, blistered, or peeling skin (with or without fever); red or irritated eyes; or sores in your mouth, throat, nose, or eyes.  · The chance of a skin reaction is raised in children between 2 and 17 years old. It may also be raised if you take valproic acid or divalproex sodium with this drug, if you start taking this drug at too high of a dose, or if your dose is raised too fast. Skin reactions have also happened without any of these. Talk with your doctor.  · Most cases of skin reactions have happened within 2 to 8 weeks of starting this drug, but some show up after longer treatment like 6 months. Talk with the doctor.  Extended-release tablets:   · This drug is not approved for use in children younger than 13 years old. Talk with the doctor.  What is this drug used for?   · It is used to help control certain kinds of seizures.  · It is used to treat bipolar problems.  · It may be given to you for other reasons. Talk with the doctor.    What do I need to tell my doctor BEFORE I take this drug?   · If you are allergic to this drug; any part of this drug; or any other drugs, foods, or substances. Tell your doctor about the allergy and what signs you had.  · If you are taking dofetilide.  This is not a list of all drugs or health problems that interact with this drug.  Tell  your doctor and pharmacist about all of your drugs (prescription or OTC, natural products, vitamins) and health problems. You must check to make sure that it is safe for you to take this drug with all of your drugs and health problems. Do not start, stop, or change the dose of any drug without checking with your doctor.  What are some things I need to know or do while I take this drug?   For all uses of this drug:   · Tell all of your health care providers that you take this drug. This includes your doctors, nurses, pharmacists, and dentists.  · Avoid driving and doing other tasks or actions that call for you to be alert until you see how this drug affects you.  · It may take several weeks to see the full effects.  · Do not stop taking this drug all of a sudden without calling your doctor. You may have a greater risk of side effects. If you need to stop this drug, you will want to slowly stop it as ordered by your doctor.  · If you stop taking this drug, talk with your doctor. You may need to be restarted at a lower dose and raise the dose slowly.  · Have blood work checked as you have been told by the doctor. Talk with the doctor.  · This drug may affect certain lab tests. Tell all of your health care providers and lab workers that you take this drug.  · Talk with your doctor before you use alcohol, marijuana or other forms of cannabis, or prescription or OTC drugs that may slow your actions.  · Like other drugs that may be used for seizures, this drug may rarely raise the risk of suicidal thoughts or actions. The risk may be higher in people who have had suicidal thoughts or actions in the past. Call the doctor right away about any new or worse signs like depression; feeling nervous, restless, or grouchy; panic attacks; or other changes in mood or behavior. Call the doctor right away if any suicidal thoughts or actions occur.  · Some drugs may look the same as this drug or may have names that sound like this drug.  Always check to make sure you have the right product. If you see any change in the way this drug looks like shape, color, size, or wording, check with your pharmacist.  · If you have an abnormal heartbeat, heart failure, or other heart problems, talk with your doctor. Abnormal heartbeats can happen in people with some types of heart problems, which can lead to sudden death.  · If the patient is a child, use this drug with care. The risk of some side effects may be higher in children.  · Birth control pills and other hormone-based birth control may change how much of this drug is in your body. Talk to your doctor before you start or stop any hormone-based birth control. The chance of side effects may be raised when taking birth control pills during the week that the pills are not active. Talk with your doctor.  · Birth control pills and other hormone-based birth control may not work as well to prevent pregnancy. Use some other kind of birth control also like a condom when taking this drug.  · Tell your doctor if you are pregnant, plan on getting pregnant, or are breast-feeding. You will need to talk about the benefits and risks to you and the baby.  For seizures:   · If seizures are different or worse after starting this drug, talk with the doctor.  What are some side effects that I need to call my doctor about right away?   WARNING/CAUTION: Even though it may be rare, some people may have very bad and sometimes deadly side effects when taking a drug. Tell your doctor or get medical help right away if you have any of the following signs or symptoms that may be related to a very bad side effect:  · Signs of an allergic reaction, like rash; hives; itching; red, swollen, blistered, or peeling skin with or without fever; wheezing; tightness in the chest or throat; trouble breathing, swallowing, or talking; unusual hoarseness; or swelling of the mouth, face, lips, tongue, or throat.  · Shortness of breath, a big weight  gain, or swelling in the arms or legs.  · Very bad muscle pain or weakness.  · Very bad joint pain or swelling.  · Change in eyesight.  · Chest pain or pressure.  · Fast, slow, or abnormal heartbeat.  · Very bad dizziness or passing out.  · Change in balance.  · Not able to control eye movements.  · Flu-like signs.  · Painful periods.  · Period (menstrual) changes. These include spotting or bleeding between cycles.  · Low blood cell counts have happened with this drug. If blood cell counts get very low, this can lead to bleeding problems, infections, or anemia. Call your doctor right away if you have signs of infection like fever, chills, or sore throat; any unexplained bruising or bleeding; or if you feel very tired or weak.  · This drug may raise the chance of a very bad brain problem called aseptic meningitis. Call your doctor right away if you have a headache, fever, chills, very upset stomach or throwing up, stiff neck, rash, bright lights bother your eyes, feeling sleepy, or feeling confused.  · A very bad and sometimes deadly effect has happened in people taking drugs for seizures like this drug. Call your doctor right away if you have swollen glands; fever; rash; chest pain; signs of kidney problems like unable to pass urine or change in the amount of urine passed; or signs of liver problems like dark urine, feeling tired, not hungry, upset stomach or stomach pain, light-colored stools, throwing up, or yellow skin or eyes.  What are some other side effects of this drug?   All drugs may cause side effects. However, many people have no side effects or only have minor side effects. Call your doctor or get medical help if any of these side effects or any other side effects bother you or do not go away:  · Feeling dizzy, sleepy, tired, or weak.  · Constipation, diarrhea, stomach pain, upset stomach, throwing up, or feeling less hungry.  · Shakiness.  · Trouble sleeping.  · Nose or throat irritation.  · Weight  loss.  · Dry mouth.  · Back pain.  These are not all of the side effects that may occur. If you have questions about side effects, call your doctor. Call your doctor for medical advice about side effects.  You may report side effects to your national health agency.  You may report side effects to the FDA at 1-382.218.6393. You may also report side effects at https://www.fda.gov/medwatch.  How is this drug best taken?   Use this drug as ordered by your doctor. Read all information given to you. Follow all instructions closely.  All products:   · Take with or without food.  · Do not change the dose or stop this drug. This could cause seizures. Talk with your doctor.  · Keep taking this drug as you have been told by your doctor or other health care provider, even if you feel well.  All tablet products:   · Swallow whole. Do not chew, break, or crush.  · If you have trouble swallowing, talk with your doctor.  Tablets for suspension:   · It may be swallowed whole, chewed, or mixed in water or fruit juice.  · If chewed, drink a little water or fruit juice to help swallow.  · You may break up tablet by adding liquid to cover tablet in a glass or spoon. Wait at least 1 minute until fully broken up, then mix and drink.  Oral-disintegrating tablet:   · Place on your tongue and let it dissolve. Water is not needed. Do not swallow it whole. Do not chew, break, or crush it.  What do I do if I miss a dose?   · Take a missed dose as soon as you think about it.  · If it is close to the time for your next dose, skip the missed dose and go back to your normal time.  · Do not take 2 doses at the same time or extra doses.    How do I store and/or throw out this drug?   · Store at room temperature protected from light. Store in a dry place. Do not store in a bathroom.  · Keep all drugs in a safe place. Keep all drugs out of the reach of children and pets.  · Throw away unused or  drugs. Do not flush down a toilet or pour down a  drain unless you are told to do so. Check with your pharmacist if you have questions about the best way to throw out drugs. There may be drug take-back programs in your area.    General drug facts   · If your symptoms or health problems do not get better or if they become worse, call your doctor.  · Do not share your drugs with others and do not take anyone else's drugs.  · Some drugs may have another patient information leaflet. If you have any questions about this drug, please talk with your doctor, nurse, pharmacist, or other health care provider.  · This drug comes with an extra patient fact sheet called a Medication Guide. Read it with care. Read it again each time this drug is refilled. If you have any questions about this drug, please talk with the doctor, pharmacist, or other health care provider.  · If you think there has been an overdose, call your poison control center or get medical care right away. Be ready to tell or show what was taken, how much, and when it happened.    Consumer Information Use and Disclaimer   This generalized information is a limited summary of diagnosis, treatment, and/or medication information. It is not meant to be comprehensive and should be used as a tool to help the user understand and/or assess potential diagnostic and treatment options. It does NOT include all information about conditions, treatments, medications, side effects, or risks that may apply to a specific patient. It is not intended to be medical advice or a substitute for the medical advice, diagnosis, or treatment of a health care provider based on the health care provider's examination and assessment of a patient's specific and unique circumstances. Patients must speak with a health care provider for complete information about their health, medical questions, and treatment options, including any risks or benefits regarding use of medications. This information does not endorse any treatments or medications as  safe, effective, or approved for treating a specific patient. [employer] and its affiliates disclaim any warranty or liability relating to this information or the use thereof. The use of this information is governed by the Terms of Use, available at https://www.[employer]/en/solutions/lexicomp/about/royal.  Last Reviewed Date   2021-04-13  Copyright   © 2021 [employer] and its affiliates and/or licensors. All rights reserved.  Patient Education       Trazodone (TRAZ oh done)   Brand Names: Ridge APO-TraZODone; APO-TraZODone D; DOM-TraZODone; Oleptro; PMS-TraZODone; RATIO-TraZODone [DSC]; TEVA-TraZODone; TraZODone-100; TraZODone-150; TraZODone-50   Warning   For all patients taking this drug:   · Drugs like this one have raised the chance of suicidal thoughts or actions in children and young adults. The risk may be greater in people who have had these thoughts or actions in the past. All people who take this drug need to be watched closely. Call the doctor right away if signs like low mood (depression), nervousness, restlessness, grouchiness, panic attacks, or changes in mood or actions are new or worse. Call the doctor right away if any thoughts or actions of suicide occur.  Children:   · This drug is not approved for use in children. However, the doctor may decide the benefits of taking this drug outweigh the risks. If your child has been given this drug, ask the doctor for information about the benefits and risks. Talk with the doctor if you have questions about giving this drug to your child.  What is this drug used for?   · It is used to treat low mood (depression).  · It may be given to you for other reasons. Talk with the doctor.    What do I need to tell my doctor BEFORE I take this drug?   · If you are allergic to this drug; any part of this drug; or any other drugs, foods, or substances. Tell your doctor about the allergy and what signs you had.  · If you have had a recent heart attack.  · If  you have ever had a long QT on ECG or other heartbeat that is not normal.  · If you have any of these health problems: Low magnesium levels, low potassium levels, or slow heartbeat.  · If you are taking any of these drugs: Linezolid or methylene blue.  · If you have taken certain drugs for depression or Parkinson's disease in the last 14 days. This includes isocarboxazid, phenelzine, tranylcypromine, selegiline, or rasagiline. Very high blood pressure may happen.  · If you are taking any drugs that can cause a certain type of heartbeat that is not normal (prolonged QT interval). There are many drugs that can do this. Ask your doctor or pharmacist if you are not sure.  This is not a list of all drugs or health problems that interact with this drug.  Tell your doctor and pharmacist about all of your drugs (prescription or OTC, natural products, vitamins) and health problems. You must check to make sure that it is safe for you to take this drug with all of your drugs and health problems. Do not start, stop, or change the dose of any drug without checking with your doctor.  What are some things I need to know or do while I take this drug?   · Tell all of your health care providers that you take this drug. This includes your doctors, nurses, pharmacists, and dentists.  · Avoid driving and doing other tasks or actions that call for you to be alert until you see how this drug affects you.  · To lower the chance of feeling dizzy or passing out, rise slowly if you have been sitting or lying down. Be careful going up and down stairs.  · Talk with your doctor before you use alcohol, marijuana or other forms of cannabis, or prescription or OTC drugs that may slow your actions.  · An unsafe heartbeat that is not normal (long QT on ECG) has happened with this drug. This may raise the chance of sudden death. Talk with the doctor.  · This drug may raise the chance of bleeding. Sometimes, bleeding can be life-threatening. Talk with  the doctor.  · Some people may have a higher chance of eye problems with this drug. Your doctor may want you to have an eye exam to see if you have a higher chance of these eye problems. Call your doctor right away if you have eye pain, change in eyesight, or swelling or redness in or around the eye.  · This drug can cause low sodium levels. Very low sodium levels can be life-threatening, leading to seizures, passing out, trouble breathing, or death.  · If you are 65 or older, use this drug with care. You could have more side effects.  · Tell your doctor if you are pregnant, plan on getting pregnant, or are breast-feeding. You will need to talk about the benefits and risks to you and the baby.    What are some side effects that I need to call my doctor about right away?   WARNING/CAUTION: Even though it may be rare, some people may have very bad and sometimes deadly side effects when taking a drug. Tell your doctor or get medical help right away if you have any of the following signs or symptoms that may be related to a very bad side effect:  · Signs of an allergic reaction, like rash; hives; itching; red, swollen, blistered, or peeling skin with or without fever; wheezing; tightness in the chest or throat; trouble breathing, swallowing, or talking; unusual hoarseness; or swelling of the mouth, face, lips, tongue, or throat.  · Signs of low sodium levels like headache, trouble focusing, memory problems, feeling confused, weakness, seizures, or change in balance.  · Signs of bleeding like throwing up or coughing up blood; vomit that looks like coffee grounds; blood in the urine; black, red, or tarry stools; bleeding from the gums; abnormal vaginal bleeding; bruises without a cause or that get bigger; or bleeding you cannot stop.  · Signs of high or low blood pressure like very bad headache or dizziness, passing out, or change in eyesight.  · Fast or abnormal heartbeat.  · Swelling.  · Feeling confused.  · Call your  doctor right away if you have a painful erection (hard penis) or an erection that lasts for longer than 4 hours. This may happen even when you are not having sex. If this is not treated right away, it may lead to lasting sex problems and you may not be able to have sex.  · A severe and sometimes deadly problem called serotonin syndrome may happen. The risk may be greater if you also take certain other drugs. Call your doctor right away if you have agitation; change in balance; confusion; hallucinations; fever; fast or abnormal heartbeat; flushing; muscle twitching or stiffness; seizures; shivering or shaking; sweating a lot; severe diarrhea, upset stomach, or throwing up; or very bad headache.  What are some other side effects of this drug?   All drugs may cause side effects. However, many people have no side effects or only have minor side effects. Call your doctor or get medical help if any of these side effects or any other side effects bother you or do not go away:  · Feeling dizzy, sleepy, tired, or weak.  · Constipation, diarrhea, stomach pain, upset stomach, or throwing up.  · Dry mouth.  · Headache.  · Feeling nervous and excitable.  · Shakiness.  · Muscle pain.  · Stuffy nose.  · Weight gain or loss.  These are not all of the side effects that may occur. If you have questions about side effects, call your doctor. Call your doctor for medical advice about side effects.  You may report side effects to your national health agency.  You may report side effects to the FDA at 1-327.928.6314. You may also report side effects at https://www.fda.gov/medwatch.  How is this drug best taken?   Use this drug as ordered by your doctor. Read all information given to you. Follow all instructions closely.  All products:   · If you feel sleepy after taking this drug, talk with your doctor. Your doctor may change your dose or when you take this drug.  · Keep taking this drug as you have been told by your doctor or other  health care provider, even if you feel well.  · It may take several weeks to see the full effects.  · Do not stop taking this drug all of a sudden without calling your doctor. You may have a greater risk of side effects. If you need to stop this drug, you will want to slowly stop it as ordered by your doctor.  Regular-release tablets:   · Take this drug right after a meal or light snack.  · Tablet may be broken in half.  · Do not chew or crush.  Extended-release tablets:   · Take on an empty stomach.  · Swallow whole. Do not chew or crush.  · Long-acting tablets may be broken in half.  What do I do if I miss a dose?   · Take a missed dose as soon as you think about it.  · If it is close to the time for your next dose, skip the missed dose and go back to your normal time.  · Do not take 2 doses at the same time or extra doses.    How do I store and/or throw out this drug?   · Store at room temperature protected from light. Store in a dry place. Do not store in a bathroom.  · Keep all drugs in a safe place. Keep all drugs out of the reach of children and pets.  · Throw away unused or  drugs. Do not flush down a toilet or pour down a drain unless you are told to do so. Check with your pharmacist if you have questions about the best way to throw out drugs. There may be drug take-back programs in your area.    General drug facts   · If your symptoms or health problems do not get better or if they become worse, call your doctor.  · Do not share your drugs with others and do not take anyone else's drugs.  · Some drugs may have another patient information leaflet. If you have any questions about this drug, please talk with your doctor, nurse, pharmacist, or other health care provider.  · This drug comes with an extra patient fact sheet called a Medication Guide. Read it with care. Read it again each time this drug is refilled. If you have any questions about this drug, please talk with the doctor, pharmacist, or  other health care provider.  · If you think there has been an overdose, call your poison control center or get medical care right away. Be ready to tell or show what was taken, how much, and when it happened.    Consumer Information Use and Disclaimer   This generalized information is a limited summary of diagnosis, treatment, and/or medication information. It is not meant to be comprehensive and should be used as a tool to help the user understand and/or assess potential diagnostic and treatment options. It does NOT include all information about conditions, treatments, medications, side effects, or risks that may apply to a specific patient. It is not intended to be medical advice or a substitute for the medical advice, diagnosis, or treatment of a health care provider based on the health care provider's examination and assessment of a patient's specific and unique circumstances. Patients must speak with a health care provider for complete information about their health, medical questions, and treatment options, including any risks or benefits regarding use of medications. This information does not endorse any treatments or medications as safe, effective, or approved for treating a specific patient. Up[employer], Inc[employer] and its affiliates disclaim any warranty or liability relating to this information or the use thereof. The use of this information is governed by the Terms of Use, available at https://www.[employer]/en/solutions/lexicomp/about/royal.  Last Reviewed Date   2020-04-23  Copyright   © 2021 [employer]. and its affiliates and/or licensors. All rights reserved.  Patient Education       Trazodone (TRAZ oh done)   Brand Names: Ridge APO-TraZODone; APO-TraZODone D; DOM-TraZODone; Oleptro; PMS-TraZODone; RATIO-TraZODone [DSC]; TEVA-TraZODone; TraZODone-100; TraZODone-150; TraZODone-50   Warning   For all patients taking this drug:   · Drugs like this one have raised the chance of suicidal thoughts or  actions in children and young adults. The risk may be greater in people who have had these thoughts or actions in the past. All people who take this drug need to be watched closely. Call the doctor right away if signs like low mood (depression), nervousness, restlessness, grouchiness, panic attacks, or changes in mood or actions are new or worse. Call the doctor right away if any thoughts or actions of suicide occur.  Children:   · This drug is not approved for use in children. However, the doctor may decide the benefits of taking this drug outweigh the risks. If your child has been given this drug, ask the doctor for information about the benefits and risks. Talk with the doctor if you have questions about giving this drug to your child.  What is this drug used for?   · It is used to treat low mood (depression).  · It may be given to you for other reasons. Talk with the doctor.    What do I need to tell my doctor BEFORE I take this drug?   · If you are allergic to this drug; any part of this drug; or any other drugs, foods, or substances. Tell your doctor about the allergy and what signs you had.  · If you have had a recent heart attack.  · If you have ever had a long QT on ECG or other heartbeat that is not normal.  · If you have any of these health problems: Low magnesium levels, low potassium levels, or slow heartbeat.  · If you are taking any of these drugs: Linezolid or methylene blue.  · If you have taken certain drugs for depression or Parkinson's disease in the last 14 days. This includes isocarboxazid, phenelzine, tranylcypromine, selegiline, or rasagiline. Very high blood pressure may happen.  · If you are taking any drugs that can cause a certain type of heartbeat that is not normal (prolonged QT interval). There are many drugs that can do this. Ask your doctor or pharmacist if you are not sure.  This is not a list of all drugs or health problems that interact with this drug.  Tell your doctor and  pharmacist about all of your drugs (prescription or OTC, natural products, vitamins) and health problems. You must check to make sure that it is safe for you to take this drug with all of your drugs and health problems. Do not start, stop, or change the dose of any drug without checking with your doctor.  What are some things I need to know or do while I take this drug?   · Tell all of your health care providers that you take this drug. This includes your doctors, nurses, pharmacists, and dentists.  · Avoid driving and doing other tasks or actions that call for you to be alert until you see how this drug affects you.  · To lower the chance of feeling dizzy or passing out, rise slowly if you have been sitting or lying down. Be careful going up and down stairs.  · Talk with your doctor before you use alcohol, marijuana or other forms of cannabis, or prescription or OTC drugs that may slow your actions.  · An unsafe heartbeat that is not normal (long QT on ECG) has happened with this drug. This may raise the chance of sudden death. Talk with the doctor.  · This drug may raise the chance of bleeding. Sometimes, bleeding can be life-threatening. Talk with the doctor.  · Some people may have a higher chance of eye problems with this drug. Your doctor may want you to have an eye exam to see if you have a higher chance of these eye problems. Call your doctor right away if you have eye pain, change in eyesight, or swelling or redness in or around the eye.  · This drug can cause low sodium levels. Very low sodium levels can be life-threatening, leading to seizures, passing out, trouble breathing, or death.  · If you are 65 or older, use this drug with care. You could have more side effects.  · Tell your doctor if you are pregnant, plan on getting pregnant, or are breast-feeding. You will need to talk about the benefits and risks to you and the baby.    What are some side effects that I need to call my doctor about right away?    WARNING/CAUTION: Even though it may be rare, some people may have very bad and sometimes deadly side effects when taking a drug. Tell your doctor or get medical help right away if you have any of the following signs or symptoms that may be related to a very bad side effect:  · Signs of an allergic reaction, like rash; hives; itching; red, swollen, blistered, or peeling skin with or without fever; wheezing; tightness in the chest or throat; trouble breathing, swallowing, or talking; unusual hoarseness; or swelling of the mouth, face, lips, tongue, or throat.  · Signs of low sodium levels like headache, trouble focusing, memory problems, feeling confused, weakness, seizures, or change in balance.  · Signs of bleeding like throwing up or coughing up blood; vomit that looks like coffee grounds; blood in the urine; black, red, or tarry stools; bleeding from the gums; abnormal vaginal bleeding; bruises without a cause or that get bigger; or bleeding you cannot stop.  · Signs of high or low blood pressure like very bad headache or dizziness, passing out, or change in eyesight.  · Fast or abnormal heartbeat.  · Swelling.  · Feeling confused.  · Call your doctor right away if you have a painful erection (hard penis) or an erection that lasts for longer than 4 hours. This may happen even when you are not having sex. If this is not treated right away, it may lead to lasting sex problems and you may not be able to have sex.  · A severe and sometimes deadly problem called serotonin syndrome may happen. The risk may be greater if you also take certain other drugs. Call your doctor right away if you have agitation; change in balance; confusion; hallucinations; fever; fast or abnormal heartbeat; flushing; muscle twitching or stiffness; seizures; shivering or shaking; sweating a lot; severe diarrhea, upset stomach, or throwing up; or very bad headache.  What are some other side effects of this drug?   All drugs may cause side  effects. However, many people have no side effects or only have minor side effects. Call your doctor or get medical help if any of these side effects or any other side effects bother you or do not go away:  · Feeling dizzy, sleepy, tired, or weak.  · Constipation, diarrhea, stomach pain, upset stomach, or throwing up.  · Dry mouth.  · Headache.  · Feeling nervous and excitable.  · Shakiness.  · Muscle pain.  · Stuffy nose.  · Weight gain or loss.  These are not all of the side effects that may occur. If you have questions about side effects, call your doctor. Call your doctor for medical advice about side effects.  You may report side effects to your national health agency.  You may report side effects to the FDA at 1-186.210.6124. You may also report side effects at https://www.fda.gov/medwatch.  How is this drug best taken?   Use this drug as ordered by your doctor. Read all information given to you. Follow all instructions closely.  All products:   · If you feel sleepy after taking this drug, talk with your doctor. Your doctor may change your dose or when you take this drug.  · Keep taking this drug as you have been told by your doctor or other health care provider, even if you feel well.  · It may take several weeks to see the full effects.  · Do not stop taking this drug all of a sudden without calling your doctor. You may have a greater risk of side effects. If you need to stop this drug, you will want to slowly stop it as ordered by your doctor.  Regular-release tablets:   · Take this drug right after a meal or light snack.  · Tablet may be broken in half.  · Do not chew or crush.  Extended-release tablets:   · Take on an empty stomach.  · Swallow whole. Do not chew or crush.  · Long-acting tablets may be broken in half.  What do I do if I miss a dose?   · Take a missed dose as soon as you think about it.  · If it is close to the time for your next dose, skip the missed dose and go back to your normal  time.  · Do not take 2 doses at the same time or extra doses.    How do I store and/or throw out this drug?   · Store at room temperature protected from light. Store in a dry place. Do not store in a bathroom.  · Keep all drugs in a safe place. Keep all drugs out of the reach of children and pets.  · Throw away unused or  drugs. Do not flush down a toilet or pour down a drain unless you are told to do so. Check with your pharmacist if you have questions about the best way to throw out drugs. There may be drug take-back programs in your area.    General drug facts   · If your symptoms or health problems do not get better or if they become worse, call your doctor.  · Do not share your drugs with others and do not take anyone else's drugs.  · Some drugs may have another patient information leaflet. If you have any questions about this drug, please talk with your doctor, nurse, pharmacist, or other health care provider.  · This drug comes with an extra patient fact sheet called a Medication Guide. Read it with care. Read it again each time this drug is refilled. If you have any questions about this drug, please talk with the doctor, pharmacist, or other health care provider.  · If you think there has been an overdose, call your poison control center or get medical care right away. Be ready to tell or show what was taken, how much, and when it happened.    Consumer Information Use and Disclaimer   This generalized information is a limited summary of diagnosis, treatment, and/or medication information. It is not meant to be comprehensive and should be used as a tool to help the user understand and/or assess potential diagnostic and treatment options. It does NOT include all information about conditions, treatments, medications, side effects, or risks that may apply to a specific patient. It is not intended to be medical advice or a substitute for the medical advice, diagnosis, or treatment of a health care provider  based on the health care provider's examination and assessment of a patient's specific and unique circumstances. Patients must speak with a health care provider for complete information about their health, medical questions, and treatment options, including any risks or benefits regarding use of medications. This information does not endorse any treatments or medications as safe, effective, or approved for treating a specific patient. [employer] and its affiliates disclaim any warranty or liability relating to this information or the use thereof. The use of this information is governed by the Terms of Use, available at https://www.[employer]/en/solutions/lexicomp/about/royal.  Last Reviewed Date   2020-04-23  Copyright   © 2021 [employer] Inc[employer] and its affiliates and/or licensors. All rights reserved.  Patient Education       Lamotrigine (la RAGHAVENDRA tri jeen)   Brand Names: US LaMICtal; LaMICtal ODT; LaMICtal Starter; LaMICtal XR; Subvenite; Subvenite Starter Kit-Blue; Subvenite Starter Kit-Green; Subvenite Starter Kit-Orange   Brand Names: [place] APO-LamoTRIgine; Auro-LamoTRIgine; LaMICtal; LamoTRIgine-100; LamoTRIgine-150; LamoTRIgine-25; MYLAN-LamoTRIgine; PMS-LamoTRIgine; TEVA-LamoTRIgine   Warning   All products:   · A severe skin reaction (Pizano-Chon syndrome/toxic epidermal necrolysis) may happen. It can cause severe health problems that may not go away, and sometimes death. Get medical help right away if you have signs like red, swollen, blistered, or peeling skin (with or without fever); red or irritated eyes; or sores in your mouth, throat, nose, or eyes.  · The chance of a skin reaction is raised in children between 2 and 17 years old. It may also be raised if you take valproic acid or divalproex sodium with this drug, if you start taking this drug at too high of a dose, or if your dose is raised too fast. Skin reactions have also happened without any of these. Talk with your doctor.  · Most  cases of skin reactions have happened within 2 to 8 weeks of starting this drug, but some show up after longer treatment like 6 months. Talk with the doctor.  Extended-release tablets:   · This drug is not approved for use in children younger than 13 years old. Talk with the doctor.  What is this drug used for?   · It is used to help control certain kinds of seizures.  · It is used to treat bipolar problems.  · It may be given to you for other reasons. Talk with the doctor.    What do I need to tell my doctor BEFORE I take this drug?   · If you are allergic to this drug; any part of this drug; or any other drugs, foods, or substances. Tell your doctor about the allergy and what signs you had.  · If you are taking dofetilide.  This is not a list of all drugs or health problems that interact with this drug.  Tell your doctor and pharmacist about all of your drugs (prescription or OTC, natural products, vitamins) and health problems. You must check to make sure that it is safe for you to take this drug with all of your drugs and health problems. Do not start, stop, or change the dose of any drug without checking with your doctor.  What are some things I need to know or do while I take this drug?   For all uses of this drug:   · Tell all of your health care providers that you take this drug. This includes your doctors, nurses, pharmacists, and dentists.  · Avoid driving and doing other tasks or actions that call for you to be alert until you see how this drug affects you.  · It may take several weeks to see the full effects.  · Do not stop taking this drug all of a sudden without calling your doctor. You may have a greater risk of side effects. If you need to stop this drug, you will want to slowly stop it as ordered by your doctor.  · If you stop taking this drug, talk with your doctor. You may need to be restarted at a lower dose and raise the dose slowly.  · Have blood work checked as you have been told by the  doctor. Talk with the doctor.  · This drug may affect certain lab tests. Tell all of your health care providers and lab workers that you take this drug.  · Talk with your doctor before you use alcohol, marijuana or other forms of cannabis, or prescription or OTC drugs that may slow your actions.  · Like other drugs that may be used for seizures, this drug may rarely raise the risk of suicidal thoughts or actions. The risk may be higher in people who have had suicidal thoughts or actions in the past. Call the doctor right away about any new or worse signs like depression; feeling nervous, restless, or grouchy; panic attacks; or other changes in mood or behavior. Call the doctor right away if any suicidal thoughts or actions occur.  · Some drugs may look the same as this drug or may have names that sound like this drug. Always check to make sure you have the right product. If you see any change in the way this drug looks like shape, color, size, or wording, check with your pharmacist.  · If you have an abnormal heartbeat, heart failure, or other heart problems, talk with your doctor. Abnormal heartbeats can happen in people with some types of heart problems, which can lead to sudden death.  · If the patient is a child, use this drug with care. The risk of some side effects may be higher in children.  · Birth control pills and other hormone-based birth control may change how much of this drug is in your body. Talk to your doctor before you start or stop any hormone-based birth control. The chance of side effects may be raised when taking birth control pills during the week that the pills are not active. Talk with your doctor.  · Birth control pills and other hormone-based birth control may not work as well to prevent pregnancy. Use some other kind of birth control also like a condom when taking this drug.  · Tell your doctor if you are pregnant, plan on getting pregnant, or are breast-feeding. You will need to talk  about the benefits and risks to you and the baby.  For seizures:   · If seizures are different or worse after starting this drug, talk with the doctor.  What are some side effects that I need to call my doctor about right away?   WARNING/CAUTION: Even though it may be rare, some people may have very bad and sometimes deadly side effects when taking a drug. Tell your doctor or get medical help right away if you have any of the following signs or symptoms that may be related to a very bad side effect:  · Signs of an allergic reaction, like rash; hives; itching; red, swollen, blistered, or peeling skin with or without fever; wheezing; tightness in the chest or throat; trouble breathing, swallowing, or talking; unusual hoarseness; or swelling of the mouth, face, lips, tongue, or throat.  · Shortness of breath, a big weight gain, or swelling in the arms or legs.  · Very bad muscle pain or weakness.  · Very bad joint pain or swelling.  · Change in eyesight.  · Chest pain or pressure.  · Fast, slow, or abnormal heartbeat.  · Very bad dizziness or passing out.  · Change in balance.  · Not able to control eye movements.  · Flu-like signs.  · Painful periods.  · Period (menstrual) changes. These include spotting or bleeding between cycles.  · Low blood cell counts have happened with this drug. If blood cell counts get very low, this can lead to bleeding problems, infections, or anemia. Call your doctor right away if you have signs of infection like fever, chills, or sore throat; any unexplained bruising or bleeding; or if you feel very tired or weak.  · This drug may raise the chance of a very bad brain problem called aseptic meningitis. Call your doctor right away if you have a headache, fever, chills, very upset stomach or throwing up, stiff neck, rash, bright lights bother your eyes, feeling sleepy, or feeling confused.  · A very bad and sometimes deadly effect has happened in people taking drugs for seizures like this  drug. Call your doctor right away if you have swollen glands; fever; rash; chest pain; signs of kidney problems like unable to pass urine or change in the amount of urine passed; or signs of liver problems like dark urine, feeling tired, not hungry, upset stomach or stomach pain, light-colored stools, throwing up, or yellow skin or eyes.  What are some other side effects of this drug?   All drugs may cause side effects. However, many people have no side effects or only have minor side effects. Call your doctor or get medical help if any of these side effects or any other side effects bother you or do not go away:  · Feeling dizzy, sleepy, tired, or weak.  · Constipation, diarrhea, stomach pain, upset stomach, throwing up, or feeling less hungry.  · Shakiness.  · Trouble sleeping.  · Nose or throat irritation.  · Weight loss.  · Dry mouth.  · Back pain.  These are not all of the side effects that may occur. If you have questions about side effects, call your doctor. Call your doctor for medical advice about side effects.  You may report side effects to your national health agency.  You may report side effects to the FDA at 1-835.857.6562. You may also report side effects at https://www.fda.gov/medwatch.  How is this drug best taken?   Use this drug as ordered by your doctor. Read all information given to you. Follow all instructions closely.  All products:   · Take with or without food.  · Do not change the dose or stop this drug. This could cause seizures. Talk with your doctor.  · Keep taking this drug as you have been told by your doctor or other health care provider, even if you feel well.  All tablet products:   · Swallow whole. Do not chew, break, or crush.  · If you have trouble swallowing, talk with your doctor.  Tablets for suspension:   · It may be swallowed whole, chewed, or mixed in water or fruit juice.  · If chewed, drink a little water or fruit juice to help swallow.  · You may break up tablet by  adding liquid to cover tablet in a glass or spoon. Wait at least 1 minute until fully broken up, then mix and drink.  Oral-disintegrating tablet:   · Place on your tongue and let it dissolve. Water is not needed. Do not swallow it whole. Do not chew, break, or crush it.  What do I do if I miss a dose?   · Take a missed dose as soon as you think about it.  · If it is close to the time for your next dose, skip the missed dose and go back to your normal time.  · Do not take 2 doses at the same time or extra doses.    How do I store and/or throw out this drug?   · Store at room temperature protected from light. Store in a dry place. Do not store in a bathroom.  · Keep all drugs in a safe place. Keep all drugs out of the reach of children and pets.  · Throw away unused or  drugs. Do not flush down a toilet or pour down a drain unless you are told to do so. Check with your pharmacist if you have questions about the best way to throw out drugs. There may be drug take-back programs in your area.    General drug facts   · If your symptoms or health problems do not get better or if they become worse, call your doctor.  · Do not share your drugs with others and do not take anyone else's drugs.  · Some drugs may have another patient information leaflet. If you have any questions about this drug, please talk with your doctor, nurse, pharmacist, or other health care provider.  · This drug comes with an extra patient fact sheet called a Medication Guide. Read it with care. Read it again each time this drug is refilled. If you have any questions about this drug, please talk with the doctor, pharmacist, or other health care provider.  · If you think there has been an overdose, call your poison control center or get medical care right away. Be ready to tell or show what was taken, how much, and when it happened.    Consumer Information Use and Disclaimer   This generalized information is a limited summary of diagnosis,  treatment, and/or medication information. It is not meant to be comprehensive and should be used as a tool to help the user understand and/or assess potential diagnostic and treatment options. It does NOT include all information about conditions, treatments, medications, side effects, or risks that may apply to a specific patient. It is not intended to be medical advice or a substitute for the medical advice, diagnosis, or treatment of a health care provider based on the health care provider's examination and assessment of a patient's specific and unique circumstances. Patients must speak with a health care provider for complete information about their health, medical questions, and treatment options, including any risks or benefits regarding use of medications. This information does not endorse any treatments or medications as safe, effective, or approved for treating a specific patient. Up[employer], Inc[employer] and its affiliates disclaim any warranty or liability relating to this information or the use thereof. The use of this information is governed by the Terms of Use, available at https://www.[employer]/en/solutions/lexicomp/about/royal.  Last Reviewed Date   2021-04-13  Copyright   © 2021 Up[employer]Date, Inc. and its affiliates and/or licensors. All rights reserved.  Patient Education       Trazodone (TRAZ oh done)   Brand Names: [place] APO-TraZODone; APO-TraZODone D; DOM-TraZODone; Oleptro; PMS-TraZODone; RATIO-TraZODone [DSC]; TEVA-TraZODone; TraZODone-100; TraZODone-150; TraZODone-50   Warning   For all patients taking this drug:   · Drugs like this one have raised the chance of suicidal thoughts or actions in children and young adults. The risk may be greater in people who have had these thoughts or actions in the past. All people who take this drug need to be watched closely. Call the doctor right away if signs like low mood (depression), nervousness, restlessness, grouchiness, panic attacks, or changes in mood or  actions are new or worse. Call the doctor right away if any thoughts or actions of suicide occur.  Children:   · This drug is not approved for use in children. However, the doctor may decide the benefits of taking this drug outweigh the risks. If your child has been given this drug, ask the doctor for information about the benefits and risks. Talk with the doctor if you have questions about giving this drug to your child.  What is this drug used for?   · It is used to treat low mood (depression).  · It may be given to you for other reasons. Talk with the doctor.    What do I need to tell my doctor BEFORE I take this drug?   · If you are allergic to this drug; any part of this drug; or any other drugs, foods, or substances. Tell your doctor about the allergy and what signs you had.  · If you have had a recent heart attack.  · If you have ever had a long QT on ECG or other heartbeat that is not normal.  · If you have any of these health problems: Low magnesium levels, low potassium levels, or slow heartbeat.  · If you are taking any of these drugs: Linezolid or methylene blue.  · If you have taken certain drugs for depression or Parkinson's disease in the last 14 days. This includes isocarboxazid, phenelzine, tranylcypromine, selegiline, or rasagiline. Very high blood pressure may happen.  · If you are taking any drugs that can cause a certain type of heartbeat that is not normal (prolonged QT interval). There are many drugs that can do this. Ask your doctor or pharmacist if you are not sure.  This is not a list of all drugs or health problems that interact with this drug.  Tell your doctor and pharmacist about all of your drugs (prescription or OTC, natural products, vitamins) and health problems. You must check to make sure that it is safe for you to take this drug with all of your drugs and health problems. Do not start, stop, or change the dose of any drug without checking with your doctor.  What are some things I  need to know or do while I take this drug?   · Tell all of your health care providers that you take this drug. This includes your doctors, nurses, pharmacists, and dentists.  · Avoid driving and doing other tasks or actions that call for you to be alert until you see how this drug affects you.  · To lower the chance of feeling dizzy or passing out, rise slowly if you have been sitting or lying down. Be careful going up and down stairs.  · Talk with your doctor before you use alcohol, marijuana or other forms of cannabis, or prescription or OTC drugs that may slow your actions.  · An unsafe heartbeat that is not normal (long QT on ECG) has happened with this drug. This may raise the chance of sudden death. Talk with the doctor.  · This drug may raise the chance of bleeding. Sometimes, bleeding can be life-threatening. Talk with the doctor.  · Some people may have a higher chance of eye problems with this drug. Your doctor may want you to have an eye exam to see if you have a higher chance of these eye problems. Call your doctor right away if you have eye pain, change in eyesight, or swelling or redness in or around the eye.  · This drug can cause low sodium levels. Very low sodium levels can be life-threatening, leading to seizures, passing out, trouble breathing, or death.  · If you are 65 or older, use this drug with care. You could have more side effects.  · Tell your doctor if you are pregnant, plan on getting pregnant, or are breast-feeding. You will need to talk about the benefits and risks to you and the baby.    What are some side effects that I need to call my doctor about right away?   WARNING/CAUTION: Even though it may be rare, some people may have very bad and sometimes deadly side effects when taking a drug. Tell your doctor or get medical help right away if you have any of the following signs or symptoms that may be related to a very bad side effect:  · Signs of an allergic reaction, like rash; hives;  itching; red, swollen, blistered, or peeling skin with or without fever; wheezing; tightness in the chest or throat; trouble breathing, swallowing, or talking; unusual hoarseness; or swelling of the mouth, face, lips, tongue, or throat.  · Signs of low sodium levels like headache, trouble focusing, memory problems, feeling confused, weakness, seizures, or change in balance.  · Signs of bleeding like throwing up or coughing up blood; vomit that looks like coffee grounds; blood in the urine; black, red, or tarry stools; bleeding from the gums; abnormal vaginal bleeding; bruises without a cause or that get bigger; or bleeding you cannot stop.  · Signs of high or low blood pressure like very bad headache or dizziness, passing out, or change in eyesight.  · Fast or abnormal heartbeat.  · Swelling.  · Feeling confused.  · Call your doctor right away if you have a painful erection (hard penis) or an erection that lasts for longer than 4 hours. This may happen even when you are not having sex. If this is not treated right away, it may lead to lasting sex problems and you may not be able to have sex.  · A severe and sometimes deadly problem called serotonin syndrome may happen. The risk may be greater if you also take certain other drugs. Call your doctor right away if you have agitation; change in balance; confusion; hallucinations; fever; fast or abnormal heartbeat; flushing; muscle twitching or stiffness; seizures; shivering or shaking; sweating a lot; severe diarrhea, upset stomach, or throwing up; or very bad headache.  What are some other side effects of this drug?   All drugs may cause side effects. However, many people have no side effects or only have minor side effects. Call your doctor or get medical help if any of these side effects or any other side effects bother you or do not go away:  · Feeling dizzy, sleepy, tired, or weak.  · Constipation, diarrhea, stomach pain, upset stomach, or throwing up.  · Dry  mouth.  · Headache.  · Feeling nervous and excitable.  · Shakiness.  · Muscle pain.  · Stuffy nose.  · Weight gain or loss.  These are not all of the side effects that may occur. If you have questions about side effects, call your doctor. Call your doctor for medical advice about side effects.  You may report side effects to your national health agency.  You may report side effects to the FDA at 1-285.915.5281. You may also report side effects at https://www.fda.gov/medwatch.  How is this drug best taken?   Use this drug as ordered by your doctor. Read all information given to you. Follow all instructions closely.  All products:   · If you feel sleepy after taking this drug, talk with your doctor. Your doctor may change your dose or when you take this drug.  · Keep taking this drug as you have been told by your doctor or other health care provider, even if you feel well.  · It may take several weeks to see the full effects.  · Do not stop taking this drug all of a sudden without calling your doctor. You may have a greater risk of side effects. If you need to stop this drug, you will want to slowly stop it as ordered by your doctor.  Regular-release tablets:   · Take this drug right after a meal or light snack.  · Tablet may be broken in half.  · Do not chew or crush.  Extended-release tablets:   · Take on an empty stomach.  · Swallow whole. Do not chew or crush.  · Long-acting tablets may be broken in half.  What do I do if I miss a dose?   · Take a missed dose as soon as you think about it.  · If it is close to the time for your next dose, skip the missed dose and go back to your normal time.  · Do not take 2 doses at the same time or extra doses.    How do I store and/or throw out this drug?   · Store at room temperature protected from light. Store in a dry place. Do not store in a bathroom.  · Keep all drugs in a safe place. Keep all drugs out of the reach of children and pets.  · Throw away unused or   drugs. Do not flush down a toilet or pour down a drain unless you are told to do so. Check with your pharmacist if you have questions about the best way to throw out drugs. There may be drug take-back programs in your area.    General drug facts   · If your symptoms or health problems do not get better or if they become worse, call your doctor.  · Do not share your drugs with others and do not take anyone else's drugs.  · Some drugs may have another patient information leaflet. If you have any questions about this drug, please talk with your doctor, nurse, pharmacist, or other health care provider.  · This drug comes with an extra patient fact sheet called a Medication Guide. Read it with care. Read it again each time this drug is refilled. If you have any questions about this drug, please talk with the doctor, pharmacist, or other health care provider.  · If you think there has been an overdose, call your poison control center or get medical care right away. Be ready to tell or show what was taken, how much, and when it happened.    Consumer Information Use and Disclaimer   This generalized information is a limited summary of diagnosis, treatment, and/or medication information. It is not meant to be comprehensive and should be used as a tool to help the user understand and/or assess potential diagnostic and treatment options. It does NOT include all information about conditions, treatments, medications, side effects, or risks that may apply to a specific patient. It is not intended to be medical advice or a substitute for the medical advice, diagnosis, or treatment of a health care provider based on the health care provider's examination and assessment of a patient's specific and unique circumstances. Patients must speak with a health care provider for complete information about their health, medical questions, and treatment options, including any risks or benefits regarding use of medications. This information  does not endorse any treatments or medications as safe, effective, or approved for treating a specific patient. [employer] and its affiliates disclaim any warranty or liability relating to this information or the use thereof. The use of this information is governed by the Terms of Use, available at https://www.[employer].QPD/en/solutions/lexicomp/about/royal.  Last Reviewed Date   2020-04-23  Copyright   © 2021 [employer] and its affiliates and/or licensors. All rights reserved.  Patient Education       Alzheimer Disease Discharge Instructions   About this topic   Alzheimer's is a disease that causes changes in brain function. It is a type of dementia that affects your behavior and judgment. It also affects how you process information. You may have problems talking with others or remembering things. Some people have trouble solving problems and finding words when speaking. Alzheimer disease is not a normal part of aging. The disease gets worse over time.  Currently, there is no cure for Alzheimer disease. Treatment includes drugs to improve memory and slow down the worsening of the disease.  What care is needed at home?   · Ask your doctor what you need to do when you go home. Make sure you ask questions if you do not understand what the doctor says. This way you will know what you need to do to care for your patient.  · Share information about Alzheimer with the rest of the family. Talk about how you can help the person with Alzheimer.  · Keep a quiet and calm home for your patient. This will help lower fear and distress.  ? Give a structured daily schedule that is the same each day. Help your patient wake up and go to bed at the same time each day.  ? Give a healthy balanced diet at the same time each day. Avoid drinks with caffeine and alcohol.  ? Plan time for exercise, being with others, and rest.  ? Daily hygiene is important. Help your patient continue to get dressed as they normally would. Help  "them with make up or cologne. Daily hygiene is important. Bathing may become harder as Alzheimer disease progresses. Less often bathing, or washing at the sink, may be needed.  ? Be sure to help with brushing teeth or dentures.  ? Make a calming bedtime routine: Warm milk, soft music, reading aloud, dimming of lights.  ? Keep dim lights on at night. This may help to avoid distress if your patient wakes up at night.  · Help your patient remember.  ? Keep a record each day. This will give your patient a written memory of daily events. It may help your patient keep track of everyday life and guests.  ? Keep a calendar with all office visits on it like therapy and doctor visits. Try not to miss any visits.  · Keep your patient safe.  ? Put away sharp objects like knives or scissors.  ? Put childproof locks on cabinets that hold items like poisons, chemicals, drugs, and lighters.  ? Make sure any guns are locked away. Keep them safe and where your patient cannot get them.  ? Make sure to lock doors to stop wandering out of the home. Alarms or bells on doors may be a way to alert you that someone is leaving.  ? Have your patient wear an ID necklace or bracelet. It should say "Memory Impaired." It should also have a contact phone number, address, and name of patient and caregiver.  ? Do not allow driving when the doctor says your patient has to stop driving. This is often a very hard issue. You may have to hide keys and disable the car.  · Help your patient by using a calm, soothing tone when speaking.  ? Avoid loud music. Play calming music your patient enjoys.  ? Don't argue. Keep your voice calm and soothing. Use distraction or change the subject if talk becomes filled with emotion. It is normal for people with Alzheimer disease to have strong reactions to situations.  ? Use simple words when talking. Offer fewer choices. For example, ask "Do you want to wear the blue shirt or the red shirt?" rather than "What do you " "want to wear?"  What follow-up care is needed?   · The doctor may request visits to the office to check on your patient's progress. Be sure to keep these visits. Follow-up is an important part of the treatment of this disease.   · The doctor may send your patient to a psychiatrist or psychologist to help with behavior concerns with low mood, sleep issues, or distress.  What drugs may be needed?   The doctor may order drugs to:  · Improve memory  · Slow worsening of the disease  · Treat tension or distress  The doctor may stop drugs which could cause confusion, falls, or accidents.  Will physical activity be limited?   · Daily exercise is important. This may help some of your patient's signs, and will help keep muscle tone and balance.  · As the disease progresses, activities like driving and working with machines should be limited. This can keep the patient and other people safe.  What changes to diet are needed?   · Offer foods that include a balanced diet, with lots of vegetables, fruits, and grains.  · Have your family member drink lots of water and other fluids.  What problems could happen?   · Change in behavior ? Your patient may lose interest in things they once enjoyed. They may be irritable and have very high and low moods. They may show sexual behavior that is upsetting to others.  · Loss of memory ? Getting lost in familiar places happens often. So does forgetting common recipes. Your patient may miss meetings or leave the stove on. They may lose their keys or glasses.  · Loss of ability to function ? Your patient may have trouble with bill paying or other household chores. Shopping or other things in a normal daily routine may be unfamiliar.  · Loss of ability to communicate ? They may have problems finding the right word. This may progress to not being able to express needs. The patient may not be able to understand directions or instructions.  · Harm towards others and self ? This is often not planned " but may be a problem. It may be a result of changes in balance or depth perception. A decline in skills or how to stay safe can also cause injury.  · Injuries and illnesses ? Lung infections or bedsores may happen because of limited activity or a drop in function.  What can be done to prevent this health problem?   There is no known way to prevent this illness. Still, some things may improve or slow the signs:  · Stay involved in social and mental activities.  · Keep the brain busy.  · Exercise often.  · Keep a normal weight.  · Eat a balanced diet, including foods with Omega-3 fatty acids and antioxidants.  · Manage blood pressure and blood sugars.  When do I need to call the doctor?   · You are worried the patient may harm himself or someone else  · Patient has a sudden change in mental status or function  · Patient cannot eat or sleep  · You are no longer able to care for the patient safely at home  · Health problem is not better or the patient is feeling worse  Helpful tips   · Join support groups, such as the Alzheimer's Association, to meet others and their families who are living with this disease.  · Caregivers must take time out to care for themselves to prevent burnout. Ask family or friends to provide care for a few hours every few days.  · Talk to doctor or  about day care programs in the area.  · Look for resources for care so that you can take a vacation or get some time to yourself.  · Have a back up person to care for your patient in case you get sick or need a break.  · Find out if there is a living will or power of  for health care issues. What are your patient's wishes?  Teach Back: Helping You Understand   The Teach Back Method helps you understand the information we are giving you. After you talk with the staff, tell them in your own words what you learned. This helps to make sure the staff has described each thing clearly. It also helps to explain things that may have been  confusing. Before going home, make sure you can do these:  · I can tell you about Alzheimer's disease.  · I can tell you ways to help keep my Alzheimer's patient safe at home.  · I can tell you what I will do if I am worried I cannot care for my Alzheimer's patient safely.  Where can I learn more?   Alzheimer's Disease Research  http://www.brightfocus.org/alzheimers/livingwith/caregiving.html   [employer] [place] on Aging  https://www.[place].nih.gov/health/next-steps-after-alzheimers-diagnosis   [employer] [place] on Aging  https://www.[place].nih.gov/health/what-alzheimers-disease   Last Reviewed Date   2020-03-27  Consumer Information Use and Disclaimer   This information is not specific medical advice and does not replace information you receive from your health care provider. This is only a brief summary of general information. It does NOT include all information about conditions, illnesses, injuries, tests, procedures, treatments, therapies, discharge instructions or life-style choices that may apply to you. You must talk with your health care provider for complete information about your health and treatment options. This information should not be used to decide whether or not to accept your health care providers advice, instructions or recommendations. Only your health care provider has the knowledge and training to provide advice that is right for you.  Copyright   Copyright © 2021 [employer], Inc. and its affiliates and/or licensors. All rights reserved.    Recommend reading the following books:     The 36-Hour Day and there are many online and printed resources to help caregivers as well.     Alzheimer's Through the Stages.     Dementia with G.R.A.C.E.    For More Information About Hallucinations, Delusions, and Paranoia in Alzheimer's   [place] Alzheimer's and related Dementias Education and Referral (ADEAR) Center   [phone] (toll-free)   adear@[place]nn.nih.gov   www.[place].nih.gov/alzheimers   The [employer] [place]  on Aging's ADEAR Center offers information and free print publications about Alzheimer's disease and related dementias for families, caregivers, and health professionals. ADEAR Center staff answer telephone, email, and written requests and make referrals to local and national resources

## 2022-01-27 PROBLEM — R06.02 SHORTNESS OF BREATH: Status: ACTIVE | Noted: 2022-01-01

## 2022-01-27 PROBLEM — R07.9 CHEST PAIN: Status: ACTIVE | Noted: 2022-01-01
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
## 2022-01-30 PROBLEM — J18.9 PNEUMONIA OF RIGHT LUNG DUE TO INFECTIOUS ORGANISM: Status: ACTIVE | Noted: 2022-01-01

## 2022-01-30 PROBLEM — I21.4 NSTEMI (NON-ST ELEVATED MYOCARDIAL INFARCTION): Status: ACTIVE | Noted: 2022-01-01

## 2022-01-30 PROBLEM — I50.9 ACUTE CONGESTIVE HEART FAILURE: Status: ACTIVE | Noted: 2022-01-01

## 2022-01-30 PROBLEM — J96.01 ACUTE HYPOXEMIC RESPIRATORY FAILURE: Status: ACTIVE | Noted: 2022-01-01

## 2022-01-30 NOTE — Clinical Note
60 ml of contrast were injected throughout the case. 0 mL of contrast was the total wasted during the case. 60 mL was the total amount used during the case.

## 2022-01-30 NOTE — Clinical Note
The radial band was applied to the left radial artery. 10 cc's of air were inserted into the closure device.

## 2022-01-30 NOTE — Clinical Note
The DP pulses were 2+ bilaterally. The PT pulses were 1+ bilaterally. The radial pulses were +2 bilaterally.

## 2022-01-30 NOTE — Clinical Note
The left ventricle was injected. The injected rate was 12 mL/sec. The total injected volume was 20 mL.

## 2022-01-30 NOTE — Clinical Note
Diagnosis: Pneumonia of lower lobe due to infectious organism, unspecified laterality [[id number]]   Admitting Provider:: JOSE CAGE [[id number]]   Future Attending Provider: JOSE CAGE [[id number]]   Reason for IP Medical Treatment  (Clinical interventions that can only be accomplished in the IP setting? ) :: pneumonia, elevated troponin   Estimated Length of Stay:: 2 midnights   I certify that Inpatient services for greater than or equal to 2 midnights are medically necessary:: Yes   Plans for Post-Acute care--if anticipated (pick the single best option):: A. No post acute care anticipated at this time

## 2022-01-30 NOTE — Clinical Note
ostium   right coronary artery. Hemodynamics were performed.  An angiography was performed of the right coronary arteries. Multiple views were taken.

## 2022-01-30 NOTE — Clinical Note
The catheter was removed from the left ventricle. Hemodynamics were performed.  and Pullback was recorded.

## 2022-01-30 NOTE — Clinical Note
The site was marked. The left radial was prepped. The site was prepped with ChloraPrep. The site was clipped. The patient was draped. The patient was positioned supine.

## 2022-01-31 PROBLEM — R91.8 PULMONARY INFILTRATE: Status: ACTIVE | Noted: 2022-01-01

## 2022-01-31 PROBLEM — I20.0 UNSTABLE ANGINA: Status: ACTIVE | Noted: 2022-01-01
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
## 2022-01-31 NOTE — ASSESSMENT & PLAN NOTE
Patient with Hypoxic Respiratory failure which is Acute.  she is not on home oxygen. Supplemental oxygen was provided and noted-  .   Signs/symptoms of respiratory failure include- increased work of breathing. Contributing diagnoses includes - CHF Labs and images were reviewed. Patient Has not had a recent ABG. Will treat underlying causes and adjust management of respiratory failure as follows-     Hypoxemia likely due to pulmonary edema/CHF.  Continue supplemental oxygen to maintain saturations greater than 92%.  COVID-19 negative.    1/31:  Patient diuresing well  Weaned off of BiPAP  Stepped down from ICU

## 2022-01-31 NOTE — ASSESSMENT & PLAN NOTE
Troponin elevated at 11.  Mild ST depressions noted in the anterolateral leads.  Denies chest pain.  Continue heparin drip.  Dr. Montgomery with cardiology aware.  Keep NPO past midnight.  Received aspirin 325 mg p.o. x1 in the ED.

## 2022-01-31 NOTE — HPI
Julia Schroeder is a 76 y.o. Black or  female with PMH significant for dementia, HTN, hyperlipidemia, in IDDM, presented to the ED complaining of shortness of breath.  Patient is a very poor historian due to dementia.  No family at the bedside.  Chart review reveals that patient has seen Dr. Perez in the cardiology clinic three days ago for intermittent chest pain.  Patient is scheduled for outpatient nuclear stress test and echocardiogram, that have not been done yet.  Initially found to be hypotensive and hypoxic, currently on 4 L O2 N/C, to maintain saturations in the low to mid 90s.  Blood pressure was low per EMS, in the /74.  Currently receiving 30 mL/kg bolus IV fluids.  Denies chest pain.  Troponin elevated at 11. EKG reveals mild ST depression in leads V3, V4, V5.  Started on heparin drip.  .  CXR reveals increased vascular congestion, with opacity in the right lung.  Denies fever, chills.  Received IV Rocephin empirically.  WBC 15078, 0% bands, lactic acid 1.9.    Admitting diagnosis:  NSTEMI.  Acute hypoxemic respiratory failure.  Right lower lobe infiltrate (? pneumonia versus CHF).  Dementia

## 2022-01-31 NOTE — ASSESSMENT & PLAN NOTE
Right lower lobe infiltrate, unclear if represents pneumonia versus increased vascular congestion.  Afebrile.  No leukocytosis or bandemia.  Lactic acid within normal limits.  Received Rocephin IV one time dose in the ED.  BNP elevated, suggesting possibly vascular congestion/pulmonary edema.  However unable to administer IV diuretics as patient is receiving 30 mL/kg bolus IV fluids for hypotension in the ED/EMS.  Monitor closely.  Hopefully we can get some clarity by tomorrow if this is pneumonia versus CHF      1/31:   Procalcitonin normal  BNP elevated  Shortness of breath likely due to CHF
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
## 2022-01-31 NOTE — ASSESSMENT & PLAN NOTE
Patient is identified as having Systolic (HFrEF) heart failure that is Acute. CHF is currently uncontrolled due to Pulmonary edema/pleural effusion on CXR. Latest ECHO performed and demonstrates- Results for orders placed during the hospital encounter of 01/30/22    Echo    Interpretation Summary  · The left ventricle is normal in size with concentric hypertrophy and mildly decreased systolic function.  · The estimated ejection fraction is 45%.  · Normal right ventricular size with normal right ventricular systolic function.  · Grade II left ventricular diastolic dysfunction.  · Mild to moderate tricuspid regurgitation.  · Moderate mitral regurgitation.  · There is mild aortic valve stenosis.  · Aortic valve area is 1.54 cm2; peak velocity is 1.48 m/s; mean gradient is 5 mmHg.  · Trivial pericardial effusion.  · There are segmental left ventricular wall motion abnormalities.  · The estimated PA systolic pressure is 57 mmHg.  · There is pulmonary hypertension.  · Intermediate central venous pressure (8 mmHg).  . Continue Furosemide and monitor clinical status closely. Monitor on telemetry. Patient is off CHF pathway.  Monitor strict Is&Os and daily weights.  Place on fluid restriction of 1.5 L. Continue to stress to patient importance of self efficacy and  on diet for CHF. Last BNP reviewed- and noted below   Recent Labs   Lab 01/30/22 1944   *   .

## 2022-01-31 NOTE — ASSESSMENT & PLAN NOTE
Patient with Hypoxic Respiratory failure which is Acute.  she is not on home oxygen. Supplemental oxygen was provided and noted-  .   Signs/symptoms of respiratory failure include- tachypnea and increased work of breathing. Contributing diagnoses includes - CHF Labs and images were reviewed. Patient Has recent ABG, which has been reviewed. Will treat underlying causes and adjust management of respiratory failure as follows- RX myocardial infarction.  O2 target sat 92-94%.  Avoid fluid overload.
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
## 2022-01-31 NOTE — PLAN OF CARE
Patient AAOx4 and on therapeutic level of heparin. Patient refused cath procedure today. Son inquired about becoming Power of . Malik Mcnamara MD and Sandhya Foster RN notified. Patient refused Power of  paperwork today and stated that she does not want anyone making decisions for her. Patient's code status was changed to DNR today. Patient's blood pressure remained in the 80s-90s/50s-60s. Malik Mcnamara MD and Sofya Cage MD were notified. Sofya Cage MD ordered me to hold lasix this morning, but to still administer aspirin. No further changes at this time. Will continue to monitor patient and notify physician of changes as ordered.

## 2022-01-31 NOTE — ASSESSMENT & PLAN NOTE
Troponin elevated at 11.  Mild ST depressions noted in the anterolateral leads.  Denies chest pain.  Continue heparin drip.  Dr. Montgomery with cardiology aware.  Keep NPO past midnight.  Received aspirin 325 mg p.o. x1 in the ED.    1/31:  Troponin trended up to 14  Cardiology recommended left heart catheterization  Patient refused at this time  Will continue heparin  Start cardiac diet  Should patient change her mind  Will need to be made NPO and notify Cardiology  Echo showed EF of 45%

## 2022-01-31 NOTE — PROGRESS NOTES
I met with patient and her son and sister were present at bedside during our conversation. Recommended LHC for definitive evaluation and PCI if warranted given NSTEMI/acute CHF presentation. Patient does have underlying dementia but was oriented to person and place and repeatedly declined any invasive procedure/LHC. She has opted for continued medical management. She and her family are aware that her clinical condition may deteriorate further.    Risks, benefits, and treatment alternatives discussed in detail with patient and her family at bedside. All questions answered.

## 2022-01-31 NOTE — ASSESSMENT & PLAN NOTE
Patient with Hypoxic Respiratory failure which is Acute.  she is not on home oxygen. Supplemental oxygen was provided and noted-  .   Signs/symptoms of respiratory failure include- increased work of breathing. Contributing diagnoses includes - CHF Labs and images were reviewed. Patient Has not had a recent ABG. Will treat underlying causes and adjust management of respiratory failure as follows-     Hypoxemia likely due to pulmonary edema/CHF.  Continue supplemental oxygen to maintain saturations greater than 92%.  COVID-19 negative.

## 2022-01-31 NOTE — ASSESSMENT & PLAN NOTE
Patient is identified as having unknwon heart failure that is Acute. CHF is currently uncontrolled due to Rales/crackles on pulmonary exam. Latest ECHO performed and demonstrates- No results found for this or any previous visit.  . Continue Furosemide and monitor clinical status closely. Monitor on telemetry. Patient is off CHF pathway.  Monitor strict Is&Os and daily weights.  Place on fluid restriction of 1.5 L. Continue to stress to patient importance of self efficacy and  on diet for CHF. Last BNP reviewed- and noted below   Recent Labs   Lab 01/30/22 1944   *   .    Increased vascular congestion on CXR.  Unable to administer IV diuretics due to hypotension requiring IV hydration.  Monitor closely for diuretic need.

## 2022-01-31 NOTE — ASSESSMENT & PLAN NOTE
Right lower lobe infiltrate, unclear if represents pneumonia versus increased vascular congestion.  Afebrile.  No leukocytosis or bandemia.  Lactic acid within normal limits.  Received Rocephin IV one time dose in the ED.  BNP elevated, suggesting possibly vascular congestion/pulmonary edema.  However unable to administer IV diuretics as patient is receiving 30 mL/kg bolus IV fluids for hypotension in the ED/EMS.  Monitor closely.  Hopefully we can get some clarity by tomorrow if this is pneumonia versus CHF
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
## 2022-01-31 NOTE — HOSPITAL COURSE
Patient was admitted for NSTEMI. She was started on ACS treatement. Bipap was initiated for respiratory distress and she was diuresed. Echo showed systolic and diastolic HF with an EF of 45%. Cardiology consulted on case. Patient initially refused any workup however given her hx of dementia and possible alzheimers she was unable to make high yield decisions. 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 Patient was not a good candidate for cabg given her mentation and other comorbidities. Family at beside stated that they will not pursue any surgical options and would prefer to be treated medically. She was unable to tolerate BB or ACE/arb given her BP. She was continued on asa, plavix, ranexa, and lasix daily. Patient was discharged home and instructed to follow up with primary cardiologist. She qualified for home o2.

## 2022-01-31 NOTE — PLAN OF CARE
[facility] - Intensive Care (Hospital)  Initial Discharge Assessment       Primary Care Provider: Devon Lovell MD    Admission Diagnosis: CHF (congestive heart failure) [I50.9]  Troponin level elevated [R77.8]  Hypotension [I95.9]  Severe sepsis [A41.9, R65.20]  Pneumonia of lower lobe due to infectious organism, unspecified laterality [J18.9]    Admission Date: 1/30/2022  Expected Discharge Date:     Discharge Barriers Identified: None    Payor: [employer] MANAGED MEDICARE / Plan: [place] [employer] MEDICARE PPO / Product Type: Medicare Advantage /     Extended Emergency Contact Information  Primary Emergency Contact: AlexandreZuly   [facility]  Home Phone: [phone]  Relation: Sister  Secondary Emergency Contact: Val Givens  Mobile Phone: [phone]  Relation: Son    Discharge Plan A: [place]         [place] [id number] [facility] [address] [facility]  [address] [facility] [address]  Phone: [phone] Fax: [phone]    [facility] - [facility] [address] [facility]  [address] [facility] [address]  Phone: [phone] Fax: [phone]      Initial Assessment (most recent)     Adult Discharge Assessment - 01/31/22 1351        Discharge Assessment    Assessment Type Discharge Planning Assessment     Confirmed/corrected address, phone number and insurance Yes     Confirmed Demographics Correct on Facesheet     Source of Information patient;family     When was your last doctors appointment? 01/14/22     Communicated ROGER with patient/caregiver Date not available/Unable to determine     Reason For Admission NSTEMI     Lives With child(rina), adult     Facility Arrived From: home     Do you expect to return to your current living situation? Yes     Do you have help at home or someone to help you manage your care at home? Yes     Who are your caregiver(s) and their phone number(s)? SonVal     Prior to hospitilization cognitive  status: Alert/Oriented     Current cognitive status: Alert/Oriented     Walking or Climbing Stairs Difficulty none     Dressing/Bathing Difficulty none     Home Accessibility wheelchair accessible     Home Layout Able to live on 1st floor     Equipment Currently Used at Home glucometer     Readmission within 30 days? No     Patient currently being followed by outpatient case management? No     Do you currently have service(s) that help you manage your care at home? Yes     Name and Contact number of agency [facility]     Is the pt/caregiver preference to resume services with current agency Yes     Do you take prescription medications? Yes     Do you have prescription coverage? Yes     Do you have any problems affording any of your prescribed medications? No     Is the patient taking medications as prescribed? yes     Who is going to help you get home at discharge? son and daughter in law     How do you get to doctors appointments? family or friend will provide     Are you on dialysis? No     Do you take coumadin? No     Discharge Plan A Home Health     DME Needed Upon Discharge  none     Discharge Plan discussed with: Patient;Adult children     Discharge Barriers Identified None        Relationship/Environment    Name(s) of Who Lives With Patient Val Givens               Anticipated DC dispo: home health   Prior Level of Function: independent   PCP: Devon Lovell MD    Comments:  CM met with patient at bedside to introduce role and discuss discharge planning. Patient lives with son, Val, and his wife who will also be help at home and can provide transport. Patient is current with [facility]. CM discharge needs depends on hospital progress. CM will continue following to assist with other needs.

## 2022-01-31 NOTE — HOSPITAL COURSE
O2 sat 94% on 2 liter/minute.  Afebrile.  Urine output 1.2 L. Troponin 14. EKG ABG chest x-ray reviewed.  On IV heparin for NSTEMI.  2/1 patient seen and examined.  O2 sat 95% on 4 liter/minute.  Afebrile.  On IV heparin.  No chest pain.  2/3 seen and examined.  O2 sat 92% on 2 liter/minute.  Afebrile.  No distress.  Status post left heart catheter yesterday via radial approach.  Multivessel CAD poor candidate for CABG due to dementia and poor targets.
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
## 2022-01-31 NOTE — ASSESSMENT & PLAN NOTE
Cardiac monitoring.  Declined left heart catheterization.  IV heparin monitor PTT.  Aspirin statin.

## 2022-01-31 NOTE — ACP (ADVANCE CARE PLANNING)
Advance Care Planning       Advance Care Planning  Code Status  In light of the patients advanced and terminal illness, we reviewed what the patients preferences for care would be at the very end of life.  The patient wishes to have a natural, peaceful death.  Along those lines, the patient does not wish to have CPR or other invasive treatments performed when her heart and/or breathing stops.  I communicated to the patient that a DNR order would be placed in her medical record to reflect this preference.  A DNR form was completed and will be scanned into EPIC.   I spent a total of 15 minutes engaging the patient in this advance care planning discussion.              Sofya Cage M.D

## 2022-01-31 NOTE — HPI
Cardiology consulted for NSTEMI.  Pt saw Dr. Perez, Cardiology, last week for chest pain sxs.  Stress test and echo advised.  She presented to ER with c/o dyspnea.  Troponin elevation to 14.  Ecg showed NSR, mild anterior ST depression.  No cp this am.  CXR with possible infiltrated, given antibiotics x one on admission to cover for possible pneumonia.    She has dementia; discussed with son at bedside.   Problem: Pain  Goal: #Acceptable pain level achieved/maintained at rest using NRS/Faces  This goal is used for patients who can self-report.  Acceptable means the level is at or below the identified comfort/function goal.   Outcome: Outcome Met, Continue evaluating goal progress toward completion  Patient shows no signs of pain behaviors and is resting comfortably. Will continue to monitor.     Problem: Delirium, Risk for  Goal: # No symptoms of delirium  Evaluate delirium symptoms under active problem when present  Outcome: Outcome Met, Continue evaluating goal progress toward completion  Patient is showing improved mentation this morning. He is more alert and is oriented to self. Will continue to monitor.

## 2022-01-31 NOTE — PROGRESS NOTES
Pharmacist Renal Dose Adjustment Note    Julia Schroeder is a 76 y.o. female being treated with the medication Famotidine    Patient Data:    Vital Signs (Most Recent):  Temp: 98.8 °F (37.1 °C) (01/30/22 1833)  Pulse: 91 (01/31/22 0945)  Resp: 16 (01/31/22 0945)  BP: (!) 94/59 (01/31/22 0945)  SpO2: (!) 94 % (01/31/22 0945)   Vital Signs (72h Range):  Temp:  [98.8 °F (37.1 °C)]   Pulse:  []   Resp:  [14-41]   BP: ()/(52-74)   SpO2:  [83 %-100 %]      Recent Labs   Lab 01/30/22 1944 01/31/22 0414   CREATININE 1.0 1.0     Serum creatinine: 1 mg/dL 01/31/22 0414  Estimated creatinine clearance: 43.2 mL/min    Medication:Famotidine dose: 20mg frequency bid will be changed to medication:Famotidine dose:20mg frequency:daily    Pharmacist's Name: Qi Dawkins  Pharmacist's Extension: 0712

## 2022-01-31 NOTE — HPI
76-year-old female patient admitted to the hospital for shortness of breath worsening the day of presentation.  Past medical history significant for dementia, HTN, hyperlipidemia, in IDDM
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
## 2022-01-31 NOTE — SUBJECTIVE & OBJECTIVE
Interval History:  No acute events overnight.  Patient weaned off of BiPAP.  Left heart catheterization was recommended by Cardiology however patient refused.     Review of Systems   Constitutional: Negative for fatigue and fever.   HENT: Negative for sinus pressure.    Eyes: Negative for visual disturbance.   Respiratory: Positive for shortness of breath (improved).    Cardiovascular: Negative for chest pain.   Gastrointestinal: Negative for nausea and vomiting.   Genitourinary: Negative for difficulty urinating.   Musculoskeletal: Negative for back pain.   Skin: Negative for rash.   Neurological: Negative for headaches.   Psychiatric/Behavioral: Negative for confusion.     Objective:     Vital Signs (Most Recent):  Temp: 98.8 °F (37.1 °C) (01/30/22 1833)  Pulse: 91 (01/31/22 0945)  Resp: 16 (01/31/22 0945)  BP: (!) 94/59 (01/31/22 0945)  SpO2: (!) 94 % (01/31/22 0945) Vital Signs (24h Range):  Temp:  [98.8 °F (37.1 °C)] 98.8 °F (37.1 °C)  Pulse:  [] 91  Resp:  [14-41] 16  SpO2:  [83 %-100 %] 94 %  BP: ()/(52-74) 94/59     Weight: 66.7 kg (147 lb)  Body mass index is 27.78 kg/m².    Intake/Output Summary (Last 24 hours) at 1/31/2022 1206  Last data filed at 1/31/2022 0830  Gross per 24 hour   Intake 2050 ml   Output 1450 ml   Net 600 ml      Physical Exam  Constitutional:       General: She is not in acute distress.     Appearance: She is well-developed. She is not diaphoretic.   HENT:      Head: Normocephalic and atraumatic.   Eyes:      Pupils: Pupils are equal, round, and reactive to light.   Cardiovascular:      Rate and Rhythm: Normal rate and regular rhythm.      Heart sounds: Normal heart sounds. No murmur heard.  No friction rub. No gallop.    Pulmonary:      Effort: Pulmonary effort is normal. No respiratory distress.      Breath sounds: No stridor. Rales present. No wheezing.   Abdominal:      General: Bowel sounds are normal. There is no distension.      Palpations: Abdomen is soft. There is  no mass.      Tenderness: There is no abdominal tenderness. There is no guarding.   Musculoskeletal:      Right lower leg: Edema present.      Left lower leg: Edema present.   Skin:     General: Skin is warm.      Findings: No erythema.   Neurological:      Mental Status: She is alert and oriented to person, place, and time.         Significant Labs: All pertinent labs within the past 24 hours have been reviewed.    Significant Imaging: I have reviewed all pertinent imaging results/findings within the past 24 hours.

## 2022-01-31 NOTE — ED NOTES
Pt has increased confusion, diaphoretic, and reports increased SOB. Pt's O2 75% on 4L. Respiratory called, MD notified. Repeat ABG order placed.

## 2022-02-01 NOTE — PROGRESS NOTES
[facility]  Cardiology  Progress Note    Patient Name: Julia Schroeder  MRN: [id number]  Admission Date: 1/30/2022  Hospital Length of Stay: 2 days  Code Status: DNR   Attending Physician: Malik Mcnamara MD   Primary Care Physician: Devon Lovell MD  Expected Discharge Date:   Principal Problem:NSTEMI (non-ST elevated myocardial infarction)    Subjective:       HPI:  Cardiology consulted for NSTEMI.  Pt saw Dr. Perez, Cardiology, last week for chest pain sxs.  Stress test and echo advised.  She presented to ER with c/o dyspnea.  Troponin elevation to 14.  Ecg showed NSR, mild anterior ST depression.  No cp this am.  CXR with possible infiltrated, given antibiotics x one on admission to cover for possible pneumonia.    She has dementia; discussed with son at bedside.      Hospital Course:   2/1/22: Pt seen and examined this am.  No CP sxs. Has some dyspnea, but she also states she feels ok.  Labs reviewed.  Troponin peaked.  Echo EF 45%, WMA, mod MR, mod PHTN, mild AS, mild-mod TR.          Review of Systems   Constitutional: Positive for malaise/fatigue.   HENT: Negative.    Eyes: Negative.    Cardiovascular: Positive for dyspnea on exertion.   Respiratory: Positive for shortness of breath.    Endocrine: Negative.    Hematologic/Lymphatic: Negative.    Skin: Negative.    Musculoskeletal: Negative.    Gastrointestinal: Negative.    Genitourinary: Negative.    Neurological: Positive for weakness.   Psychiatric/Behavioral: Positive for memory loss.   Allergic/Immunologic: Negative.      Objective:     Vital Signs (Most Recent):  Temp: 98.4 °F (36.9 °C) (02/01/22 0841)  Pulse: 99 (02/01/22 0841)  Resp: 16 (02/01/22 0841)  BP: 113/63 (02/01/22 0841)  SpO2: (!) 93 % (02/01/22 0841) Vital Signs (24h Range):  Temp:  [97.9 °F (36.6 °C)-99 °F (37.2 °C)] 98.4 °F (36.9 °C)  Pulse:  [] 99  Resp:  [15-36] 16  SpO2:  [88 %-99 %] 93 %  BP: ()/(51-74) 113/63     Weight: 66.1 kg (145 lb 11.6 oz)  Body mass index is  27.53 kg/m².     SpO2: (!) 93 %  O2 Device (Oxygen Therapy): nasal cannula      Intake/Output Summary (Last 24 hours) at 2/1/2022 1051  Last data filed at 2/1/2022 0830  Gross per 24 hour   Intake 1292.52 ml   Output 1725 ml   Net -432.48 ml       Lines/Drains/Airways     Drain                 Urethral Catheter 01/30/22 2250 Latex 16 Fr. 1 day          Peripheral Intravenous Line                 Peripheral IV - Single Lumen 01/30/22 2226 20 G Anterior;Left Forearm 1 day                Physical Exam  Vitals and nursing note reviewed.   Constitutional:       General: She is active. She is not in acute distress.Vital signs are normal.      Appearance: Normal appearance. She is well-developed and well-nourished. She is not ill-appearing, sickly-appearing or diaphoretic.   HENT:      Head: Normocephalic.   Neck:      Thyroid: No thyromegaly.      Vascular: Normal carotid pulses. No carotid bruit, hepatojugular reflux or JVD.   Cardiovascular:      Rate and Rhythm: Normal rate and regular rhythm.      Chest Wall: PMI is not displaced.      Pulses: Normal pulses.           Radial pulses are 2+ on the right side and 2+ on the left side.      Heart sounds: Normal heart sounds, S1 normal and S2 normal. No murmur heard.  No friction rub. No gallop.    Pulmonary:      Effort: Pulmonary effort is normal.      Breath sounds: Examination of the right-lower field reveals rales. Examination of the left-lower field reveals rales. Rales present. No wheezing.   Abdominal:      General: Bowel sounds are normal. There is no ascites or abdominal bruit. Aorta is normal.      Palpations: Abdomen is soft. There is no hepatomegaly, splenomegaly, mass or pulsatile liver.      Tenderness: There is no abdominal tenderness.   Musculoskeletal:         General: No edema.      Cervical back: Neck supple.   Lymphadenopathy:      Cervical: No cervical adenopathy.   Skin:     General: Skin is warm.   Neurological:      Mental Status: She is alert.    Psychiatric:         Mood and Affect: Mood and affect normal.         Behavior: Behavior normal. Behavior is cooperative.         Significant Labs:   ABG:   Recent Labs   Lab 01/30/22 2207   PH 7.321*   PCO2 42.1   HCO3 21.7*   POCSATURATED 89*   BE -4   , Blood Culture:   Recent Labs   Lab 01/30/22 1945   LABBLOO No Growth to date  No Growth to date  No Growth to date  No Growth to date   , BMP:   Recent Labs   Lab 01/30/22 1944 01/31/22  0414   * 207*    138   K 4.3 3.6    104   CO2 23 21*   BUN 21 23   CREATININE 1.0 1.0   CALCIUM 8.8 8.4*   MG  --  1.8   , CMP   Recent Labs   Lab 01/30/22 1944 01/31/22 0414    138   K 4.3 3.6    104   CO2 23 21*   * 207*   BUN 21 23   CREATININE 1.0 1.0   CALCIUM 8.8 8.4*   PROT 6.9 6.7   ALBUMIN 3.5 3.3*   BILITOT 0.4 0.4   ALKPHOS 52* 69   AST 55* 85*   ALT 23 34   ANIONGAP 12 13   ESTGFRAFRICA >60 >60   EGFRNONAA 56* 56*   , CBC   Recent Labs   Lab 01/31/22 0414 01/31/22 0414 01/31/22 1219 01/31/22  1219 02/01/22  0831   WBC 12.92*  --  9.54  --  8.75   HGB 12.4  --  12.5  --  11.6*   HCT 38.8   < > 43.3   < > 36.2*     --  144*  --  172    < > = values in this interval not displayed.   , INR   Recent Labs   Lab 01/30/22 2125   INR 1.0   , Lipid Panel No results for input(s): CHOL, HDL, LDLCALC, TRIG, CHOLHDL in the last 48 hours. and Troponin   Recent Labs   Lab 01/31/22  0150 01/31/22  1219 02/01/22  0831   TROPONINI 14.279* 21.572* 15.496*       Significant Imaging: Echocardiogram:   Transthoracic echo (TTE) complete (Cupid Only):   Results for orders placed or performed during the hospital encounter of 01/30/22   Echo   Result Value Ref Range    BSA 1.69 m2    TDI SEPTAL 0.08 m/s    LV LATERAL E/E' RATIO 20.20 m/s    LV SEPTAL E/E' RATIO 12.63 m/s    LA WIDTH 3.33 cm    IVC diameter 1.93 cm    Left Ventricular Outflow Tract Mean Velocity 0.57061719077603 cm/s    Left Ventricular Outflow Tract Mean Gradient 1.26  mmHg    TDI LATERAL 0.05 m/s    LVIDd 4.15 3.5 - 6.0 cm    IVS 1.30 (A) 0.6 - 1.1 cm    Posterior Wall 1.03 0.6 - 1.1 cm    Ao root annulus 2.04 cm    LVIDs 3.32 2.1 - 4.0 cm    FS 20 28 - 44 %    LA volume 29.64 cm3    Sinus 2.75 cm    STJ 2.10 cm    Ascending aorta 2.29 cm    LV mass 167.52 g    LA size 2.75 cm    TAPSE 1.16 cm    Left Ventricle Relative Wall Thickness 0.50 cm    AV mean gradient 5 mmHg    AV valve area 1.54 cm2    AV Velocity Ratio 0.49     AV index (prosthetic) 0.54     MV mean gradient 2 mmHg    MV valve area p 1/2 method 4.32 cm2    MV valve area by continuity eq 1.72 cm2    E/A ratio 1.28     Mean e' 0.07 m/s    E wave deceleration time 175.017181792049641 msec    IVRT 53.851599856398834 msec    LVOT diameter 1.90 cm    LVOT area 2.8 cm2    LVOT peak mode 0.72 m/s    LVOT peak VTI 15.80 cm    Ao peak mode 1.48 m/s    Ao VTI 29.0 cm    RVOT peak mode 0.51 m/s    RVOT peak VTI 9.3 cm    Mr max mode 5.22 m/s    LVOT stroke volume 44.77 cm3    AV peak gradient 9 mmHg    MV peak gradient 4 mmHg    PV mean gradient 0.55 mmHg    E/E' ratio 15.54 m/s    MV Peak E Mode 1.01 m/s    TR Max Mode 3.50 m/s    MV VTI 26.1 cm    MV stenosis pressure 1/2 time 50.546170839129090 ms    MV Peak A Mode 0.79 m/s    LV Systolic Volume 44.93 mL    LV Systolic Volume Index 27.1 mL/m2    LV Diastolic Volume 76.58 mL    LV Diastolic Volume Index 46.13 mL/m2    LA Volume Index 17.9 mL/m2    LV Mass Index 101 g/m2    Echo EF Estimated 41 %    RA Major Axis 3.78 cm    Left Atrium Minor Axis 3.22 cm    Left Atrium Major Axis 4.66 cm    Triscuspid Valve Regurgitation Peak Gradient 49 mmHg    RA Width 2.88 cm    Right Atrial Pressure (from IVC) 8 mmHg    EF 45 %    TV rest pulmonary artery pressure 57 mmHg    Narrative    · The left ventricle is normal in size with concentric hypertrophy and   mildly decreased systolic function.  · The estimated ejection fraction is 45%.  · Normal right ventricular size with normal right  ventricular systolic   function.  · Grade II left ventricular diastolic dysfunction.  · Mild to moderate tricuspid regurgitation.  · Moderate mitral regurgitation.  · There is mild aortic valve stenosis.  · Aortic valve area is 1.54 cm2; peak velocity is 1.48 m/s; mean gradient   is 5 mmHg.  · Trivial pericardial effusion.  · There are segmental left ventricular wall motion abnormalities.  · The estimated PA systolic pressure is 57 mmHg.  · There is pulmonary hypertension.  · Intermediate central venous pressure (8 mmHg).        Assessment and Plan:     DISCUSSED CONDITION WITH PT/SON.  PT YESTERDAY OPTED FOR MED MGT OF NSTEMI/CHF AFTER DISCUSSING WITH FAMILY.  HAS DEMENTIA.  NOW AGREEABLE TO ALL OPTIONS.  DISCUSSED WITH DR. BOOTHE, PRIMARY CARDIOLOGIST.  FOR NOW RECOMMEND OPTIMAL MEDICAL TX.  PLAVIX LOAD.  IV HEPARIN GTT FOR NOW.  STATIN.  NTG PATCH/RANEXA.  ADD SOME IV LASIX FOR FEW DOSES.  REPEAT LABS IN AM.      * NSTEMI (non-ST elevated myocardial infarction)  See management plan detailed above.       Unstable angina  See management plan detailed above.     Acute congestive heart failure  Echocardiogram.  Diurese as needed.      Pulmonary infiltrate  Per hospital med mgt.    Dementia without behavioral disturbance  Per hospital med mgt.     Hyperlipidemia associated with type 2 diabetes mellitus  Statin tx.        VTE Risk Mitigation (From admission, onward)         Ordered     heparin 25,000 units in dextrose 5% 250 mL (100 units/mL) infusion LOW INTENSITY nomogram - [facility]  Continuous        Question Answer Comment   Heparin Infusion Adjustment (DO NOT MODIFY ANSWER) [email or web address]\epic\Images\Pharmacy\HeparinInfusions\heparin LOW INTENSITY nomogram for [facility] [id number]    Begin at (in units/kg/hr) 12 01/30/22 2043     heparin 25,000 units in dextrose 5% (100 units/ml) IV bolus from bag - ADDITIONAL PRN BOLUS - 60 units/kg (max bolus 4000 units)  As needed (PRN)        Question:  Heparin Infusion Adjustment  (DO NOT MODIFY ANSWER)  Answer:  [email or web address]\epic\Images\Pharmacy\HeparinInfusions\heparin LOW INTENSITY nomogram for [facility] [id number]    01/30/22 2043     heparin 25,000 units in dextrose 5% (100 units/ml) IV bolus from bag - ADDITIONAL PRN BOLUS - 30 units/kg (max bolus 4000 units)  As needed (PRN)        Question:  Heparin Infusion Adjustment (DO NOT MODIFY ANSWER)  Answer:  [email or web address]\Images\Pharmacy\HeparinInfusions\heparin LOW INTENSITY nomogram for [facility] [id number].pdf    01/30/22 2043     Place sequential compression device  Until discontinued         01/30/22 2056                Abhilash Deal MD  Cardiology  [facility]

## 2022-02-01 NOTE — ASSESSMENT & PLAN NOTE
Cardiac monitoring.  Declined left heart catheterization.  IV heparin monitor PTT.  Aspirin statin.    2/1 continue cardiac monitoring.  Aspirin statin Plavix IV heparin monitor PTT continue nitro patch and Ranexa.

## 2022-02-01 NOTE — SUBJECTIVE & OBJECTIVE
Interval History: pt agreeable to [facility]. Will continue to diurese. NPO at midnight and possible heart cath tomorrow.     Review of Systems   Constitutional: Negative for fatigue and fever.   HENT: Negative for sinus pressure.    Eyes: Negative for visual disturbance.   Respiratory: Positive for shortness of breath (improved).    Cardiovascular: Negative for chest pain.   Gastrointestinal: Negative for nausea and vomiting.   Genitourinary: Negative for difficulty urinating.   Musculoskeletal: Negative for back pain.   Skin: Negative for rash.   Neurological: Negative for headaches.   Psychiatric/Behavioral: Negative for confusion.     Objective:     Vital Signs (Most Recent):  Temp: 98.3 °F (36.8 °C) (02/01/22 1211)  Pulse: 96 (02/01/22 1211)  Resp: 16 (02/01/22 1211)  BP: (!) 91/58 (02/01/22 1211)  SpO2: 95 % (02/01/22 1211) Vital Signs (24h Range):  Temp:  [97.9 °F (36.6 °C)-99 °F (37.2 °C)] 98.3 °F (36.8 °C)  Pulse:  [] 96  Resp:  [15-36] 16  SpO2:  [88 %-98 %] 95 %  BP: ()/(51-74) 91/58     Weight: 66.1 kg (145 lb 11.6 oz)  Body mass index is 27.53 kg/m².    Intake/Output Summary (Last 24 hours) at 2/1/2022 1223  Last data filed at 2/1/2022 0830  Gross per 24 hour   Intake 1052.52 ml   Output 1725 ml   Net -672.48 ml      Physical Exam  Constitutional:       General: She is not in acute distress.     Appearance: She is well-developed. She is not diaphoretic.   HENT:      Head: Normocephalic and atraumatic.   Eyes:      Pupils: Pupils are equal, round, and reactive to light.   Cardiovascular:      Rate and Rhythm: Normal rate and regular rhythm.      Heart sounds: Normal heart sounds. No murmur heard.  No friction rub. No gallop.    Pulmonary:      Effort: Pulmonary effort is normal. No respiratory distress.      Breath sounds: No stridor. Rales present. No wheezing.   Abdominal:      General: Bowel sounds are normal. There is no distension.      Palpations: Abdomen is soft. There is no mass.       Tenderness: There is no abdominal tenderness. There is no guarding.   Musculoskeletal:      Right lower leg: Edema present.      Left lower leg: Edema present.   Skin:     General: Skin is warm.      Findings: No erythema.   Neurological:      Mental Status: She is alert and oriented to person, place, and time.         Significant Labs: All pertinent labs within the past 24 hours have been reviewed.    Significant Imaging: I have reviewed all pertinent imaging results/findings within the past 24 hours.

## 2022-02-01 NOTE — ASSESSMENT & PLAN NOTE
Patient with Hypoxic Respiratory failure which is Acute.  she is not on home oxygen. Supplemental oxygen was provided and noted-  .   Signs/symptoms of respiratory failure include- tachypnea and increased work of breathing. Contributing diagnoses includes - CHF Labs and images were reviewed. Patient Has recent ABG, which has been reviewed. Will treat underlying causes and adjust management of respiratory failure as follows- RX myocardial infarction.  O2 target sat 92-94%.  Avoid fluid overload.  2/1 medical treatment of NSTEMI.  Diuresis.

## 2022-02-01 NOTE — PLAN OF CARE
Pt AAO x2.  VSS.  Pt remained afebrile throughout this shift.   IV hearin gtts administered per order.   Pt remained free of falls this shift.   Pt c/o of no pain this shift.  Blood glucose monitored, corrected via SSI.  Plan of care reviewed. Patient verbalizes understanding.   Pt moving/turing q 2 hours. Frequent weight shifting encouraged.  Patient NSR on monitor.   Bed low, side rails up x 2, wheels locked, call light in reach.   Bed alarm maintained for safety.   Patient instructed to call for assistance.   Hourly rounding completed.   24 hour chart check completed.  Will continue to monitor.

## 2022-02-01 NOTE — PLAN OF CARE
Patient current with [facility]       02/01/22 6023   Post-Acute Status   Post-Acute Authorization [facility]   Home Health Status Referrals Sent

## 2022-02-01 NOTE — ASSESSMENT & PLAN NOTE
Patient is identified as having Systolic (HFrEF) heart failure that is Acute. CHF is currently uncontrolled due to Pulmonary edema/pleural effusion on CXR. Latest ECHO performed and demonstrates- Results for orders placed during the hospital encounter of 01/30/22    Echo    Interpretation Summary  · The left ventricle is normal in size with concentric hypertrophy and mildly decreased systolic function.  · The estimated ejection fraction is 45%.  · Normal right ventricular size with normal right ventricular systolic function.  · Grade II left ventricular diastolic dysfunction.  · Mild to moderate tricuspid regurgitation.  · Moderate mitral regurgitation.  · There is mild aortic valve stenosis.  · Aortic valve area is 1.54 cm2; peak velocity is 1.48 m/s; mean gradient is 5 mmHg.  · Trivial pericardial effusion.  · There are segmental left ventricular wall motion abnormalities.  · The estimated PA systolic pressure is 57 mmHg.  · There is pulmonary hypertension.  · Intermediate central venous pressure (8 mmHg).  . Continue Furosemide and monitor clinical status closely. Monitor on telemetry. Patient is off CHF pathway.  Monitor strict Is&Os and daily weights.  Place on fluid restriction of 1.5 L. Continue to stress to patient importance of self efficacy and  on diet for CHF. Last BNP reviewed- and noted below   Recent Labs   Lab 01/30/22 1944   *   .  2/138 NSTEMI.  IV Lasix strict I&Os.

## 2022-02-01 NOTE — HOSPITAL COURSE
2/1/22: Pt seen and examined this am.  No CP sxs. Has some dyspnea, but she also states she feels ok.  Labs reviewed.  Troponin peaked.  Echo EF 45%, WMA, mod MR, mod PHTN, mild AS, mild-mod TR.    2/2/22: PT SEEN AND EXAMINED.  DENIED CP OR DYSPNEA THIS AM.  LABS REVIEWED: BNP HIGHER.  STARTED ON LASIX YESTERDAY.  FAMILY AT BEDSIDE REQUESTING CARDIAC CATH.    2/3/22:  PT SEEN AND EXAMINED THIS AM.  NO ACUTE ISSUES. DENIES DYSPNEA, SOME OCCASIONAL CP.  SHE WANTS TO GO HOME.

## 2022-02-01 NOTE — PROGRESS NOTES
Inpatient heart failure nurse visit    Spoke with pt and Yair, son at bedside. Pt has distory of dementia. Yair informed lives with pt. I have reviewed information about the [facility] heart failure management program with the patient and family including a 48 hour f/u call after discharge to check on patient and a f/u clinic appt to be scheduled  within a week after discharge. Provided brochure with contact information and signs/symptoms to watch for and report to heart failure staff nurses after discharge. Hospital f/u appt scheduled with Dr. Bailey.    CHF education reviewed with pt in detail  Reviewed and provided written brochure, Home care guide for heart failure patients.   Recommend 2 gram sodium restriction and 1500cc fluid restriction.  Encourage physical activity with graded exercise program.  Requested patient to weigh themselves daily, and to notify us if their weight increases by more than 2 lbs in 1 day or 5 lbs in 1 week.

## 2022-02-01 NOTE — SUBJECTIVE & OBJECTIVE
Review of Systems   Constitutional: Positive for malaise/fatigue.   HENT: Negative.    Eyes: Negative.    Cardiovascular: Positive for dyspnea on exertion.   Respiratory: Positive for shortness of breath.    Endocrine: Negative.    Hematologic/Lymphatic: Negative.    Skin: Negative.    Musculoskeletal: Negative.    Gastrointestinal: Negative.    Genitourinary: Negative.    Neurological: Positive for weakness.   Psychiatric/Behavioral: Positive for memory loss.   Allergic/Immunologic: Negative.      Objective:     Vital Signs (Most Recent):  Temp: 98.4 °F (36.9 °C) (02/01/22 0841)  Pulse: 99 (02/01/22 0841)  Resp: 16 (02/01/22 0841)  BP: 113/63 (02/01/22 0841)  SpO2: (!) 93 % (02/01/22 0841) Vital Signs (24h Range):  Temp:  [97.9 °F (36.6 °C)-99 °F (37.2 °C)] 98.4 °F (36.9 °C)  Pulse:  [] 99  Resp:  [15-36] 16  SpO2:  [88 %-99 %] 93 %  BP: ()/(51-74) 113/63     Weight: 66.1 kg (145 lb 11.6 oz)  Body mass index is 27.53 kg/m².     SpO2: (!) 93 %  O2 Device (Oxygen Therapy): nasal cannula      Intake/Output Summary (Last 24 hours) at 2/1/2022 1051  Last data filed at 2/1/2022 0830  Gross per 24 hour   Intake 1292.52 ml   Output 1725 ml   Net -432.48 ml       Lines/Drains/Airways     Drain                 Urethral Catheter 01/30/22 2250 Latex 16 Fr. 1 day          Peripheral Intravenous Line                 Peripheral IV - Single Lumen 01/30/22 2226 20 G Anterior;Left Forearm 1 day                Physical Exam  Vitals and nursing note reviewed.   Constitutional:       General: She is active. She is not in acute distress.Vital signs are normal.      Appearance: Normal appearance. She is well-developed and well-nourished. She is not ill-appearing, sickly-appearing or diaphoretic.   HENT:      Head: Normocephalic.   Neck:      Thyroid: No thyromegaly.      Vascular: Normal carotid pulses. No carotid bruit, hepatojugular reflux or JVD.   Cardiovascular:      Rate and Rhythm: Normal rate and regular rhythm.       Chest Wall: PMI is not displaced.      Pulses: Normal pulses.           Radial pulses are 2+ on the right side and 2+ on the left side.      Heart sounds: Normal heart sounds, S1 normal and S2 normal. No murmur heard.  No friction rub. No gallop.    Pulmonary:      Effort: Pulmonary effort is normal.      Breath sounds: Examination of the right-lower field reveals rales. Examination of the left-lower field reveals rales. Rales present. No wheezing.   Abdominal:      General: Bowel sounds are normal. There is no ascites or abdominal bruit. Aorta is normal.      Palpations: Abdomen is soft. There is no hepatomegaly, splenomegaly, mass or pulsatile liver.      Tenderness: There is no abdominal tenderness.   Musculoskeletal:         General: No edema.      Cervical back: Neck supple.   Lymphadenopathy:      Cervical: No cervical adenopathy.   Skin:     General: Skin is warm.   Neurological:      Mental Status: She is alert.   Psychiatric:         Mood and Affect: Mood and affect normal.         Behavior: Behavior normal. Behavior is cooperative.         Significant Labs:   ABG:   Recent Labs   Lab 01/30/22 2207   PH 7.321*   PCO2 42.1   HCO3 21.7*   POCSATURATED 89*   BE -4   , Blood Culture:   Recent Labs   Lab 01/30/22 1945   LABBLOO No Growth to date  No Growth to date  No Growth to date  No Growth to date   , BMP:   Recent Labs   Lab 01/30/22 1944 01/31/22 0414   * 207*    138   K 4.3 3.6    104   CO2 23 21*   BUN 21 23   CREATININE 1.0 1.0   CALCIUM 8.8 8.4*   MG  --  1.8   , CMP   Recent Labs   Lab 01/30/22 1944 01/31/22 0414    138   K 4.3 3.6    104   CO2 23 21*   * 207*   BUN 21 23   CREATININE 1.0 1.0   CALCIUM 8.8 8.4*   PROT 6.9 6.7   ALBUMIN 3.5 3.3*   BILITOT 0.4 0.4   ALKPHOS 52* 69   AST 55* 85*   ALT 23 34   ANIONGAP 12 13   ESTGFRAFRICA >60 >60   EGFRNONAA 56* 56*   , CBC   Recent Labs   Lab 01/31/22 0414 01/31/22  0414 01/31/22  1219 01/31/22  1219  02/01/22  0831   WBC 12.92*  --  9.54  --  8.75   HGB 12.4  --  12.5  --  11.6*   HCT 38.8   < > 43.3   < > 36.2*     --  144*  --  172    < > = values in this interval not displayed.   , INR   Recent Labs   Lab 01/30/22 2125   INR 1.0   , Lipid Panel No results for input(s): CHOL, HDL, LDLCALC, TRIG, CHOLHDL in the last 48 hours. and Troponin   Recent Labs   Lab 01/31/22  0150 01/31/22  1219 02/01/22  0831   TROPONINI 14.279* 21.572* 15.496*       Significant Imaging: Echocardiogram:   Transthoracic echo (TTE) complete (Cupid Only):   Results for orders placed or performed during the hospital encounter of 01/30/22   Echo   Result Value Ref Range    BSA 1.69 m2    TDI SEPTAL 0.08 m/s    LV LATERAL E/E' RATIO 20.20 m/s    LV SEPTAL E/E' RATIO 12.63 m/s    LA WIDTH 3.33 cm    IVC diameter 1.93 cm    Left Ventricular Outflow Tract Mean Velocity 0.74738558488876 cm/s    Left Ventricular Outflow Tract Mean Gradient 1.26 mmHg    TDI LATERAL 0.05 m/s    LVIDd 4.15 3.5 - 6.0 cm    IVS 1.30 (A) 0.6 - 1.1 cm    Posterior Wall 1.03 0.6 - 1.1 cm    Ao root annulus 2.04 cm    LVIDs 3.32 2.1 - 4.0 cm    FS 20 28 - 44 %    LA volume 29.64 cm3    Sinus 2.75 cm    STJ 2.10 cm    Ascending aorta 2.29 cm    LV mass 167.52 g    LA size 2.75 cm    TAPSE 1.16 cm    Left Ventricle Relative Wall Thickness 0.50 cm    AV mean gradient 5 mmHg    AV valve area 1.54 cm2    AV Velocity Ratio 0.49     AV index (prosthetic) 0.54     MV mean gradient 2 mmHg    MV valve area p 1/2 method 4.32 cm2    MV valve area by continuity eq 1.72 cm2    E/A ratio 1.28     Mean e' 0.07 m/s    E wave deceleration time 175.627953960890720 msec    IVRT 53.135749718795818 msec    LVOT diameter 1.90 cm    LVOT area 2.8 cm2    LVOT peak wilman 0.72 m/s    LVOT peak VTI 15.80 cm    Ao peak wilman 1.48 m/s    Ao VTI 29.0 cm    RVOT peak wilman 0.51 m/s    RVOT peak VTI 9.3 cm    Mr max wilman 5.22 m/s    LVOT stroke volume 44.77 cm3    AV peak gradient 9 mmHg    MV peak  gradient 4 mmHg    PV mean gradient 0.55 mmHg    E/E' ratio 15.54 m/s    MV Peak E Mode 1.01 m/s    TR Max Mode 3.50 m/s    MV VTI 26.1 cm    MV stenosis pressure 1/2 time 50.811104899073978 ms    MV Peak A Mode 0.79 m/s    LV Systolic Volume 44.93 mL    LV Systolic Volume Index 27.1 mL/m2    LV Diastolic Volume 76.58 mL    LV Diastolic Volume Index 46.13 mL/m2    LA Volume Index 17.9 mL/m2    LV Mass Index 101 g/m2    Echo EF Estimated 41 %    RA Major Axis 3.78 cm    Left Atrium Minor Axis 3.22 cm    Left Atrium Major Axis 4.66 cm    Triscuspid Valve Regurgitation Peak Gradient 49 mmHg    RA Width 2.88 cm    Right Atrial Pressure (from IVC) 8 mmHg    EF 45 %    TV rest pulmonary artery pressure 57 mmHg    Narrative    · The left ventricle is normal in size with concentric hypertrophy and   mildly decreased systolic function.  · The estimated ejection fraction is 45%.  · Normal right ventricular size with normal right ventricular systolic   function.  · Grade II left ventricular diastolic dysfunction.  · Mild to moderate tricuspid regurgitation.  · Moderate mitral regurgitation.  · There is mild aortic valve stenosis.  · Aortic valve area is 1.54 cm2; peak velocity is 1.48 m/s; mean gradient   is 5 mmHg.  · Trivial pericardial effusion.  · There are segmental left ventricular wall motion abnormalities.  · The estimated PA systolic pressure is 57 mmHg.  · There is pulmonary hypertension.  · Intermediate central venous pressure (8 mmHg).

## 2022-02-01 NOTE — PROGRESS NOTES
[facility] - Med Surg 3  [facility] Medicine  Progress Note    Patient Name: Julia Schroeder  MRN: [id number]  Patient Class: IP- Inpatient   Admission Date: 1/30/2022  Length of Stay: 2 days  Attending Physician: Malik Mcnamara MD  Primary Care Provider: Devon Lovell MD        Subjective:     Principal Problem:NSTEMI (non-ST elevated myocardial infarction)        HPI:  Julia Schroeder is a 76 y.o. Black or  female with PMH significant for dementia, HTN, hyperlipidemia, in IDDM, presented to the ED complaining of shortness of breath.  Patient is a very poor historian due to dementia.  No family at the bedside.  Chart review reveals that patient has seen Dr. Perez in the cardiology clinic three days ago for intermittent chest pain.  Patient is scheduled for outpatient nuclear stress test and echocardiogram, that have not been done yet.  Initially found to be hypotensive and hypoxic, currently on 4 L O2 N/C, to maintain saturations in the low to mid 90s.  Blood pressure was low per EMS, in the /74.  Currently receiving 30 mL/kg bolus IV fluids.  Denies chest pain.  Troponin elevated at 11. EKG reveals mild ST depression in leads V3, V4, V5.  Started on heparin drip.  .  CXR reveals increased vascular congestion, with opacity in the right lung.  Denies fever, chills.  Received IV Rocephin empirically.  WBC 92640, 0% bands, lactic acid 1.9.    Admitting diagnosis:  NSTEMI.  Acute hypoxemic respiratory failure.  Right lower lobe infiltrate (? pneumonia versus CHF).  Dementia        Overview/Hospital Course:  1/31:  Patient able to be weaned off of BiPAP.  Currently being diuresed.  Troponin trended up to 14.  Cardiology recommended left heart catheterization however patient refused.  Family currently in discussion with patient on proceeding with left heart catheterization.  Continue heparin.  Start cardiac diet.  NPO at midnight should patient agree to left heart catheterization.  2/1: pt agreeable to [facility]. Will  continue to diurese. NPO at midnight and possible heart cath tomorrow.       Interval History: pt agreeable to [facility]. Will continue to diurese. NPO at midnight and possible heart cath tomorrow.     Review of Systems   Constitutional: Negative for fatigue and fever.   HENT: Negative for sinus pressure.    Eyes: Negative for visual disturbance.   Respiratory: Positive for shortness of breath (improved).    Cardiovascular: Negative for chest pain.   Gastrointestinal: Negative for nausea and vomiting.   Genitourinary: Negative for difficulty urinating.   Musculoskeletal: Negative for back pain.   Skin: Negative for rash.   Neurological: Negative for headaches.   Psychiatric/Behavioral: Negative for confusion.     Objective:     Vital Signs (Most Recent):  Temp: 98.3 °F (36.8 °C) (02/01/22 1211)  Pulse: 96 (02/01/22 1211)  Resp: 16 (02/01/22 1211)  BP: (!) 91/58 (02/01/22 1211)  SpO2: 95 % (02/01/22 1211) Vital Signs (24h Range):  Temp:  [97.9 °F (36.6 °C)-99 °F (37.2 °C)] 98.3 °F (36.8 °C)  Pulse:  [] 96  Resp:  [15-36] 16  SpO2:  [88 %-98 %] 95 %  BP: ()/(51-74) 91/58     Weight: 66.1 kg (145 lb 11.6 oz)  Body mass index is 27.53 kg/m².    Intake/Output Summary (Last 24 hours) at 2/1/2022 1223  Last data filed at 2/1/2022 0830  Gross per 24 hour   Intake 1052.52 ml   Output 1725 ml   Net -672.48 ml      Physical Exam  Constitutional:       General: She is not in acute distress.     Appearance: She is well-developed. She is not diaphoretic.   HENT:      Head: Normocephalic and atraumatic.   Eyes:      Pupils: Pupils are equal, round, and reactive to light.   Cardiovascular:      Rate and Rhythm: Normal rate and regular rhythm.      Heart sounds: Normal heart sounds. No murmur heard.  No friction rub. No gallop.    Pulmonary:      Effort: Pulmonary effort is normal. No respiratory distress.      Breath sounds: No stridor. Rales present. No wheezing.   Abdominal:      General: Bowel sounds are normal. There is  no distension.      Palpations: Abdomen is soft. There is no mass.      Tenderness: There is no abdominal tenderness. There is no guarding.   Musculoskeletal:      Right lower leg: Edema present.      Left lower leg: Edema present.   Skin:     General: Skin is warm.      Findings: No erythema.   Neurological:      Mental Status: She is alert and oriented to person, place, and time.         Significant Labs: All pertinent labs within the past 24 hours have been reviewed.    Significant Imaging: I have reviewed all pertinent imaging results/findings within the past 24 hours.      Assessment/Plan:      * NSTEMI (non-ST elevated myocardial infarction)  Troponin elevated at 11.  Mild ST depressions noted in the anterolateral leads.  Denies chest pain.  Continue heparin drip.  Dr. Montgomery with cardiology aware.  Keep NPO past midnight.  Received aspirin 325 mg p.o. x1 in the ED.    2/1:  Echo showed EF of 40%  Pt agreeable to [facility] now  Will continue to diurese   Plan for [facility] tomorrow per report  Npo at midnight       Acute congestive heart failure  Patient is identified as having unknwon heart failure that is Acute. CHF is currently uncontrolled due to Rales/crackles on pulmonary exam. Latest ECHO performed and demonstrates- No results found for this or any previous visit.  . Continue Furosemide and monitor clinical status closely. Monitor on telemetry. Patient is off CHF pathway.  Monitor strict Is&Os and daily weights.  Place on fluid restriction of 1.5 L. Continue to stress to patient importance of self efficacy and  on diet for CHF. Last BNP reviewed- and noted below   Recent Labs   Lab 01/30/22 1944   *   .    Increased vascular congestion on CXR.  Unable to administer IV diuretics due to hypotension requiring IV hydration.  Monitor closely for diuretic need.    Pulmonary infiltrate  Right lower lobe infiltrate, unclear if represents pneumonia versus increased vascular congestion.  Afebrile.  No  leukocytosis or bandemia.  Lactic acid within normal limits.  Received Rocephin IV one time dose in the ED.  BNP elevated, suggesting possibly vascular congestion/pulmonary edema.  However unable to administer IV diuretics as patient is receiving 30 mL/kg bolus IV fluids for hypotension in the ED/EMS.  Monitor closely.  Hopefully we can get some clarity by tomorrow if this is pneumonia versus CHF      1/31:   Procalcitonin normal  BNP elevated  Shortness of breath likely due to CHF    Acute hypoxemic respiratory failure  Patient with Hypoxic Respiratory failure which is Acute.  she is not on home oxygen. Supplemental oxygen was provided and noted-  .   Signs/symptoms of respiratory failure include- increased work of breathing. Contributing diagnoses includes - CHF Labs and images were reviewed. Patient Has not had a recent ABG. Will treat underlying causes and adjust management of respiratory failure as follows-     Hypoxemia likely due to pulmonary edema/CHF.  Continue supplemental oxygen to maintain saturations greater than 92%.  COVID-19 negative.    1/31:  Patient diuresing well  Weaned off of BiPAP  Stepped down from ICU    Dementia without behavioral disturbance         Hyperlipidemia associated with type 2 diabetes mellitus           VTE Risk Mitigation (From admission, onward)         Ordered     heparin 25,000 units in dextrose 5% 250 mL (100 units/mL) infusion LOW INTENSITY nomogram - [facility]  Continuous        Question Answer Comment   Heparin Infusion Adjustment (DO NOT MODIFY ANSWER) [email or web address]\epic\Images\Pharmacy\HeparinInfusions\heparin LOW INTENSITY nomogram for [facility] [id number]    Begin at (in units/kg/hr) 12        01/30/22 2043     heparin 25,000 units in dextrose 5% (100 units/ml) IV bolus from bag - ADDITIONAL PRN BOLUS - 60 units/kg (max bolus 4000 units)  As needed (PRN)        Question:  Heparin Infusion Adjustment (DO NOT MODIFY ANSWER)  Answer:   [email or web address]\[email or web address]\Images\Pharmacy\HeparinInfusions\heparin LOW INTENSITY nomogram for [facility] [id number]    01/30/22 2043     heparin 25,000 units in dextrose 5% (100 units/ml) IV bolus from bag - ADDITIONAL PRN BOLUS - 30 units/kg (max bolus 4000 units)  As needed (PRN)        Question:  Heparin Infusion Adjustment (DO NOT MODIFY ANSWER)  Answer:  \\[email or web address]\epic\Images\Pharmacy\HeparinInfusions\heparin LOW INTENSITY nomogram for [facility] [id number].pdf    01/30/22 2043     Place sequential compression device  Until discontinued         01/30/22 2056                Discharge Planning   ROGER:      Code Status: DNR   Is the patient medically ready for discharge?:     Reason for patient still in hospital (select all that apply): Patient trending condition  Discharge Plan A: Home Health                  Malik Mcnamara MD  Department of Hospital Medicine   [facility] - Med S[facility] 3

## 2022-02-01 NOTE — PLAN OF CARE
Pt AAOx3 intermit confusion and alzheimers. POC reviewed with pt. Pt verbalized understanding   Pt remains free of injuries and falls; fall precaution in place   SR on tele monitor. HR 90's  IV intact; infusing heparin at 18units   No C/O of pain  5L O2, cont pulse ox  Tele sitter   Bed low, side rails up x2, non slip socks in use, call light in reach   Reminded to call for assistance  [place] rounding complete will continue to monitor

## 2022-02-01 NOTE — ASSESSMENT & PLAN NOTE
Troponin elevated at 11.  Mild ST depressions noted in the anterolateral leads.  Denies chest pain.  Continue heparin drip.  Dr. Montgomery with cardiology aware.  Keep NPO past midnight.  Received aspirin 325 mg p.o. x1 in the ED.    2/1:  Echo showed EF of 40%  Pt agreeable to [facility] now  Will continue to diurese   Plan for [facility] tomorrow per report  Npo at midnight

## 2022-02-01 NOTE — PROGRESS NOTES
[facility] - Intensive Care ([facility])  Critical Care Medicine  Consult Note    Patient Name: Julia Schroeder  MRN: [id number]  Admission Date: 1/30/2022  Hospital Length of Stay: 2 days  Code Status: DNR  Attending Physician: Malik Mcnamara MD   Primary Care Provider: Devon Lovell MD   Principal Problem: NSTEMI (non-ST elevated myocardial infarction)    [unfilled]  Subjective:     HPI:  76-year-old female patient admitted to the hospital for shortness of breath worsening the day of presentation.  Past medical history significant for dementia, HTN, hyperlipidemia, in IDDM          Hospital/ICU Course:  O2 sat 94% on 2 liter/minute.  Afebrile.  Urine output 1.2 L. Troponin 14. EKG ABG chest x-ray reviewed.  On IV heparin for NSTEMI.  2/1 patient seen and examined.  O2 sat 95% on 4 liter/minute.  Afebrile.  On IV heparin.  No chest pain.      2/1 patient seen and examined.  O2 sat 95% on 4 liter/minute.  Afebrile.  On IV heparin.  No chest pain.    Review of Systems   Constitutional: Negative for chills and fever.   HENT: Negative for nosebleeds.    Eyes: Negative for discharge.   Respiratory: Positive for shortness of breath. Negative for choking.    Cardiovascular: Negative for chest pain.   Gastrointestinal: Negative for blood in stool.   Endocrine: Negative for cold intolerance.   Genitourinary: Negative for hematuria.   Musculoskeletal: Positive for arthralgias. Negative for neck stiffness.   Skin: Negative for rash.   Allergic/Immunologic: Negative for immunocompromised state.   Neurological: Positive for weakness. Negative for seizures.   Hematological: Negative for adenopathy.   Psychiatric/Behavioral: Positive for confusion. Negative for behavioral problems.     Objective:     Vital Signs (Most Recent):  Temp: 98.3 °F (36.8 °C) (02/01/22 1211)  Pulse: 96 (02/01/22 1211)  Resp: 16 (02/01/22 1211)  BP: (!) 91/58 (02/01/22 1211)  SpO2: 95 % (02/01/22 1211) Vital Signs (24h Range):  Temp:  [97.9 °F (36.6 °C)-99 °F (37.2 °C)]  98.3 °F (36.8 °C)  Pulse:  [] 96  Resp:  [15-32] 16  SpO2:  [88 %-98 %] 95 %  BP: ()/(54-74) 91/58     Weight: 66.1 kg (145 lb 11.6 oz)  Body mass index is 27.53 kg/m².      Intake/Output Summary (Last 24 hours) at 2/1/2022 1413  Last data filed at 2/1/2022 0830  Gross per 24 hour   Intake 1052.52 ml   Output 1725 ml   Net -672.48 ml       Physical Exam  Vitals and nursing note reviewed.   Constitutional:       General: She is not in acute distress.     Appearance: She is well-developed. She is ill-appearing.   HENT:      Head: Normocephalic and atraumatic.   Cardiovascular:      Rate and Rhythm: Normal rate.   Pulmonary:      Effort: Pulmonary effort is normal. No respiratory distress.      Breath sounds: No stridor.   Abdominal:      Palpations: Abdomen is soft.      Tenderness: There is no abdominal tenderness.   Genitourinary:     Comments: Moya catheter  Musculoskeletal:         General: No deformity.      Cervical back: Neck supple.   Skin:     General: Skin is warm.   Neurological:      General: No focal deficit present.      Mental Status: She is alert. Mental status is at baseline.   Psychiatric:         Mood and Affect: Mood normal.         Behavior: Behavior normal.         Thought Content: Thought content normal.         Judgment: Judgment normal.         Vents:  Oxygen Concentration (%): 100 (01/31/22 0800)    Lines/Drains/Airways       Drain                   Urethral Catheter 01/30/22 2250 Latex 16 Fr. 1 day              Peripheral Intravenous Line                   Peripheral IV - Single Lumen 01/30/22 2226 20 G Anterior;Left Forearm 1 day                    Significant Labs:    CBC/Anemia Profile:  Recent Labs   Lab 01/31/22  0414 01/31/22  1219 02/01/22  0831   WBC 12.92* 9.54 8.75   HGB 12.4 12.5 11.6*   HCT 38.8 43.3 36.2*    144* 172   MCV 90 101* 92   RDW 12.8 13.0 13.0        Chemistries:  Recent Labs   Lab 01/30/22  1944 01/31/22  0414 02/01/22  0954    138 139   K  4.3 3.6 4.2    104 100   CO2 23 21* 19*   BUN 21 23 23   CREATININE 1.0 1.0 1.2   CALCIUM 8.8 8.4* 9.0   ALBUMIN 3.5 3.3*  --    PROT 6.9 6.7  --    BILITOT 0.4 0.4  --    ALKPHOS 52* 69  --    ALT 23 34  --    AST 55* 85*  --    MG  --  1.8  --      Results for TATO PELAEZ (MRN [id number]) as of 1/31/2022 12:41   Ref. Range 1/30/2022 19:44 1/31/2022 01:50   BNP Latest Ref Range: 0 - 99 pg/mL 292 (H)    Troponin I Latest Ref Range: 0.000 - 0.026 ng/mL 11.791 (H) 14.279 (H)          Ref. Range 1/30/2022 22:07   POC PH Latest Ref Range: 7.35 - 7.45  7.321 (L)   POC PCO2 Latest Ref Range: 35 - 45 mmHg 42.1   POC PO2 Latest Ref Range: 80 - 100 mmHg 61 (L)   POC BE Latest Ref Range: -2 to 2 mmol/L -4   POC HCO3 Latest Ref Range: 24 - 28 mmol/L 21.7 (L)   POC SATURATED O2 Latest Ref Range: 95 - 100 % 89 (L)   Sample Unknown ARTERIAL   DelSys Unknown NRB   Allens Test Unknown Pass   Site Unknown RR     EKG 01/30/2022      Normal sinus rhythm   ST and T wave abnormality, consider anterior ischemia   Abnormal ECG   When compared with ECG of 20-JAN-2022 14:30,   Left anterior fascicular block is no longer Present   ST now depressed in Inferior leads   ST now depressed in Anterior-lateral leads   T wave inversion now evident in Anterior leads       2D echo 01/31/2022    Show images for Echo    Summary    The left ventricle is normal in size with concentric hypertrophy and mildly decreased systolic function.  The estimated ejection fraction is 45%.  Normal right ventricular size with normal right ventricular systolic function.  Grade II left ventricular diastolic dysfunction.  Mild to moderate tricuspid regurgitation.  Moderate mitral regurgitation.  There is mild aortic valve stenosis.  Aortic valve area is 1.54 cm2; peak velocity is 1.48 m/s; mean gradient is 5 mmHg.  Trivial pericardial effusion.  There are segmental left ventricular wall motion abnormalities.  The estimated PA systolic pressure is 57 mmHg.  There is  pulmonary hypertension.  Intermediate central venous pressure (8 mmHg        Significant Imaging:   I have reviewed all pertinent imaging results/findings within the past 24 hours.  CXR: I have reviewed all pertinent results/findings within the past 24 hours and my personal findings are:         FINDINGS:  Right basilar consolidation concerning for early pneumonia.  Correlation is advised.  No left pleural fluid or pneumothorax.  Probable trace right pleural effusion.     The cardiac silhouette is normal in size. The hilar and mediastinal contours are unremarkable.     Bones are intact.     Impression:     Right basilar consolidation concerning for pneumonia.  Possible trace right pleural effusion.           ABG  Recent Labs   Lab 01/30/22  2207   PH 7.321*   PO2 61*   PCO2 42.1   HCO3 21.7*   BE -4     Assessment/Plan:     Neuro  Dementia without behavioral disturbance  DNR.  Palliative care consult    Pulmonary  Pulmonary infiltrate  1/31 monitor temp WBC.  Cultures negative.  2/1 afebrile.  No antibiotics.    Acute hypoxemic respiratory failure  Patient with Hypoxic Respiratory failure which is Acute.  she is not on home oxygen. Supplemental oxygen was provided and noted-  .   Signs/symptoms of respiratory failure include- tachypnea and increased work of breathing. Contributing diagnoses includes - CHF Labs and images were reviewed. Patient Has recent ABG, which has been reviewed. Will treat underlying causes and adjust management of respiratory failure as follows- RX myocardial infarction.  O2 target sat 92-94%.  Avoid fluid overload.  2/1 medical treatment of NSTEMI.  Diuresis.    Cardiac/Vascular  * NSTEMI (non-ST elevated myocardial infarction)  Cardiac monitoring.  Declined left heart catheterization.  IV heparin monitor PTT.  Aspirin statin.    2/1 continue cardiac monitoring.  Aspirin statin Plavix IV heparin monitor PTT continue nitro patch and Ranexa.    Acute congestive heart failure  Patient is  identified as having Systolic (HFrEF) heart failure that is Acute. CHF is currently uncontrolled due to Pulmonary edema/pleural effusion on CXR. Latest ECHO performed and demonstrates- Results for orders placed during the hospital encounter of 01/30/22    Echo    Interpretation Summary  · The left ventricle is normal in size with concentric hypertrophy and mildly decreased systolic function.  · The estimated ejection fraction is 45%.  · Normal right ventricular size with normal right ventricular systolic function.  · Grade II left ventricular diastolic dysfunction.  · Mild to moderate tricuspid regurgitation.  · Moderate mitral regurgitation.  · There is mild aortic valve stenosis.  · Aortic valve area is 1.54 cm2; peak velocity is 1.48 m/s; mean gradient is 5 mmHg.  · Trivial pericardial effusion.  · There are segmental left ventricular wall motion abnormalities.  · The estimated PA systolic pressure is 57 mmHg.  · There is pulmonary hypertension.  · Intermediate central venous pressure (8 mmHg).  . Continue Furosemide and monitor clinical status closely. Monitor on telemetry. Patient is off CHF pathway.  Monitor strict Is&Os and daily weights.  Place on fluid restriction of 1.5 L. Continue to stress to patient importance of self efficacy and  on diet for CHF. Last BNP reviewed- and noted below   Recent Labs   Lab 01/30/22 1944   *   .  2/138 NSTEMI.  IV Lasix strict I&Os.      DC POP Cage MD  Critical Care Medicine  [facility] - Int[facility] ([facility])

## 2022-02-01 NOTE — SUBJECTIVE & OBJECTIVE
2/1 patient seen and examined.  O2 sat 95% on 4 liter/minute.  Afebrile.  On IV heparin.  No chest pain.    Review of Systems   Constitutional: Negative for chills and fever.   HENT: Negative for nosebleeds.    Eyes: Negative for discharge.   Respiratory: Positive for shortness of breath. Negative for choking.    Cardiovascular: Negative for chest pain.   Gastrointestinal: Negative for blood in stool.   Endocrine: Negative for cold intolerance.   Genitourinary: Negative for hematuria.   Musculoskeletal: Positive for arthralgias. Negative for neck stiffness.   Skin: Negative for rash.   Allergic/Immunologic: Negative for immunocompromised state.   Neurological: Positive for weakness. Negative for seizures.   Hematological: Negative for adenopathy.   Psychiatric/Behavioral: Positive for confusion. Negative for behavioral problems.     Objective:     Vital Signs (Most Recent):  Temp: 98.3 °F (36.8 °C) (02/01/22 1211)  Pulse: 96 (02/01/22 1211)  Resp: 16 (02/01/22 1211)  BP: (!) 91/58 (02/01/22 1211)  SpO2: 95 % (02/01/22 1211) Vital Signs (24h Range):  Temp:  [97.9 °F (36.6 °C)-99 °F (37.2 °C)] 98.3 °F (36.8 °C)  Pulse:  [] 96  Resp:  [15-32] 16  SpO2:  [88 %-98 %] 95 %  BP: ()/(54-74) 91/58     Weight: 66.1 kg (145 lb 11.6 oz)  Body mass index is 27.53 kg/m².      Intake/Output Summary (Last 24 hours) at 2/1/2022 1413  Last data filed at 2/1/2022 0830  Gross per 24 hour   Intake 1052.52 ml   Output 1725 ml   Net -672.48 ml       Physical Exam  Vitals and nursing note reviewed.   Constitutional:       General: She is not in acute distress.     Appearance: She is well-developed. She is ill-appearing.   HENT:      Head: Normocephalic and atraumatic.   Cardiovascular:      Rate and Rhythm: Normal rate.   Pulmonary:      Effort: Pulmonary effort is normal. No respiratory distress.      Breath sounds: No stridor.   Abdominal:      Palpations: Abdomen is soft.      Tenderness: There is no abdominal tenderness.    Genitourinary:     Comments: Moya catheter  Musculoskeletal:         General: No deformity.      Cervical back: Neck supple.   Skin:     General: Skin is warm.   Neurological:      General: No focal deficit present.      Mental Status: She is alert. Mental status is at baseline.   Psychiatric:         Mood and Affect: Mood normal.         Behavior: Behavior normal.         Thought Content: Thought content normal.         Judgment: Judgment normal.         Vents:  Oxygen Concentration (%): 100 (01/31/22 0800)    Lines/Drains/Airways     Drain                 Urethral Catheter 01/30/22 2250 Latex 16 Fr. 1 day          Peripheral Intravenous Line                 Peripheral IV - Single Lumen 01/30/22 2226 20 G Anterior;Left Forearm 1 day                Significant Labs:    CBC/Anemia Profile:  Recent Labs   Lab 01/31/22  0414 01/31/22  1219 02/01/22  0831   WBC 12.92* 9.54 8.75   HGB 12.4 12.5 11.6*   HCT 38.8 43.3 36.2*    144* 172   MCV 90 101* 92   RDW 12.8 13.0 13.0        Chemistries:  Recent Labs   Lab 01/30/22  1944 01/31/22  0414 02/01/22  0954    138 139   K 4.3 3.6 4.2    104 100   CO2 23 21* 19*   BUN 21 23 23   CREATININE 1.0 1.0 1.2   CALCIUM 8.8 8.4* 9.0   ALBUMIN 3.5 3.3*  --    PROT 6.9 6.7  --    BILITOT 0.4 0.4  --    ALKPHOS 52* 69  --    ALT 23 34  --    AST 55* 85*  --    MG  --  1.8  --      Results for TATO PELAEZ (MRN [id number]) as of 1/31/2022 12:41   Ref. Range 1/30/2022 19:44 1/31/2022 01:50   BNP Latest Ref Range: 0 - 99 pg/mL 292 (H)    Troponin I Latest Ref Range: 0.000 - 0.026 ng/mL 11.791 (H) 14.279 (H)          Ref. Range 1/30/2022 22:07   POC PH Latest Ref Range: 7.35 - 7.45  7.321 (L)   POC PCO2 Latest Ref Range: 35 - 45 mmHg 42.1   POC PO2 Latest Ref Range: 80 - 100 mmHg 61 (L)   POC BE Latest Ref Range: -2 to 2 mmol/L -4   POC HCO3 Latest Ref Range: 24 - 28 mmol/L 21.7 (L)   POC SATURATED O2 Latest Ref Range: 95 - 100 % 89 (L)   Sample Unknown ARTERIAL   DelSys  Unknown NRB   Allens Test Unknown Pass   Site Unknown RR     EKG 01/30/2022      Normal sinus rhythm   ST and T wave abnormality, consider anterior ischemia   Abnormal ECG   When compared with ECG of 20-JAN-2022 14:30,   Left anterior fascicular block is no longer Present   ST now depressed in Inferior leads   ST now depressed in Anterior-lateral leads   T wave inversion now evident in Anterior leads       2D echo 01/31/2022    Show images for Echo    Summary    · The left ventricle is normal in size with concentric hypertrophy and mildly decreased systolic function.  · The estimated ejection fraction is 45%.  · Normal right ventricular size with normal right ventricular systolic function.  · Grade II left ventricular diastolic dysfunction.  · Mild to moderate tricuspid regurgitation.  · Moderate mitral regurgitation.  · There is mild aortic valve stenosis.  · Aortic valve area is 1.54 cm2; peak velocity is 1.48 m/s; mean gradient is 5 mmHg.  · Trivial pericardial effusion.  · There are segmental left ventricular wall motion abnormalities.  · The estimated PA systolic pressure is 57 mmHg.  · There is pulmonary hypertension.  · Intermediate central venous pressure (8 mmHg        Significant Imaging:   I have reviewed all pertinent imaging results/findings within the past 24 hours.  CXR: I have reviewed all pertinent results/findings within the past 24 hours and my personal findings are:         FINDINGS:  Right basilar consolidation concerning for early pneumonia.  Correlation is advised.  No left pleural fluid or pneumothorax.  Probable trace right pleural effusion.     The cardiac silhouette is normal in size. The hilar and mediastinal contours are unremarkable.     Bones are intact.     Impression:     Right basilar consolidation concerning for pneumonia.  Possible trace right pleural effusion.

## 2022-02-02 PROBLEM — I20.0 UNSTABLE ANGINA: Status: RESOLVED | Noted: 2022-01-01 | Resolved: 2022-01-01

## 2022-02-02 NOTE — SUBJECTIVE & OBJECTIVE
Review of Systems   Constitutional: Positive for malaise/fatigue.   HENT: Negative.    Eyes: Negative.    Cardiovascular: Negative.    Respiratory: Negative.    Endocrine: Negative.    Hematologic/Lymphatic: Negative.    Skin: Negative.    Musculoskeletal: Negative.    Gastrointestinal: Negative.    Genitourinary: Negative.    Neurological: Positive for weakness.   Psychiatric/Behavioral: Positive for memory loss.   Allergic/Immunologic: Negative.      Objective:     Vital Signs (Most Recent):  Temp: 98.2 °F (36.8 °C) (02/02/22 0722)  Pulse: 96 (02/02/22 0722)  Resp: 16 (02/02/22 0722)  BP: 105/66 (02/02/22 0722)  SpO2: (S) (!) 92 % (02/02/22 0824) Vital Signs (24h Range):  Temp:  [98 °F (36.7 °C)-98.3 °F (36.8 °C)] 98.2 °F (36.8 °C)  Pulse:  [] 96  Resp:  [16-18] 16  SpO2:  [92 %-97 %] 92 %  BP: ()/(54-66) 105/66     Weight: 66.4 kg (146 lb 6.2 oz)  Body mass index is 27.66 kg/m².     SpO2: (S) (!) 92 %  O2 Device (Oxygen Therapy): nasal cannula      Intake/Output Summary (Last 24 hours) at 2/2/2022 1103  Last data filed at 2/2/2022 0600  Gross per 24 hour   Intake --   Output 1650 ml   Net -1650 ml       Lines/Drains/Airways     Drain            Female External Urinary Catheter 02/02/22 0500 <1 day          Peripheral Intravenous Line                 Peripheral IV - Single Lumen 01/30/22 2226 20 G Anterior;Left Forearm 2 days                Physical Exam  Vitals and nursing note reviewed.   Constitutional:       General: She is active. She is not in acute distress.Vital signs are normal.      Appearance: Normal appearance. She is well-developed and well-nourished. She is not sickly-appearing or diaphoretic.   HENT:      Head: Normocephalic.   Neck:      Thyroid: No thyromegaly.      Vascular: No carotid bruit or JVD.   Cardiovascular:      Rate and Rhythm: Normal rate and regular rhythm.      Chest Wall: PMI is not displaced.      Pulses: Normal pulses.           Radial pulses are 2+ on the right  side and 2+ on the left side.      Heart sounds: Normal heart sounds, S1 normal and S2 normal. No murmur heard.  No friction rub. No gallop.    Pulmonary:      Effort: Pulmonary effort is normal.      Breath sounds: Examination of the right-lower field reveals decreased breath sounds. Examination of the left-lower field reveals decreased breath sounds. Decreased breath sounds present. No wheezing or rales.   Abdominal:      General: Bowel sounds are normal. There is no ascites or abdominal bruit. Aorta is normal.      Palpations: Abdomen is soft. There is no pulsatile liver.      Tenderness: There is no abdominal tenderness.   Musculoskeletal:         General: No edema.      Cervical back: Neck supple.   Lymphadenopathy:      Cervical: No cervical adenopathy.   Skin:     General: Skin is dry.   Neurological:      Mental Status: She is alert.   Psychiatric:         Mood and Affect: Mood and affect normal.         Behavior: Behavior normal. Behavior is cooperative.         Significant Labs:   ABG: No results for input(s): PH, PCO2, HCO3, POCSATURATED, BE in the last 48 hours., Blood Culture: No results for input(s): LABBLOO in the last 48 hours., BMP:   Recent Labs   Lab 02/01/22  0954 02/02/22  0919   * 130*    139   K 4.2 3.7    101   CO2 19* 23   BUN 23 18   CREATININE 1.2 1.0   CALCIUM 9.0 8.3*   , CMP   Recent Labs   Lab 02/01/22  0954 02/02/22  0919    139   K 4.2 3.7    101   CO2 19* 23   * 130*   BUN 23 18   CREATININE 1.2 1.0   CALCIUM 9.0 8.3*   PROT  --  6.1   ALBUMIN  --  3.1*   BILITOT  --  0.5   ALKPHOS  --  63   AST  --  35   ALT  --  22   ANIONGAP 20* 15   ESTGFRAFRICA 51* >60   EGFRNONAA 45* 56*   , CBC   Recent Labs   Lab 01/31/22  1219 01/31/22  1219 02/01/22  0831 02/01/22  0831 02/02/22  0631   WBC 9.54  --  8.75  --  7.52   HGB 12.5  --  11.6*  --  10.6*   HCT 43.3   < > 36.2*   < > 33.0*   *  --  172  --  180    < > = values in this interval not  displayed.   , INR No results for input(s): INR, PROTIME in the last 48 hours., Lipid Panel No results for input(s): CHOL, HDL, LDLCALC, TRIG, CHOLHDL in the last 48 hours. and Troponin   Recent Labs   Lab 01/31/22  1219 02/01/22  0831   TROPONINI 21.572* 15.496*       Significant Imaging: Echocardiogram:   Transthoracic echo (TTE) complete (Cupid Only):   Results for orders placed or performed during the hospital encounter of 01/30/22   Echo   Result Value Ref Range    BSA 1.69 m2    TDI SEPTAL 0.08 m/s    LV LATERAL E/E' RATIO 20.20 m/s    LV SEPTAL E/E' RATIO 12.63 m/s    LA WIDTH 3.33 cm    IVC diameter 1.93 cm    Left Ventricular Outflow Tract Mean Velocity 0.84997131013046 cm/s    Left Ventricular Outflow Tract Mean Gradient 1.26 mmHg    TDI LATERAL 0.05 m/s    LVIDd 4.15 3.5 - 6.0 cm    IVS 1.30 (A) 0.6 - 1.1 cm    Posterior Wall 1.03 0.6 - 1.1 cm    Ao root annulus 2.04 cm    LVIDs 3.32 2.1 - 4.0 cm    FS 20 28 - 44 %    LA volume 29.64 cm3    Sinus 2.75 cm    STJ 2.10 cm    Ascending aorta 2.29 cm    LV mass 167.52 g    LA size 2.75 cm    TAPSE 1.16 cm    Left Ventricle Relative Wall Thickness 0.50 cm    AV mean gradient 5 mmHg    AV valve area 1.54 cm2    AV Velocity Ratio 0.49     AV index (prosthetic) 0.54     MV mean gradient 2 mmHg    MV valve area p 1/2 method 4.32 cm2    MV valve area by continuity eq 1.72 cm2    E/A ratio 1.28     Mean e' 0.07 m/s    E wave deceleration time [id number] msec    IVRT 53.488602206576216 msec    LVOT diameter 1.90 cm    LVOT area 2.8 cm2    LVOT peak mode 0.72 m/s    LVOT peak VTI 15.80 cm    Ao peak mode 1.48 m/s    Ao VTI 29.0 cm    RVOT peak mode 0.51 m/s    RVOT peak VTI 9.3 cm    Mr max mode 5.22 m/s    LVOT stroke volume 44.77 cm3    AV peak gradient 9 mmHg    MV peak gradient 4 mmHg    PV mean gradient 0.55 mmHg    E/E' ratio 15.54 m/s    MV Peak E Mode 1.01 m/s    TR Max Mode 3.50 m/s    MV VTI 26.1 cm    MV stenosis pressure 1/2 time [id number] ms     MV Peak A Mode 0.79 m/s    LV Systolic Volume 44.93 mL    LV Systolic Volume Index 27.1 mL/m2    LV Diastolic Volume 76.58 mL    LV Diastolic Volume Index 46.13 mL/m2    LA Volume Index 17.9 mL/m2    LV Mass Index 101 g/m2    Echo EF Estimated 41 %    RA Major Axis 3.78 cm    Left Atrium Minor Axis 3.22 cm    Left Atrium Major Axis 4.66 cm    Triscuspid Valve Regurgitation Peak Gradient 49 mmHg    RA Width 2.88 cm    Right Atrial Pressure (from IVC) 8 mmHg    EF 45 %    TV rest pulmonary artery pressure 57 mmHg    Narrative    · The left ventricle is normal in size with concentric hypertrophy and   mildly decreased systolic function.  · The estimated ejection fraction is 45%.  · Normal right ventricular size with normal right ventricular systolic   function.  · Grade II left ventricular diastolic dysfunction.  · Mild to moderate tricuspid regurgitation.  · Moderate mitral regurgitation.  · There is mild aortic valve stenosis.  · Aortic valve area is 1.54 cm2; peak velocity is 1.48 m/s; mean gradient   is 5 mmHg.  · Trivial pericardial effusion.  · There are segmental left ventricular wall motion abnormalities.  · The estimated PA systolic pressure is 57 mmHg.  · There is pulmonary hypertension.  · Intermediate central venous pressure (8 mmHg).

## 2022-02-02 NOTE — SUBJECTIVE & OBJECTIVE
Interval History:  No acute events overnight.  Patient not agreeable to cardiac workup this a.m..  While at bedside patient asked on numerous occasions while she is in the hospital.    Review of Systems   Constitutional: Negative for fatigue and fever.   HENT: Negative for sinus pressure.    Eyes: Negative for visual disturbance.   Respiratory: Positive for shortness of breath (improved).    Cardiovascular: Negative for chest pain.   Gastrointestinal: Negative for nausea and vomiting.   Genitourinary: Negative for difficulty urinating.   Musculoskeletal: Negative for back pain.   Skin: Negative for rash.   Neurological: Negative for headaches.   Psychiatric/Behavioral: Positive for confusion. The patient is not nervous/anxious.      Objective:     Vital Signs (Most Recent):  Temp: 98.2 °F (36.8 °C) (02/02/22 0722)  Pulse: 96 (02/02/22 0722)  Resp: 16 (02/02/22 0722)  BP: 105/66 (02/02/22 0722)  SpO2: (S) (!) 92 % (02/02/22 0824) Vital Signs (24h Range):  Temp:  [98 °F (36.7 °C)-98.3 °F (36.8 °C)] 98.2 °F (36.8 °C)  Pulse:  [] 96  Resp:  [16-18] 16  SpO2:  [92 %-97 %] 92 %  BP: ()/(54-66) 105/66     Weight: 66.4 kg (146 lb 6.2 oz)  Body mass index is 27.66 kg/m².    Intake/Output Summary (Last 24 hours) at 2/2/2022 0956  Last data filed at 2/2/2022 0600  Gross per 24 hour   Intake --   Output 1650 ml   Net -1650 ml      Physical Exam  Constitutional:       General: She is not in acute distress.     Appearance: She is well-developed. She is not diaphoretic.   HENT:      Head: Normocephalic and atraumatic.   Eyes:      Pupils: Pupils are equal, round, and reactive to light.   Cardiovascular:      Rate and Rhythm: Normal rate and regular rhythm.      Heart sounds: Normal heart sounds. No murmur heard.  No friction rub. No gallop.    Pulmonary:      Effort: Pulmonary effort is normal. No respiratory distress.      Breath sounds: No stridor. Rales present. No wheezing.   Abdominal:      General: Bowel sounds  are normal. There is no distension.      Palpations: Abdomen is soft. There is no mass.      Tenderness: There is no abdominal tenderness. There is no guarding.   Musculoskeletal:      Right lower leg: Edema present.      Left lower leg: Edema present.   Skin:     General: Skin is warm.      Findings: No erythema.   Neurological:      Mental Status: She is alert.      Comments: Oriented to person, place, time however not oriented to situation         Significant Labs: All pertinent labs within the past 24 hours have been reviewed.    Significant Imaging: I have reviewed all pertinent imaging results/findings within the past 24 hours.

## 2022-02-02 NOTE — PROGRESS NOTES
[facility]  [facility] Medicine  Progress Note    Patient Name: Julia Schroeder  MRN: [id number]  Patient Class: IP- Inpatient   Admission Date: 1/30/2022  Length of Stay: 3 days  Attending Physician: Malik Mcnamara MD  Primary Care Provider: Devon Lovell MD        Subjective:     Principal Problem:NSTEMI (non-ST elevated myocardial infarction)        HPI:  Julia Schroeder is a 76 y.o. Black or  female with PMH significant for dementia, HTN, hyperlipidemia, in IDDM, presented to the ED complaining of shortness of breath.  Patient is a very poor historian due to dementia.  No family at the bedside.  Chart review reveals that patient has seen Dr. Perez in the cardiology clinic three days ago for intermittent chest pain.  Patient is scheduled for outpatient nuclear stress test and echocardiogram, that have not been done yet.  Initially found to be hypotensive and hypoxic, currently on 4 L O2 N/C, to maintain saturations in the low to mid 90s.  Blood pressure was low per EMS, in the /74.  Currently receiving 30 mL/kg bolus IV fluids.  Denies chest pain.  Troponin elevated at 11. EKG reveals mild ST depression in leads V3, V4, V5.  Started on heparin drip.  .  CXR reveals increased vascular congestion, with opacity in the right lung.  Denies fever, chills.  Received IV Rocephin empirically.  WBC 37062, 0% bands, lactic acid 1.9.    Admitting diagnosis:  NSTEMI.  Acute hypoxemic respiratory failure.  Right lower lobe infiltrate (? pneumonia versus CHF).  Dementia        Overview/Hospital Course:  1/31:  Patient able to be weaned off of BiPAP.  Currently being diuresed.  Troponin trended up to 14.  Cardiology recommended left heart catheterization however patient refused.  Family currently in discussion with patient on proceeding with left heart catheterization.  Continue heparin.  Start cardiac diet.  NPO at midnight should patient agree to left heart catheterization.  2/1: pt agreeable to [facility]. Will  continue to diurese. NPO at midnight and possible heart cath tomorrow.   2/2:  Patient not agreeable to cardiac workup this a.m..  While at bedside patient asked on numerous occasions while she is in the hospital.  She does not fully understand the severity of her situation.  Patient lacks the mental capacity to make high yield decisions.  Next of kin is sister.  Sister able to provide consent for procedures.      Interval History:  No acute events overnight.  Patient not agreeable to cardiac workup this a.m..  While at bedside patient asked on numerous occasions while she is in the hospital.    Review of Systems   Constitutional: Negative for fatigue and fever.   HENT: Negative for sinus pressure.    Eyes: Negative for visual disturbance.   Respiratory: Positive for shortness of breath (improved).    Cardiovascular: Negative for chest pain.   Gastrointestinal: Negative for nausea and vomiting.   Genitourinary: Negative for difficulty urinating.   Musculoskeletal: Negative for back pain.   Skin: Negative for rash.   Neurological: Negative for headaches.   Psychiatric/Behavioral: Positive for confusion. The patient is not nervous/anxious.      Objective:     Vital Signs (Most Recent):  Temp: 98.2 °F (36.8 °C) (02/02/22 0722)  Pulse: 96 (02/02/22 0722)  Resp: 16 (02/02/22 0722)  BP: 105/66 (02/02/22 0722)  SpO2: (S) (!) 92 % (02/02/22 0824) Vital Signs (24h Range):  Temp:  [98 °F (36.7 °C)-98.3 °F (36.8 °C)] 98.2 °F (36.8 °C)  Pulse:  [] 96  Resp:  [16-18] 16  SpO2:  [92 %-97 %] 92 %  BP: ()/(54-66) 105/66     Weight: 66.4 kg (146 lb 6.2 oz)  Body mass index is 27.66 kg/m².    Intake/Output Summary (Last 24 hours) at 2/2/2022 0956  Last data filed at 2/2/2022 0600  Gross per 24 hour   Intake --   Output 1650 ml   Net -1650 ml      Physical Exam  Constitutional:       General: She is not in acute distress.     Appearance: She is well-developed. She is not diaphoretic.   HENT:      Head: Normocephalic and  atraumatic.   Eyes:      Pupils: Pupils are equal, round, and reactive to light.   Cardiovascular:      Rate and Rhythm: Normal rate and regular rhythm.      Heart sounds: Normal heart sounds. No murmur heard.  No friction rub. No gallop.    Pulmonary:      Effort: Pulmonary effort is normal. No respiratory distress.      Breath sounds: No stridor. Rales present. No wheezing.   Abdominal:      General: Bowel sounds are normal. There is no distension.      Palpations: Abdomen is soft. There is no mass.      Tenderness: There is no abdominal tenderness. There is no guarding.   Musculoskeletal:      Right lower leg: Edema present.      Left lower leg: Edema present.   Skin:     General: Skin is warm.      Findings: No erythema.   Neurological:      Mental Status: She is alert.      Comments: Oriented to person, place, time however not oriented to situation         Significant Labs: All pertinent labs within the past 24 hours have been reviewed.    Significant Imaging: I have reviewed all pertinent imaging results/findings within the past 24 hours.      Assessment/Plan:      * NSTEMI (non-ST elevated myocardial infarction)  Troponin elevated at 11.  Mild ST depressions noted in the anterolateral leads.  Denies chest pain.  Continue heparin drip.  Dr. Montgomery with cardiology aware.  Keep NPO past midnight.  Received aspirin 325 mg p.o. x1 in the ED.    2/2:  Echo showed EF of 40%  Not agreeable to [facility] this am  Will continue to diurese   Still npo   Lacks capacity to make high yield medical decisions    Acute congestive heart failure  Patient is identified as having unknwon heart failure that is Acute. CHF is currently uncontrolled due to Rales/crackles on pulmonary exam. Latest ECHO performed and demonstrates- No results found for this or any previous visit.  . Continue Furosemide and monitor clinical status closely. Monitor on telemetry. Patient is off CHF pathway.  Monitor strict Is&Os and daily weights.  Place on  fluid restriction of 1.5 L. Continue to stress to patient importance of self efficacy and  on diet for CHF. Last BNP reviewed- and noted below   Recent Labs   Lab 01/30/22 1944   *   .    Increased vascular congestion on CXR.  Unable to administer IV diuretics due to hypotension requiring IV hydration.  Monitor closely for diuretic need.    Pulmonary infiltrate  Right lower lobe infiltrate, unclear if represents pneumonia versus increased vascular congestion.  Afebrile.  No leukocytosis or bandemia.  Lactic acid within normal limits.  Received Rocephin IV one time dose in the ED.  BNP elevated, suggesting possibly vascular congestion/pulmonary edema.  However unable to administer IV diuretics as patient is receiving 30 mL/kg bolus IV fluids for hypotension in the ED/EMS.  Monitor closely.  Hopefully we can get some clarity by tomorrow if this is pneumonia versus CHF      1/31:   Procalcitonin normal  BNP elevated  Shortness of breath likely due to CHF    Acute hypoxemic respiratory failure  Patient with Hypoxic Respiratory failure which is Acute.  she is not on home oxygen. Supplemental oxygen was provided and noted-  .   Signs/symptoms of respiratory failure include- increased work of breathing. Contributing diagnoses includes - CHF Labs and images were reviewed. Patient Has not had a recent ABG. Will treat underlying causes and adjust management of respiratory failure as follows-     Hypoxemia likely due to pulmonary edema/CHF.  Continue supplemental oxygen to maintain saturations greater than 92%.  COVID-19 negative.    1/31:  Patient diuresing well  Weaned off of BiPAP  Stepped down from ICU    Dementia without behavioral disturbance  2/2:  Baseline dementia  Although aaox3 she does not   Grasp the severity of her condition   Stating numerous times this am she   Doesn't know why she's here  And that there's nothing wrong with her heart  I do not believe she has the mental capacity to make   High  yield medical decisions  Patient is not  and has no children  Next of kin (sister) able to consent for procedures  This was discussed with cardiology       Hyperlipidemia associated with type 2 diabetes mellitus           VTE Risk Mitigation (From admission, onward)         Ordered     heparin 25,000 units in dextrose 5% 250 mL (100 units/mL) infusion LOW INTENSITY nomogram - [facility]  Continuous        Question Answer Comment   Heparin Infusion Adjustment (DO NOT MODIFY ANSWER) \\[email or web address]\epic\Images\Pharmacy\HeparinInfusions\heparin LOW INTENSITY nomogram for [facility] [id number].pdf    Begin at (in units/kg/hr) 12        01/30/22 2043     heparin 25,000 units in dextrose 5% (100 units/ml) IV bolus from bag - ADDITIONAL PRN BOLUS - 60 units/kg (max bolus 4000 units)  As needed (PRN)        Question:  Heparin Infusion Adjustment (DO NOT MODIFY ANSWER)  Answer:  \\[email or web address]\epic\Images\Pharmacy\HeparinInfusions\heparin LOW INTENSITY nomogram for [facility] [id number].pdf    01/30/22 2043     heparin 25,000 units in dextrose 5% (100 units/ml) IV bolus from bag - ADDITIONAL PRN BOLUS - 30 units/kg (max bolus 4000 units)  As needed (PRN)        Question:  Heparin Infusion Adjustment (DO NOT MODIFY ANSWER)  Answer:  \\[email or web address]\epic\Images\Pharmacy\HeparinInfusions\heparin LOW INTENSITY nomogram for [facility] [id number].pdf    01/30/22 2043     Place sequential compression device  Until discontinued         01/30/22 2056                Discharge Planning   ROGER:      Code Status: DNR   Is the patient medically ready for discharge?:     Reason for patient still in hospital (select all that apply): Patient trending condition  Discharge Plan A: Home Health                  Malik Mcnamara MD  Department of Hospital Medicine   [facility]

## 2022-02-02 NOTE — PROGRESS NOTES
[facility]  Cardiology  Progress Note    Patient Name: Julia Schroeder  MRN: [id number]  Admission Date: 1/30/2022  Hospital Length of Stay: 3 days  Code Status: DNR   Attending Physician: Malik Mcnamara MD   Primary Care Physician: Devon Lovell MD  Expected Discharge Date:   Principal Problem:NSTEMI (non-ST elevated myocardial infarction)    Subjective:     HPI:  Cardiology consulted for NSTEMI.  Pt saw Dr. Perez, Cardiology, last week for chest pain sxs.  Stress test and echo advised.  She presented to ER with c/o dyspnea.  Troponin elevation to 14.  Ecg showed NSR, mild anterior ST depression.  No cp this am.  CXR with possible infiltrated, given antibiotics x one on admission to cover for possible pneumonia.    She has dementia; discussed with son at bedside.  Hospital Course:   2/1/22: Pt seen and examined this am.  No CP sxs. Has some dyspnea, but she also states she feels ok.  Labs reviewed.  Troponin peaked.  Echo EF 45%, WMA, mod MR, mod PHTN, mild AS, mild-mod TR.    2/2/22: PT SEEN AND EXAMINED.  DENIED CP OR DYSPNEA THIS AM.  LABS REVIEWED: BNP HIGHER.  STARTED ON LASIX YESTERDAY.  FAMILY AT BEDSIDE REQUESTING CARDIAC CATH.        Review of Systems   Constitutional: Positive for malaise/fatigue.   HENT: Negative.    Eyes: Negative.    Cardiovascular: Negative.    Respiratory: Negative.    Endocrine: Negative.    Hematologic/Lymphatic: Negative.    Skin: Negative.    Musculoskeletal: Negative.    Gastrointestinal: Negative.    Genitourinary: Negative.    Neurological: Positive for weakness.   Psychiatric/Behavioral: Positive for memory loss.   Allergic/Immunologic: Negative.      Objective:     Vital Signs (Most Recent):  Temp: 98.2 °F (36.8 °C) (02/02/22 0722)  Pulse: 96 (02/02/22 0722)  Resp: 16 (02/02/22 0722)  BP: 105/66 (02/02/22 0722)  SpO2: (S) (!) 92 % (02/02/22 0824) Vital Signs (24h Range):  Temp:  [98 °F (36.7 °C)-98.3 °F (36.8 °C)] 98.2 °F (36.8 °C)  Pulse:  [] 96  Resp:  [16-18]  16  SpO2:  [92 %-97 %] 92 %  BP: ()/(54-66) 105/66     Weight: 66.4 kg (146 lb 6.2 oz)  Body mass index is 27.66 kg/m².     SpO2: (S) (!) 92 %  O2 Device (Oxygen Therapy): nasal cannula      Intake/Output Summary (Last 24 hours) at 2/2/2022 1103  Last data filed at 2/2/2022 0600  Gross per 24 hour   Intake --   Output 1650 ml   Net -1650 ml       Lines/Drains/Airways     Drain            Female External Urinary Catheter 02/02/22 0500 <1 day          Peripheral Intravenous Line                 Peripheral IV - Single Lumen 01/30/22 2226 20 G Anterior;Left Forearm 2 days                Physical Exam  Vitals and nursing note reviewed.   Constitutional:       General: She is active. She is not in acute distress.Vital signs are normal.      Appearance: Normal appearance. She is well-developed and well-nourished. She is not sickly-appearing or diaphoretic.   HENT:      Head: Normocephalic.   Neck:      Thyroid: No thyromegaly.      Vascular: No carotid bruit or JVD.   Cardiovascular:      Rate and Rhythm: Normal rate and regular rhythm.      Chest Wall: PMI is not displaced.      Pulses: Normal pulses.           Radial pulses are 2+ on the right side and 2+ on the left side.      Heart sounds: Normal heart sounds, S1 normal and S2 normal. No murmur heard.  No friction rub. No gallop.    Pulmonary:      Effort: Pulmonary effort is normal.      Breath sounds: Examination of the right-lower field reveals decreased breath sounds. Examination of the left-lower field reveals decreased breath sounds. Decreased breath sounds present. No wheezing or rales.   Abdominal:      General: Bowel sounds are normal. There is no ascites or abdominal bruit. Aorta is normal.      Palpations: Abdomen is soft.      Tenderness: There is no abdominal tenderness.   Musculoskeletal:         General: No edema.      Cervical back: Neck supple.   Lymphadenopathy:      Cervical: No cervical adenopathy.   Skin:     General: Skin is dry.    Neurological:      Mental Status: She is alert.   Psychiatric:         Mood and Affect: Mood and affect normal.         Behavior: Behavior normal. Behavior is cooperative.         Significant Labs:   ABG: No results for input(s): PH, PCO2, HCO3, POCSATURATED, BE in the last 48 hours., Blood Culture: No results for input(s): LABBLOO in the last 48 hours., BMP:   Recent Labs   Lab 02/01/22  0954 02/02/22  0919   * 130*    139   K 4.2 3.7    101   CO2 19* 23   BUN 23 18   CREATININE 1.2 1.0   CALCIUM 9.0 8.3*   , CMP   Recent Labs   Lab 02/01/22  0954 02/02/22  0919    139   K 4.2 3.7    101   CO2 19* 23   * 130*   BUN 23 18   CREATININE 1.2 1.0   CALCIUM 9.0 8.3*   PROT  --  6.1   ALBUMIN  --  3.1*   BILITOT  --  0.5   ALKPHOS  --  63   AST  --  35   ALT  --  22   ANIONGAP 20* 15   ESTGFRAFRICA 51* >60   EGFRNONAA 45* 56*   , CBC   Recent Labs   Lab 01/31/22  1219 01/31/22  1219 02/01/22  0831 02/01/22  0831 02/02/22  0631   WBC 9.54  --  8.75  --  7.52   HGB 12.5  --  11.6*  --  10.6*   HCT 43.3   < > 36.2*   < > 33.0*   *  --  172  --  180    < > = values in this interval not displayed.   , INR No results for input(s): INR, PROTIME in the last 48 hours., Lipid Panel No results for input(s): CHOL, HDL, LDLCALC, TRIG, CHOLHDL in the last 48 hours. and Troponin   Recent Labs   Lab 01/31/22  1219 02/01/22  0831   TROPONINI 21.572* 15.496*       Significant Imaging: Echocardiogram:   Transthoracic echo (TTE) complete ([facility]upid Only):   Results for orders placed or performed during the hospital encounter of 01/30/22   Echo   Result Value Ref Range    BSA 1.69 m2    TDI SEPTAL 0.08 m/s    LV LATERAL E/E' RATIO 20.20 m/s    LV SEPTAL E/E' RATIO 12.63 m/s    LA WIDTH 3.33 cm    IVC diameter 1.93 cm    Left Ventricular Outflow Tract Mean Velocity 0.07895054614657 cm/s    Left Ventricular Outflow Tract Mean Gradient 1.26 mmHg    TDI LATERAL 0.05 m/s    LVIDd 4.15 3.5 - 6.0 cm    IVS  1.30 (A) 0.6 - 1.1 cm    Posterior Wall 1.03 0.6 - 1.1 cm    Ao root annulus 2.04 cm    LVIDs 3.32 2.1 - 4.0 cm    FS 20 28 - 44 %    LA volume 29.64 cm3    Sinus 2.75 cm    STJ 2.10 cm    Ascending aorta 2.29 cm    LV mass 167.52 g    LA size 2.75 cm    TAPSE 1.16 cm    Left Ventricle Relative Wall Thickness 0.50 cm    AV mean gradient 5 mmHg    AV valve area 1.54 cm2    AV Velocity Ratio 0.49     AV index (prosthetic) 0.54     MV mean gradient 2 mmHg    MV valve area p 1/2 method 4.32 cm2    MV valve area by continuity eq 1.72 cm2    E/A ratio 1.28     Mean e' 0.07 m/s    E wave deceleration time 175.909714186622316 msec    IVRT 53.408764954951003 msec    LVOT diameter 1.90 cm    LVOT area 2.8 cm2    LVOT peak mode 0.72 m/s    LVOT peak VTI 15.80 cm    Ao peak mode 1.48 m/s    Ao VTI 29.0 cm    RVOT peak mode 0.51 m/s    RVOT peak VTI 9.3 cm    Mr max mode 5.22 m/s    LVOT stroke volume 44.77 cm3    AV peak gradient 9 mmHg    MV peak gradient 4 mmHg    PV mean gradient 0.55 mmHg    E/E' ratio 15.54 m/s    MV Peak E Mode 1.01 m/s    TR Max Mode 3.50 m/s    MV VTI 26.1 cm    MV stenosis pressure 1/2 time 50.643424762633981 ms    MV Peak A Mode 0.79 m/s    LV Systolic Volume 44.93 mL    LV Systolic Volume Index 27.1 mL/m2    LV Diastolic Volume 76.58 mL    LV Diastolic Volume Index 46.13 mL/m2    LA Volume Index 17.9 mL/m2    LV Mass Index 101 g/m2    Echo EF Estimated 41 %    RA Major Axis 3.78 cm    Left Atrium Minor Axis 3.22 cm    Left Atrium Major Axis 4.66 cm    Triscuspid Valve Regurgitation Peak Gradient 49 mmHg    RA Width 2.88 cm    Right Atrial Pressure (from IVC) 8 mmHg    EF 45 %    TV rest pulmonary artery pressure 57 mmHg    Narrative    · The left ventricle is normal in size with concentric hypertrophy and   mildly decreased systolic function.  · The estimated ejection fraction is 45%.  · Normal right ventricular size with normal right ventricular systolic   function.  · Grade II left ventricular diastolic  dysfunction.  · Mild to moderate tricuspid regurgitation.  · Moderate mitral regurgitation.  · There is mild aortic valve stenosis.  · Aortic valve area is 1.54 cm2; peak velocity is 1.48 m/s; mean gradient   is 5 mmHg.  · Trivial pericardial effusion.  · There are segmental left ventricular wall motion abnormalities.  · The estimated PA systolic pressure is 57 mmHg.  · There is pulmonary hypertension.  · Intermediate central venous pressure (8 mmHg).        Assessment and Plan:     LONG DISCUSSION WITH PT AND EXTENDED FAMILY MEMBERS AT BEDSIDE ABOUT LHC, POSSIBLE PCI AND ALL INDICATIONS, RISKS/BENEFITS EXPLAINED IN DETAIL.  FAMILY WANTS TO PROCEED WITH LHC.  OPTION OF CONSERVATIVE MED MGT ALSO DISCUSSED IN LIGHT OF PATIENT'S DEMENTIA.  PT NOT REALLY ABLE TO CONSENT DUE TO DEMENTIA.  FAMILY MEMBERS ALL SEEM ON BOARD FOR CARDIAC CATH.  WILL SCHEDULE LATER TODAY.  CAN STOP IV HEPARIN GTT NOW.  ASA  PLAVIX  STATIN  LASIX.  FURTHER RECS TO FOLLOW CATH.      * NSTEMI (non-ST elevated myocardial infarction)  See management plan detailed above.       Acute congestive heart failure  Echocardiogram.  Diurese as needed.      Pulmonary infiltrate  Per hospital med mgt.    Dementia without behavioral disturbance  Per hospital med mgt.     Hyperlipidemia associated with type 2 diabetes mellitus  Statin tx.        VTE Risk Mitigation (From admission, onward)         Ordered     heparin 25,000 units in dextrose 5% 250 mL (100 units/mL) infusion LOW INTENSITY nomogram - [facility]  Continuous        Question Answer Comment   Heparin Infusion Adjustment (DO NOT MODIFY ANSWER) \\[email or web address]\epic\Images\Pharmacy\HeparinInfusions\heparin LOW INTENSITY nomogram for [facility] [id number].pdf    Begin at (in units/kg/hr) 12 01/30/22 2043     heparin 25,000 units in dextrose 5% (100 units/ml) IV bolus from bag - ADDITIONAL PRN BOLUS - 60 units/kg (max bolus 4000 units)  As needed (PRN)        Question:  Heparin Infusion Adjustment (DO NOT MODIFY  ANSWER)  Answer:  \\[email or web address]\epic\Images\Pharmacy\HeparinInfusions\heparin LOW INTENSITY nomogram for [facility] [id number].pdf    01/30/22 2043     heparin 25,000 units in dextrose 5% (100 units/ml) IV bolus from bag - ADDITIONAL PRN BOLUS - 30 units/kg (max bolus 4000 units)  As needed (PRN)        Question:  Heparin Infusion Adjustment (DO NOT MODIFY ANSWER)  Answer:  \\[email or web address]\epic\Images\Pharmacy\HeparinInfusions\heparin LOW INTENSITY nomogram for [facility] [id number].pdf    01/30/22 2043     Place sequential compression device  Until discontinued         01/30/22 2056                Abhilash Deal MD  Cardiology  [facility]

## 2022-02-02 NOTE — NURSING TRANSFER
Nursing Transfer Note      2/2/2022     Reason patient is being transferred: Back to floor    Transfer From: [facility]    Transfer via bed    Transfer with NC to O2, cardiac monitoring    Transported by Nurse    Any special needs or follow-up needed: Bedrest until 1745    Chart send with patient: Yes    Notified: Family at bedside    Patient reassessed at: 1650 bedside report    Upon arrival to floor: cardiac monitor applied, patient oriented to room, call bell in reach and bed in lowest position

## 2022-02-02 NOTE — ASSESSMENT & PLAN NOTE
Troponin elevated at 11.  Mild ST depressions noted in the anterolateral leads.  Denies chest pain.  Continue heparin drip.  Dr. Montgomery with cardiology aware.  Keep NPO past midnight.  Received aspirin 325 mg p.o. x1 in the ED.    2/2:  Echo showed EF of 40%  Not agreeable to C this am  Will continue to diurese   Still npo   Lacks capacity to make high yield medical decisions

## 2022-02-02 NOTE — ASSESSMENT & PLAN NOTE
2/2:  Baseline dementia  Although aaox3 she does not   Grasp the severity of her condition   Stating numerous times this am she   Doesn't know why she's here  And that there's nothing wrong with her heart  I do not believe she has the mental capacity to make   High yield medical decisions  Patient is not  and has no children  Next of kin (sister) able to consent for procedures  This was discussed with cardiology

## 2022-02-02 NOTE — PLAN OF CARE
Plan of care reviewed with pt and family at bedside, verbalized understanding. A&O x2, disoriented to time and situation. IV intact, dry, and clean. Heparin infusing at 18 U/kg. Medications given with no complications. BG monitored. On 5L NC. Pt ambulates and turns in bed independently. Made NPO after midnight for possible heart cath in AM. Bed alarm on, avasys in place. No pain reported. Instructed to call for assistance. Hourly rounding completed.

## 2022-02-02 NOTE — INTERVAL H&P NOTE
The patient has been examined and the H&P has been reviewed:    I concur with the findings and no changes have occurred since H&P was written.    Anesthesia/Surgery risks, benefits and alternative options discussed and understood by patient/family.          Active Hospital Problems    Diagnosis  POA    *NSTEMI (non-ST elevated myocardial infarction) [I21.4]  Yes    Acute hypoxemic respiratory failure [J96.01]  Yes    Pulmonary infiltrate [R91.8]  Yes    Acute congestive heart failure [I50.9]  Yes    Dementia without behavioral disturbance [F03.90]  Yes    Hyperlipidemia associated with type 2 diabetes mellitus [E11.69, E78.5]  Yes      Resolved Hospital Problems    Diagnosis Date Resolved POA    Unstable angina [I20.0] 02/02/2022 Yes

## 2022-02-02 NOTE — PROGRESS NOTES
"  [facility] - Med Surg 3  Adult Nutrition  Progress Note    SUMMARY     Recommendations    1. Continue diet as prescribed: cardiac diabetic 1800 calories  2. RD to follow up to monitor intake, tolerance, and labs.    *Please send consult if acute nutrition needs arise.    Goals:  Pt will tolerate >65% estimated energy and protein needs by RD follow up.    Nutrition Goal Status: new   Communication of RD recs: POC and sticky note    Assessment and Plan  Nutrition Problem    Inadequate energy and protein intake  Related to (etiology):      Inability to consume adequate energy    Medical condition  Signs and Symptoms (as evidenced by):     Poor PO intake  Interventions:    fat and Na modified diet    Collaboration with other care providers  Nutrition Diagnosis Status:   New     Reason for Assessment  Reason for assessment: identified at risk by screening criteria   Diagnosis: NSTEMI (non-ST elevated myocardial infarction)   Relevant medical history: DM, NSTEMI, HLD, acute CHF    General information comments:   02/02/2022: RD spoke with pt who reports poor appetite and intake while IP, 25% meals. Doesn't like boost products. RD provided menu and communicated preferences to kitchen. EMR indicates stable weights/recent weight gain. Will continue to monitor.     Nutrition Discharge Planning - pending medical course, cardiac diabetic 1800     Nutrition Risk Screen  Have you recently lost weight without trying?: Unsure  Have you been eating poorly because of a decreased appetite?: Yes  Nutrition Risk Screen: no indicators present    Nutrition/Diet History  Patient Reported Diet/Restrictions/Preferences: low salt   Food Allergies: NKFA  Factors Affecting Nutritional Intake: none identified at this time   Spiritual, Cultural Beliefs, Pent[employer]alism Practices, Values that Affect Care: no      Anthropometrics  Temp: 98.2 °F (36.8 °C)  Height: 5' 1" (154.9 cm)  Height (inches): 61 in  Weight Method: Bed Scale  Weight: 66.4 kg (146 lb 6.2 " oz)  Weight (lb): 146.39 lb  Ideal Body Weight (IBW), Female: 105 lb  % Ideal Body Weight, Female (lb): 140 %  BMI (Calculated): 27.7     Wt Readings from Last 5 Encounters:   02/02/22 66.4 kg (146 lb 6.2 oz)   01/27/22 67 kg (147 lb 11.3 oz)   01/24/22 64.5 kg (142 lb 3.2 oz)   01/20/22 63.7 kg (140 lb 8.7 oz)   01/14/22 64.2 kg (141 lb 8.6 oz)     Labs  Pertinent Labs: reviewed  Lab Results   Component Value Date    HGB 10.6 (L) 02/02/2022    HCT 33.0 (L) 02/02/2022     02/02/2022    CALCIUM 8.3 (L) 02/02/2022    K 3.7 02/02/2022    BUN 18 02/02/2022    CREATININE 1.0 02/02/2022    ESTGFRAFRICA >60 02/02/2022    EGFRNONAA 56 (A) 02/02/2022    ALBUMIN 3.1 (L) 02/02/2022     Lab Results   Component Value Date    HGBA1C 8.3 (H) 01/07/2022     Recent Labs   Lab 02/02/22  0530   POCTGLUCOSE 154*     Lab Results   Component Value Date    ALT 22 02/02/2022    AST 35 02/02/2022    ALKPHOS 63 02/02/2022    BILITOT 0.5 02/02/2022     Meds  Pertinent Medications: reviewed    aspirin  81 mg Oral Daily    atorvastatin  40 mg Oral QHS    clopidogreL  75 mg Oral Daily    donepeziL  10 mg Oral Daily    famotidine  20 mg Oral Daily    memantine  10 mg Oral BID    mupirocin   Nasal BID    nitroGLYCERIN 0.1 mg/hr TD PT24  1 patch Transdermal Daily    ranolazine  500 mg Oral BID     Continuous Infusions:   sodium chloride 0.9% 75 mL/hr at 02/02/22 1516     PRN Meds:acetaminophen, calcium carbonate, dextrose 10%, dextrose 10%, glucagon (human recombinant), glucose, glucose, guaiFENesin 100 mg/5 ml, insulin aspart U-100, magnesium oxide, magnesium oxide, melatonin, morphine, ondansetron, potassium bicarbonate, potassium bicarbonate, potassium bicarbonate, potassium, sodium phosphates, potassium, sodium phosphates, potassium, sodium phosphates     Physical Findings/Assessment  N/V:  denies  Wounds: not indicated   Edema:   not indicated   Last Bowel Movement: 01/30/22      Rubio Score: 17  Mouth/Teeth WDL: WDL    O2  Device (Oxygen Therapy): nasal cannula  NFPE not performed, pt appears well nourished    Malnutrition Assessment  Patient does not meet at least 2 ASPEN criteria for malnutrition at this time.   Will continue to monitor.      Estimated/Assessed Needs  Weight Used For Calorie Calculations: 66.4 kg (146 lb 6.2 oz)  Energy Calorie Requirements (kcal): 2013-1609 (25-30)  Energy Need Method: Kcal/kg  Weight Used For Protein Calculations: 66.4 kg (146 lb 6.2 oz)  Protein Requirements: 53-66g (.8-1)  RDA Method (mL): 1660ml fluid or per MD/NP   202-247g (45-55% CHO)    Nutrition Prescription Ordered  cardiac     Evaluation of Received Nutrients  Energy Calories Required: not meeting needs  Protein Required: not meeting needs  Tolerance: tolerating  % Intake of Estimated Energy Needs: 25 - 50 %  % Meal Intake: 25 - 50 %    Monitor and Evaluation  Food and Nutrient Intake: food and beverage intake  Food and Nutrient Administration: diet order  Anthropometric Measurements: weight, weight change, body mass index  Biochemical Data, Medical Tests and Procedures: electrolyte and renal panel, lipid profile, gastrointestinal profile, glucose/endocrine profile, Inflammatory profile  Nutrition-Focused Physical Findings: overall appearance     Nutrition Follow-Up  Level of nutrition risk: moderate  Frequency of follow-up: Twice weekly   Tentative Next Date to be Seen by RD: 02/04/22  Thu Cruz MS, RD, LDN

## 2022-02-02 NOTE — OP NOTE
[facility] - Cath Lab ([facility])  Cardiac Catheterization  Procedure Note    SUMMARY     Julia Schroeder  [id number]  Devon Lovell MD    Date of Procedure: 2/2/2022    Procedure:  1. LHC 2. LEFT VENTRICULOGRAM 3. CORONARY ANGIOGRAM    Provider: Abhilash Deal MD    Indications: She was referred for cardiac catheterization to further evaluate NSTEMI, CHF.    Pre-Procedure Diagnosis: NSTEMI, CHF.    Post-Procedure Diagnosis:  SEVERE MULTIVESSEL CAD    Anesthesia: RN IV Sedation    Description of the Findings of the Procedure:     The risks, benefits, complications, treatment options, and expected outcomes were discussed with the patient. The patient and/or family concurred with the proposed plan, giving informed consent. Patient was brought to the cath lab after IV hydration was begun and oral premedication was given.    FINDINGS:  LEFT MAIN: HEAVILY CALCIFIED, MILD DISEASE.  LAD:   SEVERELY CALCIFIED DIFFUSELY DISEASED VESSEL.  THE MID TO DISTAL LAD IS SEVERELY DISEASED, CALCIFIED WITH MULTIPLE 80 - 90% STENOSES.  D1: TINY, DIFFUSE DISEASE.  D2:  SEVERELY CALCIFIED, SEVERE OSTIAL DISEASE, OCCLUDED PROXIMALLY.  LCX:  SEVERELY CALCIFIED PROXIMAL TO DISTAL SEGMENT AND VERY TORTUOUS IN PROXIMAL SEGMENT WITH SUBTOTAL CALCIFIED STENOSIS PROXIMALLY, 90% MID STENOSIS AND DISTAL IS SMALL AND DIFFUSELY DISEASED.  OM1 - OM3:  SMALL, SEVERE DIFFUSE DISEASE.  RCA: HEAVY CALCIFICATION, DIFFUSE NONOBSTRUCTIVE DISEASE PROX TO DISTAL; PDA AND PLB ARE SMALL AND DIFFUSELY DISEASED.    EF 45%, LVEDP 30    LEFT RADIAL TR BAND.  SEE REPORT.      Complications: None; patient tolerated the procedure well.    Estimated Blood Loss (EBL): Minimal           Implants: NONE    Specimens: NONE    Condition: stable    Disposition: PACU - hemodynamically stable.    Attestation: I was present and scrubbed for the entire procedure.     RECOMMENDATIONS:    USUAL POST CATH CARE.  SEVERE CALCIFIED MULTIVESSEL CAD.  PT HAS SIGNIFICANT DEMENTIA; POOR CANDIDATE  FOR CABG AND DOES NOT HAVE GOOD TARGETS.  RECOMMEND MAXIMAL MEDICAL TX AS TOLERATED.  SEEMS TO HAVE POOR PROGNOSIS OVERALL IN LIGHT OF TOTALITY OF SITUATION.  CARDIAC DIET.  TX BACK TO TELEMETRY.

## 2022-02-02 NOTE — PLAN OF CARE
Pt AAO x1.  VSS.  Pt remained afebrile throughout this shift.   IV fluids administered per order.   Pt remained free of falls this shift.   Pt c/o of no pain this shift.  Plan of care reviewed. Patient verbalizes understanding.   Pt moving/turing q 2 hours. Frequent weight shifting encouraged.  Patient NSR on monitor.   Bed low, side rails up x 2, wheels locked, call light in reach.   Bed alarm maintained for safety.   Patient instructed to call for assistance.   Hourly rounding completed.   24 hour chart check completed.  Will continue to monitor.

## 2022-02-03 PROBLEM — R09.02 HYPOXEMIA: Status: ACTIVE | Noted: 2022-01-01

## 2022-02-03 PROBLEM — I50.33 ACUTE ON CHRONIC DIASTOLIC CONGESTIVE HEART FAILURE: Status: ACTIVE | Noted: 2022-01-01

## 2022-02-03 PROBLEM — J96.01 ACUTE HYPOXEMIC RESPIRATORY FAILURE: Status: RESOLVED | Noted: 2022-01-01 | Resolved: 2022-01-01

## 2022-02-03 PROBLEM — I25.10 CAD, MULTIPLE VESSEL: Status: ACTIVE | Noted: 2022-01-01

## 2022-02-03 NOTE — PROGRESS NOTES
[facility] - Intensive Care ([facility])  Critical Care Medicine  Consult Note    Patient Name: Julia Schroeder  MRN: [id number]  Admission Date: 1/30/2022  Hospital Length of Stay: 4 days  Code Status: DNR  Attending Physician: Malik Mcnamara MD   Primary Care Provider: Devon Lovell MD   Principal Problem: NSTEMI (non-ST elevated myocardial infarction)    [unfilled]  Subjective:     HPI:  76-year-old female patient admitted to the hospital for shortness of breath worsening the day of presentation.  Past medical history significant for dementia, HTN, hyperlipidemia, in IDDM          Hospital/ICU Course:  O2 sat 94% on 2 liter/minute.  Afebrile.  Urine output 1.2 L. Troponin 14. EKG ABG chest x-ray reviewed.  On IV heparin for NSTEMI.  2/1 patient seen and examined.  O2 sat 95% on 4 liter/minute.  Afebrile.  On IV heparin.  No chest pain.  2/3 seen and examined.  O2 sat 92% on 2 liter/minute.  Afebrile.  No distress.  Status post left heart catheter yesterday via radial approach.  Multivessel CAD poor candidate for CABG due to dementia and poor targets.      2/3 seen and examined.  O2 sat 92% on 2 liter/minute.  Afebrile.  No distress.  Status post left heart catheter yesterday via radial approach.  Multivessel CAD poor candidate for CABG due to dementia and poor targets.  Review of Systems   Constitutional: Negative for chills and fever.   HENT: Negative for nosebleeds.    Eyes: Negative for discharge.   Respiratory: Positive for shortness of breath. Negative for choking.    Cardiovascular: Negative for chest pain.   Gastrointestinal: Negative for blood in stool.   Endocrine: Negative for cold intolerance.   Genitourinary: Negative for hematuria.   Musculoskeletal: Positive for arthralgias. Negative for neck stiffness.   Skin: Negative for rash.   Allergic/Immunologic: Negative for immunocompromised state.   Neurological: Positive for weakness. Negative for seizures.   Hematological: Negative for adenopathy.    Psychiatric/Behavioral: Positive for confusion. Negative for behavioral problems.     Objective:     Vital Signs (Most Recent):  Temp: 98.4 °F (36.9 °C) (02/03/22 0742)  Pulse: 93 (02/03/22 0742)  Resp: 16 (02/03/22 0742)  BP: 95/61 (02/03/22 0742)  SpO2: (!) 92 % (02/03/22 0742) Vital Signs (24h Range):  Temp:  [98.1 °F (36.7 °C)-98.8 °F (37.1 °C)] 98.4 °F (36.9 °C)  Pulse:  [81-98] 93  Resp:  [14-23] 16  SpO2:  [90 %-100 %] 92 %  BP: ()/(51-74) 95/61     Weight: 66.7 kg (147 lb 0.8 oz)  Body mass index is 27.78 kg/m².      Intake/Output Summary (Last 24 hours) at 2/3/2022 1301  Last data filed at 2/3/2022 0900  Gross per 24 hour   Intake 1192.61 ml   Output --   Net 1192.61 ml       Physical Exam  Vitals and nursing note reviewed.   Constitutional:       General: She is not in acute distress.     Appearance: She is well-developed. She is ill-appearing.   HENT:      Head: Normocephalic and atraumatic.   Cardiovascular:      Rate and Rhythm: Normal rate.   Pulmonary:      Effort: Pulmonary effort is normal. No respiratory distress.      Breath sounds: No stridor.   Abdominal:      Palpations: Abdomen is soft.      Tenderness: There is no abdominal tenderness.   Genitourinary:     Comments: External urine catheter  Musculoskeletal:         General: No deformity.      Cervical back: Neck supple.   Skin:     General: Skin is warm.   Neurological:      General: No focal deficit present.      Mental Status: She is alert. Mental status is at baseline.   Psychiatric:         Mood and Affect: Mood normal.         Behavior: Behavior normal.         Thought Content: Thought content normal.         Judgment: Judgment normal.         Vents:  Oxygen Concentration (%): 28 (02/03/22 0742)    Lines/Drains/Airways     Drain            Female External Urinary Catheter 02/02/22 0500 1 day          Peripheral Intravenous Line                 Peripheral IV - Single Lumen 01/30/22 2226 20 G Anterior;Left Forearm 3 days                 Significant Labs:    CBC/Anemia Profile:  Recent Labs   Lab 02/02/22  0631   WBC 7.52   HGB 10.6*   HCT 33.0*      MCV 91   RDW 12.8        Chemistries:  Recent Labs   Lab 02/02/22  0919      K 3.7      CO2 23   BUN 18   CREATININE 1.0   CALCIUM 8.3*   ALBUMIN 3.1*   PROT 6.1   BILITOT 0.5   ALKPHOS 63   ALT 22   AST 35     Results for TATO PELAEZ (MRN [id number]) as of 1/31/2022 12:41   Ref. Range 1/30/2022 19:44 1/31/2022 01:50   BNP Latest Ref Range: 0 - 99 pg/mL 292 (H)    Troponin I Latest Ref Range: 0.000 - 0.026 ng/mL 11.791 (H) 14.279 (H)          Ref. Range 1/30/2022 22:07   POC PH Latest Ref Range: 7.35 - 7.45  7.321 (L)   POC PCO2 Latest Ref Range: 35 - 45 mmHg 42.1   POC PO2 Latest Ref Range: 80 - 100 mmHg 61 (L)   POC BE Latest Ref Range: -2 to 2 mmol/L -4   POC HCO3 Latest Ref Range: 24 - 28 mmol/L 21.7 (L)   POC SATURATED O2 Latest Ref Range: 95 - 100 % 89 (L)   Sample Unknown ARTERIAL   DelSys Unknown NRB   Allens Test Unknown Pass   Site Unknown RR     EKG 01/30/2022      Normal sinus rhythm   ST and T wave abnormality, consider anterior ischemia   Abnormal ECG   When compared with ECG of 20-JAN-2022 14:30,   Left anterior fascicular block is no longer Present   ST now depressed in Inferior leads   ST now depressed in Anterior-lateral leads   T wave inversion now evident in Anterior leads       2D echo 01/31/2022    Show images for Echo    Summary    · The left ventricle is normal in size with concentric hypertrophy and mildly decreased systolic function.  · The estimated ejection fraction is 45%.  · Normal right ventricular size with normal right ventricular systolic function.  · Grade II left ventricular diastolic dysfunction.  · Mild to moderate tricuspid regurgitation.  · Moderate mitral regurgitation.  · There is mild aortic valve stenosis.  · Aortic valve area is 1.54 cm2; peak velocity is 1.48 m/s; mean gradient is 5 mmHg.  · Trivial pericardial effusion.  · There are  segmental left ventricular wall motion abnormalities.  · The estimated PA systolic pressure is 57 mmHg.  · There is pulmonary hypertension.  · Intermediate central venous pressure (8 mmHg        Significant Imaging:   I have reviewed all pertinent imaging results/findings within the past 24 hours.  CXR: I have reviewed all pertinent results/findings within the past 24 hours and my personal findings are:         FINDINGS:  Right basilar consolidation concerning for early pneumonia.  Correlation is advised.  No left pleural fluid or pneumothorax.  Probable trace right pleural effusion.     The cardiac silhouette is normal in size. The hilar and mediastinal contours are unremarkable.     Bones are intact.     Impression:     Right basilar consolidation concerning for pneumonia.  Possible trace right pleural effusion.           ABG  Recent Labs   Lab 01/30/22  2207   PH 7.321*   PO2 61*   PCO2 42.1   HCO3 21.7*   BE -4     Assessment/Plan:     Neuro  Dementia without behavioral disturbance  DNR.  Palliative care consult  2/3 DNR not candidate for CABG.  Dementia    Pulmonary  Hypoxemia  Elevated BNP.  Lasix.  Home O2 evaluation    Pulmonary infiltrate  1/31 monitor temp WBC.  Cultures negative.  2/1 afebrile.  No antibiotics.    Cardiac/Vascular  * NSTEMI (non-ST elevated myocardial infarction)  Cardiac monitoring.  Declined left heart catheterization.  IV heparin monitor PTT.  Aspirin statin.    2/1 continue cardiac monitoring.  Aspirin statin Plavix IV heparin monitor PTT continue nitro patch and Ranexa.  2/3 medical treatment aspirin statin Plavix ranexa    CAD, multiple vessel  Medical treatment aspirin statin Plavix Ranexa    Acute congestive heart failure  Patient is identified as having Systolic (HFrEF) heart failure that is Acute. CHF is currently uncontrolled due to Pulmonary edema/pleural effusion on CXR. Latest ECHO performed and demonstrates- Results for orders placed during the hospital encounter of  01/30/22    Echo    Interpretation Summary  · The left ventricle is normal in size with concentric hypertrophy and mildly decreased systolic function.  · The estimated ejection fraction is 45%.  · Normal right ventricular size with normal right ventricular systolic function.  · Grade II left ventricular diastolic dysfunction.  · Mild to moderate tricuspid regurgitation.  · Moderate mitral regurgitation.  · There is mild aortic valve stenosis.  · Aortic valve area is 1.54 cm2; peak velocity is 1.48 m/s; mean gradient is 5 mmHg.  · Trivial pericardial effusion.  · There are segmental left ventricular wall motion abnormalities.  · The estimated PA systolic pressure is 57 mmHg.  · There is pulmonary hypertension.  · Intermediate central venous pressure (8 mmHg).  . Continue Furosemide and monitor clinical status closely. Monitor on telemetry. Patient is off CHF pathway.  Monitor strict Is&Os and daily weights.  Place on fluid restriction of 1.5 L. Continue to stress to patient importance of self efficacy and  on diet for CHF. Last BNP reviewed- and noted below   Recent Labs   Lab 01/30/22 1944   *   .  2/138 NSTEMI.  IV Lasix strict I&Os.      Overall poor prognosis      Sofya Cage MD  Critical Care Medicine  [facility] - Intensive Care ([facility])

## 2022-02-03 NOTE — PLAN OF CARE
Plan of care reviewed with pt and family at bedside, verbalized understanding. A&O x4, with intermittent confusion. Iv intact, dry, and clean. LHC done on 2/2/22 and site is clean and dry. Pt ambulates with assistance x1, turns in bed independently. On 2L NC. Bed alarm on, avasys in place. BG monitored. Ns on monitor. No pain reported. Instructed to call for assistance. Hourly rounding completed.

## 2022-02-03 NOTE — PROGRESS NOTES
Home Oxygen Evaluation    Date Performed: 2/3/2022    1) Patient's Home O2 Sat on room air, while at rest: 84%        If O2 sats on room air at rest are 88% or below, patient qualifies. No additional testing needed. Document N/A in steps 2 and 3. If 89% or above, complete steps 2.      2) Patient's O2 Sat on room air while exercising: unable to perform         If O2 sats on room air while exercising remain 89% or above patient does not qualify, no further testing needed Document N/A in step 3. If O2 sats on room air while exercising are 88% or below, continue to step 3.      3) Patient's O2 Sat while exercising on O2: 4LPM 90%         (Must show improvement from #2 for patients to qualify)    If O2 sats improve on oxygen, patient qualifies for portable oxygen. If not, the patient does not qualify.

## 2022-02-03 NOTE — ASSESSMENT & PLAN NOTE
Cardiac monitoring.  Declined left heart catheterization.  IV heparin monitor PTT.  Aspirin statin.    2/1 continue cardiac monitoring.  Aspirin statin Plavix IV heparin monitor PTT continue nitro patch and Ranexa.  2/3 medical treatment aspirin statin Plavix ranexa

## 2022-02-03 NOTE — DISCHARGE SUMMARY
[facility]  Hospital Medicine  Discharge Summary      Patient Name: Julia Schroeder  MRN: [id number]  Patient Class: IP- Inpatient  Admission Date: 1/30/2022  Hospital Length of Stay: 4 days  Discharge Date and Time:  02/03/2022 12:39 PM  Attending Physician: Malik Mcnamara MD   Discharging Provider: Malik Mcnamara MD  Primary Care Provider: Devon Lovell MD      HPI:   Julia Schroeder is a 76 y.o. Black or  female with PMH significant for dementia, HTN, hyperlipidemia, in IDDM, presented to the ED complaining of shortness of breath.  Patient is a very poor historian due to dementia.  No family at the bedside.  Chart review reveals that patient has seen Dr. Perez in the cardiology clinic three days ago for intermittent chest pain.  Patient is scheduled for outpatient nuclear stress test and echocardiogram, that have not been done yet.  Initially found to be hypotensive and hypoxic, currently on 4 L O2 N/C, to maintain saturations in the low to mid 90s.  Blood pressure was low per EMS, in the /74.  Currently receiving 30 mL/kg bolus IV fluids.  Denies chest pain.  Troponin elevated at 11. EKG reveals mild ST depression in leads V3, V4, V5.  Started on heparin drip.  .  CXR reveals increased vascular congestion, with opacity in the right lung.  Denies fever, chills.  Received IV Rocephin empirically.  WBC 84861, 0% bands, lactic acid 1.9.    Admitting diagnosis:  NSTEMI.  Acute hypoxemic respiratory failure.  Right lower lobe infiltrate (? pneumonia versus CHF).  Dementia        Procedure(s) (LRB):  CATHETERIZATION, HEART, LEFT (Left)      Hospital Course:   Patient was admitted for NSTEMI. She was started on ACS treatement. Bipap was initiated for respiratory distress and she was diuresed. Echo showed systolic and diastolic HF with an EF of 45%. Cardiology consulted on case. Patient initially refused any workup however given her hx of dementia and possible alzheimers she was unable to make high yield  decisions. Next of kin sister consented for [facility] which showed severe multivessel disease. Patient was not a good candidate for cabg given her mentation and other comorbidities. Family at beside stated that they will not pursue any surgical options and would prefer to be treated medically. She was unable to tolerate BB or ACE/arb given her BP. She was continued on asa, plavix, ranexa, and lasix daily. Patient was discharged home and instructed to follow up with primary cardiologist. She qualified for home o2.          Goals of Care Treatment Preferences:  Code Status: DNR      Consults:   Consults (From admission, onward)        Status Ordering Provider     Inpatient consult to Pulmonology  Once        Provider:  Sofya Cage MD    Completed MILTON SUAREZ     Inpatient consult to Cardiology  Once        Provider:  Tyler Montgomery MD    Completed WILLIAN WINTER JR          No new Assessment & Plan notes have been filed under this hospital service since the last note was generated.  Service: Hospital Medicine    Final Active Diagnoses:    Diagnosis Date Noted POA    PRINCIPAL PROBLEM:  NSTEMI (non-ST elevated myocardial infarction) [I21.4] 01/30/2022 Yes    Acute on chronic diastolic congestive heart failure [I50.33] 02/03/2022 Yes    CAD, multiple vessel [I25.10] 02/03/2022 Yes    Pulmonary infiltrate [R91.8] 01/30/2022 Yes    Acute congestive heart failure [I50.9] 01/30/2022 Yes    Dementia without behavioral disturbance [F03.90] 09/29/2021 Yes    Hyperlipidemia associated with type 2 diabetes mellitus [E11.69, E78.5] 05/23/2021 Yes      Problems Resolved During this Admission:    Diagnosis Date Noted Date Resolved POA    Unstable angina [I20.0] 01/31/2022 02/02/2022 Yes    Acute hypoxemic respiratory failure [J96.01] 01/30/2022 02/03/2022 Yes       Discharged Condition: fair    Disposition: Home or Self Care    Follow Up:   Follow-up Information     [facility].    Specialty:  "Home Health Services  Why: Home Health  Contact information:  [address]  [place] [address]  [phone]             Milli Perez MD In 1 week.    Specialties: Interventional Cardiology, Cardiology  Contact information:  [address]  [place] [address]  [phone]                       Patient Instructions:      OXYGEN FOR HOME USE     Order Specific Question Answer Comments   Liter Flow 4    Duration Continuous    Qualifying Test Performed at: Rest    Oxygen saturation: 84    Portable mode: continuous    Route nasal cannula    Device: home concentrator with portable tanks    Length of need (in months): 3 mos    Patient condition with qualifying saturation CHF    Height: 5' 1" (1.549 m)    Weight: 66.7 kg (147 lb 0.8 oz)    Alternative treatment measures have been tried or considered and deemed clinically ineffective. Yes        Significant Diagnostic Studies: Labs:   BMP:   Recent Labs   Lab 02/02/22  0919   *      K 3.7      CO2 23   BUN 18   CREATININE 1.0   CALCIUM 8.3*    and CBC   Recent Labs   Lab 02/02/22  0631   WBC 7.52   HGB 10.6*   HCT 33.0*          Pending Diagnostic Studies:     None         Medications:  Reconciled Home Medications:      Medication List      START taking these medications    aspirin 81 MG EC tablet  Commonly known as: ECOTRIN  Take 1 tablet (81 mg total) by mouth once daily.  Start taking on: February 4, 2022     clopidogreL 75 mg tablet  Commonly known as: PLAVIX  Take 1 tablet (75 mg total) by mouth once daily.  Start taking on: February 4, 2022     furosemide 20 MG tablet  Commonly known as: LASIX  Take 1 tablet (20 mg total) by mouth once daily.     nitroGLYCERIN 0.1 mg/hr TD PT24 0.1 mg/hr Pt24  Commonly known as: NITRODUR  Place 1 patch onto the skin once daily.  Start taking on: February 4, 2022     ranolazine 500 MG Tb12  Commonly known as: RANEXA  Take 1 tablet (500 mg total) by mouth 2 (two) times " daily.        CONTINUE taking these medications    blood sugar diagnostic Strp  Use to test Blood Sugar TWICE daily, to use with insurance preferred meter.     blood-glucose meter kit  Use to test Blood Sugar TWICE daily, to use with insurance preferred meter.     donepeziL 10 MG tablet  Commonly known as: ARICEPT  Take 1 tablet (10 mg total) by mouth once daily.     JARDIANCE 25 mg tablet  Generic drug: empagliflozin  Take 1 tablet (25 mg total) by mouth once daily.     lancets Misc  Use to test Blood Sugar TWICE daily, to use with insurance preferred meter.     memantine 10 MG Tab  Commonly known as: NAMENDA  Take 1 tablet (10 mg total) by mouth 2 (two) times daily.     metFORMIN 1000 MG tablet  Commonly known as: GLUCOPHAGE  Take 1 tablet (1,000 mg total) by mouth 2 (two) times daily with meals.     rosuvastatin 10 MG tablet  Commonly known as: CRESTOR  Take 1 tablet (10 mg total) by mouth once daily.     * TRULICITY 1.5 mg/0.5 mL pen injector  Generic drug: dulaglutide  Inject 1.5 mg into the skin every 7 days.     * TRULICITY 1.5 mg/0.5 mL pen injector  Generic drug: dulaglutide  Inject 1.5 mg into the skin once a week.         * This list has 2 medication(s) that are the same as other medications prescribed for you. Read the directions carefully, and ask your doctor or other care provider to review them with you.                Indwelling Lines/Drains at time of discharge:   Lines/Drains/Airways     Drain            Female External Urinary Catheter 02/02/22 0500 1 day                Time spent on the discharge of patient: 38 minutes         Malik Mcnamara MD  Department of Hospital Medicine  [facility]

## 2022-02-03 NOTE — PROGRESS NOTES
[facility]  Cardiology  Progress Note    Patient Name: Julia Schroeder  MRN: [id number]  Admission Date: 1/30/2022  Hospital Length of Stay: 4 days  Code Status: DNR   Attending Physician: Malik Mcnamara MD   Primary Care Physician: Devon Lovell MD  Expected Discharge Date: 2/3/2022  Principal Problem:NSTEMI (non-ST elevated myocardial infarction)    Subjective:     HPI:  Cardiology consulted for NSTEMI.  Pt saw Dr. Perez, Cardiology, last week for chest pain sxs.  Stress test and echo advised.  She presented to ER with c/o dyspnea.  Troponin elevation to 14.  Ecg showed NSR, mild anterior ST depression.  No cp this am.  CXR with possible infiltrated, given antibiotics x one on admission to cover for possible pneumonia.    She has dementia; discussed with son at bedside.  Hospital Course:   2/1/22: Pt seen and examined this am.  No CP sxs. Has some dyspnea, but she also states she feels ok.  Labs reviewed.  Troponin peaked.  Echo EF 45%, WMA, mod MR, mod PHTN, mild AS, mild-mod TR.    2/2/22: PT SEEN AND EXAMINED.  DENIED CP OR DYSPNEA THIS AM.  LABS REVIEWED: BNP HIGHER.  STARTED ON LASIX YESTERDAY.  FAMILY AT BEDSIDE REQUESTING CARDIAC CATH.    2/3/22:  PT SEEN AND EXAMINED THIS AM.  NO ACUTE ISSUES. DENIES DYSPNEA, SOME OCCASIONAL CP.  SHE WANTS TO GO HOME.         Review of Systems   Constitutional: Positive for malaise/fatigue.   HENT: Negative.    Eyes: Negative.    Cardiovascular: Positive for dyspnea on exertion.   Respiratory: Positive for shortness of breath.    Endocrine: Negative.    Hematologic/Lymphatic: Negative.    Skin: Negative.    Musculoskeletal: Negative.    Gastrointestinal: Negative.    Genitourinary: Negative.    Neurological: Positive for weakness.   Psychiatric/Behavioral: Positive for memory loss.   Allergic/Immunologic: Negative.      Objective:     Vital Signs (Most Recent):  Temp: 98.4 °F (36.9 °C) (02/03/22 0742)  Pulse: 93 (02/03/22 0742)  Resp: 16 (02/03/22 0742)  BP: 95/61  (02/03/22 0742)  SpO2: (!) 92 % (02/03/22 0742) Vital Signs (24h Range):  Temp:  [98 °F (36.7 °C)-98.8 °F (37.1 °C)] 98.4 °F (36.9 °C)  Pulse:  [81-98] 93  Resp:  [14-23] 16  SpO2:  [90 %-100 %] 92 %  BP: ()/(51-74) 95/61     Weight: 66.7 kg (147 lb 0.8 oz)  Body mass index is 27.78 kg/m².     SpO2: (!) 92 %  O2 Device (Oxygen Therapy): nasal cannula      Intake/Output Summary (Last 24 hours) at 2/3/2022 1051  Last data filed at 2/3/2022 0900  Gross per 24 hour   Intake 1192.61 ml   Output --   Net 1192.61 ml       Lines/Drains/Airways     Drain            Female External Urinary Catheter 02/02/22 0500 1 day          Peripheral Intravenous Line                 Peripheral IV - Single Lumen 01/30/22 2226 20 G Anterior;Left Forearm 3 days                Physical Exam  Vitals and nursing note reviewed.   Constitutional:       General: She is active. She is not in acute distress.Vital signs are normal.      Appearance: Normal appearance. She is well-developed and well-nourished.  HENT:      Head: Normocephalic.   Neck:      Vascular: No carotid bruit or JVD.   Cardiovascular:      Rate and Rhythm: Normal rate and regular rhythm.      Chest Wall: PMI is not displaced.      Pulses: Normal pulses.           Radial pulses are 2+ on the right side and 2+ on the left side.      Heart sounds: Normal heart sounds, S1 normal and S2 normal. No murmur heard.  No friction rub. No gallop.       Comments:   LEFT RADIAL ACCESS SITE LOOKS GOOD, NO DRAINAGE OR HEMATOMA NOTED. MILD BRUISING.  Pulmonary:      Effort: Pulmonary effort is normal.      Breath sounds: Examination of the right-lower field reveals decreased breath sounds. Examination of the left-lower field reveals decreased breath sounds. Decreased breath sounds present. No wheezing or rales.   Abdominal:      General: Bowel sounds are normal. There is no ascites or abdominal bruit. Aorta is normal.      Palpations: Abdomen is soft.      Tenderness: There is no abdominal  tenderness.   Musculoskeletal:         General: No edema.      Cervical back: Neck supple.   Lymphadenopathy:      Cervical: No cervical adenopathy.   Skin:     General: Skin is warm.   Neurological:      Mental Status: She is alert.   Psychiatric:         Mood and Affect: Mood and affect normal.         Behavior: Behavior normal. Behavior is cooperative.         Significant Labs:   ABG: No results for input(s): PH, PCO2, HCO3, POCSATURATED, BE in the last 48 hours., Blood Culture: No results for input(s): LABBLOO in the last 48 hours., BMP:   Recent Labs   Lab 02/02/22  0919   *      K 3.7      CO2 23   BUN 18   CREATININE 1.0   CALCIUM 8.3*   , CMP   Recent Labs   Lab 02/02/22  0919      K 3.7      CO2 23   *   BUN 18   CREATININE 1.0   CALCIUM 8.3*   PROT 6.1   ALBUMIN 3.1*   BILITOT 0.5   ALKPHOS 63   AST 35   ALT 22   ANIONGAP 15   ESTGFRAFRICA >60   EGFRNONAA 56*   , CBC   Recent Labs   Lab 02/02/22  0631   WBC 7.52   HGB 10.6*   HCT 33.0*      , INR No results for input(s): INR, PROTIME in the last 48 hours., Lipid Panel No results for input(s): CHOL, HDL, LDLCALC, TRIG, CHOLHDL in the last 48 hours. and Troponin No results for input(s): TROPONINI in the last 48 hours.    Significant Imaging: Echocardiogram:   Transthoracic echo (TTE) complete (Cupid Only):   Results for orders placed or performed during the hospital encounter of 01/30/22   Echo   Result Value Ref Range    BSA 1.69 m2    TDI SEPTAL 0.08 m/s    LV LATERAL E/E' RATIO 20.20 m/s    LV SEPTAL E/E' RATIO 12.63 m/s    LA WIDTH 3.33 cm    IVC diameter 1.93 cm    Left Ventricular Outflow Tract Mean Velocity 0.40003762369118 cm/s    Left Ventricular Outflow Tract Mean Gradient 1.26 mmHg    TDI LATERAL 0.05 m/s    LVIDd 4.15 3.5 - 6.0 cm    IVS 1.30 (A) 0.6 - 1.1 cm    Posterior Wall 1.03 0.6 - 1.1 cm    Ao root annulus 2.04 cm    LVIDs 3.32 2.1 - 4.0 cm    FS 20 28 - 44 %    LA volume 29.64 cm3    Sinus 2.75  cm    STJ 2.10 cm    Ascending aorta 2.29 cm    LV mass 167.52 g    LA size 2.75 cm    TAPSE 1.16 cm    Left Ventricle Relative Wall Thickness 0.50 cm    AV mean gradient 5 mmHg    AV valve area 1.54 cm2    AV Velocity Ratio 0.49     AV index (prosthetic) 0.54     MV mean gradient 2 mmHg    MV valve area p 1/2 method 4.32 cm2    MV valve area by continuity eq 1.72 cm2    E/A ratio 1.28     Mean e' 0.07 m/s    E wave deceleration time 175.894548660567178 msec    IVRT 53.966004096084260 msec    LVOT diameter 1.90 cm    LVOT area 2.8 cm2    LVOT peak mode 0.72 m/s    LVOT peak VTI 15.80 cm    Ao peak mode 1.48 m/s    Ao VTI 29.0 cm    RVOT peak mode 0.51 m/s    RVOT peak VTI 9.3 cm    Mr max mode 5.22 m/s    LVOT stroke volume 44.77 cm3    AV peak gradient 9 mmHg    MV peak gradient 4 mmHg    PV mean gradient 0.55 mmHg    E/E' ratio 15.54 m/s    MV Peak E Mode 1.01 m/s    TR Max Mode 3.50 m/s    MV VTI 26.1 cm    MV stenosis pressure 1/2 time 50.764126536182708 ms    MV Peak A Mode 0.79 m/s    LV Systolic Volume 44.93 mL    LV Systolic Volume Index 27.1 mL/m2    LV Diastolic Volume 76.58 mL    LV Diastolic Volume Index 46.13 mL/m2    LA Volume Index 17.9 mL/m2    LV Mass Index 101 g/m2    Echo EF Estimated 41 %    RA Major Axis 3.78 cm    Left Atrium Minor Axis 3.22 cm    Left Atrium Major Axis 4.66 cm    Triscuspid Valve Regurgitation Peak Gradient 49 mmHg    RA Width 2.88 cm    Right Atrial Pressure (from IVC) 8 mmHg    EF 45 %    TV rest pulmonary artery pressure 57 mmHg    Narrative    · The left ventricle is normal in size with concentric hypertrophy and   mildly decreased systolic function.  · The estimated ejection fraction is 45%.  · Normal right ventricular size with normal right ventricular systolic   function.  · Grade II left ventricular diastolic dysfunction.  · Mild to moderate tricuspid regurgitation.  · Moderate mitral regurgitation.  · There is mild aortic valve stenosis.  · Aortic valve area is 1.54 cm2;  peak velocity is 1.48 m/s; mean gradient   is 5 mmHg.  · Trivial pericardial effusion.  · There are segmental left ventricular wall motion abnormalities.  · The estimated PA systolic pressure is 57 mmHg.  · There is pulmonary hypertension.  · Intermediate central venous pressure (8 mmHg).          Results for orders placed during the hospital encounter of 01/30/22    Cardiac catheterization    Conclusion  · SEVERE CALCIFIED DIFFUSE MULTIVESSEL DISEASE.  · LVEF 45%.  · RECOMMENDATIONS: PT IS POOR CANDIDATE FOR CABG WITH SIGNIFICANT DEMENTIA AND POOR TARGETS. CAD NOT OPTIMAL FOR PCI. RECOMMEND MAXIMAL MEDICAL THERAPY AS TOLERATED.    The procedure log was documented by Documenter: Daphne Mcdonough RN and verified by Abhilash Deal MD.    Date: 2/2/2022  Time: 3:11 PM      Assessment and Plan:     NSTEMI.  S/P C YESTERDAY SHOWING SEVERE HEAVILY CALCIFIED DIFFUSE CAD.  DISCUSSED W PT AND EXTENDED FAMILY.    POOR CANDIDATE/TARGETS FOR CABG.  NOT OPTIMAL FOR PCI.  PT WITH SIGNIFICANT DEMENTIA.  MEDICAL TX ADVISED.  BP IS SOFT AND PRESENTLY CANNOT TAKE BETA-BLOCKER OR ACE-I/ARB FOR HER CAD/CHF.  RANEXA.  NTG PATCH DAILY.  ASA, PLAVIX, STATIN.  POOR PROGNOSIS OVERALL.  FAMILY SEEMED TO UNDERSTAND SITUATION.  ADD DAILY LASIX.  OK TO D/C HOME WHEN PT FEELS OK.  F/U WITH DR. BOOTHE, PRIMARY CARDIOLOGIST, OR MID LEVEL AFTER DISCHARGE.      * NSTEMI (non-ST elevated myocardial infarction)  See management plan detailed above.       Acute congestive heart failure  Echocardiogram.  Diurese as needed.      Pulmonary infiltrate  Per hospital med mgt.    Dementia without behavioral disturbance  Per hospital med mgt.     Hyperlipidemia associated with type 2 diabetes mellitus  Statin tx.        VTE Risk Mitigation (From admission, onward)         Ordered     Place sequential compression device  Until discontinued         01/30/22 2056                Abhilash Deal MD  Cardiology  [facility]

## 2022-02-03 NOTE — SUBJECTIVE & OBJECTIVE
2/3 seen and examined.  O2 sat 92% on 2 liter/minute.  Afebrile.  No distress.  Status post left heart catheter yesterday via radial approach.  Multivessel CAD poor candidate for CABG due to dementia and poor targets.  Review of Systems   Constitutional: Negative for chills and fever.   HENT: Negative for nosebleeds.    Eyes: Negative for discharge.   Respiratory: Positive for shortness of breath. Negative for choking.    Cardiovascular: Negative for chest pain.   Gastrointestinal: Negative for blood in stool.   Endocrine: Negative for cold intolerance.   Genitourinary: Negative for hematuria.   Musculoskeletal: Positive for arthralgias. Negative for neck stiffness.   Skin: Negative for rash.   Allergic/Immunologic: Negative for immunocompromised state.   Neurological: Positive for weakness. Negative for seizures.   Hematological: Negative for adenopathy.   Psychiatric/Behavioral: Positive for confusion. Negative for behavioral problems.     Objective:     Vital Signs (Most Recent):  Temp: 98.4 °F (36.9 °C) (02/03/22 0742)  Pulse: 93 (02/03/22 0742)  Resp: 16 (02/03/22 0742)  BP: 95/61 (02/03/22 0742)  SpO2: (!) 92 % (02/03/22 0742) Vital Signs (24h Range):  Temp:  [98.1 °F (36.7 °C)-98.8 °F (37.1 °C)] 98.4 °F (36.9 °C)  Pulse:  [81-98] 93  Resp:  [14-23] 16  SpO2:  [90 %-100 %] 92 %  BP: ()/(51-74) 95/61     Weight: 66.7 kg (147 lb 0.8 oz)  Body mass index is 27.78 kg/m².      Intake/Output Summary (Last 24 hours) at 2/3/2022 1301  Last data filed at 2/3/2022 0900  Gross per 24 hour   Intake 1192.61 ml   Output --   Net 1192.61 ml       Physical Exam  Vitals and nursing note reviewed.   Constitutional:       General: She is not in acute distress.     Appearance: She is well-developed. She is ill-appearing.   HENT:      Head: Normocephalic and atraumatic.   Cardiovascular:      Rate and Rhythm: Normal rate.   Pulmonary:      Effort: Pulmonary effort is normal. No respiratory distress.      Breath sounds: No  stridor.   Abdominal:      Palpations: Abdomen is soft.      Tenderness: There is no abdominal tenderness.   Genitourinary:     Comments: External urine catheter  Musculoskeletal:         General: No deformity.      Cervical back: Neck supple.   Skin:     General: Skin is warm.   Neurological:      General: No focal deficit present.      Mental Status: She is alert. Mental status is at baseline.   Psychiatric:         Mood and Affect: Mood normal.         Behavior: Behavior normal.         Thought Content: Thought content normal.         Judgment: Judgment normal.         Vents:  Oxygen Concentration (%): 28 (02/03/22 0742)    Lines/Drains/Airways     Drain            Female External Urinary Catheter 02/02/22 0500 1 day          Peripheral Intravenous Line                 Peripheral IV - Single Lumen 01/30/22 2226 20 G Anterior;Left Forearm 3 days                Significant Labs:    CBC/Anemia Profile:  Recent Labs   Lab 02/02/22  0631   WBC 7.52   HGB 10.6*   HCT 33.0*      MCV 91   RDW 12.8        Chemistries:  Recent Labs   Lab 02/02/22  0919      K 3.7      CO2 23   BUN 18   CREATININE 1.0   CALCIUM 8.3*   ALBUMIN 3.1*   PROT 6.1   BILITOT 0.5   ALKPHOS 63   ALT 22   AST 35     Results for TATO PELAEZ (MRN [id number]) as of 1/31/2022 12:41   Ref. Range 1/30/2022 19:44 1/31/2022 01:50   BNP Latest Ref Range: 0 - 99 pg/mL 292 (H)    Troponin I Latest Ref Range: 0.000 - 0.026 ng/mL 11.791 (H) 14.279 (H)          Ref. Range 1/30/2022 22:07   POC PH Latest Ref Range: 7.35 - 7.45  7.321 (L)   POC PCO2 Latest Ref Range: 35 - 45 mmHg 42.1   POC PO2 Latest Ref Range: 80 - 100 mmHg 61 (L)   POC BE Latest Ref Range: -2 to 2 mmol/L -4   POC HCO3 Latest Ref Range: 24 - 28 mmol/L 21.7 (L)   POC SATURATED O2 Latest Ref Range: 95 - 100 % 89 (L)   Sample Unknown ARTERIAL   DelSys Unknown NRB   Allens Test Unknown Pass   Site Unknown RR     EKG 01/30/2022      Normal sinus rhythm   ST and T wave abnormality,  consider anterior ischemia   Abnormal ECG   When compared with ECG of 20-JAN-2022 14:30,   Left anterior fascicular block is no longer Present   ST now depressed in Inferior leads   ST now depressed in Anterior-lateral leads   T wave inversion now evident in Anterior leads       2D echo 01/31/2022    Show images for Echo    Summary    · The left ventricle is normal in size with concentric hypertrophy and mildly decreased systolic function.  · The estimated ejection fraction is 45%.  · Normal right ventricular size with normal right ventricular systolic function.  · Grade II left ventricular diastolic dysfunction.  · Mild to moderate tricuspid regurgitation.  · Moderate mitral regurgitation.  · There is mild aortic valve stenosis.  · Aortic valve area is 1.54 cm2; peak velocity is 1.48 m/s; mean gradient is 5 mmHg.  · Trivial pericardial effusion.  · There are segmental left ventricular wall motion abnormalities.  · The estimated PA systolic pressure is 57 mmHg.  · There is pulmonary hypertension.  · Intermediate central venous pressure (8 mmHg        Significant Imaging:   I have reviewed all pertinent imaging results/findings within the past 24 hours.  CXR: I have reviewed all pertinent results/findings within the past 24 hours and my personal findings are:         FINDINGS:  Right basilar consolidation concerning for early pneumonia.  Correlation is advised.  No left pleural fluid or pneumothorax.  Probable trace right pleural effusion.     The cardiac silhouette is normal in size. The hilar and mediastinal contours are unremarkable.     Bones are intact.     Impression:     Right basilar consolidation concerning for pneumonia.  Possible trace right pleural effusion.

## 2022-02-03 NOTE — PLAN OF CARE
[facility]  Discharge Final Note    Primary Care Provider: Devon Lovell MD    Expected Discharge Date: 2/3/2022    Final Discharge Note (most recent)     Final Note - 02/03/22 1627        Final Note    Assessment Type Final Discharge Note     Anticip[place] [facility] [place]ospital Resources/Appts/Education Provided Appointments scheduled and added to AVS        Post-Acute Status    Post-Acute Authorization Home Health;HME     HME Status Set-up Complete/Auth obtained     Home Health Status Set-up Complete/Auth obtained                 Important Message from Medicare  Important Message from Medicare regarding Discharge Appeal Rights: Given to patient/caregiver,Explained to patient/caregiver,Signed/date by patient/caregiver     Date IMM was signed: 02/03/22  Time IMM was signed: 1112    Contact Info     [facility]  [place]   Specialty: Home Health Services    [address]  [place] [address]   Phone: [phone]       Next Steps: Follow up    Instructions: Home Health    Milli Perez MD   Specialty: Interventional Cardiology, Cardiology    [address] [facility] [place] [address]   Phone: [phone]       Next Steps: Follow up in 1 week(s)    [facility]   Specialty: DME Provider    [address] [place] [address] A  [facility] [address]   Phone: [phone]       Next Steps: Follow up    Instructions: DME- oxygen        Feb10 Established Patient Visit with Dr Fabian Bailey  Thursday Feb 10, 2022 9:40 AM  Arrive at check-in approximately 15 minutes before your scheduled appointment time. Bring all outside medical records and imaging, along with a list of your current medications and insurance card.  Prepay due: Estimate unavailable  Please take [address] for the Medical Office Building. Check in on the 4th floor. [facility] (Medical Ofc Bldg)  [address] [place] [id number] [phone]      Portable oxygen delivered to the bedside by [facility]

## 2022-02-03 NOTE — SUBJECTIVE & OBJECTIVE
Review of Systems   Constitutional: Positive for malaise/fatigue.   HENT: Negative.    Eyes: Negative.    Cardiovascular: Positive for dyspnea on exertion.   Respiratory: Positive for shortness of breath.    Endocrine: Negative.    Hematologic/Lymphatic: Negative.    Skin: Negative.    Musculoskeletal: Negative.    Gastrointestinal: Negative.    Genitourinary: Negative.    Neurological: Positive for weakness.   Psychiatric/Behavioral: Positive for memory loss.   Allergic/Immunologic: Negative.      Objective:     Vital Signs (Most Recent):  Temp: 98.4 °F (36.9 °C) (02/03/22 0742)  Pulse: 93 (02/03/22 0742)  Resp: 16 (02/03/22 0742)  BP: 95/61 (02/03/22 0742)  SpO2: (!) 92 % (02/03/22 0742) Vital Signs (24h Range):  Temp:  [98 °F (36.7 °C)-98.8 °F (37.1 °C)] 98.4 °F (36.9 °C)  Pulse:  [81-98] 93  Resp:  [14-23] 16  SpO2:  [90 %-100 %] 92 %  BP: ()/(51-74) 95/61     Weight: 66.7 kg (147 lb 0.8 oz)  Body mass index is 27.78 kg/m².     SpO2: (!) 92 %  O2 Device (Oxygen Therapy): nasal cannula      Intake/Output Summary (Last 24 hours) at 2/3/2022 1051  Last data filed at 2/3/2022 0900  Gross per 24 hour   Intake 1192.61 ml   Output --   Net 1192.61 ml       Lines/Drains/Airways     Drain            Female External Urinary Catheter 02/02/22 0500 1 day          Peripheral Intravenous Line                 Peripheral IV - Single Lumen 01/30/22 2226 20 G Anterior;Left Forearm 3 days                Physical Exam  Vitals and nursing note reviewed.   Constitutional:       General: She is active. She is not in acute distress.Vital signs are normal.      Appearance: Normal appearance. She is well-developed and well-nourished. She is not ill-appearing, sickly-appearing or diaphoretic.   HENT:      Head: Normocephalic.   Neck:      Vascular: No carotid bruit or JVD.   Cardiovascular:      Rate and Rhythm: Normal rate and regular rhythm.      Chest Wall: PMI is not displaced.      Pulses: Normal pulses.           Radial  pulses are 2+ on the right side and 2+ on the left side.      Heart sounds: Normal heart sounds, S1 normal and S2 normal. No murmur heard.  No friction rub. No gallop.       Comments:   LEFT RADIAL ACCESS SITE LOOKS GOOD, NO DRAINAGE OR HEMATOMA NOTED. MILD BRUISING.  Pulmonary:      Effort: Pulmonary effort is normal.      Breath sounds: Examination of the right-lower field reveals decreased breath sounds. Examination of the left-lower field reveals decreased breath sounds. Decreased breath sounds present. No wheezing or rales.   Abdominal:      General: Bowel sounds are normal. There is no ascites or abdominal bruit. Aorta is normal.      Palpations: Abdomen is soft. There is no pulsatile liver.      Tenderness: There is no abdominal tenderness.   Musculoskeletal:         General: No edema.      Cervical back: Neck supple.   Lymphadenopathy:      Cervical: No cervical adenopathy.   Skin:     General: Skin is warm.   Neurological:      Mental Status: She is alert.   Psychiatric:         Mood and Affect: Mood and affect normal.         Behavior: Behavior normal. Behavior is cooperative.         Significant Labs:   ABG: No results for input(s): PH, PCO2, HCO3, POCSATURATED, BE in the last 48 hours., Blood Culture: No results for input(s): LABBLOO in the last 48 hours., BMP:   Recent Labs   Lab 02/02/22  0919   *      K 3.7      CO2 23   BUN 18   CREATININE 1.0   CALCIUM 8.3*   , CMP   Recent Labs   Lab 02/02/22  0919      K 3.7      CO2 23   *   BUN 18   CREATININE 1.0   CALCIUM 8.3*   PROT 6.1   ALBUMIN 3.1*   BILITOT 0.5   ALKPHOS 63   AST 35   ALT 22   ANIONGAP 15   ESTGFRAFRICA >60   EGFRNONAA 56*   , CBC   Recent Labs   Lab 02/02/22  0631   WBC 7.52   HGB 10.6*   HCT 33.0*      , INR No results for input(s): INR, PROTIME in the last 48 hours., Lipid Panel No results for input(s): CHOL, HDL, LDLCALC, TRIG, CHOLHDL in the last 48 hours. and Troponin No results for  input(s): TROPONINI in the last 48 hours.    Significant Imaging: Echocardiogram:   Transthoracic echo (TTE) complete (Cupid Only):   Results for orders placed or performed during the hospital encounter of 01/30/22   Echo   Result Value Ref Range    BSA 1.69 m2    TDI SEPTAL 0.08 m/s    LV LATERAL E/E' RATIO 20.20 m/s    LV SEPTAL E/E' RATIO 12.63 m/s    LA WIDTH 3.33 cm    IVC diameter 1.93 cm    Left Ventricular Outflow Tract Mean Velocity 0.75857558043416 cm/s    Left Ventricular Outflow Tract Mean Gradient 1.26 mmHg    TDI LATERAL 0.05 m/s    LVIDd 4.15 3.5 - 6.0 cm    IVS 1.30 (A) 0.6 - 1.1 cm    Posterior Wall 1.03 0.6 - 1.1 cm    Ao root annulus 2.04 cm    LVIDs 3.32 2.1 - 4.0 cm    FS 20 28 - 44 %    LA volume 29.64 cm3    Sinus 2.75 cm    STJ 2.10 cm    Ascending aorta 2.29 cm    LV mass 167.52 g    LA size 2.75 cm    TAPSE 1.16 cm    Left Ventricle Relative Wall Thickness 0.50 cm    AV mean gradient 5 mmHg    AV valve area 1.54 cm2    AV Velocity Ratio 0.49     AV index (prosthetic) 0.54     MV mean gradient 2 mmHg    MV valve area p 1/2 method 4.32 cm2    MV valve area by continuity eq 1.72 cm2    E/A ratio 1.28     Mean e' 0.07 m/s    E wave deceleration time 175.714303607094567 msec    IVRT 53.645710731399243 msec    LVOT diameter 1.90 cm    LVOT area 2.8 cm2    LVOT peak mode 0.72 m/s    LVOT peak VTI 15.80 cm    Ao peak mode 1.48 m/s    Ao VTI 29.0 cm    RVOT peak mode 0.51 m/s    RVOT peak VTI 9.3 cm    Mr max mode 5.22 m/s    LVOT stroke volume 44.77 cm3    AV peak gradient 9 mmHg    MV peak gradient 4 mmHg    PV mean gradient 0.55 mmHg    E/E' ratio 15.54 m/s    MV Peak E Mode 1.01 m/s    TR Max Mode 3.50 m/s    MV VTI 26.1 cm    MV stenosis pressure 1/2 time 50.105036596247195 ms    MV Peak A Mode 0.79 m/s    LV Systolic Volume 44.93 mL    LV Systolic Volume Index 27.1 mL/m2    LV Diastolic Volume 76.58 mL    LV Diastolic Volume Index 46.13 mL/m2    LA Volume Index 17.9 mL/m2    LV Mass Index 101 g/m2     Echo EF Estimated 41 %    RA Major Axis 3.78 cm    Left Atrium Minor Axis 3.22 cm    Left Atrium Major Axis 4.66 cm    Triscuspid Valve Regurgitation Peak Gradient 49 mmHg    RA Width 2.88 cm    Right Atrial Pressure (from IVC) 8 mmHg    EF 45 %    TV rest pulmonary artery pressure 57 mmHg    Narrative    · The left ventricle is normal in size with concentric hypertrophy and   mildly decreased systolic function.  · The estimated ejection fraction is 45%.  · Normal right ventricular size with normal right ventricular systolic   function.  · Grade II left ventricular diastolic dysfunction.  · Mild to moderate tricuspid regurgitation.  · Moderate mitral regurgitation.  · There is mild aortic valve stenosis.  · Aortic valve area is 1.54 cm2; peak velocity is 1.48 m/s; mean gradient   is 5 mmHg.  · Trivial pericardial effusion.  · There are segmental left ventricular wall motion abnormalities.  · The estimated PA systolic pressure is 57 mmHg.  · There is pulmonary hypertension.  · Intermediate central venous pressure (8 mmHg).          Results for orders placed during the hospital encounter of 01/30/22    Cardiac catheterization    Conclusion  · SEVERE CALCIFIED DIFFUSE MULTIVESSEL DISEASE.  · LVEF 45%.  · RECOMMENDATIONS: PT IS POOR CANDIDATE FOR CABG WITH SIGNIFICANT DEMENTIA AND POOR TARGETS. CAD NOT OPTIMAL FOR PCI. RECOMMEND MAXIMAL MEDICAL THERAPY AS TOLERATED.    The procedure log was documented by Documenter: Daphne Mcdonough RN and verified by Abhilash Deal MD.    Date: 2/2/2022  Time: 3:11 PM

## 2022-02-03 NOTE — PLAN OF CARE
RD Recommendations    1. Continue diet as prescribed: cardiac diabetic 1800 calories  2. RD to follow up to monitor intake, tolerance, and labs.    *Please send consult if acute nutrition needs arise.    Goals:  Pt will tolerate >65% estimated energy and protein needs by RD follow up.       Thu Cruz MS, RD, LDN

## 2022-02-04 PROBLEM — I63.231 STROKE DUE TO OCCLUSION OF RIGHT CAROTID ARTERY: Status: ACTIVE | Noted: 2022-01-01

## 2022-02-04 PROBLEM — I63.511 ACUTE ISCHEMIC RIGHT MCA STROKE: Status: ACTIVE | Noted: 2022-01-01
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
## 2022-02-04 NOTE — PROCEDURES
Radiology Post-Procedure Note    Pre Op Diagnosis: Right terminal ICA occlusion     Post Op Diagnosis: Right terminal ICA occlusion S/p ADAPT - TICI3    Procedure: Cerebral angiogram    Procedure performed by: Mk Read MD    Written Informed Consent Obtained: Yes    Specimen Removed: NO    Estimated Blood Loss: Minimal    Procedure report:     A 8F sheath was placed into the right femoral artery and a 5F DOE [employer] MARGIE Balloon Guide Catheter was advanced into the aortic arch. The right CCA; ICA arteries were subselected and angiography of the brain was performed after injection into each of these vessels.    Preliminary interpretation: Right terminal ICA occlusion was identified and aspiration thrombectomy was performed using Viragen Flow Plus Aspiration Catheter, guided with aid of Bahman guidewire / Headway 27 Microcatheter.     Reperfusion score TICI 3 on First Pass was achieved.     Please see Imaging report for full details.    A right femoral artery angiogram was performed, the sheath removed and hemostasis achieved using perclose.  No hematoma was present at the time of hemostasis.    The patient tolerated the procedure well.         Jayda Chiang MD     Neuro Endovascular Surgery Fellow   [facility]

## 2022-02-04 NOTE — ASSESSMENT & PLAN NOTE
Patient admitted to OSH for NSTEMI on 1/31. Per cards note at that time, she has severe heavily calcified CAD. Not optimal for PCI or CABG. Blood pressures at that time too low for ACE/ARB or B-blocker for CHF (EF45%). They recommended medical treatment. Ranexa, nitro patch daily, ASA, plavix, and statin.    -Troponin peaked at 20, had been decreasing but again uptrending  -EKG at admission without any ST elevation or evidence of new acute occlusion  -Continue statin, lasix, ranolazine  -Resume ASA/plavix when appropriate s/p thrombectomy
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
## 2022-02-04 NOTE — NURSING
Patient arrived to [facility] from IR     Type of stroke/diagnosis: R ICA CVA s/p thrombectomy     TPA start and end time n/a    Thrombectomy end time 1526  [place] time 17    Current symptoms:   Oriented to self  Dysarthric  No movement LUE and LLE  Spontaneous/follows commands RLE RUE     Skin assessment done: Yes  Wounds noted: R groin site with c/d/i dressing     *If wounds noted, was Wound Care consulted? no    King Completed? Pending     Patient Belongings on Admit: none     NCC notified: Juan HICKEY

## 2022-02-04 NOTE — ASSESSMENT & PLAN NOTE
Antithrombotics for secondary stroke prevention: Pending results of endovascular thrombectomy    Statins for secondary stroke prevention and hyperlipidemia, if present:   Statins: Atorvastatin- 40 mg daily    Aggressive risk factor modification: HTN, DM, HLD     Rehab efforts: The patient has been evaluated by a stroke team provider and the therapy needs have been fully considered based off the presenting complaints and exam findings. The following therapy evaluations are needed: PT evaluate and treat, OT evaluate and treat, SLP evaluate and treat, PM&R evaluate for appropriate placement    Diagnostics ordered/pending: HgbA1C to assess blood glucose levels, Lipid Profile to assess cholesterol levels, MRI head without contrast to assess brain parenchyma, TTE to assess cardiac function/status     VTE prophylaxis: Heparin 5000 units SQ every 8 hours    BP parameters: Infarct: Pending results of endovascular thrombectomy

## 2022-02-04 NOTE — ASSESSMENT & PLAN NOTE
CNS  -R MCA stroke s/p thrombectomy with TICI3. Patient with recent NSTEMI showing EF45% and regional wall motion abnormalities  -Patient is high risk for reperfusion injury, SBP <140, repeat CT head 2 hours post-thrombectomy per IR recs  -q1h neuro checks  -PT/OT    [facility]  -Patient admitted to OSH for NSTEMI on 1/31. Per cards note at that time, she has severe heavily calcified CAD. Not optimal for PCI or CABG. Blood pressures at that time too low for ACE/ARB or B-blocker for CHF (EF45%). They recommended medical treatment. Ranexa, nitro patch daily, ASA, plavix, and statin.  -Troponin peaked at 20, had been decreasing but again uptrending  -EKG at admission without any ST elevation or evidence of new acute occlusion  -Repeat echo to evaluate for LV thrombus  -Continue statin, lasix, ranolazine  -Resume ASA/plavix when appropriate s/p thrombectomy    Pulmonary  -On 3L NC on arrival, wean as tolerated    FEN/GI  -SLP  -NPO until more awake    Heme/ID  -Holding aspirin and plavix s/p thrombectomy. Resume as appropriate
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
## 2022-02-04 NOTE — HPI
77 y/o woman with recent NSTEMI (discharged yesterday), T2DM, presenting with R hemiparesis.  Patient was normal when she went to bed last night at 11 pm.

## 2022-02-04 NOTE — ASSESSMENT & PLAN NOTE
77 y/o woman with wake up R-MCA stroke.  Evolving changes in R hemisphere and hyperdense R-M1.  Recommend transfer for thrombectomy.  Not a tPA candidate.

## 2022-02-04 NOTE — Clinical Note
Diagnosis: Cerebrovascular accident (CVA), unspecified mechanism [[id number]]   Admitting Provider:: NADEEN FLANAGAN [[id number]]   Future Attending Provider: NADEEN FLANAGAN [[id number]]   Reason for IP Medical Treatment  (Clinical interventions that can only be accomplished in the IP setting? ) :: Stroke   Estimated Length of Stay:: 2 midnights   I certify that Inpatient services for greater than or equal to 2 midnights are medically necessary:: Yes   Plans for Post-Acute care--if anticipated (pick the single best option):: D. Skilled Nursing Placement   Special Needs:: No Special Needs [1]

## 2022-02-04 NOTE — PLAN OF CARE
Hemostasis achieved via right groin with use of perc-closure device. Patient to remain flat for 2 hours. HOB may be elevated at 1726

## 2022-02-04 NOTE — ED NOTES
Patient incontinent of urine; gown and linens changed; patient arrived with no belongings.  Son at bedside
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
## 2022-02-04 NOTE — PLAN OF CARE
Pt arrived to IR room 4 for thrombectomy. Pt oriented to unit and staff. Plan of care reviewed with patient.Comfort measures utilized. Pt safely transferred from stretcher to procedural table. Fall risk reviewed with patient, fall risk interventions maintained. Safety strap applied, positioner pillows utilized to minimize pressure points. Blankets applied. Pt prepped and draped utilizing standard sterile technique. Patient placed on continuous monitoring, as required by sedation policy. Timeouts completed utilizing standard universal time-out, per department and facility policy. RN to remain at bedside, continuous monitoring maintained. Pt resting comfortably. Denies pain/discomfort. Will continue to monitor. See flow sheets for monitoring, medication administration, and updates.
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
## 2022-02-04 NOTE — HPI
Per ED evaluation:  Patient presents to the emergency department with symptoms concerning for acute CVA.  Last known normal per family was 2:00 a.m.; patient presented outside of the tPA window; tele stroke was activated immediately on arrival; neurologist suspects large vessel occlusion and recommend stat transfer for possible neuro intervention.  Neurology advised CTA head neck prior to transfer; CTA head and neck performed and pending at the time of transfer; numerous labs pending at time of transfer; rectal aspirin given    Patient presented initially to [facility] emergency department with L-sided weakness, L-sided facial droop, confusion, she arrives to the neuro ICU status post thrombectomy for right ICA occlusion. CTA shows acute large vessel occlusion with distal right supraclinoid ICA filling defect extending into the anterior cerebral artery and middle cerebral artery branches.  Patient was recently admitted to outside hospital with NSTEMI (days ago), at which time she was evaluated by cardiology and cardiac catheterization was performed showing severe multi-vessel disease. Medical management was decided upon by Cardiology and family at that time due to comorbidities.  Patient had been started on aspirin, Plavix, statin

## 2022-02-05 NOTE — PROGRESS NOTES
[facility] - Neuro Critical Care  Neurocritical Care  Progress Note    Admit Date: 2/4/2022  Service Date: 02/05/2022  Length of Stay: 1    Subjective:     Chief Complaint: <principal problem not specified>    History of Present Illness: Per ED evaluation:  Patient presents to the emergency department with symptoms concerning for acute CVA.  Last known normal per family was 2:00 a.m.; patient presented outside of the tPA window; tele stroke was activated immediately on arrival; neurologist suspects large vessel occlusion and recommend stat transfer for possible neuro intervention.  Neurology advised CTA head neck prior to transfer; CTA head and neck performed and pending at the time of transfer; numerous labs pending at time of transfer; rectal aspirin given    Patient presented initially to [facility] emergency department with L-sided weakness, L-sided facial droop, confusion, she arrives to the neuro ICU status post thrombectomy for right ICA occlusion. CTA shows acute large vessel occlusion with distal right supraclinoid ICA filling defect extending into the anterior cerebral artery and middle cerebral artery branches.  Patient was recently admitted to outside hospital with NSTEMI (days ago), at which time she was evaluated by cardiology and cardiac catheterization was performed showing severe multi-vessel disease. Medical management was decided upon by Cardiology and family at that time due to comorbidities.  Patient had been started on aspirin, Plavix, statin      Hospital Course: 02/05/2022 metabolic acidosis, tachypnea. Place arterial line.       Review of Symptoms: encephalopathic, dysarthria cannot participate    Medications:  Continuous Infusions:  Scheduled Meds:   atorvastatin  40 mg Oral Daily    donepeziL  10 mg Oral Daily    furosemide  20 mg Oral Daily    memantine  10 mg Oral BID    ranolazine  500 mg Oral BID     PRN Meds:.calcium gluconate IVPB, calcium gluconate IVPB, calcium gluconate IVPB,  iodixanoL, magnesium sulfate IVPB, magnesium sulfate IVPB, sodium chloride 0.9%, sodium chloride 0.9%    OBJECTIVE:   Vital Signs (Most Recent):   Temp: 98.4 °F (36.9 °C) (02/05/22 0726)  Pulse: (!) 117 (02/05/22 0833)  Resp: (!) 29 (02/05/22 0833)  BP: (!) 102/59 (02/05/22 0726)  SpO2: 96 % (02/05/22 0833)    Vital Signs (24h Range):   Temp:  [96.4 °F (35.8 °C)-98.5 °F (36.9 °C)] 98.4 °F (36.9 °C)  Pulse:  [] 117  Resp:  [11-37] 29  SpO2:  [93 %-100 %] 96 %  BP: (101-118)/(58-75) 102/59    ICP/CPP (Last 24h):        I & O (Last 24h):     Intake/Output Summary (Last 24 hours) at 2/5/2022 0849  Last data filed at 2/5/2022 0602  Gross per 24 hour   Intake 85 ml   Output 1350 ml   Net -1265 ml     Physical Exam:  GA: drowsy, comfortable, no acute distress.   HEENT: No scleral icterus or JVD. Neck supple  Pulmonary: Air entry equal to auscultation A/P/L. Wheezing no, crackles no  Cardiac: RRR, S1 & S2 w/o rubs/murmurs/gallops.   Abdominal: soft, non-tender, bowel sounds present x 4. No appreciable hepatosplenomegaly.  Skin: No jaundice, rashes, or visible lesions.  Neuro:  --Mental Status:  Drowsy, arouses to stimulation. Does not follow commands consistently   --Pupils 3.5 mm, PERRL. Right gaze preference  --Corneal reflex not done in this arousable patient, gag, cough intact.  Left hemiparesis  MSK:  No edema in UE and LE    Vent Data:   Oxygen Concentration (%):  [28] 28    Lines/Drains/Airway:         Female External Urinary Catheter 02/02/22 0500 (Active)   Skin no breakdown;no redness;perineum cleansed w/ soap and water 02/05/22 0726   Tolerance no signs/symptoms of discomfort 02/05/22 0726   Suction Continuous suction at 60 mmHg 02/05/22 0726   Date of last wick change 02/05/22 02/05/22 0726   Time of last wick change 0702 02/05/22 0726   Output (mL) 350 mL 02/05/22 0602     Nutrition/Tube Feeds (if NPO state why): npo     Labs:  ABG:   Recent Labs   Lab 02/05/22  0833   PH 7.192*   PO2 77*   PCO2 16.4*    HCO3 6.3*   POCSATURATED 92*   BE -22     BMP:  Recent Labs   Lab 02/05/22  0008      K 4.1      CO2 14*   BUN 15   CREATININE 1.2   *   MG 2.0   PHOS 3.0     LFT:   Lab Results   Component Value Date    AST 36 02/05/2022    ALT 24 02/05/2022    ALKPHOS 67 02/05/2022    BILITOT 0.4 02/05/2022    ALBUMIN 2.9 (L) 02/05/2022    PROT 6.6 02/05/2022     CBC:   Lab Results   Component Value Date    WBC 12.13 02/05/2022    HGB 12.4 02/05/2022    HCT 40.3 02/05/2022    MCV 95 02/05/2022     02/05/2022     Microbiology x 7d:   Microbiology Results (last 7 days)     ** No results found for the last 168 hours. **        Imaging:    I personally reviewed the above image.  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, without long-term current use of insulin      Neuro:   AIS s/p thrombectomy  Possible PH-1 vs contrast blush in right BG  Remains encephalopathic    Pulmonary:   Mild tachypnea  ABG - metabolic acidosis with high AG  CXR today    Cardiac:   MAP goals >65 mmhg  Place arterial line for hemodynamic monitoring  Echo pending  Check lactic acid, ketones    Renal:    Making urine  BUN/Cr <20    ID:   Afebrile, normal wbc    Hem/Onc:   Stable hh and plt count    Endocrine:    BS stable    Fluids/Electrolytes/Nutrition/GI:   Npo  Replete lytes as needed  Lines:  Art   CVC NA  ETT NA  Moya NA  NG +  PEG NA    Proph:  DVT:SCD/heparin   Constipation:  Last output:bowel regimen as needed  PUP:NA  VAP:NA      Uninterrupted Critical Care/Counseling Time (not including  procedures):: 37 mins    Duy Jones MD, FN  Neurocritical care attending    DNR    Duy Jones MD  Neurocritical Care  [facility] - Neuro Critical Care

## 2022-02-05 NOTE — PLAN OF CARE
[facility] Care Plan    POC reviewed with Julia Schroeder and family at 1400. Pt unable to verbalize understanding. Questions and concerns addressed with family at bedside. Will continue to monitor. See below and flowsheets for full assessment and VS info.   -Pt continued to have respiratory distress and remained tachycardic throughout shift, placed on BiPAP @ 40% O2  -2 AMPs bicarb given  -Pt placed on Bicarb gtt @ 40  -PT placed on insulin gtt, titrated per nomogram  -Trending CMP and ABGs q6  -MRI head completed  -Full bath given  -NGT placed and ok to use  -CXR obtained  -12 lead EKG obtained  -Per pt family, pt remains DNR but is ok with intubation if needed    Neuro:  [place] Coma Scale  Best Eye Response: 3-->(E3) to speech  Best Motor Response: 6-->(M6) obeys commands  Best Verbal Response: 4-->(V4) confused  [place] Coma Scale Score: 13  Assessment Qualifiers: patient not sedated/intubated  Pupil PERRLA: yes     24 hr Temp:  [97.8 °F (36.6 °C)-98.5 °F (36.9 °C)]     CV:   Rhythm: sinus tachycardia  BP goals:   SBP < 140  MAP > 65    Resp:   O2 Device (Oxygen Therapy): BiPAP  Oxygen Concentration (%): 40    Plan: N/A    GI/:     Diet/Nutrition Received: NPO  Last Bowel Movement: 02/03/22  Voiding Characteristics: external catheter    Intake/Output Summary (Last 24 hours) at 2/5/2022 1653  Last data filed at 2/5/2022 1631  Gross per 24 hour   Intake 173.1 ml   Output 2000 ml   Net -1826.9 ml     Unmeasured Output  Urine Occurrence: 1  Stool Occurrence: 0  Pad Count: 1    Labs/Accuchecks:  Recent Labs   Lab 02/05/22  0008   WBC 12.13   RBC 4.25   HGB 12.4   HCT 40.3         Recent Labs   Lab 02/05/22  1523      K 4.0   CO2 9*      BUN 24*   CREATININE 1.6*   ALKPHOS 72   ALT 24   AST 31   BILITOT 0.4      Recent Labs   Lab 02/04/22  2115   INR 1.0   APTT 24.0      Recent Labs   Lab 02/05/22  0846      TROPONINI 19.503*       Electrolytes: N/A - electrolytes WDL  Accuchecks: Q1H    Gtts:    insulin regular 1 units/mL infusion orderable (DKA) 2 Units/hr (02/05/22 1405)    sodium bicarbonate drip 40 mL/hr at 02/05/22 1631       LDA/Wounds:  Lines/Drains/Airways       Drain                   NG/OG Tube Left nostril -- days    Female External Urinary Catheter 02/02/22 0500 3 days              Arterial Line              Arterial Line 02/05/22 1039 Left Brachial <1 day              Peripheral Intravenous Line                   Peripheral IV - Single Lumen 02/04/22 1116 20 G Right Forearm 1 day         Peripheral IV - Single Lumen 02/04/22 1148 18 G Right Antecubital 1 day         Peripheral IV - Single Lumen 02/05/22 1649 20 G Left;Posterior Hand <1 day         Peripheral IV - Single Lumen 02/05/22 1649 20 G Posterior;Right Hand <1 day                  Wounds: No  Wound care consulted: No

## 2022-02-05 NOTE — PROGRESS NOTES
Call to bedside. ABG drawn and reported. Patient placed on BIPAP with settings documented, will continue to monitor.

## 2022-02-05 NOTE — NURSING
Pt newly tachypneic and tachycardic, HR in low 120s and sustaining. O2 sats in 90s. SHAAN Turner with [facility] notified. Stat CXR ordered, ABG ordered. RT notified. WCSOLO.

## 2022-02-05 NOTE — SUBJECTIVE & OBJECTIVE
Neurologic Chief Complaint: AMS+L hemiparesis    Subjective:     Interval History: Patient is seen for follow-up neurological assessment and treatment recommendations: Echo and MRI pending. Patient tachypenic and tachycardic this morning. Neuro exam stable.     HPI, Past Medical, Family, and Social History remains the same as documented in the initial encounter.     Review of Systems   Constitutional: Negative for chills and fever.   HENT: Negative for congestion.    Respiratory: Negative for cough.    Gastrointestinal: Negative for nausea and vomiting.   Musculoskeletal: Negative for neck pain and neck stiffness.   Skin: Negative for pallor and rash.   Neurological: Positive for weakness and numbness.   Psychiatric/Behavioral: Positive for decreased concentration.     Scheduled Meds:   aspirin  81 mg Oral Daily    atorvastatin  40 mg Oral Daily    clopidogreL  75 mg Oral Daily    donepeziL  10 mg Oral Daily    furosemide  20 mg Oral Daily    heparin (porcine)  5,000 Units Subcutaneous Q8H    memantine  10 mg Oral BID    ranolazine  500 mg Oral BID    sodium chloride 0.9%  250 mL Intravenous Once     Continuous Infusions:   sodium chloride 0.9%      insulin regular 1 units/mL infusion orderable (DKA)       PRN Meds:calcium gluconate IVPB, calcium gluconate IVPB, calcium gluconate IVPB, dextrose 10 % in water (D10W), dextrose 10 % in water (D10W), dextrose 10%, dextrose 10%, glucagon (human recombinant), insulin aspart U-100, iodixanoL, magnesium sulfate IVPB, magnesium sulfate IVPB, sodium chloride 0.9%, sodium chloride 0.9%, sodium chloride 0.9%    Objective:     Vital Signs (Most Recent):  Temp: 97.9 °F (36.6 °C) (02/05/22 1126)  Pulse: (!) 120 (02/05/22 1126)  Resp: (!) 30 (02/05/22 1126)  BP: (!) 102/56 (02/05/22 1126)  SpO2: 98 % (02/05/22 1126)  BP Location: Left arm    Vital Signs Range (Last 24H):  Temp:  [96.4 °F (35.8 °C)-98.5 °F (36.9 °C)]   Pulse:  []   Resp:  [11-37]   BP:  (101-121)/(56-77)   SpO2:  [93 %-100 %]   Arterial Line BP: (103)/(57-59)   BP Location: Left arm    Physical Exam  Vitals and nursing note reviewed.   Constitutional:       General: She is not in acute distress.     Comments: Drowsy   HENT:      Head: Normocephalic and atraumatic.      Right Ear: External ear normal.      Left Ear: External ear normal.   Eyes:      Extraocular Movements: Extraocular movements intact.      Conjunctiva/sclera: Conjunctivae normal.   Cardiovascular:      Rate and Rhythm: Tachycardia present.   Pulmonary:      Effort: Respiratory distress present.   Abdominal:      General: There is no distension.   Musculoskeletal:      Right lower leg: No edema.      Left lower leg: No edema.   Skin:     General: Skin is warm and dry.   Neurological:      Sensory: Sensory deficit present.      Motor: Weakness (L hemiparesis) present.         Neurological Exam:   LOC: drowsy  Attention Span: poor  Language: No aphasia  Articulation: Dysarthria  EOM (CN III, IV, VI): Gaze preference  Right, able to overcome midline  Facial Movement (CN VII): Lower facial weakness on the Left  Motor: Arm left  Plegia 0/5  Leg left  Plegia 0/5  Arm right antigravity with spontaneous movement  Leg right antigravity with spontaneous movement  Sensation: Phan-hypoesthesia left  Tone: Flaccid LUE    Laboratory:  CMP:   Recent Labs   Lab 02/05/22 0008 02/05/22 0008 02/05/22  0846   CALCIUM 9.2   < > 9.8   ALBUMIN 2.9*  --   --    PROT 6.6  --   --       < > 140   K 4.1   < > 4.3   CO2 14*   < > 7*      < > 106   BUN 15   < > 16   CREATININE 1.2   < > 1.5*   ALKPHOS 67  --   --    ALT 24  --   --    AST 36  --   --    BILITOT 0.4  --   --     < > = values in this interval not displayed.     CBC:   Recent Labs   Lab 02/05/22 0008   WBC 12.13   RBC 4.25   HGB 12.4   HCT 40.3      MCV 95   MCH 29.2   MCHC 30.8*     Lipid Panel: No results for input(s): CHOL, LDLCALC, HDL, TRIG in the last 168  hours.  Coagulation:   Recent Labs   Lab 02/04/22  2115   INR 1.0   APTT 24.0     Platelet Aggregation Study: No results for input(s): PLTAGG, PLTAGINTERP, PLTAGREGLACO, ADPPLTAGGREG in the last 168 hours.  Hgb A1C: No results for input(s): HGBA1C in the last 168 hours.  TSH:   Recent Labs   Lab 02/04/22  1145   TSH 0.196*       Diagnostic Results     Brain imaging:  MRI Brain pending    [facility] 2/5/2022  Findings consistent with recent ischemia/infarct involving the right MCA distribution again noted, previously identified hyperdensity involving the basal ganglia and caudate on the right again noted, configuration is stable, degree of density is mildly diminished, there is continued mass effect and mild right to left midline shift appearing stable without evidence for significant interval detrimental change.  Clinical and historical correlation and continued follow-up recommended.    [facility] 2/4/2022 1741  Hyperdensity within the right basal ganglia insular cortex confined to the gray matter and sparing the internal cortex.  Findings likely representing contrast staining from recent angiogram given altered flow dynamics in the infarcted territory.  Hemorrhagic conversion is less likely.  Recommend short-term follow-up.     0.2 cm leftward midline shift.    [facility] (2/4/22) 1415: Continued evolution of acute right MCA distribution infarct with developing minimal right-sided mass effect.  No midline shift or evidence for hemorrhagic conversion.  Clinical correlation and continued follow-up recommended.     Vessel Imaging:  IR angio 2/4/2022:  Successful aspiration thrombectomy of the distal right internal carotid artery occlusion, with resulting TICI 3 flow.    CTA head/neck (2/4/22): Acute large vessel occlusion with distal right supraclinoid ICA filling defect extending into the anterior cerebral artery and middle cerebral artery branches as detailed above. Evidence of loss of gray-white matter interface of the deep  right hemispheric structures consistent with early ischemia/infarction. Calcified plaque of the vertebral arteries, internal carotid arteries and of the common carotid artery bifurcation.     Cardiac Evaluation:   Repeat echo pending    Last TTE (1/31/22): EF 45%, grade II LV diastolic dysfunction, no LA enlargement

## 2022-02-05 NOTE — PROGRESS NOTES
[facility]  Vascular Neurology  [facility]  Progress Note    Assessment/Plan:     Stroke due to occlusion of right carotid artery  Patient is a 76 y.o. year old female with vascular risk factors including HF (EF 45%), CAD c/b recent NSTEMI (medically managed, d/c'd 2/3), DM, and Alzheimer's dementia who presented to [facility] with altered mentation and left hemiparesis. Initial NIHSS 22. CTA showed a right ICA terminus occlusion. She was out of the window for IV thrombolysis. She was transferred to [facility] for EVT. Patient s/p IR with TICI 3 of R terminal ICA occlusion.    --Echo and MRI pending. Patient tachypenic and tachycardic this morning. Neuro exam stable. L hemiparesis, R gaze.     Antithrombotics for secondary stroke prevention: ASA81 Plavix 75    Statins for secondary stroke prevention and hyperlipidemia, if present:   Statins: Atorvastatin- 40 mg daily    Aggressive risk factor modification: HTN, DM, HLD     Rehab efforts: The patient has been evaluated by a stroke team provider and the therapy needs have been fully considered based off the presenting complaints and exam findings. 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 Patient tachypenic and tachycardic this morning. Neuro exam stable.       STROKE DOCUMENTATION   Acute Stroke Times   Last Known Normal Date:  02/03/22  Symptom Onset Date: 02/03/22  Stroke Team Called Date: 02/04/22  Stroke Team Called Time: 1403  Stroke Team Arrival Date: 02/04/22  Stroke Team Arrival Time: 1404  CT Interpretation Time: 1415  Alteplase Recommended: No  Thrombectomy Recommended: Yes  Decision to Treat Time for IR: 1432    NIH Scale:  1a. Level of Consciousness: 1-->Not alert, but arousable by minor stimulation to obey, answer, or respond  1b. LOC Questions: 1-->Answers one question correctly  1c. LOC Commands: 0-->Performs both tasks correctly  2. Best Gaze: 1-->Partial gaze palsy, gaze is abnormal in one or both eyes, but forced deviation or total gaze paresis is not present  3. Visual: 1-->Partial hemianopia  4. Facial Palsy: 1-->Minor paralysis (flattened nasolabial fold, asymmetry on smiling)  5a. Motor Arm, Left: 4-->No movement  5b. Motor Arm, Right: 0-->No drift, limb holds 90 (or 45) degrees for full 10 secs  6a. Motor Leg, Left: 4-->No movement  6b. Motor Leg, Right: 0-->No drift, leg holds 30 degree position for full 5 secs  7. Limb Ataxia: 0-->Absent  8. Sensory: 1-->Mild-to-moderate sensory loss, patient feels pinprick is less sharp or is dull on the affected side, or there is a loss of superficial pain with pinprick, but patient is aware of being touched  9. Best Language: 1-->Mild-to-moderate aphasia, some obvious loss of fluency or facility of comprehension, without significant limitation on ideas expressed or form of expression. Reduction of speech and/or comprehension, however, makes conversation. . . (see row details)  10. Dysarthria: 1-->Mild-to-moderate dysarthria, patient slurs at least some words and, at worst, can be understood with some difficulty  11. Extinction and Inattention (formerly Neglect): 1-->Visual, tactile, auditory, spatial, or personal inattention or extinction to bilateral simultaneous stimulation in one of the sensory modalities  Total (NIH Stroke Scale): 17       Modified Chiara Score: 0  Marshall  Coma Scale:    ABCD2 Score:    [id number] Score:   HAS -BLED Score:   ICH Score:   Hunt & Dominguez Classification:      Hemorrhagic change of an Ischemic Stroke: Does this patient have an ischemic stroke with hemorrhagic changes? No     Neurologic Chief Complaint: AMS+L hemiparesis    Subjective:     Interval History: Patient is seen for follow-up neurological assessment and treatment recommendations: Echo and MRI pending. Patient tachypenic and tachycardic this morning. Neuro exam stable.     HPI, Past Medical, Family, and Social History remains the same as documented in the initial encounter.     Review of Systems   Constitutional: Negative for chills and fever.   HENT: Negative for congestion.    Respiratory: Negative for cough.    Gastrointestinal: Negative for nausea and vomiting.   Musculoskeletal: Negative for neck pain and neck stiffness.   Skin: Negative for pallor and rash.   Neurological: Positive for weakness and numbness.   Psychiatric/Behavioral: Positive for decreased concentration.     Scheduled Meds:   aspirin  81 mg Oral Daily    atorvastatin  40 mg Oral Daily    clopidogreL  75 mg Oral Daily    donepeziL  10 mg Oral Daily    furosemide  20 mg Oral Daily    heparin (porcine)  5,000 Units Subcutaneous Q8H    memantine  10 mg Oral BID    ranolazine  500 mg Oral BID    sodium chloride 0.9%  250 mL Intravenous Once     Continuous Infusions:   sodium chloride 0.9%      insulin regular 1 units/mL infusion orderable (DKA)       PRN Meds:calcium gluconate IVPB, calcium gluconate IVPB, calcium gluconate IVPB, dextrose 10 % in water (D10W), dextrose 10 % in water (D10W), dextrose 10%, dextrose 10%, glucagon (human recombinant), insulin aspart U-100, iodixanoL, magnesium sulfate IVPB, magnesium sulfate IVPB, sodium chloride 0.9%, sodium chloride 0.9%, sodium chloride 0.9%    Objective:     Vital Signs (Most Recent):  Temp: 97.9 °F (36.6 °C) (02/05/22 1126)  Pulse: (!) 120 (02/05/22 1126)  Resp: (!)  30 (02/05/22 1126)  BP: (!) 102/56 (02/05/22 1126)  SpO2: 98 % (02/05/22 1126)  BP Location: Left arm    Vital Signs Range (Last 24H):  Temp:  [96.4 °F (35.8 °C)-98.5 °F (36.9 °C)]   Pulse:  []   Resp:  [11-37]   BP: (101-121)/(56-77)   SpO2:  [93 %-100 %]   Arterial Line BP: (103)/(57-59)   BP Location: Left arm    Physical Exam  Vitals and nursing note reviewed.   Constitutional:       General: She is not in acute distress.     Comments: Drowsy   HENT:      Head: Normocephalic and atraumatic.      Right Ear: External ear normal.      Left Ear: External ear normal.   Eyes:      Extraocular Movements: Extraocular movements intact.      Conjunctiva/sclera: Conjunctivae normal.   Cardiovascular:      Rate and Rhythm: Tachycardia present.   Pulmonary:      Effort: Respiratory distress present.   Abdominal:      General: There is no distension.   Musculoskeletal:      Right lower leg: No edema.      Left lower leg: No edema.   Skin:     General: Skin is warm and dry.   Neurological:      Sensory: Sensory deficit present.      Motor: Weakness (L hemiparesis) present.         Neurological Exam:   LOC: drowsy  Attention Span: poor  Language: No aphasia  Articulation: Dysarthria  EOM (CN III, IV, VI): Gaze preference  Right, able to overcome midline  Facial Movement (CN VII): Lower facial weakness on the Left  Motor: Arm left  Plegia 0/5  Leg left  Plegia 0/5  Arm right antigravity with spontaneous movement  Leg right antigravity with spontaneous movement  Sensation: Phan-hypoesthesia left  Tone: Flaccid LUE    Laboratory:  CMP:   Recent Labs   Lab 02/05/22  0008 02/05/22  0008 02/05/22  0846   CALCIUM 9.2   < > 9.8   ALBUMIN 2.9*  --   --    PROT 6.6  --   --       < > 140   K 4.1   < > 4.3   CO2 14*   < > 7*      < > 106   BUN 15   < > 16   CREATININE 1.2   < > 1.5*   ALKPHOS 67  --   --    ALT 24  --   --    AST 36  --   --    BILITOT 0.4  --   --     < > = values in this interval not displayed.      CBC:   Recent Labs   Lab 02/05/22  0008   WBC 12.13   RBC 4.25   HGB 12.4   HCT 40.3      MCV 95   MCH 29.2   MCHC 30.8*     Lipid Panel: No results for input(s): CHOL, LDLCALC, HDL, TRIG in the last 168 hours.  Coagulation:   Recent Labs   Lab 02/04/22  2115   INR 1.0   APTT 24.0     Platelet Aggregation Study: No results for input(s): PLTAGG, PLTAGINTERP, PLTAGREGLACO, ADPPLTAGGREG in the last 168 hours.  Hgb A1C: No results for input(s): HGBA1C in the last 168 hours.  TSH:   Recent Labs   Lab 02/04/22  1145   TSH 0.196*       Diagnostic Results     Brain imaging:  MRI Brain pending    [facility] 2/5/2022  Findings consistent with recent ischemia/infarct involving the right MCA distribution again noted, previously identified hyperdensity involving the basal ganglia and caudate on the right again noted, configuration is stable, degree of density is mildly diminished, there is continued mass effect and mild right to left midline shift appearing stable without evidence for significant interval detrimental change.  Clinical and historical correlation and continued follow-up recommended.    [facility] 2/4/2022 1741  Hyperdensity within the right basal ganglia insular cortex confined to the gray matter and sparing the internal cortex.  Findings likely representing contrast staining from recent angiogram given altered flow dynamics in the infarcted territory.  Hemorrhagic conversion is less likely.  Recommend short-term follow-up.     0.2 cm leftward midline shift.    [facility] (2/4/22) 1415: Continued evolution of acute right MCA distribution infarct with developing minimal right-sided mass effect.  No midline shift or evidence for hemorrhagic conversion.  Clinical correlation and continued follow-up recommended.     Vessel Imaging:  IR angio 2/4/2022:  Successful aspiration thrombectomy of the distal right internal carotid artery occlusion, with resulting TICI 3 flow.    CTA head/neck (2/4/22): Acute large vessel  occlusion with distal right supraclinoid ICA filling defect extending into the anterior cerebral artery and middle cerebral artery branches as detailed above. Evidence of loss of gray-white matter interface of the deep right hemispheric structures consistent with early ischemia/infarction. Calcified plaque of the vertebral arteries, internal carotid arteries and of the common carotid artery bifurcation.     Cardiac Evaluation:   Repeat echo pending    Last TTE (1/31/22): EF 45%, grade II LV diastolic dysfunction, no LA enlargement         Sandhya Ordoñez PA-C  Com[facility] St[facility] Center  Department of Vascular Neurology   [facility] - Neuro Critical Care
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
## 2022-02-05 NOTE — PT/OT/SLP PROGRESS
Physical Therapy      Patient Name:  Julia Schroeder   MRN:  [id number]    Patient not seen today. Pt with lined being placed by RN upon attempt. Writing therapist unable to return today. Will follow-up when appropriate.

## 2022-02-05 NOTE — HPI
Pt is 76F w pmh DM, dementia, CHF and CAD on aspirin plavix who presented with acute R ICA LVO, s/p mechanical thrombectomy TICI3. CTH post op showed hyperdensity in R deep structures concerning for reperfusion hemorrhage vs contrast extravasation. NSGY consulted for evaluation.

## 2022-02-05 NOTE — PT/OT/SLP PROGRESS
Occupational Therapy      Patient Name:  Julia Schroeder   MRN:  [id number]    Patient not seen today secondary to bedside line placement upon AM attempt.   OT unable to make 2nd attempt, still an eval.    SAMANTA Phoenix      2/5/2022

## 2022-02-05 NOTE — ASSESSMENT & PLAN NOTE
Pt is 76F w pmh DM, dementia, CHF and CAD on aspirin plavix who presented with acute R ICA LVO, s/p mechanical thrombectomy TICI3. CTH post op showed hyperdensity in R deep structures concerning for reperfusion hemorrhage vs contrast extravasation. NSGY consulted for evaluation.    Plan:  Admit NCC  Q1hr neurochecks  Iamging reviewed.    --Post angio CTH with right basal ganglia hyperdensity sparing the internal capsule concerning for reperfusion hemorrhage vs contrast extravasation.  CBC, CMP, coags, type & screen, COVID  HOB>30, SBP<140, Na 135-150  NPO at this time  Repeat CTH in 4hrs  Hold all AC. Recommend immediate reversal if interval scan with worsening hemorrhage.  Please contact Neurosurgery for any changes in exam    Plan reviewed with Patient, family, neurosurgery staff and NCC.
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
## 2022-02-05 NOTE — PROGRESS NOTES
[facility]  Neurosurgery  Progress Note    Subjective:     History of Present Illness: Pt is 76F w pmh DM, dementia, CHF and CAD on aspirin plavix who presented with acute R ICA LVO, s/p mechanical thrombectomy TICI3. CTH post op showed hyperdensity in R deep structures concerning for reperfusion hemorrhage vs contrast extravasation. NSGY consulted for evaluation.      Post-Op Info:  * No surgery found *         Interval History: NAEON, AFVSS, Exam stable, CTH reviewed this AM    Medications:  Continuous Infusions:  Scheduled Meds:   atorvastatin  40 mg Oral Daily    donepeziL  10 mg Oral Daily    furosemide  20 mg Oral Daily    memantine  10 mg Oral BID    ranolazine  500 mg Oral BID     PRN Meds:calcium gluconate IVPB, calcium gluconate IVPB, calcium gluconate IVPB, iodixanoL, magnesium sulfate IVPB, magnesium sulfate IVPB, sodium chloride 0.9%, sodium chloride 0.9%     Review of Systems  Objective:     Weight: 64.4 kg (142 lb)  Body mass index is 25.97 kg/m².  Vital Signs (Most Recent):  Temp: 98.4 °F (36.9 °C) (02/05/22 0726)  Pulse: 110 (02/05/22 0726)  Resp: (!) 37 (02/05/22 0726)  BP: (!) 102/59 (02/05/22 0726)  SpO2: 97 % (02/05/22 0726) Vital Signs (24h Range):  Temp:  [96.4 °F (35.8 °C)-98.5 °F (36.9 °C)] 98.4 °F (36.9 °C)  Pulse:  [] 110  Resp:  [11-37] 37  SpO2:  [93 %-100 %] 97 %  BP: (101-118)/(58-75) 102/59              Oxygen Concentration (%):  [28] 28    Female External Urinary Catheter 02/02/22 0500 (Active)   Skin no breakdown;no redness;perineum cleansed w/ soap and water 02/05/22 0726   Tolerance no signs/symptoms of discomfort 02/05/22 0726   Suction Continuous suction at 60 mmHg 02/05/22 0726   Date of last wick change 02/05/22 02/05/22 0726   Time of last wick change 0702 02/05/22 0726   Output (mL) 350 mL 02/05/22 0602       Physical Exam  Neurosurgery Physical Exam  Neurosurgery Physical Exam  General: well developed, well nourished, drowsy  Head:  normocephalic, atraumatic  CV: RRR, pulses equal bilateral  Pulmonary: normal respirations, no signs of respiratory distress  Abdomen: soft, non-distended  Skin: Skin is warm, dry and intact.     Neuro:  E3V4M6  Drowsy but arousable, Ox4  R gaze able to overcome, Left facial droop  PERRL  Motor: Follows commands right briskly, plegic Left  Left hemisensory loss  Groin soft, distal pulses present    Significant Labs:  Recent Labs   Lab 02/04/22  1145 02/05/22  0008   * 122*    139   K 3.8 4.1    104   CO2 20* 14*   BUN 18 15   CREATININE 1.3 1.2   CALCIUM 9.1 9.2   MG  --  2.0     Recent Labs   Lab 02/04/22  1145 02/05/22  0008   WBC 10.42 12.13   HGB 12.0 12.4   HCT 37.1 40.3    219     Recent Labs   Lab 02/04/22  1145 02/04/22  2115   INR 0.9 1.0   APTT 24.5 24.0     Microbiology Results (last 7 days)     ** No results found for the last 168 hours. **        ABGs:   Recent Labs   Lab 02/04/22  1212   PH 7.324*   PCO2 32.6*   PO2 109*   HCO3 16.9*   POCSATURATED 98   BE -9     Cardiac markers:   Recent Labs   Lab 02/04/22  1145   TROPONINI 20.386*     CMP:   Recent Labs   Lab 02/04/22  1145 02/05/22  0008   * 122*   CALCIUM 9.1 9.2   ALBUMIN 3.3* 2.9*   PROT 7.3 6.6    139   K 3.8 4.1   CO2 20* 14*    104   BUN 18 15   CREATININE 1.3 1.2   ALKPHOS 75 67   ALT 26 24   AST 47* 36   BILITOT 0.5 0.4     CRP: No results for input(s): CRP in the last 48 hours.  ESR: No results for input(s): POCESR, ERYTHROCYTES in the last 48 hours.  LFTs:   Recent Labs   Lab 02/04/22  1145 02/05/22  0008   ALT 26 24   AST 47* 36   ALKPHOS 75 67   BILITOT 0.5 0.4   PROT 7.3 6.6   ALBUMIN 3.3* 2.9*     Procalcitonin: No results for input(s): PROCAL in the last 48 hours.    Significant Diagnostics:  NSGY resident has reviewed all significant diagnostics    Assessment/Plan:     Stroke due to occlusion of right carotid artery  Pt is 76F w pmh DM, dementia, CHF and CAD on aspirin plavix who  presented with acute R ICA LVO, s/p mechanical thrombectomy TICI3. CTH post op showed hyperdensity in R deep structures concerning for reperfusion hemorrhage vs contrast extravasation. NSGY consulted for evaluation.    Plan:  --Admitted to NCC; NCC/Stroke teams primary   -q1hr neurochecks  --All labs & diagnostics reviewed    -Post angio CTH with right basal ganglia hyperdensity sparing the internal capsule concerning for reperfusion hemorrhage vs contrast extravasation.   -CTH 2/5: Interval stable vascular blush in R caudate territory, slight increase in swelling w/ ventricular effacement, no MLS         -Further imaging per primary team, NSGY only reccomends future scans if exam decompensates  --SBP reccs and management per primary team  --No further surgical management at this time; Patient is stable on exam and unlikely to need future intervention  --Continue maximal medical therapy and stroke work-up per NCC/Stroke teams  --NSGY will sign-off; please call for any additional questions.     Dispo: Per stroke/NCC teams        Artemio Villa MD  Neurosurgery  [facility] - [facility]

## 2022-02-05 NOTE — ASSESSMENT & PLAN NOTE
Pt is 76F w pmh DM, dementia, CHF and CAD on aspirin plavix who presented with acute R ICA LVO, s/p mechanical thrombectomy TICI3. CTH post op showed hyperdensity in R deep structures concerning for reperfusion hemorrhage vs contrast extravasation. NSGY consulted for evaluation.    Plan:  --Admitted to NCC; NCC/Stroke teams primary   -q1hr neurochecks  --All labs & diagnostics reviewed    -Post angio CTH with right basal ganglia hyperdensity sparing the internal capsule concerning for reperfusion hemorrhage vs contrast extravasation.   -CTH 2/5: Interval stable vascular blush in R caudate territory, slight increase in swelling w/ ventricular effacement, no MLS         -Further imaging per primary team, NSGY only reccomends future scans if exam decompensates  --SBP reccs and management per primary team  --No further surgical management at this time; Patient is stable on exam and unlikely to need future intervention  --Continue maximal medical therapy and stroke work-up per NCC/Stroke teams  --NSGY will sign-off; please call for any additional questions.     Dispo: Per stroke/NCC teams

## 2022-02-05 NOTE — HOSPITAL COURSE
02/05/2022 metabolic acidosis, tachypnea. Place arterial line.   02/06/2022 likely euglycemi DKA associated with SGLT2 inhibitors  02/07/2022 NAEO, DKA resolved, starting TF  02/08/2022 NAEO. Starting scheduled insuline. Increase water flushes    02/09/2022 C/o SOB overnight, repeat ECG unremarkable, ABG w/ metabolic alkalosis, no respiratory component. Sputum cx sent, however pt afebrile, no leukocytosis. CXR w/ slight increase in b/l effusions -> given lasix 20 mg IV x1. Sating well on RA this AM. FWF decreased from 500 cc q6hrs -> 300 cc q6hrs given suspicion for hypervolemic hypernatremia  02/10/2022 Slept better last night s/p starting delirium precautions, more awake this AM per son at bedside. Sating well on RA, CXR w/ improvement in b/l effusions. Low grade temp of 100.4, no true fevers, no leukocytosis.   02/11/2022 NAEO. Remains boarding for vascular neuro floor. Na improved this AM to 151.     2/21/2022 rapid response from NPU for hypoxic respiratory failure, upgraded to ICU, intubation and treatment of septic shock  2/22/2022 improved pressor requirements, but extremely oliguric, furosemide challenge, if no benefit increase dose and add diuril followed by lasix gtt, cultures remain negative, lactate nl  2/23/2022:: on low dose levo at 0.02 and most of time off levo, tolerating TFs-transition to scheduled insulin, wean to spontaneous setting  2/24/2022: completing course of dexamethasone for ARDS related to sepsis, off TFs for abdominal ultrasound to exclude biliary obstruction, diurese x 1, has cuff leak- pssible extubation attempt in am  2/25/2022: responded well to diuresis, lungs clear, level of consciousness-concern that episodes of tachypnea without pulmonary explanation may be related to NCSE, prolonged EEG monitoring ordered  2/26/2022: hooked up to cEEG overnight, notable for diffuse slowing, no epileptiform discharges. Remains on PS. Received  cc x1 overnight for hypotension

## 2022-02-05 NOTE — PLAN OF CARE
[facility] Care Plan    POC reviewed with Julia Schroeder and family at 1400. Pt verbalized understanding. Questions and concerns addressed. No acute events today. Pt progressing toward goals. Will continue to monitor. See below and flowsheets for full assessment and VS info.   -Pt admitted to ICU from [facility] completed  -ALCANTAR failed, pending speech consult    Neuro:  [place] Coma Scale  Best Eye Response: 2-->(E2) to pain  Best Motor Response: 6-->(M6) obeys commands  Best Verbal Response: 4-->(V4) confused  [place] Coma Scale Score: 12  Assessment Qualifiers: patient not sedated/intubated        24 hr Temp:  [96.4 °F (35.8 °C)-97.8 °F (36.6 °C)]     CV:   Rhythm: normal sinus rhythm  BP goals:   SBP < 140  MAP > 65    Resp:   O2 Device (Oxygen Therapy): room air       Plan: N/A    GI/:     Diet/Nutrition Received: NPO  Last Bowel Movement: 02/03/22  Voiding Characteristics: external catheter    Intake/Output Summary (Last 24 hours) at 2/4/2022 1918  Last data filed at 2/4/2022 1900  Gross per 24 hour   Intake --   Output 500 ml   Net -500 ml     Unmeasured Output  Stool Occurrence: 0    Labs/Accuchecks:  Recent Labs   Lab 02/04/22  1145   WBC 10.42   RBC 4.09   HGB 12.0   HCT 37.1         Recent Labs   Lab 02/04/22  1145      K 3.8   CO2 20*      BUN 18   CREATININE 1.3   ALKPHOS 75   ALT 26   AST 47*   BILITOT 0.5      Recent Labs   Lab 02/04/22  1145   INR 0.9   APTT 24.5      Recent Labs   Lab 02/04/22  1145   TROPONINI 20.386*       Electrolytes: N/A - electrolytes WDL  Accuchecks: none    Gtts:       LDA/Wounds:  Lines/Drains/Airways       Drain              Female External Urinary Catheter 02/02/22 0500 2 days              Peripheral Intravenous Line                   Peripheral IV - Single Lumen 02/04/22 1116 20 G Right Forearm <1 day         Peripheral IV - Single Lumen 02/04/22 1148 18 G Right Antecubital <1 day                  Wounds: No  Wound care consulted: No

## 2022-02-05 NOTE — SUBJECTIVE & OBJECTIVE
Interval History: NAEON, AFVSS, Exam stable, [facility] reviewed this AM    Medications:  Continuous Infusions:  Scheduled Meds:   atorvastatin  40 mg Oral Daily    donepeziL  10 mg Oral Daily    furosemide  20 mg Oral Daily    memantine  10 mg Oral BID    ranolazine  500 mg Oral BID     PRN Meds:calcium gluconate IVPB, calcium gluconate IVPB, calcium gluconate IVPB, iodixanoL, magnesium sulfate IVPB, magnesium sulfate IVPB, sodium chloride 0.9%, sodium chloride 0.9%     Review of Systems  Objective:     Weight: 64.4 kg (142 lb)  Body mass index is 25.97 kg/m².  Vital Signs (Most Recent):  Temp: 98.4 °F (36.9 °C) (02/05/22 0726)  Pulse: 110 (02/05/22 0726)  Resp: (!) 37 (02/05/22 0726)  BP: (!) 102/59 (02/05/22 0726)  SpO2: 97 % (02/05/22 0726) Vital Signs (24h Range):  Temp:  [96.4 °F (35.8 °C)-98.5 °F (36.9 °C)] 98.4 °F (36.9 °C)  Pulse:  [] 110  Resp:  [11-37] 37  SpO2:  [93 %-100 %] 97 %  BP: (101-118)/(58-75) 102/59              Oxygen Concentration (%):  [28] 28    Female External Urinary Catheter 02/02/22 0500 (Active)   Skin no breakdown;no redness;perineum cleansed w/ soap and water 02/05/22 0726   Tolerance no signs/symptoms of discomfort 02/05/22 0726   Suction Continuous suction at 60 mmHg 02/05/22 0726   Date of last wick change 02/05/22 02/05/22 0726   Time of last wick change 0702 02/05/22 0726   Output (mL) 350 mL 02/05/22 0602       Physical Exam  Neurosurgery Physical Exam  Neurosurgery Physical Exam  General: well developed, well nourished, drowsy  Head: normocephalic, atraumatic  CV: RRR, pulses equal bilateral  Pulmonary: normal respirations, no signs of respiratory distress  Abdomen: soft, non-distended  Skin: Skin is warm, dry and intact.     Neuro:  E3V4M6  Drowsy but arousable, Ox4  R gaze able to overcome, Left facial droop  PERRL  Motor: Follows commands right briskly, plegic Left  Left hemisensory loss  Groin soft, distal pulses present    Significant Labs:  Recent Labs   Lab  02/04/22  1145 02/05/22  0008   * 122*    139   K 3.8 4.1    104   CO2 20* 14*   BUN 18 15   CREATININE 1.3 1.2   CALCIUM 9.1 9.2   MG  --  2.0     Recent Labs   Lab 02/04/22  1145 02/05/22  0008   WBC 10.42 12.13   HGB 12.0 12.4   HCT 37.1 40.3    219     Recent Labs   Lab 02/04/22  1145 02/04/22  2115   INR 0.9 1.0   APTT 24.5 24.0     Microbiology Results (last 7 days)     ** No results found for the last 168 hours. **        ABGs:   Recent Labs   Lab 02/04/22  1212   PH 7.324*   PCO2 32.6*   PO2 109*   HCO3 16.9*   POCSATURATED 98   BE -9     Cardiac markers:   Recent Labs   Lab 02/04/22  1145   TROPONINI 20.386*     CMP:   Recent Labs   Lab 02/04/22  1145 02/05/22  0008   * 122*   CALCIUM 9.1 9.2   ALBUMIN 3.3* 2.9*   PROT 7.3 6.6    139   K 3.8 4.1   CO2 20* 14*    104   BUN 18 15   CREATININE 1.3 1.2   ALKPHOS 75 67   ALT 26 24   AST 47* 36   BILITOT 0.5 0.4     CRP: No results for input(s): CRP in the last 48 hours.  ESR: No results for input(s): POCESR, ERYTHROCYTES in the last 48 hours.  LFTs:   Recent Labs   Lab 02/04/22  1145 02/05/22  0008   ALT 26 24   AST 47* 36   ALKPHOS 75 67   BILITOT 0.5 0.4   PROT 7.3 6.6   ALBUMIN 3.3* 2.9*     Procalcitonin: No results for input(s): PROCAL in the last 48 hours.    Significant Diagnostics:  NSGY resident has reviewed all significant diagnostics

## 2022-02-05 NOTE — HOSPITAL COURSE
2/5/2022-Echo and MRI pending. Patient tachypenic and tachycardic this morning. Neuro exam stable.   2/7/2022-Patient was started on insulin gtt and D5 for DKA management. DKA resolved, D5 discontinued and NCC attempting to see if patient will tolerate tube feedings to transition to scheduled insulin. NCC considering SD tomorrow. She continues with L hemiparesis, L neglect and R gaze.   02/08/2022 Patient with sodium of 161. Scheduled insulin initiated after TF started. Patient hypotensive and tachycardic while rounding today.   2/9/2022: Neuro exam stable. Continues with L hemiparesis, R gaze. Overnight, desatted to mids 80s, would improve to  low 90s and then desat again. Patient was placed on 1L NC with improvement in oxygen to %. Tachy to 110s, afebrile, and hypotensive.   2/10/2022: Continues with L hemiparesis, R gaze. Hypotensive while I was in the room, 96/53. .  On DAPT and statin.  02/11/2022 No acute neuro exam changes or events overnight. NCC discussed with family about possible need for peg. Discussion to be continued after next speech evaluation on 2/14/22 for decision peg v. Hospice if patient remains NPO. Pending stepdown to NPU.   02/12/2022 Hypernatremic - urine sodium and osmol pending, repeat CXR pending, increasing FWF to 275 QID, will follow up on evening sodium. Patient remains drowsy, will monitor for improvement with treatment of hypernatremia prior to considering modafinil  02/13/2022 increased FWF and started 1/2 NS overnight for increasing sodium, sodium tending down this morning, patient more drowsy this morning after working with therapy  02/14/2022 Patient more alert this morning but during rounding (11am) was back to being lethargic. Modafinil started. Failed swallow study, will need to address GOC moving forward concerning Peg v. Hospice.   02/15/2022 Continued the discussion for decision about PEG with son today. Decision made to move forward with PEG.  IR consulted.  Patient will go tomorrow for peg. Patient pulled out NG, has been replaced in order to administer contrast this evening. SLP noted white tinted secretions with thin coat on tongue, will start with oral care prior to administering medication given NPO status.   2/16:Neuro exam stable. Na stable at 141. FWF reduced from 400 to 200. S/p PEG placement with IR.   02/21/2022 Rapid response was called earlier this morning on patient while on floor for increased unresponsiveness and respiratory distress. Patient was stepped up to [facility] and intubated for septic shock. Patient on sedation and pressors.

## 2022-02-06 NOTE — PROCEDURES
"Julia Schroeder is a 76 y.o. female patient.    Temp: 97.8 °F (36.6 °C) (22)  Pulse: 100 (22)  Resp: (!) 21 (22)  BP: 107/69 (22)  SpO2: 100 % (22)  Weight: 64.4 kg (142 lb) (22)  Height: 5' 2" (157.5 cm) (22)       Arterial Line    Date/Time: 2022 9:00 AM  Location procedure was performed: [facility] NEURO CRITICAL CARE  Performed by: Betina Montelongo PA-C  Authorized by: Betina Montelongo PA-C   Consent Done: Yes  Consent: Verbal consent obtained.  Risks and benefits: risks, benefits and alternatives were discussed  Consent given by: emancipated minor  Patient understanding: patient states understanding of the procedure being performed  Patient consent: the patient's understanding of the procedure matches consent given  Patient identity confirmed:  and name  Time out: Immediately prior to procedure a "time out" was called to verify the correct patient, procedure, equipment, support staff and site/side marked as required.  Preparation: Patient was prepped and draped in the usual sterile fashion.  Indications: multiple ABGs and hemodynamic monitoring  Location: left brachial    Anesthesia:  Local Anesthetic: lidocaine 1% without epinephrine    Patient sedated: no  Needle gauge: 20  Number of attempts: 1  Complications: No  Post-procedure: dressing applied  Patient tolerance: Patient tolerated the procedure well with no immediate complications          2022  "
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
## 2022-02-06 NOTE — PLAN OF CARE
Patient's chart was reviewed by a stroke provider. Case discussed with staff and MRI as well as echo reviewed.    Echo with EF 55%, segmental WMA, no evidence of thrombus.  MRI Brain with large R MCA infarct, hemorrhagic conversion in BG/insula.  Recommend continuation of DAPT and atorvastatin 40.     Sandhya Johnston PA-C  Vascular Neurology

## 2022-02-06 NOTE — PLAN OF CARE
Problem: Physical Therapy Goal  Goal: Physical Therapy Goal  Description: PT goals until 2/20/22    1. Pt supine to sit with moderate assist-not met  2. Pt sit to supine with moderate assist-not met  3. Pt sit to stand with LRAD as needed for safety with max assist-not met  4. Pt to perform gait 2 steps with LRAD as needed for safety with max assist.-not met  5. Pt to transfer bed to/from bedside chair with LRAD for safety with max assist.-not met  6. Pt to perform B LE exs in sitting or supine x 15 reps to strengthen B LE to improve functional mobility.-not met  7. Pt to propel w/c 10ft with R UE/LE on level surface with minimal assist-not met  8. Pt to be able to sit on the EOB 10 min with CGA to perform functional activities-not met    Outcome: Ongoing, Progressing   Pt's goals set and pt will benefit from skilled PT services to work towards improved functional mobility including: bed mobility, transfers, sitting balance, w/c mobility,and gait.   2/6/2022

## 2022-02-06 NOTE — PROGRESS NOTES
[facility]  Neurocritical Care  Progress Note    Admit Date: 2/4/2022  Service Date: 02/06/2022  Length of Stay: 2    Subjective:     Chief Complaint: <principal problem not specified>    History of Present Illness: Per ED evaluation:  Patient presents to the emergency department with symptoms concerning for acute CVA.  Last known normal per family was 2:00 a.m.; patient presented outside of the tPA window; tele stroke was activated immediately on arrival; neurologist suspects large vessel occlusion and recommend stat transfer for possible neuro intervention.  Neurology advised CTA head neck prior to transfer; CTA head and neck performed and pending at the time of transfer; numerous labs pending at time of transfer; rectal aspirin given    Patient presented initially to [facility] emergency department with L-sided weakness, L-sided facial droop, confusion, she arrives to the neuro ICU status post thrombectomy for right ICA occlusion. CTA shows acute large vessel occlusion with distal right supraclinoid ICA filling defect extending into the anterior cerebral artery and middle cerebral artery branches.  Patient was recently admitted to outside hospital with NSTEMI (days ago), at which time she was evaluated by cardiology and cardiac catheterization was performed showing severe multi-vessel disease. Medical management was decided upon by Cardiology and family at that time due to comorbidities.  Patient had been started on aspirin, Plavix, statin      Hospital Course: 02/05/2022 metabolic acidosis, tachypnea. Place arterial line.   02/06/2022 likely euglycemi DKA associated with SGLT2 inhibitors      Review of Symptoms: encephalopathic, dysarthria cannot participate    Medications:  Continuous Infusions:   insulin regular 1 units/mL infusion orderable (DKA) 7.7 Units/hr (02/06/22 0825)     Scheduled Meds:   aspirin  81 mg Per NG tube Daily    atorvastatin  40 mg Per NG tube Daily    clopidogreL  75  mg Per NG tube Daily    donepeziL  10 mg Per NG tube Daily    furosemide  20 mg Per NG tube Daily    heparin (porcine)  5,000 Units Subcutaneous Q8H    memantine  10 mg Per NG tube BID    ranolazine  500 mg Oral BID     PRN Meds:.calcium gluconate IVPB, calcium gluconate IVPB, calcium gluconate IVPB, dextrose 10 % in water (D10W), dextrose 10 % in water (D10W), dextrose 10%, dextrose 10%, glucagon (human recombinant), iodixanoL, magnesium sulfate IVPB, magnesium sulfate IVPB, potassium bicarbonate, potassium bicarbonate, potassium bicarbonate, potassium, sodium phosphates, potassium, sodium phosphates, potassium, sodium phosphates, sodium chloride 0.9%    OBJECTIVE:   Vital Signs (Most Recent):   Temp: 98.2 °F (36.8 °C) (02/06/22 0702)  Pulse: 100 (02/06/22 0902)  Resp: 20 (02/06/22 0902)  BP: (!) 97/52 (02/06/22 0902)  SpO2: 99 % (02/06/22 0902)    Vital Signs (24h Range):   Temp:  [97.5 °F (36.4 °C)-98.2 °F (36.8 °C)] 98.2 °F (36.8 °C)  Pulse:  [] 100  Resp:  [19-38] 20  SpO2:  [92 %-100 %] 99 %  BP: ()/(44-78) 97/52  Arterial Line BP: ()/(49-72) 110/62    ICP/CPP (Last 24h):   CO:  [3.6 L/min-4 L/min] 3.6 L/min  CI:  [2.2 L/min/m2-2.4 L/min/m2] 2.2 L/min/m2    I & O (Last 24h):     Intake/Output Summary (Last 24 hours) at 2/6/2022 0941  Last data filed at 2/6/2022 0904  Gross per 24 hour   Intake 1751.28 ml   Output 1250 ml   Net 501.28 ml     Physical Exam:  GA: drowsy, comfortable, no acute distress.   HEENT: No scleral icterus or JVD. Neck supple  Pulmonary: Air entry equal to auscultation A/P/L. Wheezing no, crackles no  Cardiac: RRR, S1 & S2 w/o rubs/murmurs/gallops.   Abdominal: soft, non-tender, bowel sounds present x 4. No appreciable hepatosplenomegaly.  Skin: No jaundice, rashes, or visible lesions.  Neuro:  --Mental Status:  Drowsy, arouses to stimulation. Does not follow commands consistently   --Pupils 3.5 mm, PERRL. Right gaze preference  --Corneal reflex not done in this  arousable patient, gag, cough intact.  Left hemiparesis  MSK:  No edema in UE and LE    Vent Data:   Oxygen Concentration (%):  [40] 40    Lines/Drains/Airway:         Female External Urinary Catheter 02/02/22 0500 (Active)   Skin no breakdown;no redness;perineum cleansed w/ soap and water 02/05/22 0726   Tolerance no signs/symptoms of discomfort 02/05/22 0726   Suction Continuous suction at 60 mmHg 02/05/22 0726   Date of last wick change 02/05/22 02/05/22 0726   Time of last wick change 0702 02/05/22 0726   Output (mL) 350 mL 02/05/22 0602     Nutrition/Tube Feeds (if NPO state why): npo     Labs:  ABG:   Recent Labs   Lab 02/06/22  0929   PH 7.507*   PO2 178*   PCO2 37.7   HCO3 29.9*   POCSATURATED 100   BE 7     BMP:  Recent Labs   Lab 02/06/22  0556   *   K 4.3      CO2 18*   BUN 24*   CREATININE 1.3   *   MG 2.2   PHOS 2.7     LFT:   Lab Results   Component Value Date    AST 38 02/06/2022    ALT 21 02/06/2022    ALKPHOS 70 02/06/2022    BILITOT 0.4 02/06/2022    ALBUMIN 2.8 (L) 02/06/2022    PROT 6.4 02/06/2022     CBC:   Lab Results   Component Value Date    WBC 14.19 (H) 02/06/2022    HGB 10.5 (L) 02/06/2022    HCT 31.3 (L) 02/06/2022    MCV 87 02/06/2022     02/06/2022     Microbiology x 7d:   Microbiology Results (last 7 days)     ** No results found for the last 168 hours. **        Imaging:    I personally reviewed the above image.  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   Wean off BiPAP today  Saturation goal >92%  ABG - mixed alkalosis    Cardiac:   MAP goals >65 mmhg  hemodynamic monitoring SVV <10  Echo - no LV thrombus  Check ketones    Renal:    Making urine  BUN/Cr <20 trending down  D/c bicarb gtt  Start 0.45% NaCl @ 50/hr. avodiding D5 due to ischemic stroke    ID:   Afebrile, leucocytosis  Will monitor - start rocephin with temp spike >100.4 F    Hem/Onc:   Stable hh and plt count    Endocrine:    BS stable  Continue insulin gtt until BS <150  Switch to scheduled insulin - 4units aspart q4hrly + sliding scale    Fluids/Electrolytes/Nutrition/GI:   Npo while on BiPAP  Replete lytes as needed  Lines:  Art +  CVC NA  ETT NA  Moya NA  NG +  PEG NA    Proph:  DVT:SCD/heparin   Constipation:  Last output:bowel regimen as needed  PUP:NA  VAP:NA    Family updated at bedside    Uninterrupted Critical Care/Counseling Time (not including procedures):: 39 mins    Duy Jones MD, FNCS  Neurocritical care attending    DNR    Duy Jones MD  Neurocritical Care  [facility] - [facility]
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
## 2022-02-07 PROBLEM — E11.10 DKA (DIABETIC KETOACIDOSIS): Status: ACTIVE | Noted: 2022-01-01

## 2022-02-07 NOTE — PLAN OF CARE
[facility] Care Plan    POC reviewed with Julia Schroeder and family at 1400. Pt verbalized understanding. Questions and concerns addressed. No acute events today. Pt progressing toward goals. Will continue to monitor. See below and flowsheets for full assessment and VS info.   -Pt off BiPAP and on 2L NC, tolerating well  -Insulin gtt discontinued and then restarted  -D5NS @50  -Pt up with therapy to edge of bed  -PT straight cath x2 for retention  -Potassium and phos replaced      Neuro:  Marshall Coma Scale  Best Eye Response: 3-->(E3) to speech  Best Motor Response: 6-->(M6) obeys commands  Best Verbal Response: 4-->(V4) confused  Marshall Coma Scale Score: 13  Assessment Qualifiers: patient not sedated/intubated  Pupil PERRLA: yes     24 hr Temp:  [97.5 °F (36.4 °C)-98.2 °F (36.8 °C)]     CV:   Rhythm: normal sinus rhythm  BP goals:   SBP < 140  MAP > 65    Resp:   O2 Device (Oxygen Therapy): ventilator  Oxygen Concentration (%): 40    Plan: N/A    GI/:     Diet/Nutrition Received: NPO  Last Bowel Movement: 02/03/22  Voiding Characteristics: external catheter    Intake/Output Summary (Last 24 hours) at 2/6/2022 1850  Last data filed at 2/6/2022 1835  Gross per 24 hour   Intake 2118.27 ml   Output 1200 ml   Net 918.27 ml     Unmeasured Output  Urine Occurrence: 1  Stool Occurrence: 0  Pad Count: 2    Labs/Accuchecks:  Recent Labs   Lab 02/06/22  0006 02/06/22  1344   WBC 14.19*  --    RBC 3.60*  --    HGB 10.5*  --    HCT 31.3* 35*     --       Recent Labs   Lab 02/06/22  0556 02/06/22  0556 02/06/22  1414   *   < > 154*   K 4.3   < > 3.5   CO2 18*   < > 23      < > 112*   BUN 24*   < > 23   CREATININE 1.3   < > 1.2   ALKPHOS 70  --   --    ALT 21  --   --    AST 38  --   --    BILITOT 0.4  --   --     < > = values in this interval not displayed.      Recent Labs   Lab 02/04/22 2115   INR 1.0   APTT 24.0      Recent Labs   Lab 02/05/22  0846      TROPONINI 19.503*       Electrolytes:  Electrolytes replaced  Accuchecks: Q1H    Gtts:   dextrose 5 % and 0.9 % NaCl 50 mL/hr at 02/06/22 1835    insulin regular 1 units/mL infusion orderable (DKA) 1.6 Units/hr (02/06/22 1835)       LDA/Wounds:  Lines/Drains/Airways       Drain                   NG/OG Tube Left nostril -- days    Female External Urinary Catheter 02/06/22 1031 <1 day              Arterial Line              Arterial Line 02/05/22 1039 Left Brachial 1 day              Peripheral Intravenous Line                   Peripheral IV - Single Lumen 02/05/22 1649 20 G Left;Posterior Hand 1 day         Peripheral IV - Single Lumen 02/05/22 1649 20 G Posterior;Right Hand 1 day         Peripheral IV - Single Lumen 02/06/22 0645 20 G Anterior;Distal;Left Lower leg <1 day         Peripheral IV - Single Lumen 02/06/22 1618 22 G Posterior;Right Forearm <1 day                  Wounds: No  Wound care consulted: No

## 2022-02-07 NOTE — ASSESSMENT & PLAN NOTE
Stroke RF  A1c on 1/7/2022:8.3  On insulin gtt with plan to transition to scheduled insulin today or tomorrow per [facility]

## 2022-02-07 NOTE — PHYSICIAN QUERY
PT Name: Julia Schroeder  MR #: [id number]     DOCUMENTATION CLARIFICATION     CDS/: Alcides Guzman Jr, RN CCDS              Contact information:[email or web address]  This form is a permanent document in the medical record.     Query Date: February 7, 2022    By submitting this query, we are merely seeking further clarification of documentation.  Please utilize your independent clinical judgment when addressing the question(s) below.    The Medical Record contains the following   Indicators Supporting Clinical Findings Location in Medical Record   x Heart Failure documented Patient is identified as having Systolic (HFrEF) heart failure that is Acute    Echo showed systolic and diastolic HF with an EF of 45%.    Acute on chronic diastolic congestive heart failure    2/3 Critical Care Progress Note      2/3 Discharge Summary    2/3 Discharge Summary (Final Active Diagnosis)   x BNP    1/31 H&P    EF/Echo      Radiology findings     x Subjective/Objective Respiratory Conditions Respiratory: Positive for shortness of breath.   1/31 H&P    Recent/Current MI     x Heart Transplant, LVAD No carotid bruit, hepatojugular reflux or JVD 1/31 H&P    Edema, JVD      Ascites     x Diuretics/Meds furosemide injection 60 mg  furosemide injection 20 mg  furosemide injection 40 mg 1/30 MAR  2/2 MAR  1/31-2/1 MAR    Other Treatment      Other       Heart failure is a clinical diagnosis which includes symptomatic fluid retention, elevated intracardiac pressures, and/or the inability of the heart to deliver adequate blood flow.    Heart Failure with reduced Ejection Fraction (HFrEF) or Systolic Heart Failure (loses ability to contract normally, EF is <40%)    Heart Failure with preserved Ejection Fraction (HFpEF) or Diastolic Heart Failure (stiff ventricles, does not relax properly, EF is >50%)     Heart Failure with Combined Systolic and Diastolic Failure (stiff ventricles, does not relax properly and EF is  <50%)    Mid-range or mildly reduced ejection fraction (HFmrEF) is classified as systolic heart failure.   Common clues to acute exacerbation:  Rapidly progressive symptoms (w/in 2 weeks of presentation), using IV diuretics, using supplemental O2, pulmonary edema on Xray, new or worsening pleural effusion, +JVD or other signs of volume overload, MI w/in 4 weeks, and/or BNP >500  The clinical guidelines noted are only system guidelines, and do not replace the providers clinical judgment.    Provider, due to documentation conflict please clarify the               Heart Failure               diagnosis.    [  x ]  Acute Systolic Heart Failure (HFrEF or HFmrEF) - new diagnosis   [   ]  Acute on Chronic Diastolic Heart Failure (HFpEF) - worsening of CHF signs/symptoms in preexisting CHF   [   ]  Acute on Chronic Combined Systolic and Diastolic Heart Failure - worsening of CHF signs/symptoms in preexisting CHF   [   ]  Other (please specify):   [   ]  Clinically Undetermined           Please document in your progress notes daily for the duration of treatment until resolved and include in your discharge summary.    References:  American Heart Association editorial staff. (2017, May). Ejection Fraction Heart Failure Measurement. American Heart Association. https://www.heart.org/en/health-topics/heart-failure/diagnosing-heart-failure/ejection-fraction-heart-failure-measurement#:~:text=Ejection%20fraction%20(EF)%20is%20a,pushed%20out%20with%20each%20heartbeat  ANGELLA Abdul (2020, December 15). Heart failure with preserved ejection fraction: Clinical manifestations and diagnosis. [employer]. https://www.[employer].com/contents/heart-failure-with-preserved-ejection-fraction-clinical-manifestations-and-diagnosis.  ICD-10-CM/PCS Coding Clinic Third Quarter ICD-10, Effective with discharges: September 8, 2020 [facility] § Heart failure with mid-range or mildly reduced ejection fraction (2020).  Form No. 16727

## 2022-02-07 NOTE — PT/OT/SLP PROGRESS
Speech Language Pathology Treatment    Patient Name:  Julia Schroeder   MRN:  [id number]  Admitting Diagnosis:   1. Cerebrovascular accident (CVA), unspecified mechanism    2. Stroke    3. Troponin level elevated      Recommendations:                 General Recommendations:  Dysphagia therapy, Speech language evaluation and Cognitive-linguistic evaluation  Diet recommendations:  NPO, Liquid Diet Level: NPO   Aspiration Precautions: Continue alternate means of nutrition, Frequent oral care and Strict aspiration precautions   General Precautions: Standard, aspiration,NPO  Communication strategies:  provide increased time to answer and go to room if call light pushed    Subjective     Patient awake but lethargic during session  Communicated with nurse prior to trials     Pain/Comfort:  · Pain Rating 1: 0/10  · Pain Rating Post-Intervention 1: 0/10    Respiratory Status: Nasal cannula    Objective:     Has the patient been evaluated by SLP for swallowing?   Yes  Keep patient NPO? Yes     Patient sitting up in bed with right wrist restraint and NG tube in place with her son present. Patient's son reported that she tolerated a regular diet and voiced needs adequately at baseline.  Patient's with eyes closed for duration of session, but she was responsive to max verbal and tactile cueing. Her vocal intensity and quality were both reduced and her speech was mildly dysarthric. Following max prompting, patient accepted ice chips x2 with moderate oral holding and delayed swallow initiation. During trial of thin liquid x1 (via tsp), patient demonstrated severe oral holding with no swallow initiation. She had left anterior spillage across trials. SLP educated patient and son regarding POC for ST. Patient left in bed with restraint in place. Findings communicated to RN.     Assessment:     Julia Schroeder is a 76 y.o. female with an SLP diagnosis of Dysphagia and Dysphonia. ST to continue to follow.     Goals:   Multidisciplinary  Problems     SLP Goals        Problem: SLP Goal    Goal Priority Disciplines Outcome   SLP Goal     SLP    Description: Goals to be met 2/12  1 Pt will participate in ongoing swallow eval  2 pt will participate in sle                    Plan:     · Patient to be seen:  4 x/week   · Plan of Care expires:  03/07/22  · Plan of Care reviewed with:  patient,son   · SLP Follow-Up:  Yes       Discharge recommendations:  nursing facility, skilled   Barriers to Discharge:  Level of Skilled Assistance Needed     Time Tracking:     SLP Treatment Date:   02/07/22  Speech Start Time:  0838  Speech Stop Time:  0854     Speech Total Time (min):  16 min    Billable Minutes: Treatment Swallowing Dysfunction 8 and Self Care/Home Management Training 8    Dagoberto Thorpe CCC-SLP  Speech-Language Pathology  Pager: [phone]   02/07/2022

## 2022-02-07 NOTE — PLAN OF CARE
02/07/22 1638   Post-Acute Status   Post-Acute Authorization Placement   Post-Acute Placement Status Referrals Sent  (SNF)     SW advised by CM that she provided SNF list at bedside and spoke with Pt family via phone to advise it is here. SW sent referrals via [facility] to see who can accept so that more options can be provided to the family.    Mila Higuera, CORINE  Neurocritical Care   [facility]  [id number]
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
## 2022-02-07 NOTE — PLAN OF CARE
OT evaluation completed, POC established as appropriate. OT recommending SNF once medically stable for discharge.    Problem: Occupational Therapy Goal  Goal: Occupational Therapy Goal  Description: Goals set on 2/7, with expiration date 2/21:  Patient will increase functional independence with ADLs by performing:    Bed mobility with Mod A  Grooming while seated EOB with Mod A  Patient will tolerate EOB sitting for ~10 minutes with Mod A.  Toileting from bedside commode with Mod A for hygiene and clothing management.   Patient will complete sit>Stand transfers with Mod A using AD as needed.  Patient will complete stand pivot transfers with Mod A using AD as needed.  Pt will demonstrate understanding of education provided regarding energy conservation and task modification through teach-back method.    Outcome: Ongoing, Progressing

## 2022-02-07 NOTE — CONSULTS
Inpatient consult to Physical Medicine Rehab  Consult performed by: Pretty Combs NP  Consult ordered by: Juan Kirkland MD  Reason for consult: assess rehab needs      Reviewed patient history and current admission.  PM&R following at a distance for medical stability and progress with therapy as patient is in ICU.    SIS Villareal, FNP-C  Physical Medicine & Rehabilitation   02/07/2022

## 2022-02-07 NOTE — SUBJECTIVE & OBJECTIVE
Interval History:  >4 elements OR status of 3 inpatient conditions    Review of Systems   Unable to perform ROS: Mental status change     2 systems OR Unable to obtain a complete ROS due to level of consciousness.  Objective:     Vitals:  Temp: 98.5 °F (36.9 °C)  Pulse: (!) 117  Rhythm: sinus tachycardia  BP: (!) 89/57  MAP (mmHg): 68  CI (L/min/m2): 2.1 L/min/m2  SVI: 18  SVV: 4 %  Resp: 19  SpO2: 100 %  Oxygen Concentration (%): 24  O2 Device (Oxygen Therapy): room air    Temp  Min: 98 °F (36.7 °C)  Max: 99.4 °F (37.4 °C)  Pulse  Min: 99  Max: 117  BP  Min: 86/59  Max: 137/88  MAP (mmHg)  Min: 67  Max: 93  CI (L/min/m2)  Min: 2 L/min/m2  Max: 2.4 L/min/m2  SVI  Min: 18  Max: 23  SVV  Min: 3 %  Max: 7 %  Resp  Min: 16  Max: 37  SpO2  Min: 95 %  Max: 100 %  Oxygen Concentration (%)  Min: 24  Max: 24    02/06 0701 - 02/07 0700  In: 1276.4 [I.V.:1086.4]  Out: 700 [Urine:700]   Unmeasured Output  Urine Occurrence: 1  Stool Occurrence: 0  Pad Count: 1       Physical Exam  Constitutional:  No apparent distress.   Eyes: Conjunctiva clear, anicteric. Lids no lesions.  Head/Ears/Nose/Mouth/Throat/Neck: Moist mucous membranes. External ears, nose atraumatic.   Cardiovascular: Regular rhythm. ESM  Respiratory: Clear to auscultation.  Gastrointestinal: No hernia. Soft, nondistended, nontender. + bowel sounds.      Neurologic Examination:    -Mental Status: Opens eyes to voice, follows simple commands  -Cranial nerves: Pupils equal, round, and reactive bilateral. Right gaze preference   -Motor: Moves right side spon   -Sensation: Diminished to LT on left side       Medications:  Continuousinsulin regular 1 units/mL infusion orderable (DKA), Last Rate: 1.6 Units/hr (02/07/22 1002)    Scheduledaspirin, 81 mg, Daily  atorvastatin, 40 mg, Daily  clopidogreL, 75 mg, Daily  donepeziL, 10 mg, Daily  heparin (porcine), 5,000 Units, Q8H  memantine, 10 mg, BID    PRNcalcium gluconate IVPB, 1 g, PRN  calcium gluconate IVPB, 2 g,  PRN  calcium gluconate IVPB, 3 g, PRN  dextrose 10 % in water (D10W), , PRN  dextrose 10 % in water (D10W), , PRN  dextrose 10%, 12.5 g, PRN  dextrose 10%, 25 g, PRN  iodixanoL, 65 mL, ONCE PRN  magnesium sulfate IVPB, 2 g, PRN  magnesium sulfate IVPB, 4 g, PRN  potassium bicarbonate, 35 mEq, PRN  potassium bicarbonate, 50 mEq, PRN  potassium bicarbonate, 60 mEq, PRN  potassium, sodium phosphates, 2 packet, PRN  potassium, sodium phosphates, 2 packet, PRN  potassium, sodium phosphates, 2 packet, PRN  sodium chloride 0.9%, 10 mL, PRN      Today I personally reviewed pertinent medications, lines/drains/airways, imaging, cardiology results, laboratory results, microbiology results, notably:    Diet  Diet NPO  Diet NPO

## 2022-02-07 NOTE — ASSESSMENT & PLAN NOTE
Found to be in DKA  Beta hydroxy 5.5(2/5)-->5.0(2/6)  Glucose on , down from 187, 207  Patient was started on D5 and insulin gtt per NCC  IVF discontinued  Per NCC, D5 discontinued and TF started with plan to transition to scheduled insulin if patient tolerates TF

## 2022-02-07 NOTE — ASSESSMENT & PLAN NOTE
Resolving  DC IVF  Start TF and if she is able to tolerate it we will switch to scheduled insuline

## 2022-02-07 NOTE — PLAN OF CARE
[facility] Care Plan    POC reviewed with Julia Schroeder and pt's son at the bedside at 0300. Pt unable to verbalize understanding. Questions and concerns addressed with pt's son. No acute events overnight. Pt progressing toward goals. Will continue to monitor. See below and flowsheets for full assessment and VS info.     - Insulin gtt infusing and titrated per nomogram  - D5 w/ NS infusing @ 50 ml/hr for DKA management   - electrolytes replaced per order set  - BS x1; straight cath x1  - afebrile throughout shift    Neuro:  Provencal Coma Scale  Best Eye Response: 3-->(E3) to speech  Best Motor Response: 6-->(M6) obeys commands  Best Verbal Response: 4-->(V4) confused  Provencal Coma Scale Score: 13  Assessment Qualifiers: patient not sedated/intubated,no eye obstruction present  Pupil PERRLA: yes     24hr Temp:  [98 °F (36.7 °C)-99.4 °F (37.4 °C)]     CV:   Rhythm: sinus tachycardia  BP goals:   SBP < 140  MAP > 65    Resp:   O2 Device (Oxygen Therapy): nasal cannula  Oxygen Concentration (%): 24    Plan: wean oxygen requirements as tolerated    GI/:     Diet/Nutrition Received: NPO  Last Bowel Movement: 02/07/22  Voiding Characteristics: external catheter    Intake/Output Summary (Last 24 hours) at 2/7/2022 0358  Last data filed at 2/6/2022 2305  Gross per 24 hour   Intake 1907.81 ml   Output 1000 ml   Net 907.81 ml     Unmeasured Output  Urine Occurrence: 1  Stool Occurrence: 0  Pad Count: 2    Labs/Accuchecks:  Recent Labs   Lab 02/07/22  0303   WBC 13.74*   RBC 3.26*   HGB 9.7*   HCT 30.1*         Recent Labs   Lab 02/06/22  0556 02/06/22  0556 02/06/22  1414   *   < > 154*   K 4.3   < > 3.5   CO2 18*   < > 23      < > 112*   BUN 24*   < > 23   CREATININE 1.3   < > 1.2   ALKPHOS 70  --   --    ALT 21  --   --    AST 38  --   --    BILITOT 0.4  --   --     < > = values in this interval not displayed.      Recent Labs   Lab 02/04/22  2115   INR 1.0   APTT 24.0      Recent Labs   Lab 02/05/22  0846   CPK  152   TROPONINI 19.503*       Electrolytes: Electrolytes replaced  Accuchecks: Q1H    Gtts:   dextrose 5 % and 0.9 % NaCl 50 mL/hr at 02/06/22 2305    insulin regular 1 units/mL infusion orderable (DKA) 0.6 Units/hr (02/06/22 2305)       LDA/Wounds:  Lines/Drains/Airways       Drain                   NG/OG Tube Left nostril -- days    Female External Urinary Catheter 02/06/22 1031 <1 day              Arterial Line              Arterial Line 02/05/22 1039 Left Brachial 1 day              Peripheral Intravenous Line                   Peripheral IV - Single Lumen 02/05/22 1649 20 G Left;Posterior Hand 1 day         Peripheral IV - Single Lumen 02/06/22 0645 20 G Anterior;Distal;Left Lower leg <1 day          <1 day                  Wounds: No  Wound care consulted: No

## 2022-02-07 NOTE — SUBJECTIVE & OBJECTIVE
Neurologic Chief Complaint: AMS+L hemiparesis    Subjective:     Interval History: Patient is seen for follow-up neurological assessment and treatment recommendations: Patient was started on insulin gtt and D5 for DKA management. DKA resolved, D5 discontinued and NCC attempting to see if patient will tolerate tube feedings to transition to scheduled insulin. NCC considering SD tomorrow. She continues with L hemiparesis, L neglect and R gaze.     HPI, Past Medical, Family, and Social History remains the same as documented in the initial encounter.     Review of Systems   Constitutional: Negative for chills and fever.   HENT: Positive for trouble swallowing. Negative for congestion.    Respiratory: Negative for cough.    Gastrointestinal: Negative for nausea and vomiting.   Musculoskeletal: Negative for neck pain and neck stiffness.   Skin: Negative for pallor and rash.   Neurological: Positive for speech difficulty, weakness and numbness.   Psychiatric/Behavioral: Positive for decreased concentration.     Scheduled Meds:   aspirin  81 mg Per NG tube Daily    atorvastatin  40 mg Per NG tube Daily    clopidogreL  75 mg Per NG tube Daily    donepeziL  10 mg Per NG tube Daily    heparin (porcine)  5,000 Units Subcutaneous Q8H    memantine  10 mg Per NG tube BID     Continuous Infusions:   insulin regular 1 units/mL infusion orderable (DKA) 1.6 Units/hr (02/07/22 1002)     PRN Meds:calcium gluconate IVPB, calcium gluconate IVPB, calcium gluconate IVPB, dextrose 10 % in water (D10W), dextrose 10 % in water (D10W), dextrose 10%, dextrose 10%, iodixanoL, magnesium sulfate IVPB, magnesium sulfate IVPB, potassium bicarbonate, potassium bicarbonate, potassium bicarbonate, potassium, sodium phosphates, potassium, sodium phosphates, potassium, sodium phosphates, sodium chloride 0.9%    Objective:     Vital Signs (Most Recent):  Temp: 98.5 °F (36.9 °C) (02/07/22 0702)  Pulse: (!) 117 (02/07/22 1002)  Resp: 19 (02/07/22  1002)  BP: (!) 89/57 (02/07/22 1002)  SpO2: 100 % (02/07/22 1002)  BP Location: Right arm    Vital Signs Range (Last 24H):  Temp:  [98 °F (36.7 °C)-99.4 °F (37.4 °C)]   Pulse:  []   Resp:  [16-37]   BP: ()/(53-88)   SpO2:  [95 %-100 %]   Arterial Line BP: ()/(51-59)   BP Location: Right arm    Physical Exam  Vitals and nursing note reviewed.   Constitutional:       General: She is not in acute distress.     Comments: Drowsy   HENT:      Head: Normocephalic and atraumatic.      Right Ear: External ear normal.      Left Ear: External ear normal.      Nose: Nose normal.   Eyes:      Extraocular Movements: Extraocular movements intact.      Conjunctiva/sclera: Conjunctivae normal.   Cardiovascular:      Rate and Rhythm: Tachycardia present.   Pulmonary:      Effort: Pulmonary effort is normal.   Abdominal:      General: There is no distension.   Musculoskeletal:      Right lower leg: No edema.      Left lower leg: No edema.   Skin:     General: Skin is warm and dry.   Neurological:      Sensory: Sensory deficit present.      Motor: Weakness (L hemiparesis) present.         Neurological Exam:   LOC: drowsy  Attention Span: poor  Language: No aphasia  Articulation: Dysarthria  EOM (CN III, IV, VI): Gaze preference  Right, able to overcome midline  Facial Movement (CN VII): Lower facial weakness on the Left  Motor: Arm left  Plegia 0/5  Leg left  Plegia 0/5  Arm right antigravity with spontaneous movement  Leg right antigravity with spontaneous movement  Sensation: Phan-hypoesthesia left  Tone: Flaccid LUE    Laboratory:  CMP:   Recent Labs   Lab 02/07/22  0303   CALCIUM 8.7  8.7   ALBUMIN 2.7*   PROT 5.7*   *  157*   K 3.7  3.7   CO2 29  29   *  115*   BUN 21  21   CREATININE 1.0  1.0   ALKPHOS 68   ALT 18   AST 28   BILITOT 0.4     CBC:   Recent Labs   Lab 02/07/22  0303   WBC 13.74*   RBC 3.26*   HGB 9.7*   HCT 30.1*      MCV 92   MCH 29.8   MCHC 32.2     Lipid Panel: No  results for input(s): CHOL, LDLCALC, HDL, TRIG in the last 168 hours.  Coagulation:   Recent Labs   Lab 02/04/22  2115   INR 1.0   APTT 24.0     Platelet Aggregation Study: No results for input(s): PLTAGG, PLTAGINTERP, PLTAGREGLACO, ADPPLTAGGREG in the last 168 hours.  Hgb A1C: No results for input(s): HGBA1C in the last 168 hours.  TSH:   Recent Labs   Lab 02/04/22  1145   TSH 0.196*       Diagnostic Results     Brain imaging:  MRI Brain WO 2/5/2022  Moderate to large acute right MCA territory infarction with susceptibility associated with the right basal ganglia and insular components compatible with hemorrhagic conversion.  Mass effect without midline shift or hydrocephalus.     Small sized ipsilateral right cerebellar acute infarction with punctate contralateral left hippocampal focus of diffusion restriction concerning for possible superimposed transient global amnesia.    [facility] 2/5/2022  Findings consistent with recent ischemia/infarct involving the right MCA distribution again noted, previously identified hyperdensity involving the basal ganglia and caudate on the right again noted, configuration is stable, degree of density is mildly diminished, there is continued mass effect and mild right to left midline shift appearing stable without evidence for significant interval detrimental change.  Clinical and historical correlation and continued follow-up recommended.    [facility] 2/4/2022 1741  Hyperdensity within the right basal ganglia insular cortex confined to the gray matter and sparing the internal cortex.  Findings likely representing contrast staining from recent angiogram given altered flow dynamics in the infarcted territory.  Hemorrhagic conversion is less likely.  Recommend short-term follow-up.     0.2 cm leftward midline shift.    [facility] (2/4/22) 1415: Continued evolution of acute right MCA distribution infarct with developing minimal right-sided mass effect.  No midline shift or evidence for hemorrhagic  conversion.  Clinical correlation and continued follow-up recommended.     Vessel Imaging:  IR angio 2/4/2022:  Successful aspiration thrombectomy of the distal right internal carotid artery occlusion, with resulting TICI 3 flow.    CTA head/neck (2/4/22): Acute large vessel occlusion with distal right supraclinoid ICA filling defect extending into the anterior cerebral artery and middle cerebral artery branches as detailed above. Evidence of loss of gray-white matter interface of the deep right hemispheric structures consistent with early ischemia/infarction. Calcified plaque of the vertebral arteries, internal carotid arteries and of the common carotid artery bifurcation.     Cardiac Evaluation:   Last TTE (1/31/22): EF 45%, grade II LV diastolic dysfunction, no LA enlargement

## 2022-02-07 NOTE — PLAN OF CARE
[facility]  Initial Discharge Assessment       Primary Care Provider: Devon Lovell MD    Admission Diagnosis: Cerebrovascular accident (CVA), unspecified mechanism [I63.9]    Admission Date: 2/4/2022  Expected Discharge Date:  2/12/2022    Discharge Barriers Identified: None    Payor: HU[employer] MANAGED MEDICARE / Plan: [employer] MEDICARE PPO / Product Type: Medicare Advantage /     Extended Emergency Contact Information  Primary Emergency Contact: Zuly Schroeder   [facility]  Home Phone: [phone]  Relation: Sister  Secondary Emergency Contact: Val Givens  Mobile Phone: [phone]  Relation: Son    Discharge Plan A: Skilled Nursing Facility  Discharge Plan B: Home Health      [facility] - [place], [place] - [address]  [address] [address]  Phone: [phone] Fax: [phone]    [facility] - [facility] [address]  [address] [address]  Phone: [phone] Fax: [phone]     completed pt's assessment via phone with pt's son Val Givens [phone]. Pt was resting at time of assessment. CM discussed therapy's rec for SNF and son is agreeable. A list of SNF facilities within pt's network were left at the bedside for the son to review upon his arrival to the unit. JESSICA Villa informed and stated she will fax referrals to highly rated facilities for continuity of care while awaiting family choices and will place family choices as preferred once received.     Initial Assessment (most recent)       Adult Discharge Assessment - 02/07/22 1510          Discharge Assessment    Assessment Type Discharge Planning Assessment     Confirmed/corrected address, phone number and insurance Yes     Confirmed Demographics Correct on Facesheet     Source of Information other (see comments)   Sindi Givens (son) assisted w/pt's assessment via FaceTime    If unable to  respond/provide information was family/caregiver contacted? Yes     Contact Name/Number Val Givens (son) [phone]     Communicated ROGER with patient/caregiver Yes     Reason For Admission CVA     Lives With child(rina), adult   lives with her son Val Givens    Do you expect to return to your current living situation? Other (see comments)   Therapy recs SNF and pt's son is receptive; list left at bedside for son to review choices and he will call JESSICA Villa    Do you have help at home or someone to help you manage your care at home? Yes     Who are your caregiver(s) and their phone number(s)? aVl Givens (son) [phone]     Prior to hospitilization cognitive status: Unable to Assess     Current cognitive status: Unable to Assess   Pt resting at time of assessment    Walking or Climbing Stairs Difficulty --   see PT/OT notes    Dressing/Bathing Difficulty --   see PT/OT notes    Home Layout Other (see comments)   4 SAL    Equipment Currently Used at Home grab bar;glucometer     Readmission within 30 days? Yes     Patient currently being followed by outpatient case management? No     Do you currently have service(s) that help you manage your care at home? Yes     Name and Contact number of agency [facility]     Is the pt/caregiver preference to resume services with current agency Yes     Do you take prescription medications? Yes     Do you have prescription coverage? Yes     Coverage [employer]     Do you have any problems affording any of your prescribed medications? No     Is the patient taking medications as prescribed? yes     Are you on dialysis? No     Do you take coumadin? No     Discharge Plan A Skilled Nursing Facility     Discharge Plan B Home Health     DME Needed Upon Discharge  none     Discharge Plan discussed with: Adult children   D/C plan discussed with pt's son Val Givens [phone]    Discharge Barriers Identified None        Relationship/Environment    Name(s) of Who Lives With Patient  Val Givens [phone]                   Arabella Peace RN Case Manager  [facility]  [phone]
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
## 2022-02-07 NOTE — PROGRESS NOTES
[facility]  Neurocritical Care  Progress Note    Admit Date: 2/4/2022  Service Date: 02/07/2022  Length of Stay: 3    Subjective:     Chief Complaint: <principal problem not specified>    History of Present Illness: Per ED evaluation:  Patient presents to the emergency department with symptoms concerning for acute CVA.  Last known normal per family was 2:00 a.m.; patient presented outside of the tPA window; tele stroke was activated immediately on arrival; neurologist suspects large vessel occlusion and recommend stat transfer for possible neuro intervention.  Neurology advised CTA head neck prior to transfer; CTA head and neck performed and pending at the time of transfer; numerous labs pending at time of transfer; rectal aspirin given    Patient presented initially to [facility] emergency department with L-sided weakness, L-sided facial droop, confusion, she arrives to the neuro ICU status post thrombectomy for right ICA occlusion. CTA shows acute large vessel occlusion with distal right supraclinoid ICA filling defect extending into the anterior cerebral artery and middle cerebral artery branches.  Patient was recently admitted to outside hospital with NSTEMI (days ago), at which time she was evaluated by cardiology and cardiac catheterization was performed showing severe multi-vessel disease. Medical management was decided upon by Cardiology and family at that time due to comorbidities.  Patient had been started on aspirin, Plavix, statin      Hospital Course: 02/05/2022 metabolic acidosis, tachypnea. Place arterial line.   02/06/2022 likely euglycemi DKA associated with SGLT2 inhibitors  02/07/2022 NAEO, DKA resolved, starting TF      Interval History:  >4 elements OR status of 3 inpatient conditions    Review of Systems   Unable to perform ROS: Mental status change     2 systems OR Unable to obtain a complete ROS due to level of consciousness.  Objective:     Vitals:  Temp: 98.5 °F (36.9  °C)  Pulse: (!) 117  Rhythm: sinus tachycardia  BP: (!) 89/57  MAP (mmHg): 68  CI (L/min/m2): 2.1 L/min/m2  SVI: 18  SVV: 4 %  Resp: 19  SpO2: 100 %  Oxygen Concentration (%): 24  O2 Device (Oxygen Therapy): room air    Temp  Min: 98 °F (36.7 °C)  Max: 99.4 °F (37.4 °C)  Pulse  Min: 99  Max: 117  BP  Min: 86/59  Max: 137/88  MAP (mmHg)  Min: 67  Max: 93  CI (L/min/m2)  Min: 2 L/min/m2  Max: 2.4 L/min/m2  SVI  Min: 18  Max: 23  SVV  Min: 3 %  Max: 7 %  Resp  Min: 16  Max: 37  SpO2  Min: 95 %  Max: 100 %  Oxygen Concentration (%)  Min: 24  Max: 24    02/06 0701 - 02/07 0700  In: 1276.4 [I.V.:1086.4]  Out: 700 [Urine:700]   Unmeasured Output  Urine Occurrence: 1  Stool Occurrence: 0  Pad Count: 1       Physical Exam  Constitutional:  No apparent distress.   Eyes: Conjunctiva clear, anicteric. Lids no lesions.  Head/Ears/Nose/Mouth/Throat/Neck: Moist mucous membranes. External ears, nose atraumatic.   Cardiovascular: Regular rhythm. ESM  Respiratory: Clear to auscultation.  Gastrointestinal: No hernia. Soft, nondistended, nontender. + bowel sounds.      Neurologic Examination:    -Mental Status: Opens eyes to voice, follows simple commands  -Cranial nerves: Pupils equal, round, and reactive bilateral. Right gaze preference   -Motor: Moves right side spon   -Sensation: Diminished to LT on left side       Medications:  Continuousinsulin regular 1 units/mL infusion orderable (DKA), Last Rate: 1.6 Units/hr (02/07/22 1002)    Scheduledaspirin, 81 mg, Daily  atorvastatin, 40 mg, Daily  clopidogreL, 75 mg, Daily  donepeziL, 10 mg, Daily  heparin (porcine), 5,000 Units, Q8H  memantine, 10 mg, BID    PRNcalcium gluconate IVPB, 1 g, PRN  calcium gluconate IVPB, 2 g, PRN  calcium gluconate IVPB, 3 g, PRN  dextrose 10 % in water (D10W), , PRN  dextrose 10 % in water (D10W), , PRN  dextrose 10%, 12.5 g, PRN  dextrose 10%, 25 g, PRN  iodixanoL, 65 mL, ONCE PRN  magnesium sulfate IVPB, 2 g, PRN  magnesium sulfate IVPB, 4 g,  PRN  potassium bicarbonate, 35 mEq, PRN  potassium bicarbonate, 50 mEq, PRN  potassium bicarbonate, 60 mEq, PRN  potassium, sodium phosphates, 2 packet, PRN  potassium, sodium phosphates, 2 packet, PRN  potassium, sodium phosphates, 2 packet, PRN  sodium chloride 0.9%, 10 mL, PRN      Today I personally reviewed pertinent medications, lines/drains/airways, imaging, cardiology results, laboratory results, microbiology results, notably:    Diet  Diet NPO  Diet NPO        Assessment/Plan:     Neuro  Stroke due to occlusion of right carotid artery  Neurological exam is improving   Con current sec prevention measures     Dementia without behavioral disturbance  Con home meds    Cardiac/Vascular  CAD, multiple vessel  Con DAP and statins     Endocrine  DKA (diabetic ketoacidosis)  Resolving  DC IVF  Start TF and if she is able to tolerate it we will switch to scheduled insuline           The patient is being Prophylaxed for:  Venous Thromboembolism with: Mechanical or Chemical  Stress Ulcer with: Not Applicable   Ventilator Pneumonia with: not applicable    Activity Orders          Progressive Mobility Protocol (mobilize patient to their highest level of functioning at least twice daily) starting at 02/04 2000    Turn patient starting at 02/04 1600    Elevate HOB starting at 02/04 1521    Diet NPO: NPO starting at 02/04 1521        DNR    Uninterrupted Critical Care/Counseling Time (not including procedures): 35 minutes       Kalen Juan MD  Neurocritical Care  [facility]

## 2022-02-08 NOTE — SUBJECTIVE & OBJECTIVE
Neurologic Chief Complaint: ***    Subjective:     Interval History: Patient is seen for follow-up neurological assessment and treatment recommendations: ***    HPI, Past Medical, Family, and Social History remains the same as documented in the initial encounter.     Review of Systems  Scheduled Meds:   aspirin  81 mg Per NG tube Daily    atorvastatin  40 mg Per NG tube Daily    clopidogreL  75 mg Per NG tube Daily    donepeziL  10 mg Per NG tube Daily    heparin (porcine)  5,000 Units Subcutaneous Q8H    insulin aspart U-100  3 Units Subcutaneous Q4H    insulin detemir U-100  8 Units Subcutaneous BID    memantine  10 mg Per NG tube BID    mupirocin   Nasal BID     Continuous Infusions:   dextrose 10 % in water (D10W)       PRN Meds:calcium gluconate IVPB, calcium gluconate IVPB, calcium gluconate IVPB, dextrose 10 % in water (D10W), dextrose 10 % in water (D10W), dextrose 10 % in water (D10W), glucagon (human recombinant), insulin aspart U-100, iodixanoL, magnesium sulfate IVPB, magnesium sulfate IVPB, potassium bicarbonate, potassium bicarbonate, potassium bicarbonate, potassium, sodium phosphates, potassium, sodium phosphates, potassium, sodium phosphates, sodium chloride 0.9%    Objective:     Vital Signs (Most Recent):  Temp: 99.1 °F (37.3 °C) (02/08/22 1102)  Pulse: (!) 115 (02/08/22 1402)  Resp: (!) 21 (02/08/22 1402)  BP: (!) 92/56 (02/08/22 1402)  SpO2: 99 % (02/08/22 1402)  BP Location: Right arm    Vital Signs Range (Last 24H):  Temp:  [98.3 °F (36.8 °C)-99.4 °F (37.4 °C)]   Pulse:  [108-121]   Resp:  [12-27]   BP: ()/(50-70)   SpO2:  [94 %-100 %]   Arterial Line BP: ()/(49-59)   BP Location: Right arm    Physical Exam    Neurological Exam:   {Neuro Exam:99885}    Laboratory:  {LABS:18550}    Diagnostic Results     Brain Imaging   ***  Vessel Imaging   ***  Cardiac Imaging   ***

## 2022-02-08 NOTE — SUBJECTIVE & OBJECTIVE
Neurologic Chief Complaint: AMS, LSW    Subjective:     Interval History: Patient is seen for follow-up neurological assessment and treatment recommendations: Patient with sodium of 161. Scheduled insulin initiated after TF started. Patient hypotensive and tachycardic while rounding today.    HPI, Past Medical, Family, and Social History remains the same as documented in the initial encounter.     Review of Systems   Constitutional: Negative for chills, diaphoresis and fever.   HENT: Positive for trouble swallowing. Negative for congestion, drooling and rhinorrhea.    Eyes: Negative for redness.   Respiratory: Negative for cough, choking and shortness of breath.    Gastrointestinal: Negative for diarrhea, nausea and vomiting.   Musculoskeletal: Negative for neck pain and neck stiffness.   Neurological: Positive for speech difficulty, weakness and numbness.   Psychiatric/Behavioral: Positive for decreased concentration. Negative for agitation. The patient is not nervous/anxious.      Scheduled Meds:   aspirin  81 mg Per NG tube Daily    atorvastatin  40 mg Per NG tube Daily    clopidogreL  75 mg Per NG tube Daily    donepeziL  10 mg Per NG tube Daily    heparin (porcine)  5,000 Units Subcutaneous Q8H    insulin aspart U-100  3 Units Subcutaneous Q4H    insulin detemir U-100  8 Units Subcutaneous BID    memantine  10 mg Per NG tube BID    mupirocin   Nasal BID     Continuous Infusions:   dextrose 10 % in water (D10W)       PRN Meds:calcium gluconate IVPB, calcium gluconate IVPB, calcium gluconate IVPB, dextrose 10 % in water (D10W), dextrose 10 % in water (D10W), dextrose 10 % in water (D10W), glucagon (human recombinant), insulin aspart U-100, iodixanoL, magnesium sulfate IVPB, magnesium sulfate IVPB, potassium bicarbonate, potassium bicarbonate, potassium bicarbonate, potassium, sodium phosphates, potassium, sodium phosphates, potassium, sodium phosphates, sodium chloride 0.9%    Objective:     Vital Signs  (Most Recent):  Temp: 99.1 °F (37.3 °C) (02/08/22 1102)  Pulse: (!) 115 (02/08/22 1402)  Resp: (!) 21 (02/08/22 1402)  BP: (!) 92/56 (02/08/22 1402)  SpO2: 99 % (02/08/22 1402)  BP Location: Right arm    Vital Signs Range (Last 24H):  Temp:  [98.3 °F (36.8 °C)-99.4 °F (37.4 °C)]   Pulse:  [108-121]   Resp:  [12-27]   BP: ()/(50-70)   SpO2:  [94 %-100 %]   Arterial Line BP: ()/(49-59)   BP Location: Right arm    Physical Exam  Vitals and nursing note reviewed.   Constitutional:       General: She is not in acute distress.     Comments: Drowsy   HENT:      Head: Normocephalic and atraumatic.      Right Ear: External ear normal.      Left Ear: External ear normal.      Nose: Nose normal.   Eyes:      Extraocular Movements: Extraocular movements intact.      Conjunctiva/sclera: Conjunctivae normal.   Cardiovascular:      Rate and Rhythm: Tachycardia present.   Pulmonary:      Effort: Pulmonary effort is normal.   Abdominal:      General: There is no distension.   Musculoskeletal:      Right lower leg: No edema.      Left lower leg: No edema.   Skin:     General: Skin is warm and dry.   Neurological:      Sensory: Sensory deficit present.      Motor: Weakness (L hemiparesis) present.         Neurological Exam:   LOC: drowsy  Attention Span: poor  Language: No aphasia  Articulation: Dysarthria  EOM (CN III, IV, VI): Gaze preference  Right   Facial Movement (CN VII): Lower facial weakness on the Left  Motor: Arm left  Plegia 0/5  Leg left  Plegia 0/5  Arm right antigravity with spontaneous movement  Leg right antigravity with spontaneous movement  Sensation: Phan-hypoesthesia left  Tone: Flaccid LUE    Laboratory:  CMP:   Recent Labs   Lab 02/08/22  0255 02/08/22  0255 02/08/22  1027   CALCIUM 9.0  --   --    ALBUMIN 3.1*  --   --    PROT 6.1  --   --    *  --   --    K 3.5   < > 4.2   CO2 27  --   --    *  --   --    BUN 30*  --   --    CREATININE 1.3  --   --    ALKPHOS 194*  --   --    ALT 44  --    --    AST 89*  --   --    BILITOT 0.5  --   --     < > = values in this interval not displayed.     CBC:   Recent Labs   Lab 02/08/22  0255   WBC 12.29   RBC 3.27*   HGB 9.3*   HCT 30.1*      MCV 92   MCH 28.4   MCHC 30.9*     Lipid Panel: No results for input(s): CHOL, LDLCALC, HDL, TRIG in the last 168 hours.  Coagulation:   Recent Labs   Lab 02/04/22  2115   INR 1.0   APTT 24.0     Platelet Aggregation Study: No results for input(s): PLTAGG, PLTAGINTERP, PLTAGREGLACO, ADPPLTAGGREG in the last 168 hours.  Hgb A1C: No results for input(s): HGBA1C in the last 168 hours.  TSH:   Recent Labs   Lab 02/04/22  1145   TSH 0.196*       Diagnostic Results     No new imaging to review at this time. See below for pertinent neuro imaging this admission.        Brain imaging:  MRI Brain WO 2/5/2022    Moderate to large acute right MCA territory infarction with susceptibility associated with the right basal ganglia and insular components compatible with hemorrhagic conversion.  Mass effect without midline shift or hydrocephalus.     Small sized ipsilateral right cerebellar acute infarction with punctate contralateral left hippocampal focus of diffusion restriction concerning for possible superimposed transient global amnesia.    [facility] 2/5/2022  Findings consistent with recent ischemia/infarct involving the right MCA distribution again noted, previously identified hyperdensity involving the basal ganglia and caudate on the right again noted, configuration is stable, degree of density is mildly diminished, there is continued mass effect and mild right to left midline shift appearing stable without evidence for significant interval detrimental change.    [facility] 2/4/2022 1741  Hyperdensity within the right basal ganglia insular cortex confined to the gray matter and sparing the internal cortex.  Findings likely representing contrast staining from recent angiogram given altered flow dynamics in the infarcted territory.   Hemorrhagic conversion is less likely. 0.2 cm leftward midline shift.    [facility] (2/4/22) 1415: Continued evolution of acute right MCA distribution infarct with developing minimal right-sided mass effect.  No midline shift or evidence for hemorrhagic conversion.       Vessel Imaging:  IR angio 2/4/2022:  Successful aspiration thrombectomy of the distal right internal carotid artery occlusion, with resulting TICI 3 flow.    CTA head/neck (2/4/22): Acute large vessel occlusion with distal right supraclinoid ICA filling defect extending into the anterior cerebral artery and middle cerebral artery branches as detailed above. Evidence of loss of gray-white matter interface of the deep right hemispheric structures consistent with early ischemia/infarction. Calcified plaque of the vertebral arteries, internal carotid arteries and of the common carotid artery bifurcation.     Cardiac Evaluation:   Last TTE (1/31/22): EF 45%, grade II LV diastolic dysfunction, no LA enlargement

## 2022-02-08 NOTE — PROGRESS NOTES
"[facility]  Adult Nutrition  Progress Note    SUMMARY       Recommendations    1. When medically appropriate, advance diet to Diabetic diet with Low-Sodium restrictions - texture per SLP   2. If tolerated, rec increasing Glucerna TF rate to 40 mL/hr to better meet pt's needs - 1440 kcal, 79 g protein, 729 mL free water   3. RD to monitor and f/u    Goals: Pt to receive nutrition by RD f/u  Nutrition Goal Status: new  Communication of RD Recs: POC    Assessment and Plan    Nutrition Problem  Swallowing difficulty    Related to (etiology):   Post-CVA complications    Signs and Symptoms (as evidenced by):   Results of swallowing test and SLP-recommended NPO status    Interventions (treatment strategy):  Enteral nutrition  Collaboration of nutrition care with other providers    Nutrition Diagnosis Status:   New      Reason for Assessment    Reason For Assessment: consult  Diagnosis: other (see comments) (Possible acute CVA)  Relevant Medical History: T2DM, HTN, HLD, dementia  Interdisciplinary Rounds: did not attend  General Information Comments: Pt in bed with no family present in room. Pt did not arouse upon entering room. Pt with NG tube, Glucerna TF hanging @ 35 mL/hr. Unable to complete NFPE due to pt's lack of responsiveness. Per chart pt's wt appears relatively stable x 4 months. Pt may be at risk for actue malnutrition; will monitor closely for wt loss and s/s malnutrition.  Nutrition Discharge Planning: Pending medical course    Nutrition Risk Screen    Nutrition Risk Screen: dysphagia or difficulty swallowing,tube feeding or parenteral nutrition    Nutrition/Diet History    Spiritual, Cultural Beliefs, Latter day Practices, Values that Affect Care: no    Anthropometrics    Temp: 99.1 °F (37.3 °C)  Height: 5' 2" (157.5 cm)  Height (inches): 62 in  Weight Method: Bed Scale  Weight: 64.4 kg (141 lb 15.6 oz)  Weight (lb): 141.98 lb  Ideal Body Weight (IBW), Female: 110 lb  % Ideal Body Weight, " Female (lb): 129.07 %  BMI (Calculated): 26       Lab/Procedures/Meds    Pertinent Labs Reviewed: reviewed  Pertinent Labs Comments: GFR 46.7, glucose 222, BUN 30  Pertinent Medications Reviewed: reviewed  Pertinent Medications Comments: Insulin, statin, heparin      Estimated/Assessed Needs    Weight Used For Calorie Calculations: 64.4 kg (141 lb 15.6 oz)  Energy Calorie Requirements (kcal): 1304-1063 kcal (25-30 kcal/kg)  Energy Need Method: Kcal/kg  Protein Requirements: 65-75 g (1.0-1.2 g/kg)  Weight Used For Protein Calculations: 64.4 kg (141 lb 15.6 oz)     Estimated Fluid Requirement Method: other (see comments) (1 mL/kcal or per MD)  RDA Method (mL): 1610  CHO Requirement: 200 g      Nutrition Prescription Ordered    Current Diet Order: NPO  Current Nutrition Support Formula Ordered: Glucerna 1.5  Current Nutrition Support Rate Ordered: 35 (ml)  Current Nutrition Support Frequency Ordered: x 24 hrs    Evaluation of Received Nutrient/Fluid Intake    Enteral Calories (kcal): 1260  Enteral Protein (gm): 69.3  Enteral (Free Water) Fluid (mL): 637.6  % Kcal Needs: 78%  % Protein Needs: 106%  I/O: +.67 L since admit  Energy Calories Required: meeting needs  Protein Required: meeting needs  Fluid Required: other (see comments) (Fluid per MD)  Comments: LBM 2/8  % Intake of Estimated Energy Needs: 75 - 100 %  % Meal Intake: NPO    Nutrition Risk    Level of Risk/Frequency of Follow-up: low     Monitor and Evaluation    Food and Nutrient Intake: energy intake,enteral nutrition intake  Food and Nutrient Adminstration: enteral and parenteral nutrition administration  Knowledge/Beliefs/Attitudes: beliefs and attitudes  Physical Activity and Function: nutrition-related ADLs and IADLs  Anthropometric Measurements: weight,weight change,body mass index  Biochemical Data, Medical Tests and Procedures: electrolyte and renal panel,gastrointestinal profile,glucose/endocrine profile,inflammatory profile,lipid  profile  Nutrition-Focused Physical Findings: overall appearance     Nutrition Follow-Up    RD Follow-up?: Yes

## 2022-02-08 NOTE — PROGRESS NOTES
[facility]  Neurocritical Care  Progress Note    Admit Date: 2/4/2022  Service Date: 02/08/2022  Length of Stay: 4    Subjective:     Chief Complaint: <principal problem not specified>    History of Present Illness: Per ED evaluation:  Patient presents to the emergency department with symptoms concerning for acute CVA.  Last known normal per family was 2:00 a.m.; patient presented outside of the tPA window; tele stroke was activated immediately on arrival; neurologist suspects large vessel occlusion and recommend stat transfer for possible neuro intervention.  Neurology advised CTA head neck prior to transfer; CTA head and neck performed and pending at the time of transfer; numerous labs pending at time of transfer; rectal aspirin given    Patient presented initially to [facility] emergency department with L-sided weakness, L-sided facial droop, confusion, she arrives to the neuro ICU status post thrombectomy for right ICA occlusion. CTA shows acute large vessel occlusion with distal right supraclinoid ICA filling defect extending into the anterior cerebral artery and middle cerebral artery branches.  Patient was recently admitted to outside hospital with NSTEMI (days ago), at which time she was evaluated by cardiology and cardiac catheterization was performed showing severe multi-vessel disease. Medical management was decided upon by Cardiology and family at that time due to comorbidities.  Patient had been started on aspirin, Plavix, statin      Hospital Course: 02/05/2022 metabolic acidosis, tachypnea. Place arterial line.   02/06/2022 likely euglycemi DKA associated with SGLT2 inhibitors  02/07/2022 NAEO, DKA resolved, starting TF  02/08/2022 NAEO. Starting scheduled insuline. Increase water flushes        Interval History:  >4 elements OR status of 3 inpatient conditions    Review of Systems   Unable to perform ROS: Mental status change     2 systems OR Unable to obtain a complete ROS due to  level of consciousness.  Objective:     Vitals:  Temp: 98.3 °F (36.8 °C)  Pulse: (!) 117  Rhythm: sinus tachycardia  BP: (!) 89/58  MAP (mmHg): 69  Resp: (!) 24  SpO2: 100 %  O2 Device (Oxygen Therapy): nasal cannula    Temp  Min: 97.4 °F (36.3 °C)  Max: 99.4 °F (37.4 °C)  Pulse  Min: 108  Max: 121  BP  Min: 82/50  Max: 104/70  MAP (mmHg)  Min: 61  Max: 82  Resp  Min: 12  Max: 27  SpO2  Min: 95 %  Max: 100 %    02/07 0701 - 02/08 0700  In: 2176.7 [I.V.:286.7]  Out: 900 [Urine:900]   Unmeasured Output  Urine Occurrence: 1  Stool Occurrence: 0  Pad Count: 3       Physical Exam    Constitutional:  No apparent distress.   Eyes: Conjunctiva clear, anicteric. Lids no lesions.  Head/Ears/Nose/Mouth/Throat/Neck: Moist mucous membranes. External ears, nose atraumatic.   Cardiovascular: Regular rhythm. ESM  Respiratory: Clear to auscultation.  Gastrointestinal: No hernia. Soft, nondistended, nontender. + bowel sounds.      Neurologic Examination:    -Mental Status: Opens eyes to voice, follows simple commands  -Cranial nerves: Pupils equal, round, and reactive bilateral. Right gaze preference   -Motor: Moves right side spon   -Sensation: Diminished to LT on left side       Medications:  Continuousdextrose 10 % in water (D10W)    Scheduledaspirin, 81 mg, Daily  atorvastatin, 40 mg, Daily  clopidogreL, 75 mg, Daily  donepeziL, 10 mg, Daily  heparin (porcine), 5,000 Units, Q8H  insulin aspart U-100, 3 Units, Q4H  insulin detemir U-100, 8 Units, BID  memantine, 10 mg, BID  mupirocin, , BID    PRNcalcium gluconate IVPB, 1 g, PRN  calcium gluconate IVPB, 2 g, PRN  calcium gluconate IVPB, 3 g, PRN  dextrose 10 % in water (D10W), , PRN  dextrose 10 % in water (D10W), , PRN  dextrose 10 % in water (D10W), , Continuous PRN  glucagon (human recombinant), 1 mg, PRN  insulin aspart U-100, 1-10 Units, Q4H PRN  iodixanoL, 65 mL, ONCE PRN  magnesium sulfate IVPB, 2 g, PRN  magnesium sulfate IVPB, 4 g, PRN  potassium bicarbonate, 35 mEq,  PRN  potassium bicarbonate, 50 mEq, PRN  potassium bicarbonate, 60 mEq, PRN  potassium, sodium phosphates, 2 packet, PRN  potassium, sodium phosphates, 2 packet, PRN  potassium, sodium phosphates, 2 packet, PRN  sodium chloride 0.9%, 10 mL, PRN      Today I personally reviewed pertinent medications, lines/drains/airways, imaging, cardiology results, laboratory results, microbiology results, notably:    Diet  Diet NPO  Diet NPO        Assessment/Plan:     Neuro  Stroke due to occlusion of right carotid artery  Neurological exam is unchanged   Con current sec prevention measures     Dementia without behavioral disturbance  Con home meds    Ophtho  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, without long-term current use of insulin  Start scheduled insuline     Cardiac/Vascular  CAD, multiple vessel  Con DAP and statins     Endocrine  DKA (diabetic ketoacidosis)  Tolerating TF  Start scheduled insuline           The patient is being Prophylaxed for:  Venous Thromboembolism with: Mechanical or Chemical  Stress Ulcer with: Not Applicable   Ventilator Pneumonia with: not applicable    Activity Orders          Turn patient starting at 02/07 1119    Progressive Mobility Protocol (mobilize patient to their highest level of functioning at least twice daily) starting at 02/04 2000    Elevate HOB starting at 02/04 1521    Diet NPO: NPO starting at 02/04 1521        DNR    Level 3    Kalen Juan MD  Neurocritical Care  [facility]

## 2022-02-08 NOTE — SUBJECTIVE & OBJECTIVE
Interval History:  >4 elements OR status of 3 inpatient conditions    Review of Systems   Unable to perform ROS: Mental status change     2 systems OR Unable to obtain a complete ROS due to level of consciousness.  Objective:     Vitals:  Temp: 98.3 °F (36.8 °C)  Pulse: (!) 117  Rhythm: sinus tachycardia  BP: (!) 89/58  MAP (mmHg): 69  Resp: (!) 24  SpO2: 100 %  O2 Device (Oxygen Therapy): nasal cannula    Temp  Min: 97.4 °F (36.3 °C)  Max: 99.4 °F (37.4 °C)  Pulse  Min: 108  Max: 121  BP  Min: 82/50  Max: 104/70  MAP (mmHg)  Min: 61  Max: 82  Resp  Min: 12  Max: 27  SpO2  Min: 95 %  Max: 100 %    02/07 0701 - 02/08 0700  In: 2176.7 [I.V.:286.7]  Out: 900 [Urine:900]   Unmeasured Output  Urine Occurrence: 1  Stool Occurrence: 0  Pad Count: 3       Physical Exam    Constitutional:  No apparent distress.   Eyes: Conjunctiva clear, anicteric. Lids no lesions.  Head/Ears/Nose/Mouth/Throat/Neck: Moist mucous membranes. External ears, nose atraumatic.   Cardiovascular: Regular rhythm. ESM  Respiratory: Clear to auscultation.  Gastrointestinal: No hernia. Soft, nondistended, nontender. + bowel sounds.      Neurologic Examination:    -Mental Status: Opens eyes to voice, follows simple commands  -Cranial nerves: Pupils equal, round, and reactive bilateral. Right gaze preference   -Motor: Moves right side spon   -Sensation: Diminished to LT on left side       Medications:  Continuousdextrose 10 % in water (D10W)    Scheduledaspirin, 81 mg, Daily  atorvastatin, 40 mg, Daily  clopidogreL, 75 mg, Daily  donepeziL, 10 mg, Daily  heparin (porcine), 5,000 Units, Q8H  insulin aspart U-100, 3 Units, Q4H  insulin detemir U-100, 8 Units, BID  memantine, 10 mg, BID  mupirocin, , BID    PRNcalcium gluconate IVPB, 1 g, PRN  calcium gluconate IVPB, 2 g, PRN  calcium gluconate IVPB, 3 g, PRN  dextrose 10 % in water (D10W), , PRN  dextrose 10 % in water (D10W), , PRN  dextrose 10 % in water (D10W), , Continuous PRN  glucagon (human  recombinant), 1 mg, PRN  insulin aspart U-100, 1-10 Units, Q4H PRN  iodixanoL, 65 mL, ONCE PRN  magnesium sulfate IVPB, 2 g, PRN  magnesium sulfate IVPB, 4 g, PRN  potassium bicarbonate, 35 mEq, PRN  potassium bicarbonate, 50 mEq, PRN  potassium bicarbonate, 60 mEq, PRN  potassium, sodium phosphates, 2 packet, PRN  potassium, sodium phosphates, 2 packet, PRN  potassium, sodium phosphates, 2 packet, PRN  sodium chloride 0.9%, 10 mL, PRN      Today I personally reviewed pertinent medications, lines/drains/airways, imaging, cardiology results, laboratory results, microbiology results, notably:    Diet  Diet NPO  Diet NPO

## 2022-02-08 NOTE — PLAN OF CARE
[facility] Care Plan    POC reviewed with Julia Schroeder and pt's niece at 0300. Pt unable to verbalize understanding. Questions and concerns addressed with pt's niece at the bedside. No acute events overnight. Pt progressing toward goals. Will continue to monitor. See below and flowsheets for full assessment and VS info.     - serum osmo collected; urine osmo and urine random pending collection to r/o DI  - critical serum osmo of 363; critical serum Na of 161; MD Chance was notified; MD ordered a 1x 300cc FWF  - TF continued at goal rate of 35; pt tolerating well  - electrolytes replaced per order set  - afebrile throughout shift; TMAX 99.4    Neuro:  [place] Coma Scale  Best Eye Response: 3-->(E3) to speech  Best Motor Response: 6-->(M6) obeys commands  Best Verbal Response: 4-->(V4) confused  [place] Coma Scale Score: 13  Assessment Qualifiers: patient not sedated/intubated,no eye obstruction present  Pupil PERRLA: yes     24hr Temp:  [97.4 °F (36.3 °C)-99.4 °F (37.4 °C)]     CV:   Rhythm: sinus tachycardia  BP goals:   SBP < 160  MAP > 65    Resp:   O2 Device (Oxygen Therapy): nasal cannula  Oxygen Concentration (%): 24    Plan: N/A    GI/:     Diet/Nutrition Received: NPO,tube feeding  Last Bowel Movement: 02/08/22  Voiding Characteristics: external catheter    Intake/Output Summary (Last 24 hours) at 2/8/2022 0338  Last data filed at 2/8/2022 0305  Gross per 24 hour   Intake 1471.88 ml   Output 600 ml   Net 871.88 ml     Unmeasured Output  Urine Occurrence: 1  Stool Occurrence: 0  Pad Count: 3    Labs/Accuchecks:  Recent Labs   Lab 02/08/22  0255   WBC 12.29   RBC 3.27*   HGB 9.3*   HCT 30.1*         Recent Labs   Lab 02/07/22  0303 02/07/22  0303 02/07/22  1457   *  157*   < > 156*   K 3.7  3.7   < > 3.9   CO2 29  29   < > 29   *  115*   < > 114*   BUN 21  21   < > 20   CREATININE 1.0  1.0   < > 1.2   ALKPHOS 68  --   --    ALT 18  --   --    AST 28  --   --    BILITOT 0.4  --   --     <  > = values in this interval not displayed.      Recent Labs   Lab 02/04/22  2115   INR 1.0   APTT 24.0      Recent Labs   Lab 02/05/22  0846      TROPONINI 19.503*       Electrolytes: Electrolytes replaced  Accuchecks: Q4H    Gtts:   dextrose 10 % in water (D10W)         LDA/Wounds:  Lines/Drains/Airways       Drain                   NG/OG Tube Left nostril -- days    Female External Urinary Catheter 02/06/22 1031 1 day              Arterial Line              Arterial Line 02/05/22 1039 Left Brachial 2 days              Peripheral Intravenous Line                   Peripheral IV - Single Lumen 02/05/22 1649 20 G Left;Posterior Hand 2 days         Peripheral IV - Single Lumen 02/06/22 0645 20 G Anterior;Distal;Left Lower leg 1 day                  Wounds: No  Wound care consulted: No
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
## 2022-02-08 NOTE — PLAN OF CARE
[facility] Care Plan    POC reviewed with Julia Schroeder and family at 1400. Pt's son verbalized understanding. Questions and concerns addressed. No acute events today. Pt progressing toward goals. Will continue to monitor. See below and flowsheets for full assessment and VS info.     -Insulin gtt currently off  -IV fluids discontinued  -Tube feeds started  -Flotrac discontinued  -Free water flushed added  -Phos replaced  -Albumin given x1    Neuro:  McGill Coma Scale  Best Eye Response: 3-->(E3) to speech  Best Motor Response: 6-->(M6) obeys commands  Best Verbal Response: 4-->(V4) confused  McGill Coma Scale Score: 13  Assessment Qualifiers: patient not sedated/intubated  Pupil PERRLA: yes     24 hr Temp:  [97.4 °F (36.3 °C)-99.4 °F (37.4 °C)]     CV:   Rhythm: sinus tachycardia  BP goals:   SBP < 160  MAP > 65    Resp:   O2 Device (Oxygen Therapy): room air  Oxygen Concentration (%): 24    Plan: N/A    GI/:     Diet/Nutrition Received: tube feeding  Last Bowel Movement: 02/07/22  Voiding Characteristics: due to void,external catheter    Intake/Output Summary (Last 24 hours) at 2/7/2022 1842  Last data filed at 2/7/2022 1800  Gross per 24 hour   Intake 1265.63 ml   Output 200 ml   Net 1065.63 ml     Unmeasured Output  Urine Occurrence: 1  Stool Occurrence: 0  Pad Count: 2    Labs/Accuchecks:  Recent Labs   Lab 02/07/22  0303   WBC 13.74*   RBC 3.26*   HGB 9.7*   HCT 30.1*         Recent Labs   Lab 02/07/22  0303 02/07/22  0303 02/07/22  1457   *  157*   < > 156*   K 3.7  3.7   < > 3.9   CO2 29  29   < > 29   *  115*   < > 114*   BUN 21  21   < > 20   CREATININE 1.0  1.0   < > 1.2   ALKPHOS 68  --   --    ALT 18  --   --    AST 28  --   --    BILITOT 0.4  --   --     < > = values in this interval not displayed.      Recent Labs   Lab 02/04/22  2115   INR 1.0   APTT 24.0      Recent Labs   Lab 02/05/22  0846      TROPONINI 19.503*       Electrolytes: Electrolytes replaced  Accuchecks:  Q4H    Gtts:   dextrose 10 % in water (D10W)         LDA/Wounds:  Lines/Drains/Airways       Drain                   NG/OG Tube Left nostril -- days    Female External Urinary Catheter 02/06/22 1031 1 day              Arterial Line              Arterial Line 02/05/22 1039 Left Brachial 2 days              Peripheral Intravenous Line                   Peripheral IV - Single Lumen 02/05/22 1649 20 G Left;Posterior Hand 2 days         Peripheral IV - Single Lumen 02/06/22 0645 20 G Anterior;Distal;Left Lower leg 1 day                  Wounds: No  Wound care consulted: No
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
## 2022-02-08 NOTE — PT/OT/SLP PROGRESS
"Speech Language Pathology Treatment    Patient Name:  Julia Schroeder   MRN:  [id number]  Admitting Diagnosis: <principal problem not specified>    Recommendations:                 General Recommendations:  Dysphagia therapy, Speech language evaluation and Cognitive-linguistic evaluation  Diet recommendations:  NPO, Liquid Diet Level: NPO   Aspiration Precautions: Strict aspiration precautions   General Precautions: Standard, aspiration,NPO  Communication strategies:  yes/no questions only, provide increased time to answer and go to room if call light pushed    Subjective     "I'm trying to wake up"  Patient goals: did not state    Pain/Comfort:  · Pain Rating 1: 0/10  · Pain Rating Post-Intervention 1: 0/10    Respiratory Status: Room air    Objective:     Has the patient been evaluated by SLP for swallowing?   Yes  Keep patient NPO? Yes   Current Respiratory Status:        Pt seen bedside with no family present and cleared for session by nursing. Pt asleep but did rouse with cues; however, pt kept her eyes closed except for brief periods. Pt with low volume but decreased wetness noted. Pt opened mouth when ice chip placed to her lips but poor oral acceptance. Once placed in oral cavity, pt attempting to chew the ice with anterior bolus loss noted. Pt with no swallow response generated on either trial. Oral suctioning provided. Pt more lethargic at end of session with cues needed to stay awake. Pt was oriented to person and place only. She did not attempt to name objects but responded to simple personal questions with 60% acc. Education provided to pt re: role of slp and need to remain npo. Pt will need ongoing education. White board updated.       Assessment:     Julia Schroeder is a 76 y.o. female with an SLP diagnosis of Dysphagia.  She presents with lethargy.    Goals:   Multidisciplinary Problems     SLP Goals        Problem: SLP Goal    Goal Priority Disciplines Outcome   SLP Goal     SLP    Description: Goals to be " met 2/12  1 Pt will participate in ongoing swallow eval  2 pt will participate in sle                    Plan:     · Patient to be seen:  4 x/week   · Plan of Care expires:  03/07/22  · Plan of Care reviewed with:  patient,son   · SLP Follow-Up:  Yes       Discharge recommendations:  nursing facility, skilled       Time Tracking:     SLP Treatment Date:   02/08/22  Speech Start Time:  0935  Speech Stop Time:  0951     Speech Total Time (min):  16 min    Billable Minutes: Treatment Swallowing Dysfunction 8 and Self Care/Home Management Training 8    02/08/2022

## 2022-02-08 NOTE — ASSESSMENT & PLAN NOTE
Found to be in DKA  Beta hydroxy 5.5(2/5)-->5.0(2/6)   Glucose today 222   Insulin gtt discontinued, see DM for additional details

## 2022-02-08 NOTE — PT/OT/SLP PROGRESS
Physical Therapy Treatment    Patient Name:  Julia Schroeder   MRN:  [id number]    Recommendations:     Discharge Recommendations:  nursing facility, skilled   Discharge Equipment Recommendations: other (see comments) (TBD (pending progress))   Barriers to discharge: Inaccessible home, Decreased caregiver support and support 4 SAL and requres assist for mobility    Assessment:     Julia Schroeder is a 76 y.o. female admitted with a medical diagnosis of <principal problem not specified>.  She presents with the following impairments/functional limitations:  weakness,impaired endurance,impaired self care skills,impaired functional mobilty,gait instability,impaired balance,decreased safety awareness,decreased lower extremity function,decreased upper extremity function,impaired coordination,impaired fine motor,decreased coordination,decreased ROM. Pt continues to present with L UE/LE flaccidity and L neglect. Patient was lethargic, but responsive to stimuli on R side and able to follow simple commands on R side. RN notified therapist prior to session to be aware of low BP and high HR. PTA continued to monitor throughout session; BP and HR remained consistent throughout positioning.    Rehab Prognosis: Fair; patient would benefit from acute skilled PT services to address these deficits and reach maximum level of function.    Recent Surgery: * No surgery found *      Plan:     During this hospitalization, patient to be seen 4 x/week to address the identified rehab impairments via therapeutic activities,therapeutic exercises,neuromuscular re-education,wheelchair management/training,gait training and progress toward the following goals:    · Plan of Care Expires:  03/08/22    Subjective     Chief Complaint: no c/o  Patient/Family Comments/goals:   Pain/Comfort:  ·  Unable to rate to AMS      Objective:     Communicated with RN prior to session.  Patient found supine with HOB elevated with arterial line,bed alarm,blood pressure  cuff,NG tube,PureWick,SCD,restraints,pulse ox (continuous),telemetry upon PT entry to room.     General Precautions: Standard, aspiration,NPO   Orthopedic Precautions:N/A   Braces: N/A  Respiratory Status: Nasal cannula, flow 2 L/min     Functional Mobility:  · Bed Mobility:     · Rolling Left:  total assistance with use of bedsheet      AM-PAC 6 CLICK MOBILITY  Turning over in bed (including adjusting bedclothes, sheets and blankets)?: 2  Sitting down on and standing up from a chair with arms (e.g., wheelchair, bedside commode, etc.): 1  Moving from lying on back to sitting on the side of the bed?: 1  Moving to and from a bed to a chair (including a wheelchair)?: 1  Need to walk in hospital room?: 1  Climbing 3-5 steps with a railing?: 1  Basic Mobility Total Score: 7       Therapeutic Activities and Exercises:  Bed adjusted to chair position d/t HR and BP values.  L LE therex: PROM x10, AP, LAQs, Hip flex, Hip abd  -Pt responsive to commands for left side with action on the contralateral side. Unable to initiate movement on left side.  Followed simple commands to orient head to center; eye opening to verbal command only.      Patient left with bed in chair position with all lines intact, call button in reach, bed alarm on, RN notified and RN and lois present..    GOALS:   Multidisciplinary Problems     Physical Therapy Goals        Problem: Physical Therapy Goal    Goal Priority Disciplines Outcome Goal Variances Interventions   Physical Therapy Goal     PT, PT/OT Ongoing, Progressing     Description: PT goals until 2/20/22    1. Pt supine to sit with moderate assist-not met  2. Pt sit to supine with moderate assist-not met  3. Pt sit to stand with LRAD as needed for safety with max assist-not met  4. Pt to perform gait 2 steps with LRAD as needed for safety with max assist.-not met  5. Pt to transfer bed to/from bedside chair with LRAD for safety with max assist.-not met  6. Pt to perform B LE exs in sitting or  supine x 15 reps to strengthen B LE to improve functional mobility.-not met  7. Pt to propel w/c 10ft with R UE/LE on level surface with minimal assist-not met  8. Pt to be able to sit on the EOB 10 min with CGA to perform functional activities-not met                       Time Tracking:     PT Received On: 02/08/22  PT Start Time: 0817     PT Stop Time: 0846  PT Total Time (min): 29 min     Billable Minutes: Therapeutic Activity 19 and Therapeutic Exercise 10    Treatment Type: Treatment  PT/PTA: PTA     PTA Visit Number: 1     02/08/2022

## 2022-02-09 PROBLEM — E87.0 HYPERNATREMIA: Status: ACTIVE | Noted: 2022-01-01

## 2022-02-09 PROBLEM — I69.391 DYSPHAGIA DUE TO RECENT CEREBRAL INFARCTION: Status: ACTIVE | Noted: 2022-01-01

## 2022-02-09 PROBLEM — R13.10 DYSPHAGIA: Status: ACTIVE | Noted: 2022-01-01
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
## 2022-02-09 NOTE — PT/OT/SLP PROGRESS
Physical Therapy      Patient Name:  Julia Schroeder   MRN:  [id number]    Patient not seen today secondary to  (pt. worked with OT in AM and PT unable to return in PM). Will follow-up tomorrow.    Juan Antonio Fraire, PT  2/9/2022
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
## 2022-02-09 NOTE — ASSESSMENT & PLAN NOTE
Stroke RF  A1c 8.3 on 1/7/2022  Insulin gtt d/c   Currently on insulin aspart 3U q4h and detemir 8U BID

## 2022-02-09 NOTE — ASSESSMENT & PLAN NOTE
- Failed formal SLP eval, NG tube in place  - Discussed with son at bedside, notably reviewed w/ son that if swallowing does not improve over next several days, patient likely to need PEG. Further discussed that while she may eventually improve to point of not needing PEG (at which point could be removed), given patient's age may require PEG long-term. Encouraged son to discuss w/ family whether this is something patient would want for herself.

## 2022-02-09 NOTE — ASSESSMENT & PLAN NOTE
- C/w home donepezil 10 mg qday, memantine 10 mg qday   - Patient's level of alertness fluctuating more over last 24 hrs per son at bedside, suspect component of hypoactive delirium, pt high risk  - Delirium precautions, no neuro checks overnight

## 2022-02-09 NOTE — PT/OT/SLP PROGRESS
Occupational Therapy   Treatment    Name: Julia Schroeder  MRN: [id number]  Admitting Diagnosis:  Stroke due to occlusion of right carotid artery       Recommendations:     Discharge Recommendations: nursing facility, skilled  Discharge Equipment Recommendations:  other (see comments) (TBD Pending progress)  Barriers to discharge:  Other (Comment) (increased assist required)    Assessment:     Julia Schroeder is a 76 y.o. female with a medical diagnosis of Stroke due to occlusion of right carotid artery.  She presents with closed eyes and hard to arouse. Performance deficits affecting function are weakness,impaired endurance,impaired self care skills,impaired functional mobilty,gait instability,impaired balance,decreased upper extremity function,decreased lower extremity function,decreased ROM,decreased coordination,impaired fine motor,decreased safety awareness.   Patient would benefit from continued skilled acute OT 3x/wk to improve functional mobility, increase independence with ADLs, and address established goals. Recommending SNF  once medically appropriate for discharge to increase maximal independence, reduce burden of care, and ensure safety. Pt had eyes closed for 90% of the session. Pt was able to arouse when sitting EOB and speaking to son in the room or to her niece on an iphone.    Rehab Prognosis:  Good; patient would benefit from acute skilled OT services to address these deficits and reach maximum level of function.       Plan:     Patient to be seen 3 x/week to address the above listed problems via self-care/home management,therapeutic activities,neuromuscular re-education  · Plan of Care Expires: 03/09/22  · Plan of Care Reviewed with: patient,son    Subjective   Pt verbalized her name when asked.   Pain/Comfort:  · Pain Rating 1: 0/10  · Pain Rating Post-Intervention 1: 0/10  · Pain Rating Post-Intervention 2: 0/10    Objective:     Communicated with: nurse prior to session.  Patient found HOB elevated  with arterial line,bed alarm,blood pressure cuff,NG tube,SCD,restraints,pulse ox (continuous),telemetry upon OT entry to room.    General Precautions: Standard, aspiration,NPO   Orthopedic Precautions:N/A   Braces: N/A  Respiratory Status: Nasal cannula, flow 2 L/min     Occupational Performance:     Bed Mobility:    · Patient completed Rolling/Turning to Right with maximal assistance  · Patient completed Supine to Sit with maximal assistance  · Patient completed Sit to Supine with maximal assistance   · Pt had right side preference and needed Max verbal and tactile cues to maintain midline balance and keep head upright sitting EOB. Pt tolerated 9 minutes prior to resuming supine HOB elevated to 30 degrees secondary to feeding tube.    Functional Mobility/Transfers:  · Pt unable to perform at this treatment.      Activities of Daily Living:  · Pt did not perform ADL's at this treatment.      [facility] 6 Click ADL: 6    Treatment & Education: Role of OT and POC  Safety  ADL retraining  Functional mobility training  Discharge planning  Importance of EOB/OOB activity        Patient left HOB elevated with all lines intact, restraints replaced, call button in reach, bed alarm on, nurse notified and son presentEducation:      GOALS:   Multidisciplinary Problems     Occupational Therapy Goals        Problem: Occupational Therapy Goal    Goal Priority Disciplines Outcome Interventions   Occupational Therapy Goal     OT, PT/OT Ongoing, Progressing    Description: Goals set on 2/7, with expiration date 2/21:  Patient will increase functional independence with ADLs by performing:    Bed mobility with Mod A  Grooming while seated EOB with Mod A  Patient will tolerate EOB sitting for ~10 minutes with Mod A.  Toileting from bedside commode with Mod A for hygiene and clothing management.   Patient will complete sit>Stand transfers with Mod A using AD as needed.  Patient will complete stand pivot transfers with Mod A using AD as  needed.  Pt will demonstrate understanding of education provided regarding energy conservation and task modification through teach-back method.                     Time Tracking:     OT Date of Treatment: 02/09/22  OT Start Time: 0909  OT Stop Time: 0933  OT Total Time (min): 24 min    Billable Minutes:Therapeutic Activity 24    OT/DEN: OT          2/9/2022
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
## 2022-02-09 NOTE — PLAN OF CARE
[facility] Care Plan    POC reviewed with Julia Schroeder and family at 1400. Pt's niece and son verbalized understanding. Questions and concerns addressed. No acute events today. Pt progressing toward goals. Will continue to monitor. See below and flowsheets for full assessment and VS info.     -Schedule insulin added  -Enteral water flushes increased to 500cc Q6  -Phos and potassium replaced    Neuro:  Marshall Coma Scale  Best Eye Response: 3-->(E3) to speech  Best Motor Response: 6-->(M6) obeys commands  Best Verbal Response: 4-->(V4) confused  [place] Coma Scale Score: 13  Assessment Qualifiers: patient not sedated/intubated  Pupil PERRLA: yes     24 hr Temp:  [98.3 °F (36.8 °C)-99.4 °F (37.4 °C)]     CV:   Rhythm: sinus tachycardia  BP goals:   SBP < 160  MAP > 65    Resp:   O2 Device (Oxygen Therapy): room air  Oxygen Concentration (%): 24    Plan: N/A    GI/:     Diet/Nutrition Received: tube feeding  Last Bowel Movement: 02/08/22  Voiding Characteristics: external catheter    Intake/Output Summary (Last 24 hours) at 2/8/2022 1826  Last data filed at 2/8/2022 1802  Gross per 24 hour   Intake 2995 ml   Output 1750 ml   Net 1245 ml     Unmeasured Output  Urine Occurrence: 1  Stool Occurrence: 0  Pad Count: 1    Labs/Accuchecks:  Recent Labs   Lab 02/08/22  0255   WBC 12.29   RBC 3.27*   HGB 9.3*   HCT 30.1*         Recent Labs   Lab 02/08/22  0255 02/08/22  1027 02/08/22  1528   *  --  157*   K 3.5   < > 3.8   CO2 27  --  32*   *  --  115*   BUN 30*  --  32*   CREATININE 1.3  --  1.2   ALKPHOS 194*  --   --    ALT 44  --   --    AST 89*  --   --    BILITOT 0.5  --   --     < > = values in this interval not displayed.      Recent Labs   Lab 02/04/22  2115   INR 1.0   APTT 24.0      Recent Labs   Lab 02/05/22  0846      TROPONINI 19.503*       Electrolytes: Electrolytes replaced  Accuchecks: Q4H    Gtts:   dextrose 10 % in water (D10W)         LDA/Wounds:  Lines/Drains/Airways       Drain                    NG/OG Tube Left nostril -- days    Female External Urinary Catheter 02/06/22 1031 2 days              Arterial Line              Arterial Line 02/05/22 1039 Left Brachial 3 days              Peripheral Intravenous Line                   Peripheral IV - Single Lumen 02/05/22 1649 20 G Left;Posterior Hand 3 days         Peripheral IV - Single Lumen 02/06/22 0645 20 G Anterior;Distal;Left Lower leg 2 days         Peripheral IV - Single Lumen 02/08/22 1438 20 G Anterior;Left Forearm <1 day                  Wounds: No  Wound care consulted: No

## 2022-02-09 NOTE — ASSESSMENT & PLAN NOTE
2/2 stroke  SLP consulted  Patient NPO at the moment with NGT in place  Initiation of discussion for possible need for PEG held by NCC and family

## 2022-02-09 NOTE — SUBJECTIVE & OBJECTIVE
Neurologic Chief Complaint: AMS, LSW    Subjective:     Interval History: Patient is seen for follow-up neurological assessment and treatment recommendations: Neuro exam stable. Continues with L hemiparesis, R gaze. Overnight, desatted to mids 80s, would improve to  low 90s and then desat again. Patient was placed on 1L NC with improvement in oxygen to %. Tachy to 110s, afebrile, and hypotensive. Denies any pain.     HPI, Past Medical, Family, and Social History remains the same as documented in the initial encounter.     Review of Systems   Constitutional: Negative for chills, diaphoresis and fever.   HENT: Positive for trouble swallowing. Negative for congestion, drooling and rhinorrhea.    Eyes: Negative for redness.   Respiratory: Negative for cough, choking and shortness of breath.    Cardiovascular: Negative for chest pain.   Gastrointestinal: Negative for diarrhea, nausea and vomiting.   Musculoskeletal: Negative for neck pain and neck stiffness.   Skin: Negative for pallor and rash.   Neurological: Positive for speech difficulty, weakness and numbness.   Psychiatric/Behavioral: Positive for decreased concentration. Negative for agitation. The patient is not nervous/anxious.      Scheduled Meds:   aspirin  81 mg Per NG tube Daily    atorvastatin  40 mg Per NG tube Daily    clopidogreL  75 mg Per NG tube Daily    donepeziL  10 mg Per NG tube Daily    heparin (porcine)  5,000 Units Subcutaneous Q8H    insulin aspart U-100  3 Units Subcutaneous Q4H    insulin detemir U-100  8 Units Subcutaneous BID    memantine  10 mg Per NG tube BID    mupirocin   Nasal BID     Continuous Infusions:   dextrose 10 % in water (D10W)       PRN Meds:calcium gluconate IVPB, calcium gluconate IVPB, calcium gluconate IVPB, dextrose 10 % in water (D10W), dextrose 10 % in water (D10W), dextrose 10 % in water (D10W), glucagon (human recombinant), insulin aspart U-100, iodixanoL, magnesium sulfate IVPB, magnesium sulfate  IVPB, potassium bicarbonate, potassium bicarbonate, potassium bicarbonate, potassium, sodium phosphates, potassium, sodium phosphates, potassium, sodium phosphates, sodium chloride 0.9%    Objective:     Vital Signs (Most Recent):  Temp: 97.6 °F (36.4 °C) (02/09/22 0700)  Pulse: (!) 112 (02/09/22 0900)  Resp: (!) 23 (02/09/22 0900)  BP: 91/64 (02/09/22 0900)  SpO2: 100 % (02/09/22 0900)  BP Location: Right arm    Vital Signs Range (Last 24H):  Temp:  [97.6 °F (36.4 °C)-99.3 °F (37.4 °C)]   Pulse:  [106-122]   Resp:  [19-25]   BP: ()/(55-71)   SpO2:  [93 %-100 %]   Arterial Line BP: ()/(51-59)   BP Location: Right arm    Physical Exam  Vitals and nursing note reviewed.   Constitutional:       General: She is not in acute distress.     Appearance: She is not diaphoretic.      Comments: Drowsy,but arousable and able to follow commands on the right side   HENT:      Head: Normocephalic and atraumatic.      Right Ear: External ear normal.      Left Ear: External ear normal.      Nose: Nose normal.      Mouth/Throat:      Mouth: Mucous membranes are moist.   Eyes:      Extraocular Movements: Extraocular movements intact.      Conjunctiva/sclera: Conjunctivae normal.   Cardiovascular:      Rate and Rhythm: Tachycardia present.      Pulses: Normal pulses.   Pulmonary:      Effort: Pulmonary effort is normal. No respiratory distress.   Abdominal:      General: Abdomen is flat. There is no distension.   Musculoskeletal:      Right lower leg: No edema.      Left lower leg: No edema.   Skin:     General: Skin is warm and dry.   Neurological:      Sensory: Sensory deficit present.      Motor: Weakness (L hemiparesis) present.      Comments: L neglect   Psychiatric:         Behavior: Behavior normal.         Neurological Exam:   LOC: drowsy  Attention Span: poor  Language: No aphasia  Articulation: Dysarthria  PERRL  EOM (CN III, IV, VI): Gaze preference  Right   Facial Movement (CN VII): Lower facial weakness on the  Left  Motor: Arm left  Plegia 0/5  Leg left  Plegia 0/5  Arm right antigravity with spontaneous movement  Leg right antigravity with spontaneous movement  Sensation: Phan-hypoesthesia left  Tone: Flaccid LUE    Laboratory:  CMP:   Recent Labs   Lab 02/09/22  0215   CALCIUM 8.8   ALBUMIN 2.9*   PROT 6.1   *   K 3.9   CO2 36*   *   BUN 30*   CREATININE 0.9   ALKPHOS 175*   ALT 32   AST 39   BILITOT 0.4     CBC:   Recent Labs   Lab 02/09/22 0215   WBC 10.80   RBC 3.21*   HGB 9.4*   HCT 30.0*      MCV 94   MCH 29.3   MCHC 31.3*     Lipid Panel: No results for input(s): CHOL, LDLCALC, HDL, TRIG in the last 168 hours.  Coagulation:   Recent Labs   Lab 02/04/22 2115   INR 1.0   APTT 24.0     Platelet Aggregation Study: No results for input(s): PLTAGG, PLTAGINTERP, PLTAGREGLACO, ADPPLTAGGREG in the last 168 hours.  Hgb A1C: No results for input(s): HGBA1C in the last 168 hours.  TSH:   Recent Labs   Lab 02/04/22  1145   TSH 0.196*       Diagnostic Results     No new cerebral imaging to review at this time.     Brain imaging:  MRI Brain WO 2/5/2022    Moderate to large acute right MCA territory infarction with susceptibility associated with the right basal ganglia and insular components compatible with hemorrhagic conversion.  Mass effect without midline shift or hydrocephalus.     Small sized ipsilateral right cerebellar acute infarction with punctate contralateral left hippocampal focus of diffusion restriction concerning for possible superimposed transient global amnesia.    [facility] 2/5/2022  Findings consistent with recent ischemia/infarct involving the right MCA distribution again noted, previously identified hyperdensity involving the basal ganglia and caudate on the right again noted, configuration is stable, degree of density is mildly diminished, there is continued mass effect and mild right to left midline shift appearing stable without evidence for significant interval detrimental  change.    [facility] 2/4/2022 1741  Hyperdensity within the right basal ganglia insular cortex confined to the gray matter and sparing the internal cortex.  Findings likely representing contrast staining from recent angiogram given altered flow dynamics in the infarcted territory.  Hemorrhagic conversion is less likely. 0.2 cm leftward midline shift.    [facility] (2/4/22) 1415: Continued evolution of acute right MCA distribution infarct with developing minimal right-sided mass effect.  No midline shift or evidence for hemorrhagic conversion.       Vessel Imaging:  IR angio 2/4/2022:  Successful aspiration thrombectomy of the distal right internal carotid artery occlusion, with resulting TICI 3 flow.    CTA head/neck (2/4/22): Acute large vessel occlusion with distal right supraclinoid ICA filling defect extending into the anterior cerebral artery and middle cerebral artery branches as detailed above. Evidence of loss of gray-white matter interface of the deep right hemispheric structures consistent with early ischemia/infarction. Calcified plaque of the vertebral arteries, internal carotid arteries and of the common carotid artery bifurcation.     Cardiac Evaluation:   Last TTE (1/31/22): EF 45%, grade II LV diastolic dysfunction, no LA enlargement    Other:  CXR 2/9/2022  Increase in parenchymal and pleural disease, with obscuration of the heart borders due to increased perihilar consolidative opacities.

## 2022-02-09 NOTE — SUBJECTIVE & OBJECTIVE
Interval History:    - Please see above    Review of Systems   Unable to perform ROS: Mental status change     Objective:     Vitals:  Temp: 98.2 °F (36.8 °C)  Pulse: 108  Rhythm: sinus tachycardia  BP: 98/67  MAP (mmHg): 78  Resp: (!) 21  SpO2: 100 %  O2 Device (Oxygen Therapy): nasal cannula    Temp  Min: 97.6 °F (36.4 °C)  Max: 99.3 °F (37.4 °C)  Pulse  Min: 106  Max: 122  BP  Min: 85/56  Max: 101/56  MAP (mmHg)  Min: 66  Max: 80  Resp  Min: 19  Max: 25  SpO2  Min: 93 %  Max: 100 %    02/08 0701 - 02/09 0700  In: 3125   Out: 1700 [Urine:1700]   Unmeasured Output  Urine Occurrence: 1  Stool Occurrence: 0  Pad Count: 1       Physical Exam  General Appearance: Elderly woman resting in bed, appears older than stated age. No hemodynamic distress  Mental Status Exam: Wakens to repeat verbal stimuli, although even when speaking and following commands keeps eyes closed, ? Mild eyelid apraxia. Oriented to name only, not oriented to place, time or situation. Able to follow simple commands on R. Not participating in naming.   Cranial Nerves: Strong R gaze preference, unable to cross midline w/ OCRs. No BTT on L. +L facial droop. +dysarthria  Motor: Moving RUE/RLE spontaneously AG. Weak extensor posturing to noxious in LUE, no movement in LLE  Sensory: Grimaces to noxious x4 extremities  Coordination: Unable to assess  Vascular: S1/S2 of normal intensity, no S3/S4 appreciated, no murmurs appreciated  Lungs: Diminished at bases b/l   Abdomen: Soft, non-distended, non-tender, BS+; +hernia over L abdominal wall     Medications:  Continuousdextrose 10 % in water (D10W)    Scheduledaspirin, 81 mg, Daily  atorvastatin, 40 mg, Daily  clopidogreL, 75 mg, Daily  donepeziL, 10 mg, Daily  heparin (porcine), 5,000 Units, Q8H  insulin aspart U-100, 3 Units, Q4H  insulin detemir U-100, 6 Units, BID  memantine, 10 mg, BID  mupirocin, , BID  sodium chloride 3%, 4 mL, Q8H    PRNcalcium gluconate IVPB, 1 g, PRN  calcium gluconate IVPB, 2 g,  PRN  calcium gluconate IVPB, 3 g, PRN  dextrose 10 % in water (D10W), , PRN  dextrose 10 % in water (D10W), , PRN  dextrose 10 % in water (D10W), , Continuous PRN  glucagon (human recombinant), 1 mg, PRN  insulin aspart U-100, 1-10 Units, Q4H PRN  iodixanoL, 65 mL, ONCE PRN  magnesium sulfate IVPB, 2 g, PRN  magnesium sulfate IVPB, 4 g, PRN  potassium bicarbonate, 35 mEq, PRN  potassium bicarbonate, 50 mEq, PRN  potassium bicarbonate, 60 mEq, PRN  potassium, sodium phosphates, 2 packet, PRN  potassium, sodium phosphates, 2 packet, PRN  potassium, sodium phosphates, 2 packet, PRN  sodium chloride 0.9%, 10 mL, PRN      Today I personally reviewed pertinent medications, imaging, laboratory results, notably: Na downtrending, 161 -> 159. CMP otherwise unremarkable. No leukocytosis, Hb/platelets stable. CXR w/ increased pulmonary congestion.     Diet  Diet NPO  Diet NPO  TFs at goal

## 2022-02-09 NOTE — ASSESSMENT & PLAN NOTE
DKA at admission, now resolved    - Blood glucose 130s this AM, borderline low  - Decrease detemir 8 u BID -> 6 u BID  - C/w aspart 3 u q4hrs, low dose insulin sliding scale

## 2022-02-09 NOTE — ASSESSMENT & PLAN NOTE
Found to be in DKA  Beta hydroxy 5.5(2/5)-->5.0(2/6)   Glucose today 222   Insulin gtt discontinued, patient transitioned to scheduled insulin  See plan under type2 DM

## 2022-02-09 NOTE — NURSING
2130- Pt started to desat to mid 80's. Temporarily would recover and sat's would come up to low 90's but then sat's would slowly start to drop to the mid 80's again. 1L NC applied. Pt sat's back up to 99%. No signs of distress. WCTM.    0330- Pt started c/o of difficulty breathing. Still remains on 1L NC. Sat's 98%. Mouth breathing. No signs of distress. VSS. MD Chance made aware. MD ordered an ABG, CXR, and sputum cx. WCTM

## 2022-02-09 NOTE — ASSESSMENT & PLAN NOTE
Hx of. EF 55% on TTE this admission    - Slight increase in pulmonary congestion/effusions on CXR overnight, likely etiology of patient's SOB  - Lasix 20 mg IV x1, goal -500 cc  - Pt currently sating well on RA, monitor  - Cough assist

## 2022-02-09 NOTE — PLAN OF CARE
[facility] Care Plan    POC reviewed with Julia Schroeder and pt's son, Yair, at 0500. Pt unable to verbalize understanding. Questions and concerns addressed with pt's son at the bedside. Pt progressing toward goals. Will continue to monitor. See below and flowsheets for full assessment and VS info.     - ABG, CXR, respiratory cx collected  - TF continued at goal; pt tolerating well  - 500cc FWF Q6H  - electrolytes replaced per order set  - afebrile; TMAX 99.3        Neuro:  Protection Coma Scale  Best Eye Response: 3-->(E3) to speech  Best Motor Response: 6-->(M6) obeys commands  Best Verbal Response: 4-->(V4) confused  Marshall Coma Scale Score: 13  Assessment Qualifiers: patient not sedated/intubated,no eye obstruction present  Pupil PERRLA: yes     24hr Temp:  [98.3 °F (36.8 °C)-99.3 °F (37.4 °C)]     CV:   Rhythm: sinus tachycardia  BP goals:   SBP < 160  MAP > 65    Resp:   O2 Device (Oxygen Therapy): nasal cannula  Oxygen Concentration (%): 24    Plan: wean oxygen requirements as tolerated    GI/:     Diet/Nutrition Received: NPO,tube feeding  Last Bowel Movement: 02/08/22  Voiding Characteristics: external catheter    Intake/Output Summary (Last 24 hours) at 2/9/2022 0647  Last data filed at 2/9/2022 0505  Gross per 24 hour   Intake 2545 ml   Output 1700 ml   Net 845 ml     Unmeasured Output  Urine Occurrence: 1  Stool Occurrence: 0  Pad Count: 1    Labs/Accuchecks:  Recent Labs   Lab 02/09/22  0215   WBC 10.80   RBC 3.21*   HGB 9.4*   HCT 30.0*         Recent Labs   Lab 02/09/22  0215   *   K 3.9   CO2 36*   *   BUN 30*   CREATININE 0.9   ALKPHOS 175*   ALT 32   AST 39   BILITOT 0.4      Recent Labs   Lab 02/04/22  2115   INR 1.0   APTT 24.0      Recent Labs   Lab 02/05/22  0846      TROPONINI 19.503*       Electrolytes: Electrolytes replaced  Accuchecks: Q4H    Gtts:   dextrose 10 % in water (D10W)         LDA/Wounds:  Lines/Drains/Airways       Drain                   NG/OG Tube Left  nostril -- days    Female External Urinary Catheter 02/06/22 1031 2 days              Arterial Line              Arterial Line 02/05/22 1039 Left Brachial 3 days              Peripheral Intravenous Line                   Peripheral IV - Single Lumen 02/05/22 1649 20 G Left;Posterior Hand 3 days         Peripheral IV - Single Lumen 02/06/22 0645 20 G Anterior;Distal;Left Lower leg 3 days         Peripheral IV - Single Lumen 02/08/22 1438 20 G Anterior;Left Forearm <1 day                  Wounds: No  Wound care consulted: No

## 2022-02-09 NOTE — ASSESSMENT & PLAN NOTE
S/p mechanical thrombectomy w/ TICI 3    - Etiology likely large vessel atherosclerosis  - Neurological exam is unchanged, remains w/ full R MCA syndrome  - C/w ASA/plavix  - C/w atorvastatin 40 mg qhs   - PT/OT following

## 2022-02-09 NOTE — PLAN OF CARE
Problem: Occupational Therapy Goal  Goal: Occupational Therapy Goal  Description: Goals set on 2/7, with expiration date 2/21:  Patient will increase functional independence with ADLs by performing:    Bed mobility with Mod A  Grooming while seated EOB with Mod A  Patient will tolerate EOB sitting for ~10 minutes with Mod A.  Toileting from bedside commode with Mod A for hygiene and clothing management.   Patient will complete sit>Stand transfers with Mod A using AD as needed.  Patient will complete stand pivot transfers with Mod A using AD as needed.  Pt will demonstrate understanding of education provided regarding energy conservation and task modification through teach-back method.    Outcome: Ongoing, Progressing   Pts goals remain appropriate.

## 2022-02-10 PROBLEM — N17.9 AKI (ACUTE KIDNEY INJURY): Status: ACTIVE | Noted: 2022-01-01

## 2022-02-10 NOTE — ACP (ADVANCE CARE PLANNING)
[facility]  I engaged the family (son and cousin) in a conversation about advance care planning and we specifically addressed what the goals of care would be moving forward, in light of the patient's persistent dysphagia.  We did specifically address the patient's likely prognosis, which is fair . Specifically, we discussed that if patient is unable to pass a formal SLP eval by early next week (2/14), patient would need a PEG moving forwards. Discussed that PEG can be reversible in many patients, however did caution that given patient's age, stroke burden and underlying dementia, suspect that in Ms. Mace case PEG most likely to be permanent.  We explored the patient's values and preferences for future care, specifically focusing on what Ms. Mace would deem an acceptable quality of life. We reviewed that if patient continues to fail formal SLP evals, decision would be PEG vs hospice care. Discussed what hospice care would look like for Ms. Schroeder, notably introduced the concept of pleasure feeds, and discussed that hospice could occur either in a facility or at home with support. Discussed that if family chooses hospice, suspect patient's prognosis would be on the order of days to weeks.     Multiple questions answered, family to continue discussions amongst themselves prior to making decision. Will continue [facility] discussions as appropriate moving forwards.     I spent a total of 30 minutes engaging the patient in this advance care planning discussion.    Mi Dennison MD, MPH  Neurocritical Care Physician

## 2022-02-10 NOTE — ASSESSMENT & PLAN NOTE
- NG tube in place  - Failed repeat SLP eval this AM   - Discussed with son at bedside, notably reviewed w/ son that if swallowing does not improve over next several days, patient likely to need PEG. Please see ACP note 2/10 for further details
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
## 2022-02-10 NOTE — PLAN OF CARE
[facility] - Neuro Critical Care  Discharge Reassessment    Primary Care Provider: Devon Lovell MD    Expected Discharge Date: 2/14/2022    Transfer orders to Step down noted.     Per MD note 2/10/22: NG tube in place. Failed repeat SLP eval this AM. Discussed with son at bedside, notably reviewed w/ son that if swallowing does not improve over next several days, patient likely to need PEG. We discussed that if patient is unable to pass a formal SLP eval by early next week (2/14), patient would need a PEG moving forwards. We reviewed that if patient continues to fail formal SLP evals, decision would be PEG vs hospice care.    Patient has no accepting SNF and several denials.     Not medically ready for discharge.     Reassessment (most recent)       Discharge Reassessment - 02/10/22 1050          Discharge Reassessment    Assessment Type Discharge Planning Reassessment     Did the patient's condition or plan change since previous assessment? Yes     Discharge Plan discussed with: Adult children (P)      Communicated ROGER with patient/caregiver Yes (P)      Discharge Plan A Skilled Nursing Facility (P)      Discharge Plan B Hospice/home (P)      DME Needed Upon Discharge  none (P)      Discharge Barriers Identified Nursing Home rejection (P)    Several SNF denials    Why the patient remains in the hospital Requires continued medical care;Placement issues (P)         Post-Acute Status    Post-Acute Authorization -- (P)    SNF; Possibility of Hospice discussed 2day 2/10/22                  Arabella Peace RN Case Manager  [facility]  [phone]

## 2022-02-10 NOTE — ASSESSMENT & PLAN NOTE
Hx of EF 55% on TTE this admission    - S/p lasix 20 mg IV x1 on 2/9 w/ good response, CXR this AM improved  - Start lasix 20 mg PO qday standing   - Pt currently sating well on RA, monitor  - Cough assist

## 2022-02-10 NOTE — ASSESSMENT & PLAN NOTE
Stroke RF  A1c 8.3 on 1/7/2022  Insulin gtt d/c   Currently on insulin aspart 3U q4h and detemir 6U BID

## 2022-02-10 NOTE — ASSESSMENT & PLAN NOTE
- C/w home donepezil 10 mg qday, memantine 10 mg qday   - Patient's level of alertness fluctuating more over last 24 hrs per son at bedside, suspect component of hypoactive delirium, pt high risk  - Delirium precautions, no neuro checks overnight -> mental status improved this AM

## 2022-02-10 NOTE — PT/OT/SLP PROGRESS
"Speech Language Pathology Treatment    Patient Name:  Julia Schroeder   MRN:  [id number]  Admitting Diagnosis: Stroke due to occlusion of right carotid artery    Recommendations:                 General Recommendations:  Dysphagia therapy  Diet recommendations:  NPO, Liquid Diet Level: NPO   Aspiration Precautions: Strict aspiration precautions   General Precautions: Standard, aspiration,NPO  Communication strategies:  yes/no questions only, provide increased time to answer and go to room if call light pushed    Subjective     " Good morning"  Patient goals: uto    Pain/Comfort:  · Pain Rating 1: 0/10  · Pain Rating Post-Intervention 1: 0/10    Respiratory Status: Room air    Objective:     Has the patient been evaluated by SLP for swallowing?   Yes  Keep patient NPO? Yes   Current Respiratory Status:        Pt seen bedside with no family present. Pt asleep with eyes closed. She initially responded to therapist but then fell back asleep. Pt did not respond to ice to her lips. She was unable to maintain alertness for po trials. Pt was able to state her name and  but then did not respond to any further questions. Pt 's son not present for session. Pt remains too lethargic for po trials. Remain npo at this time. White board updated.     Assessment:     Julia Schroeder is a 76 y.o. female with an SLP diagnosis of Dysphagia.  She presents with lethargy.    Goals:   Multidisciplinary Problems     SLP Goals        Problem: SLP Goal    Goal Priority Disciplines Outcome   SLP Goal     SLP    Description: Goals to be met   1 Pt will participate in ongoing swallow eval  2 pt will participate in sle                    Plan:     · Patient to be seen:  4 x/week   · Plan of Care expires:  22  · Plan of Care reviewed with:  patient   · SLP Follow-Up:  Yes       Discharge recommendations:  nursing facility, skilled       Time Tracking:     SLP Treatment Date:   02/10/22  Speech Start Time:  909  Speech Stop Time:  917   "   Speech Total Time (min):  8 min    Billable Minutes: Treatment Swallowing Dysfunction 8    02/10/2022

## 2022-02-10 NOTE — SUBJECTIVE & OBJECTIVE
Neurologic Chief Complaint: AMS, LSW    Subjective:     Interval History: Patient is seen for follow-up neurological assessment and treatment recommendations: Continues with L hemiparesis, R gaze. Hypotensive while I was in the room, 96/53. .  On DAPT and statin.    HPI, Past Medical, Family, and Social History remains the same as documented in the initial encounter.     Review of Systems   Unable to perform ROS: Mental status change   Cardiovascular: Negative for chest pain.   Gastrointestinal: Negative for abdominal pain.     Scheduled Meds:   aspirin  81 mg Per NG tube Daily    atorvastatin  40 mg Per NG tube Daily    clopidogreL  75 mg Per NG tube Daily    donepeziL  10 mg Per NG tube Daily    furosemide  20 mg Per NG tube Daily    heparin (porcine)  5,000 Units Subcutaneous Q8H    insulin aspart U-100  3 Units Subcutaneous Q4H    insulin detemir U-100  6 Units Subcutaneous BID    memantine  10 mg Per NG tube BID    mupirocin   Nasal BID    sodium chloride 3%  4 mL Nebulization Q8H     Continuous Infusions:   dextrose 10 % in water (D10W)       PRN Meds:calcium gluconate IVPB, calcium gluconate IVPB, calcium gluconate IVPB, dextrose 10 % in water (D10W), dextrose 10 % in water (D10W), dextrose 10 % in water (D10W), glucagon (human recombinant), insulin aspart U-100, iodixanoL, magnesium sulfate IVPB, magnesium sulfate IVPB, potassium bicarbonate, potassium bicarbonate, potassium bicarbonate, potassium, sodium phosphates, potassium, sodium phosphates, potassium, sodium phosphates, sodium chloride 0.9%    Objective:     Vital Signs (Most Recent):  Temp: 97.7 °F (36.5 °C) (02/10/22 0700)  Pulse: 101 (02/10/22 0800)  Resp: 20 (02/10/22 0800)  BP: 110/64 (02/10/22 0918)  SpO2: 100 % (02/10/22 0800)  BP Location: Right arm    Vital Signs Range (Last 24H):  Temp:  [97.7 °F (36.5 °C)-100.4 °F (38 °C)]   Pulse:  []   Resp:  [17-26]   BP: ()/(55-71)   SpO2:  [93 %-100 %]   Arterial Line BP:  ()/(47-66)   BP Location: Right arm    Physical Exam  Vitals and nursing note reviewed.   Constitutional:       General: She is not in acute distress.     Appearance: She is not diaphoretic.      Comments: Drowsy,but arousable and able to follow commands on the right side   HENT:      Head: Normocephalic and atraumatic.      Right Ear: External ear normal.      Left Ear: External ear normal.      Nose: Nose normal.      Mouth/Throat:      Mouth: Mucous membranes are moist.   Eyes:      Extraocular Movements: Extraocular movements intact.      Conjunctiva/sclera: Conjunctivae normal.   Cardiovascular:      Rate and Rhythm: Tachycardia present.   Pulmonary:      Effort: Pulmonary effort is normal. No respiratory distress.   Abdominal:      General: Abdomen is flat. There is no distension.   Musculoskeletal:      Right lower leg: No edema.      Left lower leg: No edema.   Skin:     General: Skin is warm and dry.   Neurological:      Sensory: Sensory deficit present.      Motor: Weakness (L hemiparesis) present.      Comments: L neglect   Psychiatric:         Behavior: Behavior normal.         Neurological Exam:   LOC: drowsy  Attention Span: poor  Language: No aphasia  Articulation: Dysarthria  PERRL  EOM (CN III, IV, VI): Gaze preference  Right   Facial Movement (CN VII): Lower facial weakness on the Left  Motor: Arm left  Plegia 0/5  Leg left  Plegia 0/5  Arm right antigravity with spontaneous movement  Leg right antigravity with spontaneous movement  Sensation: Phan-hypoesthesia left  Tone: Flaccid LUE    Laboratory:  CMP:   Recent Labs   Lab 02/10/22  0144   CALCIUM 8.4*   ALBUMIN 2.6*   PROT 5.8*   *   K 3.5   CO2 34*   *   BUN 26*   CREATININE 0.8   ALKPHOS 162*   ALT 25   AST 25   BILITOT 0.4     CBC:   Recent Labs   Lab 02/10/22  0144   WBC 11.87   RBC 3.31*   HGB 9.7*   HCT 31.9*      MCV 96   MCH 29.3   MCHC 30.4*     Lipid Panel: No results for input(s): CHOL, LDLCALC, HDL, TRIG in  the last 168 hours.  Coagulation:   Recent Labs   Lab 02/04/22  2115   INR 1.0   APTT 24.0     Platelet Aggregation Study: No results for input(s): PLTAGG, PLTAGINTERP, PLTAGREGLACO, ADPPLTAGGREG in the last 168 hours.  Hgb A1C: No results for input(s): HGBA1C in the last 168 hours.  TSH:   Recent Labs   Lab 02/04/22  1145   TSH 0.196*       Diagnostic Results     No new cerebral imaging to review at this time.     Brain imaging:  MRI Brain WO 2/5/2022    Moderate to large acute right MCA territory infarction with susceptibility associated with the right basal ganglia and insular components compatible with hemorrhagic conversion.  Mass effect without midline shift or hydrocephalus.     Small sized ipsilateral right cerebellar acute infarction with punctate contralateral left hippocampal focus of diffusion restriction concerning for possible superimposed transient global amnesia.    [facility] 2/5/2022  Findings consistent with recent ischemia/infarct involving the right MCA distribution again noted, previously identified hyperdensity involving the basal ganglia and caudate on the right again noted, configuration is stable, degree of density is mildly diminished, there is continued mass effect and mild right to left midline shift appearing stable without evidence for significant interval detrimental change.    [facility] 2/4/2022 1741  Hyperdensity within the right basal ganglia insular cortex confined to the gray matter and sparing the internal cortex.  Findings likely representing contrast staining from recent angiogram given altered flow dynamics in the infarcted territory.  Hemorrhagic conversion is less likely. 0.2 cm leftward midline shift.    [facility] (2/4/22) 1415: Continued evolution of acute right MCA distribution infarct with developing minimal right-sided mass effect.  No midline shift or evidence for hemorrhagic conversion.       Vessel Imaging:  IR angio 2/4/2022:  Successful aspiration thrombectomy of the distal  right internal carotid artery occlusion, with resulting TICI 3 flow.    CTA head/neck (2/4/22): Acute large vessel occlusion with distal right supraclinoid ICA filling defect extending into the anterior cerebral artery and middle cerebral artery branches as detailed above. Evidence of loss of gray-white matter interface of the deep right hemispheric structures consistent with early ischemia/infarction. Calcified plaque of the vertebral arteries, internal carotid arteries and of the common carotid artery bifurcation.     Cardiac Evaluation:   Last TTE (1/31/22): EF 45%, grade II LV diastolic dysfunction, no LA enlargement    Other:  CXR 2/9/2022  Increase in parenchymal and pleural disease, with obscuration of the heart borders due to increased perihilar consolidative opacities.

## 2022-02-10 NOTE — ASSESSMENT & PLAN NOTE
Found to be in DKA during admission  Beta hydroxy 5.5(2/5)-->5.0(2/6)   Glucose today 222   Insulin gtt discontinued, patient transitioned to scheduled insulin  See plan under type2 DM  Resolved

## 2022-02-10 NOTE — PT/OT/SLP PROGRESS
Physical Therapy Treatment    Patient Name:  Julia Schroeder   MRN:  [id number]    Recommendations:     Discharge Recommendations:  nursing facility, skilled   Discharge Equipment Recommendations:  (TBD)   Barriers to discharge: None    Assessment:     Julia Schroeder is a 76 y.o. female admitted with a medical diagnosis of Stroke due to occlusion of right carotid artery.  She presents with the following impairments/functional limitations:  weakness,impaired endurance,impaired self care skills,impaired functional mobilty,gait instability,impaired balance,decreased safety awareness,decreased upper extremity function,decreased lower extremity function Pt. cooperative and tolerated treatment fairly well, but could not keep eyes open or head up during session.    Rehab Prognosis: Fair; patient would benefit from acute skilled PT services to address these deficits and reach maximum level of function.    Recent Surgery: * No surgery found *      Plan:     During this hospitalization, patient to be seen 4 x/week to address the identified rehab impairments via gait training,therapeutic activities,therapeutic exercises,neuromuscular re-education and progress toward the following goals:    · Plan of Care Expires:  03/08/22    Subjective     Chief Complaint: NAD  Patient/Family Comments/goals: pt. Agreeable to PT  Pain/Comfort:  · Pain Rating 1: 0/10  · Pain Rating Post-Intervention 1: 0/10      Objective:     Communicated with nursing prior to session.  Patient found supine with bed alarm,restraints,pulse ox (continuous),telemetry,NG tube,peripheral IV,pressure relief boots,SCD,PureWick upon PT entry to room.     General Precautions: Standard, aspiration,fall   Orthopedic Precautions:N/A   Braces: N/A  Respiratory Status: Room air     Functional Mobility:  · Bed Mobility:     · Rolling Right: maximal assistance  · Scooting: total assistance  · Supine to Sit: total assistance  · Sit to Supine: total assistance  · Balance: Max-Total  assist for balance and head support      AM-PAC 6 CLICK MOBILITY  Turning over in bed (including adjusting bedclothes, sheets and blankets)?: 2  Sitting down on and standing up from a chair with arms (e.g., wheelchair, bedside commode, etc.): 1  Moving from lying on back to sitting on the side of the bed?: 1  Moving to and from a bed to a chair (including a wheelchair)?: 1  Need to walk in hospital room?: 1  Climbing 3-5 steps with a railing?: 1  Basic Mobility Total Score: 7       Therapeutic Activities and Exercises:   Pt. performed sitting balance/tolerance on EOB with Max-Total A for ~8 min.    Patient left supine with all lines intact and call button in reach..    GOALS:   Multidisciplinary Problems     Physical Therapy Goals        Problem: Physical Therapy Goal    Goal Priority Disciplines Outcome Goal Variances Interventions   Physical Therapy Goal     PT, PT/OT Ongoing, Progressing     Description: PT goals until 2/20/22    1. Pt supine to sit with moderate assist-not met  2. Pt sit to supine with moderate assist-not met  3. Pt sit to stand with LRAD as needed for safety with max assist-not met  4. Pt to perform gait 2 steps with LRAD as needed for safety with max assist.-not met  5. Pt to transfer bed to/from bedside chair with LRAD for safety with max assist.-not met  6. Pt to perform B LE exs in sitting or supine x 15 reps to strengthen B LE to improve functional mobility.-not met  7. Pt to propel w/c 10ft with R UE/LE on level surface with minimal assist-not met  8. Pt to be able to sit on the EOB 10 min with CGA to perform functional activities-not met                       Time Tracking:     PT Received On: 02/10/22  PT Start Time: 1402     PT Stop Time: 1418  PT Total Time (min): 16 min     Billable Minutes: Therapeutic Activity 16    Treatment Type: Treatment  PT/PTA: PT     PTA Visit Number: 0     02/10/2022

## 2022-02-10 NOTE — PROGRESS NOTES
[facility]  Neurocritical Care  Progress Note    Admit Date: 2/4/2022  Service Date: 02/10/2022  Length of Stay: 6    Subjective:     Chief Complaint: Stroke due to occlusion of right carotid artery    History of Present Illness: Per ED evaluation:  Patient presents to the emergency department with symptoms concerning for acute CVA.  Last known normal per family was 2:00 a.m.; patient presented outside of the tPA window; tele stroke was activated immediately on arrival; neurologist suspects large vessel occlusion and recommend stat transfer for possible neuro intervention.  Neurology advised CTA head neck prior to transfer; CTA head and neck performed and pending at the time of transfer; numerous labs pending at time of transfer; rectal aspirin given    Patient presented initially to [facility] emergency department with L-sided weakness, L-sided facial droop, confusion, she arrives to the neuro ICU status post thrombectomy for right ICA occlusion. CTA shows acute large vessel occlusion with distal right supraclinoid ICA filling defect extending into the anterior cerebral artery and middle cerebral artery branches.  Patient was recently admitted to outside hospital with NSTEMI (days ago), at which time she was evaluated by cardiology and cardiac catheterization was performed showing severe multi-vessel disease. Medical management was decided upon by Cardiology and family at that time due to comorbidities.  Patient had been started on aspirin, Plavix, statin      Hospital Course: 02/05/2022 metabolic acidosis, tachypnea. Place arterial line.   02/06/2022 likely euglycemi DKA associated with SGLT2 inhibitors  02/07/2022 NAEO, DKA resolved, starting TF  02/08/2022 NAEO. Starting scheduled insuline. Increase water flushes    02/09/2022 C/o SOB overnight, repeat ECG unremarkable, ABG w/ metabolic alkalosis, no respiratory component. Sputum cx sent, however pt afebrile, no leukocytosis. CXR w/ slight  increase in b/l effusions -> given lasix 20 mg IV x1. Sating well on RA this AM. FWF decreased from 500 cc q6hrs -> 300 cc q6hrs given suspicion for hypervolemic hypernatremia  02/10/2022 Slept better last night s/p starting delirium precautions, more awake this AM per son at bedside. Sating well on RA, CXR w/ improvement in b/l effusions. Low grade temp of 100.4, no true fevers, no leukocytosis.       Interval History:  Please see hospital course above     Review of Systems   Unable to perform ROS: Dementia     Objective:     Vitals:  Temp: 97.7 °F (36.5 °C)  Pulse: 101  Rhythm: (P) sinus tachycardia  BP: 110/64  MAP (mmHg): 79  Resp: 20  SpO2: 100 %  Oxygen Concentration (%): 24  O2 Device (Oxygen Therapy): nasal cannula    Temp  Min: 97.7 °F (36.5 °C)  Max: 100.4 °F (38 °C)  Pulse  Min: 96  Max: 110  BP  Min: 88/58  Max: 110/64  MAP (mmHg)  Min: 67  Max: 83  Resp  Min: 17  Max: 26  SpO2  Min: 93 %  Max: 100 %  Oxygen Concentration (%)  Min: 24  Max: 24    02/09 0701 - 02/10 0700  In: 820   Out: 1550 [Urine:1550]   Unmeasured Output  Urine Occurrence: 1  Stool Occurrence: 0  Pad Count: 1       Physical Exam  General Appearance: Elderly woman resting in bed, appears older than stated age. No hemodynamic distress  Mental Status Exam: Wakens to verbal stimuli, much more alert today. Oriented to name and place only, not oriented to time or situation (baseline per son at bedside). Able to follow simple commands on R. Not participating in naming.   Cranial Nerves: Strong R gaze preference, unable to cross midline w/ OCRs. No BTT on L. +L facial droop. +dysarthria  Motor: Moving RUE/RLE spontaneously AG. Weak extensor posturing to noxious in LUE, no movement in LLE  Sensory: Grimaces to noxious x4 extremities  Coordination: Unable to assess  Vascular: S1/S2 of normal intensity, no S3/S4 appreciated, no murmurs appreciated  Lungs: Diminished at bases b/l   Abdomen: Soft, non-distended, non-tender, BS+; +hernia over L  abdominal wall     Medications:  Continuousdextrose 10 % in water (D10W)    Scheduledaspirin, 81 mg, Daily  atorvastatin, 40 mg, Daily  clopidogreL, 75 mg, Daily  donepeziL, 10 mg, Daily  furosemide, 20 mg, Daily  heparin (porcine), 5,000 Units, Q8H  insulin aspart U-100, 3 Units, Q4H  insulin detemir U-100, 6 Units, BID  memantine, 10 mg, BID  mupirocin, , BID  sodium chloride 3%, 4 mL, Q8H    PRNcalcium gluconate IVPB, 1 g, PRN  calcium gluconate IVPB, 2 g, PRN  calcium gluconate IVPB, 3 g, PRN  dextrose 10 % in water (D10W), , PRN  dextrose 10 % in water (D10W), , PRN  dextrose 10 % in water (D10W), , Continuous PRN  glucagon (human recombinant), 1 mg, PRN  insulin aspart U-100, 1-10 Units, Q4H PRN  iodixanoL, 65 mL, ONCE PRN  magnesium sulfate IVPB, 2 g, PRN  magnesium sulfate IVPB, 4 g, PRN  potassium bicarbonate, 35 mEq, PRN  potassium bicarbonate, 50 mEq, PRN  potassium bicarbonate, 60 mEq, PRN  potassium, sodium phosphates, 2 packet, PRN  potassium, sodium phosphates, 2 packet, PRN  potassium, sodium phosphates, 2 packet, PRN  sodium chloride 0.9%, 10 mL, PRN      Today I personally reviewed pertinent imaging, laboratory results, notably: Na downtrending to 153. Creatinine 0.8, JOSÉ resolved. CBC stable. CXR w/ improvement in b/l pleural effusions    Diet  Diet NPO  Diet NPO  TFs at goal       Assessment/Plan:     Neuro  * Stroke due to occlusion of right carotid artery  S/p mechanical thrombectomy w/ TICI 3    - Etiology likely large vessel atherosclerosis  - Neurological exam is unchanged, remains w/ full R MCA syndrome  - C/w ASA/plavix  - C/w atorvastatin 40 mg qhs   - PT/OT following    Dementia without behavioral disturbance  - C/w home donepezil 10 mg qday, memantine 10 mg qday   - Patient's level of alertness fluctuating more over last 24 hrs per son at bedside, suspect component of hypoactive delirium, pt high risk  - Delirium precautions, no neuro checks overnight -> mental status improved this AM      Ophtho  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, without long-term current use of insulin  DKA at admission, now resolved    - Blood glucose well controlled over last 24 hrs  - C/w detemir 6 u BID   - C/w aspart 3 u q4hrs, low dose insulin sliding scale    Cardiac/Vascular  CHF (congestive heart failure)  Hx of EF 55% on TTE this admission    - S/p lasix 20 mg IV x1 on 2/9 w/ good response, CXR this AM improved  - Start lasix 20 mg PO qday standing   - Pt currently sating well on RA, monitor  - Cough assist      Renal/  JOSÉ (acute kidney injury)  Unknown baseline    - Creatinine peaked at 1.3, down to 0.8 this AM  - Resolved     Hypernatremia  Suspect hypervolemic hypernatremia, improved w/ lasix     - Downtrending this AM, 161 -> 159 -> 153  - C/w lasix 20 mg PO qday  - Decrease  cc q6hrs -> 300 cc q8hrs  - Trend BID    GI  Dysphagia    - NG tube in place  - Failed repeat SLP eval this AM   - Discussed with son at bedside, notably reviewed w/ son that if swallowing does not improve over next several days, patient likely to need PEG. Please see ACP note 2/10 for further details          The patient is being Prophylaxed for:  Venous Thromboembolism with: Mechanical or Chemical  Stress Ulcer with: Not Applicable   Ventilator Pneumonia with: not applicable    Activity Orders          Turn patient starting at 02/07 1119    Progressive Mobility Protocol (mobilize patient to their highest level of functioning at least twice daily) starting at 02/04 2000    Elevate HOB starting at 02/04 1521    Diet NPO: NPO starting at 02/04 1521        DNI/DNR    Stable for transfer to floor    Level III visit    Mi Dennison MD  Neurocritical Care  [facility]

## 2022-02-10 NOTE — SUBJECTIVE & OBJECTIVE
Interval History:  Please see hospital course above     Review of Systems   Unable to perform ROS: Dementia     Objective:     Vitals:  Temp: 97.7 °F (36.5 °C)  Pulse: 101  Rhythm: (P) sinus tachycardia  BP: 110/64  MAP (mmHg): 79  Resp: 20  SpO2: 100 %  Oxygen Concentration (%): 24  O2 Device (Oxygen Therapy): nasal cannula    Temp  Min: 97.7 °F (36.5 °C)  Max: 100.4 °F (38 °C)  Pulse  Min: 96  Max: 110  BP  Min: 88/58  Max: 110/64  MAP (mmHg)  Min: 67  Max: 83  Resp  Min: 17  Max: 26  SpO2  Min: 93 %  Max: 100 %  Oxygen Concentration (%)  Min: 24  Max: 24    02/09 0701 - 02/10 0700  In: 820   Out: 1550 [Urine:1550]   Unmeasured Output  Urine Occurrence: 1  Stool Occurrence: 0  Pad Count: 1       Physical Exam  General Appearance: Elderly woman resting in bed, appears older than stated age. No hemodynamic distress  Mental Status Exam: Wakens to verbal stimuli, much more alert today. Oriented to name and place only, not oriented to time or situation (baseline per son at bedside). Able to follow simple commands on R. Not participating in naming.   Cranial Nerves: Strong R gaze preference, unable to cross midline w/ OCRs. No BTT on L. +L facial droop. +dysarthria  Motor: Moving RUE/RLE spontaneously AG. Weak extensor posturing to noxious in LUE, no movement in LLE  Sensory: Grimaces to noxious x4 extremities  Coordination: Unable to assess  Vascular: S1/S2 of normal intensity, no S3/S4 appreciated, no murmurs appreciated  Lungs: Diminished at bases b/l   Abdomen: Soft, non-distended, non-tender, BS+; +hernia over L abdominal wall     Medications:  Continuousdextrose 10 % in water (D10W)    Scheduledaspirin, 81 mg, Daily  atorvastatin, 40 mg, Daily  clopidogreL, 75 mg, Daily  donepeziL, 10 mg, Daily  furosemide, 20 mg, Daily  heparin (porcine), 5,000 Units, Q8H  insulin aspart U-100, 3 Units, Q4H  insulin detemir U-100, 6 Units, BID  memantine, 10 mg, BID  mupirocin, , BID  sodium chloride 3%, 4 mL, Q8H    PRNcalcium  gluconate IVPB, 1 g, PRN  calcium gluconate IVPB, 2 g, PRN  calcium gluconate IVPB, 3 g, PRN  dextrose 10 % in water (D10W), , PRN  dextrose 10 % in water (D10W), , PRN  dextrose 10 % in water (D10W), , Continuous PRN  glucagon (human recombinant), 1 mg, PRN  insulin aspart U-100, 1-10 Units, Q4H PRN  iodixanoL, 65 mL, ONCE PRN  magnesium sulfate IVPB, 2 g, PRN  magnesium sulfate IVPB, 4 g, PRN  potassium bicarbonate, 35 mEq, PRN  potassium bicarbonate, 50 mEq, PRN  potassium bicarbonate, 60 mEq, PRN  potassium, sodium phosphates, 2 packet, PRN  potassium, sodium phosphates, 2 packet, PRN  potassium, sodium phosphates, 2 packet, PRN  sodium chloride 0.9%, 10 mL, PRN      Today I personally reviewed pertinent imaging, laboratory results, notably: Na downtrending to 153. Creatinine 0.8, JOSÉ resolved. CBC stable. CXR w/ improvement in b/l pleural effusions    Diet  Diet NPO  Diet NPO  TFs at goal

## 2022-02-10 NOTE — PLAN OF CARE
SW completed the LOCET via phone. SW faxed [facility] to obtain the 142 for NH admission.    Received 142 and uploaded to [facility].     Mila Higuera LCSW  Neurocritical Care   [facility]  [id number]

## 2022-02-10 NOTE — PLAN OF CARE
[facility] Care Plan    POC reviewed with Julia Schroeder and family at 0300. Pt cannot verbalize understanding. Questions and concerns addressed. No acute events overnight. Pt progressing toward goals. Will continue to monitor. See below and flowsheets for full assessment and VS info.       Neuro:  [place] Coma Scale  Best Eye Response: 3-->(E3) to speech  Best Motor Response: 6-->(M6) obeys commands  Best Verbal Response: 4-->(V4) confused  [place] Coma Scale Score: 13  Assessment Qualifiers: patient not sedated/intubated  Pupil PERRLA: yes     24hr Temp:  [98.2 °F (36.8 °C)-100.4 °F (38 °C)]     CV:   Rhythm: sinus tachycardia  BP goals:   SBP < 160  MAP > 65    Resp:   O2 Device (Oxygen Therapy): nasal cannula  Oxygen Concentration (%): 24    Plan: N/A    GI/:     Diet/Nutrition Received: NPO,tube feeding  Last Bowel Movement: 02/08/22  Voiding Characteristics: external catheter    Intake/Output Summary (Last 24 hours) at 2/10/2022 0701  Last data filed at 2/10/2022 0305  Gross per 24 hour   Intake 820 ml   Output 1550 ml   Net -730 ml     Unmeasured Output  Urine Occurrence: 1  Stool Occurrence: 0  Pad Count: 1    Labs/Accuchecks:  Recent Labs   Lab 02/10/22  0144   WBC 11.87   RBC 3.31*   HGB 9.7*   HCT 31.9*         Recent Labs   Lab 02/10/22  0144   *   K 3.5   CO2 34*   *   BUN 26*   CREATININE 0.8   ALKPHOS 162*   ALT 25   AST 25   BILITOT 0.4      Recent Labs   Lab 02/04/22  2115   INR 1.0   APTT 24.0      Recent Labs   Lab 02/05/22  0846      TROPONINI 19.503*       Electrolytes: Electrolytes replaced  Accuchecks: Q4H    Gtts:   dextrose 10 % in water (D10W)         LDA/Wounds:  Lines/Drains/Airways       Drain                   NG/OG Tube Left nostril -- days    Female External Urinary Catheter 02/06/22 1031 3 days              Arterial Line              Arterial Line 02/05/22 1039 Left Brachial 4 days              Peripheral Intravenous Line                   Peripheral IV - Single  Lumen 02/05/22 1649 20 G Left;Posterior Hand 4 days         Peripheral IV - Single Lumen 02/06/22 0645 20 G Anterior;Distal;Left Lower leg 4 days         Peripheral IV - Single Lumen 02/08/22 1438 20 G Anterior;Left Forearm 1 day                  Wounds: No  Wound care consulted: No

## 2022-02-10 NOTE — ASSESSMENT & PLAN NOTE
DKA at admission, now resolved    - Blood glucose well controlled over last 24 hrs  - C/w detemir 6 u BID   - C/w aspart 3 u q4hrs, low dose insulin sliding scale
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
## 2022-02-11 NOTE — PROGRESS NOTES
[facility] - Neuro Critical Care  Neurocritical Care  Progress Note    Admit Date: 2/4/2022  Service Date: 02/11/2022  Length of Stay: 7    Subjective:     Chief Complaint: Stroke due to occlusion of right carotid artery    History of Present Illness: Per ED evaluation:  Patient presents to the emergency department with symptoms concerning for acute CVA.  Last known normal per family was 2:00 a.m.; patient presented outside of the tPA window; tele stroke was activated immediately on arrival; neurologist suspects large vessel occlusion and recommend stat transfer for possible neuro intervention.  Neurology advised CTA head neck prior to transfer; CTA head and neck performed and pending at the time of transfer; numerous labs pending at time of transfer; rectal aspirin given    Patient presented initially to [facility] emergency department with L-sided weakness, L-sided facial droop, confusion, she arrives to the neuro ICU status post thrombectomy for right ICA occlusion. CTA shows acute large vessel occlusion with distal right supraclinoid ICA filling defect extending into the anterior cerebral artery and middle cerebral artery branches.  Patient was recently admitted to outside hospital with NSTEMI (days ago), at which time she was evaluated by cardiology and cardiac catheterization was performed showing severe multi-vessel disease. Medical management was decided upon by Cardiology and family at that time due to comorbidities.  Patient had been started on aspirin, Plavix, statin      Hospital Course: 02/05/2022 metabolic acidosis, tachypnea. Place arterial line.   02/06/2022 likely euglycemi DKA associated with SGLT2 inhibitors  02/07/2022 NAEO, DKA resolved, starting TF  02/08/2022 NAEO. Starting scheduled insuline. Increase water flushes    02/09/2022 C/o SOB overnight, repeat ECG unremarkable, ABG w/ metabolic alkalosis, no respiratory component. Sputum cx sent, however pt afebrile, no leukocytosis. CXR w/ slight  increase in b/l effusions -> given lasix 20 mg IV x1. Sating well on RA this AM. FWF decreased from 500 cc q6hrs -> 300 cc q6hrs given suspicion for hypervolemic hypernatremia  02/10/2022 Slept better last night s/p starting delirium precautions, more awake this AM per son at bedside. Sating well on RA, CXR w/ improvement in b/l effusions. Low grade temp of 100.4, no true fevers, no leukocytosis.   02/11/2022 NAEO. Remains boarding for vascular neuro floor. Na improved this AM to 151.       Interval History:    - Please see above for details    Review of Systems   Unable to perform ROS: Dementia       Objective:     Vitals:  Temp: 97.8 °F (36.6 °C)  Pulse: 97  Rhythm: sinus tachycardia  BP: 104/65  MAP (mmHg): 80  Resp: 14  SpO2: 99 %  O2 Device (Oxygen Therapy): room air    Temp  Min: 97.8 °F (36.6 °C)  Max: 98.3 °F (36.8 °C)  Pulse  Min: 94  Max: 109  BP  Min: 88/56  Max: 104/65  MAP (mmHg)  Min: 64  Max: 80  Resp  Min: 10  Max: 24  SpO2  Min: 96 %  Max: 100 %    02/10 0701 - 02/11 0700  In: 1060   Out: 1225 [Urine:1225]   Unmeasured Output  Urine Occurrence: 1  Stool Occurrence: 0  Pad Count: 1       Physical Exam  General Appearance: Elderly woman resting in bed, appears older than stated age. No hemodynamic distress  Mental Status Exam: Wakens to verbal stimuli, remains w/ mild eyelid apraxia. Oriented to name only today, not oriented to time, place or situation. Able to follow simple commands on R. Not participating in naming.   Cranial Nerves: Strong R gaze preference, unable to cross midline w/ OCRs. No BTT on L. +L facial droop. +dysarthria  Motor: Moving RUE/RLE spontaneously AG. Weak extensor posturing to noxious in LUE, no movement in LLE  Sensory: Grimaces to noxious x4 extremities  Coordination: Unable to assess  Vascular: S1/S2 of normal intensity, no S3/S4 appreciated, no murmurs appreciated  Lungs: Diminished at bases b/l   Abdomen: Soft, non-distended, non-tender, BS+; +hernia over L abdominal  wall     Medications:  Continuousdextrose 10 % in water (D10W)    Scheduledaspirin, 81 mg, Daily  atorvastatin, 40 mg, Daily  clopidogreL, 75 mg, Daily  donepeziL, 10 mg, Daily  furosemide, 20 mg, Daily  heparin (porcine), 5,000 Units, Q8H  insulin aspart U-100, 4 Units, Q4H  insulin detemir U-100, 6 Units, BID  memantine, 10 mg, BID  mupirocin, , BID  sodium chloride 3%, 4 mL, Q8H    PRNcalcium gluconate IVPB, 1 g, PRN  calcium gluconate IVPB, 2 g, PRN  calcium gluconate IVPB, 3 g, PRN  dextrose 10 % in water (D10W), , PRN  dextrose 10 % in water (D10W), , PRN  dextrose 10 % in water (D10W), , Continuous PRN  glucagon (human recombinant), 1 mg, PRN  insulin aspart U-100, 1-10 Units, Q4H PRN  iodixanoL, 65 mL, ONCE PRN  magnesium sulfate IVPB, 2 g, PRN  magnesium sulfate IVPB, 4 g, PRN  potassium bicarbonate, 35 mEq, PRN  potassium bicarbonate, 50 mEq, PRN  potassium bicarbonate, 60 mEq, PRN  potassium, sodium phosphates, 2 packet, PRN  potassium, sodium phosphates, 2 packet, PRN  potassium, sodium phosphates, 2 packet, PRN  sodium chloride 0.9%, 10 mL, PRN      Today I personally reviewed pertinent laboratory results, notably: CBC stable, no leukocytosis. Na downtrending to 151    Diet  Diet NPO  Diet NPO  TFs at goal       Assessment/Plan:     Neuro  * Stroke due to occlusion of right carotid artery  S/p mechanical thrombectomy w/ TICI 3    - Etiology likely large vessel atherosclerosis  - Neurological exam is unchanged, remains w/ full R MCA syndrome  - C/w ASA/plavix  - C/w atorvastatin 40 mg qhs   - PT/OT following    Dementia without behavioral disturbance  - C/w home donepezil 10 mg qday, memantine 10 mg qday   - Patient's level of alertness fluctuating more over last 24 hrs per son at bedside, suspect component of hypoactive delirium, pt high risk  - Delirium precautions, no neuro checks overnight -> mental status improved this AM     Ophtho  Type 2 diabetes mellitus with left eye affected by mild  nonproliferative retinopathy and macular edema, without long-term current use of insulin  DKA at admission, now resolved    - Blood glucose slightly elevated over last 24 hrs, will increase aspart 3 u q4hrs -> 4 u q4hrs  - C/w detemir 6 u BID   - C/w low dose insulin sliding scale    Cardiac/Vascular  CHF (congestive heart failure)  Hx of EF 55% on TTE this admission, b/l pleural effusions    - S/p lasix 20 mg IV x1 on 2/9 w/ good response, repeat CXR improved  - C/w lasix 20 mg PO qday standing   - Pt currently sating well on RA, monitor  - Cough assist      Renal/  JOSÉ (acute kidney injury)  Unknown baseline    - Creatinine peaked at 1.3, down to 0.8 this AM  - Resolved     Hypernatremia  Suspect hypervolemic hypernatremia, improved w/ lasix     - Downtrending this AM, Na 151  - C/w lasix 20 mg PO qday  - C/w  cc q8hrs   - Trend daily    GI  Dysphagia    - NG tube in place  - Failed repeat SLP eval 2/10  - Discussed with son at bedside, notably reviewed w/ son that if swallowing does not improve over next several days, patient likely to need PEG. Please see ACP note 2/10 for further details          The patient is being Prophylaxed for:  Venous Thromboembolism with: Mechanical or Chemical  Stress Ulcer with: Not Applicable   Ventilator Pneumonia with: not applicable    Activity Orders          Turn patient starting at 02/07 1119    Progressive Mobility Protocol (mobilize patient to their highest level of functioning at least twice daily) starting at 02/04 2000    Elevate HOB starting at 02/04 1521    Diet NPO: NPO starting at 02/04 1521        DNI/DNR    Pending transfer to vascular neurology floor    Level III visit     Mi Dennison MD  Neurocritical Care  [facility] - Neuro Critical Care

## 2022-02-11 NOTE — PT/OT/SLP PROGRESS
"Speech Language Pathology Treatment    Patient Name:  Julia Schroeder   MRN:  [id number]  Admitting Diagnosis: Stroke due to occlusion of right carotid artery    Recommendations:                 General Recommendations:  Dysphagia therapy, Speech language evaluation and Cognitive-linguistic evaluation  Diet recommendations:  NPO, Liquid Diet Level: NPO   Aspiration Precautions: Frequent oral care and Strict aspiration precautions   General Precautions: Standard, aspiration,fall  Communication strategies:  provide increased time to answer and go to room if call light pushed    Subjective     "yeah"       Pain/Comfort:  · Pain Rating 1: 0/10  · Pain Rating Post-Intervention 1: 0/10    Respiratory Status: Room air    Objective:     Has the patient been evaluated by SLP for swallowing?   Yes  Keep patient NPO? Yes     Pt seen bedside with son present.  Eyes closed t/o session though raised eyebrow observed in attempts to open.  Pt did verbalize x1.  Mildly pooled secretions note din oral cavity.  Oral care completed.  Clenched teeth observed though pt did open oral cavity to allow for lingual cleaning given max cues and mild pressure to chin.  Crushed ice chip presented x1 though poor labial closure on spoon.  No active oral manipulation observed.  oral suction utilized to clear.  Education provided to pt and son re: role of SLP, cont npo, aspiration risk, and POC.  Son verbalized understanding and agreement.     Assessment:     Julia Schroeder is a 76 y.o. female with an SLP diagnosis of Dysphagia.      Goals:   Multidisciplinary Problems     SLP Goals        Problem: SLP Goal    Goal Priority Disciplines Outcome   SLP Goal     SLP    Description: Goals to be met 2/12  1 Pt will participate in ongoing swallow eval  2 pt will participate in sle                    Plan:     · Patient to be seen:  4 x/week   · Plan of Care expires:  03/07/22  · Plan of Care reviewed with:  patient,family   · SLP Follow-Up:  Yes       Discharge " recommendations:  nursing facility, skilled   Barriers to Discharge:  Level of Skilled Assistance Needed      Time Tracking:     SLP Treatment Date:   02/11/22  Speech Start Time:  0906  Speech Stop Time:  0913     Speech Total Time (min):  7 min    Billable Minutes: Treatment Swallowing Dysfunction 7    02/11/2022

## 2022-02-11 NOTE — PLAN OF CARE
[facility] Care Plan    POC reviewed with Julia Schroeder and family at 1400. Pt not able to verbalize understanding. Questions and concerns addressed. No acute events today. Pt progressing toward goals. Will continue to monitor. See below and flowsheets for full assessment and VS info.       Neuro:  Marshall Coma Scale  Best Eye Response: 3-->(E3) to speech  Best Motor Response: 6-->(M6) obeys commands  Best Verbal Response: 4-->(V4) confused  Marshall Coma Scale Score: 13  Assessment Qualifiers: patient not sedated/intubated  Pupil PERRLA: yes     24 hr Temp:  [97.7 °F (36.5 °C)-99 °F (37.2 °C)]     CV:   Rhythm: sinus tachycardia  BP goals:   SBP < 160  MAP > 65    Resp:   O2 Device (Oxygen Therapy): nasal cannula  Oxygen Concentration (%): 24    Plan: N/A    GI/:     Diet/Nutrition Received: NPO,tube feeding  Last Bowel Movement: 02/08/22  Voiding Characteristics: external catheter    Intake/Output Summary (Last 24 hours) at 2/10/2022 1830  Last data filed at 2/10/2022 1800  Gross per 24 hour   Intake 780 ml   Output 1350 ml   Net -570 ml     Unmeasured Output  Urine Occurrence: 1  Stool Occurrence: 0  Pad Count: 1    Labs/Accuchecks:  Recent Labs   Lab 02/10/22  0144   WBC 11.87   RBC 3.31*   HGB 9.7*   HCT 31.9*         Recent Labs   Lab 02/10/22  0144   *   K 3.5   CO2 34*   *   BUN 26*   CREATININE 0.8   ALKPHOS 162*   ALT 25   AST 25   BILITOT 0.4      Recent Labs   Lab 02/04/22  2115   INR 1.0   APTT 24.0      Recent Labs   Lab 02/05/22  0846      TROPONINI 19.503*       Electrolytes: N/A - electrolytes WDL  Accuchecks: Q4H    Gtts:   dextrose 10 % in water (D10W)         LDA/Wounds:  Lines/Drains/Airways       Drain                   NG/OG Tube Left nostril -- days    Female External Urinary Catheter 02/06/22 1031 4 days              Arterial Line              Arterial Line 02/05/22 1039 Left Brachial 5 days              Peripheral Intravenous Line                   Peripheral IV - Single  Lumen 02/05/22 1649 20 G Left;Posterior Hand 5 days         Peripheral IV - Single Lumen 02/06/22 0645 20 G Anterior;Distal;Left Lower leg 4 days         Peripheral IV - Single Lumen 02/08/22 1438 20 G Anterior;Left Forearm 2 days                  Wounds: No  Wound care consulted: No
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
## 2022-02-11 NOTE — ASSESSMENT & PLAN NOTE
Hx of EF 55% on TTE this admission, b/l pleural effusions    - S/p lasix 20 mg IV x1 on 2/9 w/ good response, repeat CXR improved  - C/w lasix 20 mg PO qday standing   - Pt currently sating well on RA, monitor  - Cough assist

## 2022-02-11 NOTE — ASSESSMENT & PLAN NOTE
- NG tube in place  - Failed repeat SLP eval 2/10  - Discussed with son at bedside, notably reviewed w/ son that if swallowing does not improve over next several days, patient likely to need PEG. Please see ACP note 2/10 for further details

## 2022-02-11 NOTE — ASSESSMENT & PLAN NOTE
Suspect hypervolemic hypernatremia, improved w/ lasix     - Downtrending this AM, Na 151  - C/w lasix 20 mg PO qday  - C/w  cc q8hrs   - Trend daily

## 2022-02-11 NOTE — PT/OT/SLP PROGRESS
Physical Therapy Treatment    Patient Name:  Julia Schroeder   MRN:  [id number]    Recommendations:     Discharge Recommendations:  nursing facility, skilled   Discharge Equipment Recommendations:  (TBD)   Barriers to discharge: None    Assessment:     Julia Schroeder is a 76 y.o. female admitted with a medical diagnosis of Stroke due to occlusion of right carotid artery.  She presents with the following impairments/functional limitations:  weakness,impaired endurance,impaired sensation,impaired self care skills,impaired functional mobilty,gait instability,impaired balance,decreased upper extremity function,decreased lower extremity function,decreased safety awareness. Pt was nonparticipating and inattentive to tactile and verbal cues throughout the session. Pt required head support and repositioning while performing sitting balance at EOB. Pt returned to supine d/t to lethargy and progressive somnolence.    Rehab Prognosis: Fair; patient would benefit from acute skilled PT services to address these deficits and reach maximum level of function.    Recent Surgery: * No surgery found *      Plan:     During this hospitalization, patient to be seen 4 x/week to address the identified rehab impairments via gait training,therapeutic activities,therapeutic exercises,neuromuscular re-education and progress toward the following goals:    · Plan of Care Expires:  03/08/22    Subjective     Chief Complaint: none  Patient/Family Comments/goals: Family states pt was more communicable evening before treatment.  Pain/Comfort:  ·        Objective:     Communicated with nrusing prior to session.  Patient found supine with bed alarm,restraints,pulse ox (continuous),telemetry,NG tube,peripheral IV,pressure relief boots,SCD,PureWick upon PT entry to room.     General Precautions: Standard, aspiration,fall   Orthopedic Precautions:N/A   Braces: N/A  Respiratory Status: Room air     Functional Mobility:  · Bed Mobility:     · Rolling Left:  total  assistance  · Rolling Right: total assistance-dependent x2. Pt able to flex R LE to assist in transfer.  · Scooting to EOB: total assistance   · Supine to Sit: total assistance x2 persons  · Sit to Supine: dependence x2 persons  · Balance:   · Static sitting EOB: Poor, total assistance x1 person. Pt able to follow vc to weightbear through R UE. Pt unable to hold held in neutral position      AM-PAC 6 CLICK MOBILITY  Turning over in bed (including adjusting bedclothes, sheets and blankets)?: 2  Sitting down on and standing up from a chair with arms (e.g., wheelchair, bedside commode, etc.): 1  Moving from lying on back to sitting on the side of the bed?: 1  Moving to and from a bed to a chair (including a wheelchair)?: 1  Need to walk in hospital room?: 1  Climbing 3-5 steps with a railing?: 1  Basic Mobility Total Score: 7       Therapeutic Activities and Exercises:  Pt was occasionally responsive to noxious stimuli, tactile and verbal cues.      Patient left HOB elevated with all lines intact, bed alarm on, restraints reapplied at end of session, RN notified and RN and family present.    GOALS:   Multidisciplinary Problems     Physical Therapy Goals        Problem: Physical Therapy Goal    Goal Priority Disciplines Outcome Goal Variances Interventions   Physical Therapy Goal     PT, PT/OT Ongoing, Progressing     Description: PT goals until 2/20/22    1. Pt supine to sit with moderate assist-not met  2. Pt sit to supine with moderate assist-not met  3. Pt sit to stand with LRAD as needed for safety with max assist-not met  4. Pt to perform gait 2 steps with LRAD as needed for safety with max assist.-not met  5. Pt to transfer bed to/from bedside chair with LRAD for safety with max assist.-not met  6. Pt to perform B LE exs in sitting or supine x 15 reps to strengthen B LE to improve functional mobility.-not met  7. Pt to propel w/c 10ft with R UE/LE on level surface with minimal assist-not met  8. Pt to be able to  sit on the EOB 10 min with CGA to perform functional activities-not met                       Time Tracking:     PT Received On: 02/11/22  PT Start Time: 0822     PT Stop Time: 0855  PT Total Time (min): 33 min     Billable Minutes: Therapeutic Activity 30    Treatment Type: Treatment  PT/PTA: PTA     PTA Visit Number: 1     02/11/2022

## 2022-02-11 NOTE — SUBJECTIVE & OBJECTIVE
Interval History:    - Please see above for details    Review of Systems   Unable to perform ROS: Dementia       Objective:     Vitals:  Temp: 97.8 °F (36.6 °C)  Pulse: 97  Rhythm: sinus tachycardia  BP: 104/65  MAP (mmHg): 80  Resp: 14  SpO2: 99 %  O2 Device (Oxygen Therapy): room air    Temp  Min: 97.8 °F (36.6 °C)  Max: 98.3 °F (36.8 °C)  Pulse  Min: 94  Max: 109  BP  Min: 88/56  Max: 104/65  MAP (mmHg)  Min: 64  Max: 80  Resp  Min: 10  Max: 24  SpO2  Min: 96 %  Max: 100 %    02/10 0701 - 02/11 0700  In: 1060   Out: 1225 [Urine:1225]   Unmeasured Output  Urine Occurrence: 1  Stool Occurrence: 0  Pad Count: 1       Physical Exam  General Appearance: Elderly woman resting in bed, appears older than stated age. No hemodynamic distress  Mental Status Exam: Wakens to verbal stimuli, remains w/ mild eyelid apraxia. Oriented to name only today, not oriented to time, place or situation. Able to follow simple commands on R. Not participating in naming.   Cranial Nerves: Strong R gaze preference, unable to cross midline w/ OCRs. No BTT on L. +L facial droop. +dysarthria  Motor: Moving RUE/RLE spontaneously AG. Weak extensor posturing to noxious in LUE, no movement in LLE  Sensory: Grimaces to noxious x4 extremities  Coordination: Unable to assess  Vascular: S1/S2 of normal intensity, no S3/S4 appreciated, no murmurs appreciated  Lungs: Diminished at bases b/l   Abdomen: Soft, non-distended, non-tender, BS+; +hernia over L abdominal wall     Medications:  Continuousdextrose 10 % in water (D10W)    Scheduledaspirin, 81 mg, Daily  atorvastatin, 40 mg, Daily  clopidogreL, 75 mg, Daily  donepeziL, 10 mg, Daily  furosemide, 20 mg, Daily  heparin (porcine), 5,000 Units, Q8H  insulin aspart U-100, 4 Units, Q4H  insulin detemir U-100, 6 Units, BID  memantine, 10 mg, BID  mupirocin, , BID  sodium chloride 3%, 4 mL, Q8H    PRNcalcium gluconate IVPB, 1 g, PRN  calcium gluconate IVPB, 2 g, PRN  calcium gluconate IVPB, 3 g,  PRN  dextrose 10 % in water (D10W), , PRN  dextrose 10 % in water (D10W), , PRN  dextrose 10 % in water (D10W), , Continuous PRN  glucagon (human recombinant), 1 mg, PRN  insulin aspart U-100, 1-10 Units, Q4H PRN  iodixanoL, 65 mL, ONCE PRN  magnesium sulfate IVPB, 2 g, PRN  magnesium sulfate IVPB, 4 g, PRN  potassium bicarbonate, 35 mEq, PRN  potassium bicarbonate, 50 mEq, PRN  potassium bicarbonate, 60 mEq, PRN  potassium, sodium phosphates, 2 packet, PRN  potassium, sodium phosphates, 2 packet, PRN  potassium, sodium phosphates, 2 packet, PRN  sodium chloride 0.9%, 10 mL, PRN      Today I personally reviewed pertinent laboratory results, notably: CBC stable, no leukocytosis. Na downtrending to 151    Diet  Diet NPO  Diet NPO  TFs at goal

## 2022-02-11 NOTE — PLAN OF CARE
[facility] Care Plan    POC reviewed with Julia Schroeder and family at 0300. Pt did not verbalize understanding. Questions and concerns addressed. No acute events overnight. Pt progressing toward goals. Will continue to monitor. See below and flowsheets for full assessment and VS info.       Neuro:  Marshall Coma Scale  Best Eye Response: 3-->(E3) to speech  Best Motor Response: 6-->(M6) obeys commands  Best Verbal Response: 4-->(V4) confused  Marshall Coma Scale Score: 13  Assessment Qualifiers: patient not sedated/intubated  Pupil PERRLA: yes     24hr Temp:  [97.7 °F (36.5 °C)-98.3 °F (36.8 °C)]     CV:   Rhythm: sinus tachycardia  BP goals:   SBP < 160  MAP > 65    Resp:   O2 Device (Oxygen Therapy): room air  Oxygen Concentration (%): 24    Plan:  stepdown    GI/:     Diet/Nutrition Received: NPO,tube feeding  Last Bowel Movement: 02/08/22  Voiding Characteristics: external catheter    Intake/Output Summary (Last 24 hours) at 2/11/2022 0518  Last data filed at 2/11/2022 0005  Gross per 24 hour   Intake 940 ml   Output 1000 ml   Net -60 ml     Unmeasured Output  Urine Occurrence: 1  Stool Occurrence: 0  Pad Count: 1    Labs/Accuchecks:  Recent Labs   Lab 02/11/22  0219   WBC 10.55   RBC 3.46*   HGB 10.0*   HCT 33.7*         Recent Labs   Lab 02/11/22  0345   *  151*   K 4.0  4.0   CO2 31*  31*   *  114*   BUN 24*  24*   CREATININE 0.7  0.7   ALKPHOS 133   ALT 19   AST 19   BILITOT 0.4      Recent Labs   Lab 02/04/22  2115   INR 1.0   APTT 24.0      Recent Labs   Lab 02/05/22  0846      TROPONINI 19.503*       Electrolytes: N/A - electrolytes WDL  Accuchecks: Q4H    Gtts:   dextrose 10 % in water (D10W)         LDA/Wounds:  Lines/Drains/Airways       Drain                   NG/OG Tube Left nostril -- days    Female External Urinary Catheter 02/06/22 1031 4 days              Peripheral Intravenous Line                   Peripheral IV - Single Lumen 02/05/22 1649 20 G Left;Posterior Hand 5  days         Peripheral IV - Single Lumen 02/08/22 1438 20 G Anterior;Left Forearm 2 days                  Wounds: No  Wound care consulted: No

## 2022-02-11 NOTE — ASSESSMENT & PLAN NOTE
Stroke RF  A1c 8.3 on 1/7/2022  Insulin gtt d/c   Currently on insulin aspart 4U q4h and detemir 6U BID   IP Blood glucose goal 140-180

## 2022-02-11 NOTE — SUBJECTIVE & OBJECTIVE
Neurologic Chief Complaint: AMS, LSW    Subjective:     Interval History: Patient is seen for follow-up neurological assessment and treatment recommendations: No acute neuro exam changes or events overnight. NCC discussed with family about possible need for peg. Discussion to be continued after next speech evaluation on 2/14/22 for decision peg v. Hospice if patient remains NPO. Pending stepdown to NPU.     HPI, Past Medical, Family, and Social History remains the same as documented in the initial encounter.     Review of Systems   Constitutional: Positive for fatigue. Negative for diaphoresis and fever.   HENT: Negative for drooling and rhinorrhea.    Respiratory: Negative for cough and shortness of breath.    Cardiovascular: Negative for chest pain.   Gastrointestinal: Negative for abdominal pain, diarrhea and vomiting.   Neurological: Positive for facial asymmetry and weakness.   Psychiatric/Behavioral: Negative for agitation and behavioral problems. The patient is not nervous/anxious.      Scheduled Meds:   aspirin  81 mg Per NG tube Daily    atorvastatin  40 mg Per NG tube Daily    clopidogreL  75 mg Per NG tube Daily    donepeziL  10 mg Per NG tube Daily    furosemide  20 mg Per NG tube Daily    heparin (porcine)  5,000 Units Subcutaneous Q8H    insulin aspart U-100  4 Units Subcutaneous Q4H    insulin detemir U-100  6 Units Subcutaneous BID    memantine  10 mg Per NG tube BID    mupirocin   Nasal BID    sodium chloride 3%  4 mL Nebulization Q8H     Continuous Infusions:   dextrose 10 % in water (D10W)       PRN Meds:calcium gluconate IVPB, calcium gluconate IVPB, calcium gluconate IVPB, dextrose 10 % in water (D10W), dextrose 10 % in water (D10W), dextrose 10 % in water (D10W), glucagon (human recombinant), insulin aspart U-100, iodixanoL, magnesium sulfate IVPB, magnesium sulfate IVPB, potassium bicarbonate, potassium bicarbonate, potassium bicarbonate, potassium, sodium phosphates, potassium,  sodium phosphates, potassium, sodium phosphates, sodium chloride 0.9%    Objective:     Vital Signs (Most Recent):  Temp: 97.8 °F (36.6 °C) (02/11/22 0700)  Pulse: 97 (02/11/22 1000)  Resp: 14 (02/11/22 1000)  BP: 104/65 (02/11/22 1000)  SpO2: 99 % (02/11/22 1000)  BP Location: Right arm    Vital Signs Range (Last 24H):  Temp:  [97.8 °F (36.6 °C)-98.3 °F (36.8 °C)]   Pulse:  []   Resp:  [10-24]   BP: ()/(56-66)   SpO2:  [96 %-100 %]   BP Location: Right arm    Physical Exam  Vitals and nursing note reviewed.   Constitutional:       General: She is not in acute distress.     Appearance: She is not diaphoretic.      Comments: Drowsy,but arousable and able to follow commands on the right side   HENT:      Head: Normocephalic and atraumatic.      Right Ear: External ear normal.      Left Ear: External ear normal.      Nose: Nose normal.      Mouth/Throat:      Mouth: Mucous membranes are moist.   Eyes:      General: No scleral icterus.        Right eye: No discharge.         Left eye: No discharge.   Cardiovascular:      Rate and Rhythm: Normal rate.   Pulmonary:      Effort: Pulmonary effort is normal. No respiratory distress.   Abdominal:      General: Abdomen is flat. There is no distension.   Musculoskeletal:      Right lower leg: No edema.      Left lower leg: No edema.   Skin:     General: Skin is warm and dry.   Neurological:      Cranial Nerves: Facial asymmetry present.      Sensory: Sensory deficit present.      Motor: Weakness (L hemiparesis) present.      Comments: L neglect  Drowsy    Psychiatric:         Behavior: Behavior normal.         Neurological Exam:   LOC: drowsy  Attention Span: poor  Language: No aphasia  Articulation: Dysarthria  EOM (CN III, IV, VI): Gaze preference  Right   Facial Movement (CN VII): Lower facial weakness on the Left  Motor: Arm left  Plegia 0/5  Leg left  Plegia 0/5  Arm right antigravity with spontaneous movement  Leg right antigravity with spontaneous  movement  Sensation: Phan-hypoesthesia left  Tone: Flaccid LUE    Laboratory:  CMP:   Recent Labs   Lab 02/11/22  0345   CALCIUM 8.8  8.8   ALBUMIN 2.4*   PROT 5.6*   *  151*   K 4.0  4.0   CO2 31*  31*   *  114*   BUN 24*  24*   CREATININE 0.7  0.7   ALKPHOS 133   ALT 19   AST 19   BILITOT 0.4     CBC:   Recent Labs   Lab 02/11/22  0219   WBC 10.55   RBC 3.46*   HGB 10.0*   HCT 33.7*      MCV 97   MCH 28.9   MCHC 29.7*     Lipid Panel: No results for input(s): CHOL, LDLCALC, HDL, TRIG in the last 168 hours.  Coagulation:   Recent Labs   Lab 02/04/22  2115   INR 1.0   APTT 24.0     Platelet Aggregation Study: No results for input(s): PLTAGG, PLTAGINTERP, PLTAGREGLACO, ADPPLTAGGREG in the last 168 hours.  Hgb A1C: No results for input(s): HGBA1C in the last 168 hours.  TSH:   No results for input(s): TSH in the last 168 hours.    Diagnostic Results     Brain imaging:  MRI Brain WO 2/5/2022    Moderate to large acute right MCA territory infarction with susceptibility associated with the right basal ganglia and insular components compatible with hemorrhagic conversion.  Mass effect without midline shift or hydrocephalus.     Small sized ipsilateral right cerebellar acute infarction with punctate contralateral left hippocampal focus of diffusion restriction concerning for possible superimposed transient global amnesia.    [facility] 2/5/2022  Findings consistent with recent ischemia/infarct involving the right MCA distribution again noted, previously identified hyperdensity involving the basal ganglia and caudate on the right again noted, configuration is stable, degree of density is mildly diminished, there is continued mass effect and mild right to left midline shift appearing stable without evidence for significant interval detrimental change.    [facility] 2/4/2022 1741  Hyperdensity within the right basal ganglia insular cortex confined to the gray matter and sparing the internal cortex.  Findings  likely representing contrast staining from recent angiogram given altered flow dynamics in the infarcted territory.  Hemorrhagic conversion is less likely. 0.2 cm leftward midline shift.    [facility] (2/4/22) 1415: Continued evolution of acute right MCA distribution infarct with developing minimal right-sided mass effect.  No midline shift or evidence for hemorrhagic conversion.       Vessel Imaging:  IR angio 2/4/2022:  Successful aspiration thrombectomy of the distal right internal carotid artery occlusion, with resulting TICI 3 flow.    CTA head/neck (2/4/22): Acute large vessel occlusion with distal right supraclinoid ICA filling defect extending into the anterior cerebral artery and middle cerebral artery branches as detailed above. Evidence of loss of gray-white matter interface of the deep right hemispheric structures consistent with early ischemia/infarction. Calcified plaque of the vertebral arteries, internal carotid arteries and of the common carotid artery bifurcation.     Cardiac Evaluation:   Last TTE (1/31/22): EF 45%, grade II LV diastolic dysfunction, no LA enlargement    Other:  CXR 2/9/2022  Increase in parenchymal and pleural disease, with obscuration of the heart borders due to increased perihilar consolidative opacities.

## 2022-02-11 NOTE — ASSESSMENT & PLAN NOTE
DKA at admission, now resolved    - Blood glucose slightly elevated over last 24 hrs, will increase aspart 3 u q4hrs -> 4 u q4hrs  - C/w detemir 6 u BID   - C/w low dose insulin sliding scale

## 2022-02-11 NOTE — ASSESSMENT & PLAN NOTE
2/2 stroke  SLP consulted  Patient NPO at the moment with NGT in place  Initiation of discussion for possible need for PEG held by NCC and family  Will repeat SLP evaluation on 2/14 and follow up discussion regarding PEG v. Hospice if still NPO
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
## 2022-02-12 PROBLEM — N17.9 AKI (ACUTE KIDNEY INJURY): Status: RESOLVED | Noted: 2022-01-01 | Resolved: 2022-01-01

## 2022-02-12 PROBLEM — G93.6 CYTOTOXIC CEREBRAL EDEMA: Status: ACTIVE | Noted: 2022-01-01

## 2022-02-12 NOTE — SUBJECTIVE & OBJECTIVE
Neurologic Chief Complaint: AMS, LSW    Subjective:     Interval History: Patient is seen for follow-up neurological assessment and treatment recommendations: Hypernatremic - urine sodium and osmol pending, repeat CXR pending, increasing FWF to 275 QID, will follow up on evening sodium. Patient remains drowsy, will monitor for improvement with treatment of hypernatremia prior to considering modafinil    HPI, Past Medical, Family, and Social History remains the same as documented in the initial encounter.     Review of Systems   Constitutional: Positive for fatigue. Negative for fever.   HENT: Positive for trouble swallowing. Negative for drooling.    Respiratory: Negative for cough and shortness of breath.    Cardiovascular: Negative for chest pain.   Gastrointestinal: Negative for abdominal pain, diarrhea and vomiting.   Neurological: Positive for facial asymmetry, speech difficulty, weakness and numbness.   Psychiatric/Behavioral: Negative for agitation and behavioral problems. The patient is not nervous/anxious.      Scheduled Meds:   aspirin  81 mg Per NG tube Daily    atorvastatin  40 mg Per NG tube Daily    clopidogreL  75 mg Per NG tube Daily    donepeziL  10 mg Per NG tube Daily    furosemide  20 mg Per NG tube Daily    heparin (porcine)  5,000 Units Subcutaneous Q8H    insulin aspart U-100  4 Units Subcutaneous Q4H    insulin detemir U-100  6 Units Subcutaneous BID    memantine  10 mg Per NG tube BID    mupirocin   Nasal BID    sodium chloride 3%  4 mL Nebulization Q8H     Continuous Infusions:   dextrose 10 % in water (D10W)       PRN Meds:calcium gluconate IVPB, calcium gluconate IVPB, calcium gluconate IVPB, dextrose 10 % in water (D10W), dextrose 10 % in water (D10W), dextrose 10 % in water (D10W), glucagon (human recombinant), insulin aspart U-100, iodixanoL, magnesium sulfate IVPB, magnesium sulfate IVPB, potassium bicarbonate, potassium bicarbonate, potassium bicarbonate, potassium,  sodium phosphates, potassium, sodium phosphates, potassium, sodium phosphates, sodium chloride 0.9%    Objective:     Vital Signs (Most Recent):  Temp: 97.2 °F (36.2 °C) (02/12/22 0738)  Pulse: 106 (02/12/22 1100)  Resp: 18 (02/12/22 0836)  BP: 102/64 (02/12/22 0738)  SpO2: (!) 94 % (02/12/22 0836)  BP Location: Left arm    Vital Signs Range (Last 24H):  Temp:  [97.2 °F (36.2 °C)-98.5 °F (36.9 °C)]   Pulse:  []   Resp:  [12-18]   BP: ()/(55-67)   SpO2:  [92 %-100 %]   BP Location: Left arm    Physical Exam  Vitals and nursing note reviewed.   Constitutional:       General: She is not in acute distress.     Comments: Drowsy,but arousable and able to follow commands on the right side   HENT:      Head: Normocephalic and atraumatic.      Right Ear: External ear normal.      Left Ear: External ear normal.      Nose: Nose normal.      Mouth/Throat:      Mouth: Mucous membranes are dry.   Eyes:      General: No scleral icterus.  Cardiovascular:      Rate and Rhythm: Normal rate.   Pulmonary:      Effort: Pulmonary effort is normal. No respiratory distress.      Breath sounds: Examination of the right-lower field reveals decreased breath sounds. Examination of the left-lower field reveals decreased breath sounds. Decreased breath sounds present.   Abdominal:      General: Abdomen is flat. There is no distension.   Musculoskeletal:      Right lower leg: No edema.      Left lower leg: No edema.      Comments: LUE edema   Skin:     General: Skin is warm and dry.   Neurological:      Cranial Nerves: Facial asymmetry present.      Sensory: Sensory deficit present.      Motor: Weakness (L hemiparesis) present.   Psychiatric:         Behavior: Behavior normal.         Neurological Exam:   LOC: drowsy  Attention Span: Good  Language: No aphasia  Articulation: Dysarthria  EOM (CN III, IV, VI): Gaze preference  Right   Facial Movement (CN VII): Lower facial weakness on the Left  Motor: Arm left  Plegia 0/5  Leg left   Plegia 0/5  Arm right antigravity with spontaneous movement  Leg right antigravity with spontaneous movement  Sensation: Phan-hypoesthesia left  Tone: Flaccid LUE    Laboratory:  CMP:   Recent Labs   Lab 02/12/22  0550   CALCIUM 9.3   ALBUMIN 2.3*   PROT 6.3   *   K 3.8   CO2 33*   *   BUN 25*   CREATININE 0.7   ALKPHOS 128   ALT 15   AST 18   BILITOT 0.4     CBC:   Recent Labs   Lab 02/12/22  0550   WBC 11.49   RBC 3.61*   HGB 10.4*   HCT 34.6*      MCV 96   MCH 28.8   MCHC 30.1*     Lipid Panel: No results for input(s): CHOL, LDLCALC, HDL, TRIG in the last 168 hours.  Coagulation:   No results for input(s): PT, INR, APTT in the last 168 hours.  Platelet Aggregation Study: No results for input(s): PLTAGG, PLTAGINTERP, PLTAGREGLACO, ADPPLTAGGREG in the last 168 hours.  Hgb A1C: No results for input(s): HGBA1C in the last 168 hours.  TSH:   No results for input(s): TSH in the last 168 hours.    Diagnostic Results     Brain imaging:  MRI Brain WO 2/5/2022    Moderate to large acute right MCA territory infarction with susceptibility associated with the right basal ganglia and insular components compatible with hemorrhagic conversion.  Mass effect without midline shift or hydrocephalus.     Small sized ipsilateral right cerebellar acute infarction with punctate contralateral left hippocampal focus of diffusion restriction concerning for possible superimposed transient global amnesia.    [facility] 2/5/2022  Findings consistent with recent ischemia/infarct involving the right MCA distribution again noted, previously identified hyperdensity involving the basal ganglia and caudate on the right again noted, configuration is stable, degree of density is mildly diminished, there is continued mass effect and mild right to left midline shift appearing stable without evidence for significant interval detrimental change.    [facility] 2/4/2022 1741  Hyperdensity within the right basal ganglia insular cortex confined to  the gray matter and sparing the internal cortex.  Findings likely representing contrast staining from recent angiogram given altered flow dynamics in the infarcted territory.  Hemorrhagic conversion is less likely. 0.2 cm leftward midline shift.    [facility] (2/4/22) 1415: Continued evolution of acute right MCA distribution infarct with developing minimal right-sided mass effect.  No midline shift or evidence for hemorrhagic conversion.       Vessel Imaging:  IR angio 2/4/2022:  Successful aspiration thrombectomy of the distal right internal carotid artery occlusion, with resulting TICI 3 flow.    CTA head/neck (2/4/22): Acute large vessel occlusion with distal right supraclinoid ICA filling defect extending into the anterior cerebral artery and middle cerebral artery branches as detailed above. Evidence of loss of gray-white matter interface of the deep right hemispheric structures consistent with early ischemia/infarction. Calcified plaque of the vertebral arteries, internal carotid arteries and of the common carotid artery bifurcation.     Cardiac Evaluation:   Last TTE (1/31/22): EF 45%, grade II LV diastolic dysfunction, no LA enlargement    Other:  CXR 2/9/2022  Increase in parenchymal and pleural disease, with obscuration of the heart borders due to increased perihilar consolidative opacities.
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
## 2022-02-12 NOTE — CARE UPDATE
RAPID RESPONSE NURSE ROUND       Rounding completed with charge RN, Stefania. No concerns verbalized at this time. Instructed to call [phone] for further concerns or assistance.

## 2022-02-12 NOTE — PLAN OF CARE
problem: Fall Injury Risk  Goal: Absence of Fall and Fall-Related Injury  Outcome: Ongoing, Progressing     Problem: Adjustment to Illness (Stroke, Ischemic/Transient Ischemic Attack)  Goal: Optimal Coping  Outcome: Ongoing, Progressing     Problem: Bowel Elimination Impaired (Stroke, Ischemic/Transient Ischemic Attack)  Goal: Effective Bowel Elimination  Outcome: Ongoing, Progressing     Problem: Communication Impairment (Stroke, Ischemic/Transient Ischemic Attack)  Goal: Improved Communication Skills  Outcome: Ongoing, Progressing     Problem: Functional Ability Impaired (Stroke, Ischemic/Transient Ischemic Attack)  Goal: Optimal Functional Ability  Outcome: Ongoing, Progressing     Problem: Respiratory Compromise (Stroke, Ischemic/Transient Ischemic Attack)  Goal: Effective Oxygenation and Ventilation  Outcome: Ongoing, Progressing   POC reviewed and updated with the patient/caregiver. Questions regarding POC were encouraged and addressed with the patient/caregiver.  VSS, see flow-sheets. Patient is AO X * 0 at this time. Fall/safety precautions maintained, no signs of injury noted during shift. Patient was repositioned for comfort, bed locked in low position with side rails X * 4, bed alarm set, with call light within reach. Plan is Hospice vs peg tube placement. No acute signs of distress noted at this time.

## 2022-02-12 NOTE — ASSESSMENT & PLAN NOTE
EF 55% with segmental WMA  Pulmonary edema and pleural effusion present on CXR in neuro ICU  Follow up CXR on 2/12 with improving pulmonary edema    Continue lasix

## 2022-02-12 NOTE — NURSING TRANSFER
Nursing Transfer Note      2/11/2022     Reason patient is being transferred:step down unit    [id number]    [facility]    Transfer with cardiac monitoring    Transported by RN and PCT    Medicines sent: yes    Any special needs or follow-up needed: none    Chart send with patient: Yes    Notified: sister at bedside with patient    Patient reassessed at: 02/11/2022 at 2300     Upon arrival to floor: cardiac monitor applied, patient oriented to room, call bell in reach and bed in lowest position with RN at bedside

## 2022-02-12 NOTE — ASSESSMENT & PLAN NOTE
Na 151>154  Urine sodium and osmol pending  Increased FWF to 275 QID  Follow up on evening sodium

## 2022-02-12 NOTE — ASSESSMENT & PLAN NOTE
Area of cerebral edema identified when reviewing brain imaging in the territory of multiple cerebral arteries. We will continue to monitor with q4h neuro checks while on floor or more frequently while in neuro critical care, as any change in the patients clinical exam may signify expansion of the insult and/or the area of the edema. Such changes may require acute interventions to prevent loss of function and/or death.      Clinical exam has remained stable, this suggests that cerebral edema has stabilized. Low probability of clinical deterioration.
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
## 2022-02-13 NOTE — PLAN OF CARE
Problem: Communication Impairment (Stroke, Ischemic/Transient Ischemic Attack)  Goal: Improved Communication Skills  Outcome: Ongoing, Progressing     Problem: Swallowing Impairment (Stroke, Ischemic/Transient Ischemic Attack)  Goal: Optimal Eating and Swallowing without Aspiration  Outcome: Ongoing, Progressing   Pt is still drowsy able to follow simple instructions. Restraint isnt on. Pt is still flaccid on the left side. Stoke booklet at bedside. Family educated on the long term management of diabetes using insulin.

## 2022-02-13 NOTE — PLAN OF CARE
Goals remain appropriate.  Problem: Occupational Therapy Goal  Goal: Occupational Therapy Goal  Description: Goals set 2/13 to be addressed for 14 days with expiration date, 2/27:  Patient will increase functional independence with ADLs by performing:    Patient will demonstrate rolling to the right with max assist.  Not met   Patient will demonstrate rolling to the left with mod assist.   Not met  Patient will demonstrate supine -sit with mod assist.   Not met  Patient will demonstrate stand pivot transfers with max assist.   Not met  Patient will demonstrate grooming while seated with mod assist.   Not met  Patient will demonstrate upper body dressing with mod assist while seated EOB.   Not met  Patient will demonstrate lower body dressing with max assist while seated EOB.   Not met  Patient will demonstrate toileting with max assist.   Not met  Patient will demonstrate bathing while seated EOB with max assist.   Not met  Patient's family / caregiver will demonstrate independence and safety with assisting patient with self-care skills and functional mobility.     Not met  Patient's family / caregiver will demonstrate independence with providing ROM and changes in bed positioning.   Not met  Patient and/or patient's family will verbalize understanding of stroke prevention guidelines, personal risk factors and stroke warning signs via teachback method.  Not met         2/13/2022 0904 by SAMANTA Damian  Outcome: Ongoing, Progressing

## 2022-02-13 NOTE — ASSESSMENT & PLAN NOTE
Stroke RF  A1c 8.3 on 1/7/2022  Insulin gtt d/c   Increased insulin to aspart 8U q4h and detemir 12U BID   IP Blood glucose goal 140-180

## 2022-02-13 NOTE — PLAN OF CARE
Problem: Adjustment to Illness (Stroke, Ischemic/Transient Ischemic Attack)  Goal: Optimal Coping  Intervention: Support Psychosocial Response to Stroke  Flowsheets (Taken 2/13/2022 0630)  Family/Support System Care:   involvement promoted   presence promoted   support provided     Problem: Functional Ability Impaired (Stroke, Ischemic/Transient Ischemic Attack)  Goal: Optimal Functional Ability  Outcome: Ongoing, Not Progressing  Intervention: Optimize Functional Ability  Flowsheets (Taken 2/13/2022 0630)  Activity Management: Rolling - L1     .Plan of care reviewed with patient. Patient does not demonstrate understanding of POC, family at bedside does understand POC. All questions addressed. Aspiration and fall precautions maintained throughout shift. VSS. PIV maintained. No neuro changes during shift. Patient resting comfortably.

## 2022-02-13 NOTE — PLAN OF CARE
Problem: Cognitive Impairment (Stroke, Ischemic/Transient Ischemic Attack)  Goal: Optimal Cognitive Function  Outcome: Ongoing, Progressing     Problem: Restraint, Nonbehavioral (Nonviolent)  Goal: Absence of Harm or Injury  Outcome: Ongoing, Progressing   Stroke booklet at bedside, family educated about the transitional care out of the hospital. Pt's condition remains the same. Pt condition stable

## 2022-02-13 NOTE — SUBJECTIVE & OBJECTIVE
Neurologic Chief Complaint: AMS, LSW    Subjective:     Interval History: Patient is seen for follow-up neurological assessment and treatment recommendations: increased FWF and started 1/2 NS overnight for increasing sodium, sodium tending down this morning, patient more drowsy this morning after working with therapy    HPI, Past Medical, Family, and Social History remains the same as documented in the initial encounter.     Review of Systems   Constitutional: Positive for fatigue. Negative for fever.   HENT: Positive for trouble swallowing. Negative for drooling.    Respiratory: Negative for cough and shortness of breath.    Cardiovascular: Negative for chest pain.   Gastrointestinal: Negative for abdominal pain, diarrhea and vomiting.   Neurological: Positive for facial asymmetry, speech difficulty, weakness and numbness.   Psychiatric/Behavioral: Negative for agitation and behavioral problems. The patient is not nervous/anxious.      Scheduled Meds:   aspirin  81 mg Per NG tube Daily    atorvastatin  40 mg Per NG tube Daily    clopidogreL  75 mg Per NG tube Daily    donepeziL  10 mg Per NG tube Daily    heparin (porcine)  5,000 Units Subcutaneous Q8H    insulin aspart U-100  6 Units Subcutaneous Q4H    insulin detemir U-100  8 Units Subcutaneous BID    memantine  10 mg Per NG tube BID    sodium chloride 3%  4 mL Nebulization Q8H     Continuous Infusions:   sodium chloride 0.45% 75 mL/hr at 02/13/22 0815    dextrose 10 % in water (D10W)       PRN Meds:calcium gluconate IVPB, calcium gluconate IVPB, calcium gluconate IVPB, dextrose 10 % in water (D10W), dextrose 10 % in water (D10W), dextrose 10 % in water (D10W), glucagon (human recombinant), insulin aspart U-100, iodixanoL, magnesium sulfate IVPB, magnesium sulfate IVPB, potassium bicarbonate, potassium bicarbonate, potassium bicarbonate, potassium, sodium phosphates, potassium, sodium phosphates, potassium, sodium phosphates, sodium chloride  0.9%    Objective:     Vital Signs (Most Recent):  Temp: 99.8 °F (37.7 °C) (02/13/22 0737)  Pulse: 97 (02/13/22 0737)  Resp: 18 (02/13/22 0737)  BP: 116/68 (02/13/22 0737)  SpO2: 98 % (02/13/22 0737)  BP Location: Left arm    Vital Signs Range (Last 24H):  Temp:  [96.4 °F (35.8 °C)-99.8 °F (37.7 °C)]   Pulse:  []   Resp:  [16-21]   BP: ()/(54-68)   SpO2:  [87 %-98 %]   BP Location: Left arm    Physical Exam  Vitals reviewed.   Constitutional:       General: She is not in acute distress.     Comments: Drowsy,but arousable and able to follow commands on the right side   HENT:      Head: Normocephalic and atraumatic.      Right Ear: External ear normal.      Left Ear: External ear normal.      Nose: Nose normal.      Mouth/Throat:      Mouth: Mucous membranes are dry.   Eyes:      General: No scleral icterus.  Cardiovascular:      Rate and Rhythm: Normal rate.   Pulmonary:      Effort: Pulmonary effort is normal. No respiratory distress.   Abdominal:      General: Abdomen is flat. There is no distension.   Musculoskeletal:      Right lower leg: No edema.      Left lower leg: No edema.      Comments: LUE edema   Skin:     General: Skin is warm and dry.   Neurological:      Cranial Nerves: Facial asymmetry present.      Sensory: Sensory deficit present.      Motor: Weakness (L hemiparesis) present.   Psychiatric:         Behavior: Behavior normal.         Neurological Exam:   LOC: drowsy  Attention Span: Good  Language: No aphasia  Articulation: Dysarthria  EOM (CN III, IV, VI): Gaze preference  Right   Facial Movement (CN VII): Lower facial weakness on the Left  Motor: Arm left  Plegia 0/5  Leg left  Plegia 0/5  Arm right 3/5  Leg right 3/5  Sensation: Phan-hypoesthesia left  Tone: Flaccid LUE    Laboratory:  CMP:   Recent Labs   Lab 02/13/22  0557   CALCIUM 8.6*   ALBUMIN 2.1*   PROT 5.9*   *  154*   K 3.7   CO2 29   *   BUN 23   CREATININE 0.7   ALKPHOS 107   ALT 13   AST 21   BILITOT 0.4      CBC:   Recent Labs   Lab 02/13/22  0557   WBC 11.57   RBC 3.48*   HGB 10.0*   HCT 33.8*      MCV 97   MCH 28.7   MCHC 29.6*     Lipid Panel: No results for input(s): CHOL, LDLCALC, HDL, TRIG in the last 168 hours.  Coagulation:   No results for input(s): PT, INR, APTT in the last 168 hours.  Platelet Aggregation Study: No results for input(s): PLTAGG, PLTAGINTERP, PLTAGREGLACO, ADPPLTAGGREG in the last 168 hours.  Hgb A1C: No results for input(s): HGBA1C in the last 168 hours.  TSH:   No results for input(s): TSH in the last 168 hours.    Diagnostic Results     Brain imaging:  MRI Brain WO 2/5/2022    Moderate to large acute right MCA territory infarction with susceptibility associated with the right basal ganglia and insular components compatible with hemorrhagic conversion.  Mass effect without midline shift or hydrocephalus.     Small sized ipsilateral right cerebellar acute infarction with punctate contralateral left hippocampal focus of diffusion restriction concerning for possible superimposed transient global amnesia.    [facility] 2/5/2022  Findings consistent with recent ischemia/infarct involving the right MCA distribution again noted, previously identified hyperdensity involving the basal ganglia and caudate on the right again noted, configuration is stable, degree of density is mildly diminished, there is continued mass effect and mild right to left midline shift appearing stable without evidence for significant interval detrimental change.    [facility] 2/4/2022 1741  Hyperdensity within the right basal ganglia insular cortex confined to the gray matter and sparing the internal cortex.  Findings likely representing contrast staining from recent angiogram given altered flow dynamics in the infarcted territory.  Hemorrhagic conversion is less likely. 0.2 cm leftward midline shift.    [facility] (2/4/22) 1415: Continued evolution of acute right MCA distribution infarct with developing minimal right-sided mass  effect.  No midline shift or evidence for hemorrhagic conversion.       Vessel Imaging:  IR angio 2/4/2022:  Successful aspiration thrombectomy of the distal right internal carotid artery occlusion, with resulting TICI 3 flow.    CTA head/neck (2/4/22): Acute large vessel occlusion with distal right supraclinoid ICA filling defect extending into the anterior cerebral artery and middle cerebral artery branches as detailed above. Evidence of loss of gray-white matter interface of the deep right hemispheric structures consistent with early ischemia/infarction. Calcified plaque of the vertebral arteries, internal carotid arteries and of the common carotid artery bifurcation.     Cardiac Evaluation:   Last TTE (1/31/22): EF 45%, grade II LV diastolic dysfunction, no LA enlargement    Other:  CXR 2/9/2022  Increase in parenchymal and pleural disease, with obscuration of the heart borders due to increased perihilar consolidative opacities.
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
## 2022-02-13 NOTE — ASSESSMENT & PLAN NOTE
EF 55% with segmental WMA  Pulmonary edema and pleural effusion present on CXR in neuro ICU  Follow up CXR on 2/12 with improving pulmonary edema

## 2022-02-13 NOTE — PT/OT/SLP PROGRESS
"Occupational Therapy   Treatment    Name: Julia Schroeder  MRN: [id number]  Admitting Diagnosis:  Stroke due to occlusion of right carotid artery       Recommendations:     Discharge Recommendations: nursing facility, skilled  Discharge Equipment Recommendations:  bath bench,bedside commode,wheelchair,hospital bed  Barriers to discharge:  None    Assessment:     Julia Schroeder is a 76 y.o. female with a medical diagnosis of Stroke due to occlusion of right carotid artery.  She presents with performance deficits affecting function are weakness,gait instability,decreased upper extremity function,decreased lower extremity function,impaired balance,impaired endurance,impaired sensation,decreased safety awareness,impaired cognition,impaired self care skills,visual deficits,impaired functional mobilty,decreased coordination,abnormal tone,impaired fine motor,impaired coordination,decreased ROM.     Rehab Prognosis:  Good; patient would benefit from acute skilled OT services to address these deficits and reach maximum level of function.       Plan:     Patient to be seen 4 x/week to address the above listed problems via self-care/home management,neuromuscular re-education,cognitive retraining,sensory integration,therapeutic activities,therapeutic exercises  · Plan of Care Expires: 03/05/22  · Plan of Care Reviewed with: patient,sibling    Subjective   Patient:  During supine-sit, patient stating:  "Don't let me fall.  I don't want to fall."  While sitting up, patient stating:  "That feels good."  Pain/Comfort:  · Pain Rating 1: 0/10  · Pain Rating Post-Intervention 1: 0/10    Objective:     Communicated with: Nurse prior to session.  Patient found supine with bed alarm,telemetry,peripheral IV,blood pressure cuff,pressure relief boots,pulse ox (continuous),NG tube,restraints,SCD upon OT entry to room.  Sister present during the session.    General Precautions: Standard, aspiration,fall   Orthopedic Precautions:N/A   Braces: " N/A  Respiratory Status: Room air     Occupational Performance:     Bed Mobility:    · Patient completed Rolling/Turning to Left with  total assistance  · Patient completed Rolling/Turning to Right with total assistance  · Patient completed Supine to Sit with maximal assistance  · Patient completed Sit to Supine with total assistance     Activities of Daily Living:  · Feeding:  NPO    · Grooming: maximal assistance while seated EOB with left UE in weight bearing  · Upper Body Dressing: Total assist while seated EOB with left UE in weight bearing; assistance for postural control, assistance to guide clothing over left UE and around back and for fasteners.    [facility] 6 Click ADL: 7    Treatment & Education:  Patient education provided on role of OT and need for continued OT services upon discharge.  Patient education provided on hemiplegic dressing technique, left UE weight bearing / positioning, postural control, and attention to the left.  Daily orientation provided.   PROM performed left UE/LEs one set x 10 rep in all planes of motion with stretches provided at end range; sustained stretch provided for ankle dorsiflexion.  Assistance and facilitation provided for upward rotation of the scapula during shoulder flexion and abduction to promote orientation of glenoid fossa of scapula to humeral head for prevention of post-stroke hemiplegic pain.  Patient kept eyes open throughout the session.  Patient able to follow 3/4 one step commands.  Addressed left UE weight bearing while seated EOB.  Addressed increasing attention to the left and midline positioning.  Max assist with postural control while seated EOB.   Continued education, patient/ family training recommended.  Patient's functional status and disposition recommendation discussed with PA for stroke team.  OT asked if there were any other questions; patient/ family had no further questions.    Patient left supine with all lines intact, call button in reach and bed  alarm onEducation:      GOALS:   Multidisciplinary Problems     Occupational Therapy Goals        Problem: Occupational Therapy Goal    Goal Priority Disciplines Outcome Interventions   Occupational Therapy Goal     OT, PT/OT Ongoing, Progressing    Description: Goals set 2/13 to be addressed for 14 days with expiration date, 2/27:  Patient will increase functional independence with ADLs by performing:    Patient will demonstrate rolling to the right with max assist.  Not met   Patient will demonstrate rolling to the left with mod assist.   Not met  Patient will demonstrate supine -sit with mod assist.   Not met  Patient will demonstrate stand pivot transfers with max assist.   Not met  Patient will demonstrate grooming while seated with mod assist.   Not met  Patient will demonstrate upper body dressing with mod assist while seated EOB.   Not met  Patient will demonstrate lower body dressing with max assist while seated EOB.   Not met  Patient will demonstrate toileting with max assist.   Not met  Patient will demonstrate bathing while seated EOB with max assist.   Not met  Patient's family / caregiver will demonstrate independence and safety with assisting patient with self-care skills and functional mobility.     Not met  Patient's family / caregiver will demonstrate independence with providing ROM and changes in bed positioning.   Not met  Patient and/or patient's family will verbalize understanding of stroke prevention guidelines, personal risk factors and stroke warning signs via teachback method.  Not met                          Time Tracking:     OT Date of Treatment: 02/13/22  OT Start Time: 0553  OT Stop Time: 0633  OT Total Time (min): 40 min    Billable Minutes:Self Care/Home Management 15  Neuromuscular Re-education 25    OT/DEN: OT          2/13/2022

## 2022-02-14 PROBLEM — Z78.9 IMPAIRED MOBILITY AND ADLS: Status: ACTIVE | Noted: 2022-01-01

## 2022-02-14 PROBLEM — Z74.09 IMPAIRED MOBILITY AND ADLS: Status: ACTIVE | Noted: 2022-01-01
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
## 2022-02-14 NOTE — ASSESSMENT & PLAN NOTE
Area of cerebral edema identified when reviewing brain imaging in the territory of multiple cerebral arteries. We will continue to monitor with q4h neuro checks while on floor or more frequently while in neuro critical care, as any change in the patients clinical exam may signify expansion of the insult and/or the area of the edema. Such changes may require acute interventions to prevent loss of function and/or death.      Clinical exam continues to remain stable, this suggests that cerebral edema has stabilized. Low probability of clinical deterioration.
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
## 2022-02-14 NOTE — PT/OT/SLP PROGRESS
Physical Therapy Co-Treatment    Patient Name:  Julia Schroeder   MRN:  [id number]    Recommendations:     Discharge Recommendations:  nursing facility, skilled   Discharge Equipment Recommendations:  (TBD)   Barriers to discharge: Increased level of assist    Assessment:     Julia Schroeder is a 76 y.o. female admitted with a medical diagnosis of Stroke due to occlusion of right carotid artery. Patient tolerated session fairly but continues to demonstrate no head control in sitting. Eyes open 50% of session with cuing. Appears to be falling asleep at times. Possible trace activation of LLE when moving to sit edge of bed, unable to elicit sitting edge of bed.    She presents with the following impairments/functional limitations: weakness,impaired endurance,impaired self care skills,impaired functional mobilty,gait instability,impaired balance,decreased safety awareness,decreased lower extremity function,decreased upper extremity function,impaired fine motor,impaired coordination,decreased coordination,decreased ROM. These deficits affect their roles and responsibilities in which they were able to complete prior to admit. Julia Schroeder would continue to benefit from acute PT intervention to improve quality of life and focus on recovery of impairments. Once medically stable, recommending pt discharge to Skilled Nursing Facility.    Rehab Prognosis: Fair; patient continues to benefit from acute skilled PT services to address these deficits and reach maximum level of function.  Patient remains most appropriate to discharge to nursing facility, skilled.  Recent Surgery: * No surgery found *      Plan:     During this hospitalization, patient to be seen 4 x/week to address the identified rehab impairments via gait training,therapeutic activities,therapeutic exercises,neuromuscular re-education and progress toward the following goals:    · Plan of Care Expires:  03/08/22    Subjective     Chief Complaint: Fatigue  Patient/Family  "Comments/Goals: "I'm worn out"  Pain/Comfort:  · Pain Rating 1: 0/10  · Pain Rating Post-Intervention 1: 0/10    Objective:     Communicated with RN prior to session. Patient found HOB elevated with bed alarm,telemetry,peripheral IV,PureWick,NG tube upon PT entry to room.     General Precautions: Standard, aspiration,fall,NPO   Orthopedic Precautions:N/A   Braces: N/A    Functional Mobility:  · Bed Mobility:     · Rolling Right: total assistance of 2 persons  · Scooting: total assistance to scoot anteriorly to EOB, dependent to scoot to HOB via drawsheet  · Supine to Sit: total assistance of 2 persons  · Sit to Supine: total assistance of 2 persons  · Transfers:  Deferred due to poor sitting balance   · Balance:   · Static Sitting: Poor, able to maintain for 15 minute(s) with maximal - total assistance, no head control noted on multiple trials, patient with LOB to the left, posteriorly, and forward but unable to correct with cuing, provided verbal and tactile cuing for upright posture and orientation to vertical  · Dynamic Sitting: Poor: Patient unable to accept challenge or move without loss of balance, total assistance    AM-PAC 6 CLICK MOBILITY  Turning over in bed (including adjusting bedclothes, sheets and blankets)?: 1  Sitting down on and standing up from a chair with arms (e.g., wheelchair, bedside commode, etc.): 1  Moving from lying on back to sitting on the side of the bed?: 1  Moving to and from a bed to a chair (including a wheelchair)?: 1  Need to walk in hospital room?: 1  Climbing 3-5 steps with a railing?: 1  Basic Mobility Total Score: 6     Therapeutic Activities and Exercises:  Patient educated on role of acute care PT and PT POC  Patient performed 1 set(s) of 5 repetitions of  the following seated exercises: ankle pumps, long arc quads and marches for left LE. Patient required skilled PT for instruction of exercises and appropriate cues to perform exercises safely and appropriately. " PROM    Patient left HOB elevated with all lines intact, call button in reach, RN notified, bed alarm on and family present.    GOALS:   Multidisciplinary Problems     Physical Therapy Goals        Problem: Physical Therapy Goal    Goal Priority Disciplines Outcome Goal Variances Interventions   Physical Therapy Goal     PT, PT/OT Ongoing, Progressing     Description: PT goals until 2/20/22    1. Pt supine to sit with moderate assist-not met  2. Pt sit to supine with moderate assist-not met  3. Pt sit to stand with LRAD as needed for safety with max assist-not met  4. Pt to perform gait 2 steps with LRAD as needed for safety with max assist.-not met  5. Pt to transfer bed to/from bedside chair with LRAD for safety with max assist.-not met  6. Pt to perform B LE exs in sitting or supine x 15 reps to strengthen B LE to improve functional mobility.-not met  7. Pt to propel w/c 10ft with R UE/LE on level surface with minimal assist-not met  8. Pt to be able to sit on the EOB 10 min with CGA to perform functional activities-not met                       Time Tracking:     PT Received On: 02/14/22  PT Start Time: 1137     PT Stop Time: 1200  PT Total Time (min): 23 min     Billable Minutes: Therapeutic Activity 15 min and Neuromuscular Re-education 8 min       PT/PTA: PT     PTA Visit Number: 0     02/14/2022    Co-treatment performed for this visit due to patient need for two skilled therapists to ensure patient and staff safety and to accommodate for patient activity tolerance/pain management
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
## 2022-02-14 NOTE — PLAN OF CARE
Problem: Diabetes Comorbidity  Goal: Blood Glucose Level Within Targeted Range  Outcome: Ongoing, Progressing  Intervention: Monitor and Manage Glycemia  Flowsheets (Taken 2/14/2022 2528)  Glycemic Management:   blood glucose monitored   supplemental insulin given   .Plan of care reviewed with patient and sibling. Patient nods in understanding of POC and sibling educated on patients care and POC. All questions addressed. Aspiration and fall precautions maintained throughout shift. VSS. PIV maintained. Glucose is stable. No neuro changes during shift. Patient resting comfortably.

## 2022-02-14 NOTE — PLAN OF CARE
Jeremiah Bautista - Neurosurgery ([facility])  Discharge Reassessment    Primary Care Provider: Devon Lovell MD    Expected Discharge Date: 2/18/2022     Not medically ready, pending possible PEG. CM sent additional referrals for SNF:    [place] [facility] [phone]  [facility] [phone]  [facility] [phone]  [facility] [phone]  [facility] [phone]  [facility] [phone]  [facility] [phone]  [facility] [phone]  [facility] [phone]  [facility] [phone]  [facility] [phone]  [facility] [phone]  [facility] [phone]  [facility] [phone]      Reassessment (most recent)     Discharge Reassessment - 02/14/22 1254        Discharge Reassessment    Assessment Type Discharge Planning Reassessment     Did the patient's condition or plan change since previous assessment? No     Discharge Plan discussed with: Adult children     Communicated ROGER with patient/caregiver Yes     Discharge Plan A Skilled Nursing Facility     Discharge Plan B Hospice/home     DME Needed Upon Discharge  none     Discharge Barriers Identified None     Why the patient remains in the hospital Requires continued medical care        Post-Acute Status    Post-Acute Authorization Placement     Post-Acute Placement Status Referrals Sent     Discharge Delays None known at this time
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
## 2022-02-14 NOTE — CARE UPDATE
RAPID RESPONSE NURSE ROUND       Rounding completed with charge RNAisha reports is scheduled for a GOC discussion later today. No additional concerns verbalized at this time. Instructed to call [phone] for further concerns or assistance.

## 2022-02-14 NOTE — HOSPITAL COURSE
OT- 02/13    Bed Mobility:    Patient completed Rolling/Turning to Left with  total assistance  Patient completed Rolling/Turning to Right with total assistance  Patient completed Supine to Sit with maximal assistance  Patient completed Sit to Supine with total assistance      Activities of Daily Living:  Feeding:  NPO    Grooming: maximal assistance while seated EOB with left UE in weight bearing  Upper Body Dressing: Total assist while seated EOB with left UE in weight bearing; assistance for postural control, assistance to guide clothing over left UE and around back and for fasteners.       PT- 02/11    Functional Mobility:  Bed Mobility:     Rolling Right: maximal assistance  Scooting: total assistance  Supine to Sit: total assistance  Sit to Supine: total assistance  Balance: Max-Total assist for balance and head support    SLP- 02/11    NPO- May require PEG tube placement.

## 2022-02-14 NOTE — CARE UPDATE
RAPID RESPONSE NURSE ROUND       Rounding completed with charge RN, Caroline. No concerns verbalized at this time. Instructed to call [phone] for further concerns or assistance.

## 2022-02-14 NOTE — PLAN OF CARE
Problem: Cerebral Tissue Perfusion (Stroke, Ischemic/Transient Ischemic Attack)  Goal: Optimal Cerebral Tissue Perfusion  Outcome: Ongoing, Progressing     Problem: Communication Impairment (Stroke, Ischemic/Transient Ischemic Attack)  Goal: Improved Communication Skills  Outcome: Ongoing, Progressing     Problem: Swallowing Impairment (Stroke, Ischemic/Transient Ischemic Attack)  Goal: Optimal Eating and Swallowing without Aspiration  Outcome: Ongoing, Progressing   Stroke booklet at bedside. Pt failed swallow eval for both thin and thick liquid done by [facility] discussed plan of care with family regarding long term route for nutrition; possible peg tube placement and SNF placement. Pt is stable; no change in neuro status. Pt is drowsy and very weak. Pt is able to follow simple command. Awake, alert to place and person. Able to verbalized her basic needs.

## 2022-02-14 NOTE — ASSESSMENT & PLAN NOTE
Na 151->154->156->154>152-->151 (will continue to monitor)   Increased FWF to 400 QID  Sodium q4h  Will add bolus 1/2 NS if needed following next sodium check this evening
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
## 2022-02-14 NOTE — ASSESSMENT & PLAN NOTE
-MRI Brain WO with moderate to large R MCA infarct, hemorrhagic conversion of  R BG/insula, and R cerebellar acute infarct.  -Continue Plavix and Aspirin.  -Continue Statin.  -PT/OT/SLP.  -Possible PEG tube placement 02/15.  -Keep SBP < 160 per Vascular Neurology.
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
## 2022-02-14 NOTE — ASSESSMENT & PLAN NOTE
2/2 stroke  SLP consulted  Patient NPO at the moment with NGT in place  Initiation of discussion for possible need for PEG held by NCC and family  Failed repeat SLP evaluation. Will follow up discussion regarding PEG v. Hospice tomorrow

## 2022-02-14 NOTE — CARE UPDATE
RAPID RESPONSE NURSE FOLLOW-UP NOTE       Followed up with patient for charge rounding.  Patient placed on Visi monitor, current BP reading 85/64.   Charge RN, Aisha to recheck and recalibrate monitor.  Team will continue to follow.  Please call Rapid Response RN, Alexus Breaux RN with any questions or concerns at [phone].

## 2022-02-14 NOTE — SUBJECTIVE & OBJECTIVE
Neurologic Chief Complaint: AMS, LSW    Subjective:     Interval History: Patient is seen for follow-up neurological assessment and treatment recommendations: Patient more alert this morning but during rounding (11am) was back to being lethargic. Modafinil started. Failed swallow study, will need to address GOC moving forward concerning [facility].     HPI, Past Medical, Family, and Social History remains the same as documented in the initial encounter.     Review of Systems   Constitutional: Positive for fatigue. Negative for fever.   HENT: Positive for trouble swallowing. Negative for drooling.    Respiratory: Negative for cough and shortness of breath.    Cardiovascular: Negative for chest pain.   Gastrointestinal: Negative for abdominal pain, diarrhea and vomiting.   Neurological: Positive for facial asymmetry, speech difficulty, weakness and numbness.   Psychiatric/Behavioral: Negative for agitation and behavioral problems. The patient is not nervous/anxious.      Scheduled Meds:   aspirin  81 mg Per NG tube Daily    atorvastatin  40 mg Per NG tube Daily    clopidogreL  75 mg Per NG tube Daily    donepeziL  10 mg Per NG tube Daily    heparin (porcine)  5,000 Units Subcutaneous Q8H    insulin aspart U-100  8 Units Subcutaneous Q4H    insulin detemir U-100  12 Units Subcutaneous BID    memantine  10 mg Per NG tube BID    modafiniL  100 mg Per NG tube Daily     Continuous Infusions:   dextrose 10 % in water (D10W)       PRN Meds:dextrose 10 % in water (D10W), dextrose 10 % in water (D10W), dextrose 10 % in water (D10W), glucagon (human recombinant), insulin aspart U-100, sodium chloride 0.9%    Objective:     Vital Signs (Most Recent):  Temp: 99.1 °F (37.3 °C) (02/14/22 1208)  Pulse: 108 (02/14/22 1523)  Resp: (!) 23 (02/14/22 1500)  BP: 93/67 (02/14/22 1500)  SpO2: 96 % (02/14/22 1500)  BP Location: Left arm    Vital Signs Range (Last 24H):  Temp:  [97.2 °F (36.2 °C)-99.1 °F (37.3 °C)]   Pulse:   [102-112]   Resp:  [18-23]   BP: ()/(50-67)   SpO2:  [92 %-96 %]   BP Location: Left arm    Physical Exam  Vitals and nursing note reviewed.   Constitutional:       General: She is not in acute distress.     Comments: Alert this morning, increasingly became more drowsy    HENT:      Head: Normocephalic and atraumatic.      Right Ear: External ear normal.      Left Ear: External ear normal.      Nose: Nose normal.      Mouth/Throat:      Mouth: Mucous membranes are dry.   Eyes:      General: No scleral icterus.        Right eye: No discharge.         Left eye: No discharge.   Cardiovascular:      Rate and Rhythm: Normal rate.   Pulmonary:      Effort: Pulmonary effort is normal. No respiratory distress.   Abdominal:      General: Abdomen is flat. There is no distension.   Musculoskeletal:      Right lower leg: No edema.      Left lower leg: No edema.      Comments: LUE edema   Skin:     General: Skin is warm and dry.   Neurological:      Mental Status: She is alert.      Cranial Nerves: Facial asymmetry present.      Sensory: Sensory deficit present.      Motor: Weakness (L hemiparesis) present.   Psychiatric:         Behavior: Behavior normal.         Neurological Exam:   LOC: alert   Attention Span: Good  Language: No aphasia  Articulation: Dysarthria  EOM (CN III, IV, VI): Gaze preference  Right   Facial Movement (CN VII): Lower facial weakness on the Left  Motor: Arm left  Plegia 0/5  Leg left  Plegia 0/5  Arm right 3/5  Leg right 3/5  Sensation: Phan-hypoesthesia left  Tone: Flaccid LUE    Laboratory:  CMP:   Recent Labs   Lab 02/14/22  0716   CALCIUM 8.6*   ALBUMIN 2.1*   PROT 5.9*   *   K 4.0   CO2 27   *   BUN 20   CREATININE 0.7   ALKPHOS 93   ALT 20   AST 37   BILITOT 0.3     CBC:   Recent Labs   Lab 02/14/22  0716   WBC 13.88*   RBC 3.28*   HGB 9.6*   HCT 32.1*      MCV 98   MCH 29.3   MCHC 29.9*     Lipid Panel: No results for input(s): CHOL, LDLCALC, HDL, TRIG in the last 168  hours.  Coagulation:   No results for input(s): PT, INR, APTT in the last 168 hours.  Platelet Aggregation Study: No results for input(s): PLTAGG, PLTAGINTERP, PLTAGREGLACO, ADPPLTAGGREG in the last 168 hours.  Hgb A1C: No results for input(s): HGBA1C in the last 168 hours.  TSH:   No results for input(s): TSH in the last 168 hours.    Diagnostic Results     Brain imaging:  MRI Brain WO 2/5/2022    Moderate to large acute right MCA territory infarction with susceptibility associated with the right basal ganglia and insular components compatible with hemorrhagic conversion.  Mass effect without midline shift or hydrocephalus.     Small sized ipsilateral right cerebellar acute infarction with punctate contralateral left hippocampal focus of diffusion restriction concerning for possible superimposed transient global amnesia.    [facility] 2/5/2022  Findings consistent with recent ischemia/infarct involving the right MCA distribution again noted, previously identified hyperdensity involving the basal ganglia and caudate on the right again noted, configuration is stable, degree of density is mildly diminished, there is continued mass effect and mild right to left midline shift appearing stable without evidence for significant interval detrimental change.    [facility] 2/4/2022 1741  Hyperdensity within the right basal ganglia insular cortex confined to the gray matter and sparing the internal cortex.  Findings likely representing contrast staining from recent angiogram given altered flow dynamics in the infarcted territory.  Hemorrhagic conversion is less likely. 0.2 cm leftward midline shift.    [facility] (2/4/22) 1415: Continued evolution of acute right MCA distribution infarct with developing minimal right-sided mass effect.  No midline shift or evidence for hemorrhagic conversion.       Vessel Imaging:  IR angio 2/4/2022:  Successful aspiration thrombectomy of the distal right internal carotid artery occlusion, with resulting TICI  3 flow.    CTA head/neck (2/4/22): Acute large vessel occlusion with distal right supraclinoid ICA filling defect extending into the anterior cerebral artery and middle cerebral artery branches as detailed above. Evidence of loss of gray-white matter interface of the deep right hemispheric structures consistent with early ischemia/infarction. Calcified plaque of the vertebral arteries, internal carotid arteries and of the common carotid artery bifurcation.     Cardiac Evaluation:   Last TTE (1/31/22): EF 45%, grade II LV diastolic dysfunction, no LA enlargement    Other:  CXR 2/9/2022  Increase in parenchymal and pleural disease, with obscuration of the heart borders due to increased perihilar consolidative opacities.

## 2022-02-14 NOTE — PT/OT/SLP PROGRESS
Occupational Therapy Co-Treatment  Co-treat with PT due to medical complexity of pt and to optimize functional performance.      Patient Name:  Julia Schroeder   MRN:  [id number]  Admit Date: 2/4/2022  Admitting Diagnosis:  Stroke due to occlusion of right carotid artery   Length of Stay: 10 days  Recent Surgery: * No surgery found *      Recommendations:     Discharge Recommendations: nursing facility, skilled  Discharge Equipment Recommendations:  other (see comments) (TBD (progress pending))  Barriers to discharge:  Other (Comment) (Increased skilled assistance required)    Plan:     Patient to be seen 4 x/week to address the above listed problems via self-care/home management,therapeutic activities,therapeutic exercises,neuromuscular re-education  · Plan of Care Expires: 03/05/22  · Plan of Care Reviewed with: patient,family    Assessment:   Julia Schroeder is a 76 y.o. female with a medical diagnosis of Stroke due to occlusion of right carotid artery.  She presents with the following performance deficits affecting function: weakness,impaired endurance,impaired self care skills,impaired functional mobilty,gait instability,impaired balance,decreased safety awareness,decreased lower extremity function,decreased upper extremity function,impaired fine motor,impaired coordination,decreased coordination,decreased ROM.      Pt tolerated session fairly this date and was willing to participate with increased encouragement. Demonstrating continued decreased activity tolerance, impaired endurance, increased weakness, continued right gaze preference, impaired balance, poor trunk stability/postural control, and decreased safety awareness, requiring increased assistance to complete functional tasks. Patient continues to require Total A x 2 for bed mobility and Max-Total A for EOB sitting, patient with increased leans (anterior, posterior, lateral) unable to correct despite increased cueing. Patient with eyes open ~50% of session and  "required cueing, no observed head stability, continues FF with right side preference. PROM performed to LUE while seated EOB for shoulder flexion, scapular elevation and retraction. Patient is progressing towards established goals, and continues to benefit from acute skilled OT services to increase functional performance and improve quality of life. OT to continue to recommend SNF at discharge to improve pt functional independence and increase patient safety before returning home.      Rehab Prognosis: Fair; patient would benefit from acute skilled OT services to address these deficits and reach maximum level of function.        Subjective   Communicated with: Nurse prior to session.  Patient found HOB elevated with bed alarm,telemetry,peripheral IV,PureWick,NG tube upon OT entry to room. Pt agreeable to participate at this time.    Patient: " I'm worn out "    Pain/Comfort:  Pain Rating 1: 0/10  Pain Rating Post-Intervention 1: 0/10    Objective:   Patient found with: bed alarm,telemetry,peripheral IV,PureWick,NG tube   General Precautions: Standard, Cardiac aspiration,fall,NPO   Orthopedic Precautions:N/A   Braces: N/A   Oxygen Device: Room Air  Vitals: BP 94/60 (Patient Position: Lying)   Pulse (!) 112   Temp 99.1 °F (37.3 °C) (Oral)   Resp 18   Ht 5' 2" (1.575 m)   Wt 64.4 kg (141 lb 15.6 oz)   LMP  (LMP Unknown)   SpO2 (!) 93%   BMI 25.97 kg/m²     Outcome Measures:  AMPAC 6 Click ADL: 7    Cognition:   · Alert  · Command following: inattentive  · Communication: patient with limited communication this date    Occupational Performance:  Bed Mobility:    · Patient completed Rolling/Turning to Right with total assistance and 2 persons  · Patient completed Supine to Sit with total assistance and 2 persons on R side of bed  · Scooting anteriorly to EOB to have both feet planted on floor: total assistance  · Patient completed Sit to Supine with total assistance and 2 persons on R side of bed  · Scooting to " HOB in supine: dependent and drawsheet pull    Functional Mobility/Transfers:   Static Sitting EOB: Max-Total A   Deferred additional functional mobility      Activities of Daily Living:  · Lower Body Dressing: total assistance to don socks    AMPA 6 Click ADL:  AMPA Total Score: 7    Treatment & Education:  -Pt and family education on OT role and POC.  -Importance of E/OOB activity with staff assistance, emphasis on daily participation  -PROM LUE x 10: shoulder flexion (seated EOB); elbow and wrist flexion/extension (HOB elevated)  -PROM LUE scapular retraction and elevation x 5 seated EOB  -Safety during functional transfer and mobility ensured  -Patient provided with education on importance of Bilateral UB/LB integration during functional tasks for improvement in functional performance.   -Education provided/reviewed, questions answered within OT scope of practice.   -Patient will require continued reinforcement to demonstrate understanding and learning this date.         Patient left HOB elevated with all lines intact, call button in reach, bed alarm on, nurse  notified and family present    GOALS:   Multidisciplinary Problems     Occupational Therapy Goals        Problem: Occupational Therapy Goal    Goal Priority Disciplines Outcome Interventions   Occupational Therapy Goal     OT, PT/OT Ongoing, Progressing    Description: Goals set 2/13 to be addressed for 14 days with expiration date, 2/27:  Patient will increase functional independence with ADLs by performing:    Patient will demonstrate rolling to the right with max assist.  Not met   Patient will demonstrate rolling to the left with mod assist.   Not met  Patient will demonstrate supine -sit with mod assist.   Not met  Patient will demonstrate stand pivot transfers with max assist.   Not met  Patient will demonstrate grooming while seated with mod assist.   Not met  Patient will demonstrate upper body dressing with mod assist while seated EOB.   Not  met  Patient will demonstrate lower body dressing with max assist while seated EOB.   Not met  Patient will demonstrate toileting with max assist.   Not met  Patient will demonstrate bathing while seated EOB with max assist.   Not met  Patient's family / caregiver will demonstrate independence and safety with assisting patient with self-care skills and functional mobility.     Not met  Patient's family / caregiver will demonstrate independence with providing ROM and changes in bed positioning.   Not met  Patient and/or patient's family will verbalize understanding of stroke prevention guidelines, personal risk factors and stroke warning signs via teachback method.  Not met                          Time Tracking:     OT Date of Treatment: 02/14/22  OT Start Time: 1137  OT Stop Time: 1200  OT Total Time (min): 23 min    Billable Minutes:Therapeutic Exercise 10  Neuromuscular Re-education 13      2/14/2022

## 2022-02-14 NOTE — PLAN OF CARE
Problem: SLP Goal  Goal: SLP Goal  Description: Goals to be met 2/21  1 Pt will participate in ongoing swallow eval  2 Pt will Ox3 given mod cues.   3 Pt will answer simple y/n questions with 90% accy.   4. Pt will answer simple open ended questions with 80% accy.   5. Pt will participate in ongoing assessment of language and cognition as participation improves.   6. Pt will attend to SLP at midline given max cues x1.   Outcome: Ongoing, Progressing    Rec cont npo with strict aspiration precautions.

## 2022-02-15 NOTE — NURSING
"Patient pulled out her NG tube while family member was at bedside. Family stated "the tube was coming out today anyway." Paged vascular stroke team to advise, waiting to hear back.   "
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
## 2022-02-15 NOTE — CARE UPDATE
RAPID RESPONSE NURSE ROUND       Rounding completed with charge RN, Anita. No concerns verbalized at this time. Instructed to call [phone] for further concerns or assistance.

## 2022-02-15 NOTE — PLAN OF CARE
Problem: Fall Injury Risk  Goal: Absence of Fall and Fall-Related Injury  Outcome: Ongoing, Progressing  Intervention: Promote Injury-Free Environment  Flowsheets (Taken 2/15/2022 5200)  Safety Promotion/Fall Prevention:   Fall Risk reviewed with patient/family   bed alarm set   family to remain at bedside   side rails raised x 3     Problem: Adjustment to Illness (Stroke, Ischemic/Transient Ischemic Attack)  Goal: Optimal Coping  Outcome: Ongoing, Progressing  Intervention: Support Psychosocial Response to Stroke  Flowsheets (Taken 2/15/2022 0453)  Supportive Measures:   active listening utilized   goal-setting facilitated  Family/Support System Care:   involvement promoted   presence promoted     Plan of care reviewed with patients family at bedside. Patients family demonstrated understanding of POC. All questions addressed. Aspiration and fall precautions maintained throughout shift. VSS. PIV maintained. No neuro changes during shift. Patient resting comfortably. Educated family on the importance of safety measures for the patient such as pulling on devices.

## 2022-02-15 NOTE — ASSESSMENT & PLAN NOTE
2/2 stroke  SLP consulted  Patient NPO at the moment with NGT in place  Initiation of discussion for possible need for PEG held by NCC and family  Failed repeat SLP evaluation.  Decision for PEG made, will go tomorrow with IR for procedure
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
## 2022-02-15 NOTE — CARE UPDATE
RAPID RESPONSE NURSE ROUND       Rounding completed with charge RNOma. No concerns verbalized at this time. Instructed to call [phone] for further concerns or assistance.

## 2022-02-15 NOTE — PROGRESS NOTES
"Jeremiah Bautista - Neurosurgery [facility])  Adult Nutrition  Progress Note    SUMMARY       Recommendations    1. Resume Glucerna 1.4 advancing as tolerated until goal of 40 mL/hr providing pt with 1440 kcal, 79 g protein, and 729 mL free water     2. ADAT to Diabetic with texture per SLP     3. Monitor and follow-up    Goals: Pt to receive nutrition by RD f/u  Nutrition Goal Status: progressing towards goal  Communication of RD Recs: other (comment) (POC)    Assessment and Plan    Nutrition Problem  Swallowing difficulty     Related to (etiology):   Post-CVA complications     Signs and Symptoms (as evidenced by):   Results of swallowing test and SLP-recommended NPO status     Interventions (treatment strategy):  Enteral nutrition  Collaboration of nutrition care with other providers     Nutrition Diagnosis Status:   Continues    Reason for Assessment    Reason For Assessment: RD follow-up  Diagnosis: other (see comments) (Possible acute CVA)  Relevant Medical History: T2DM, HTN, HLD, dementia  Interdisciplinary Rounds: did not attend    General Information Comments: Pt KAR at time of visit. Pulled NGT out. Possible plans for PEG placement. Unable to complete NFPE due pt being KAR. Per chart pt's wt appears relatively stable x 4 months. Pt may be at risk for actue malnutrition; will monitor closely for wt loss and s/s malnutrition.    Nutrition Discharge Planning: Pending medical course    Nutrition Risk Screen    Nutrition Risk Screen: tube feeding or parenteral nutrition    Nutrition/Diet History    Spiritual, Cultural Beliefs, Bahai Practices, Values that Affect Care: no  Food Allergies:  (MIGUEL ANGEL)  Factors Affecting Nutritional Intake: NPO    Anthropometrics    Temp: 98.5 °F (36.9 °C)  Height: 5' 2" (157.5 cm)  Height (inches): 62 in  Weight Method: Bed Scale  Weight: 64.4 kg (141 lb 15.6 oz)  Weight (lb): 141.98 lb  Ideal Body Weight (IBW), Female: 110 lb  % Ideal Body Weight, Female (lb): 129.07 %  BMI (Calculated): " 26  BMI Grade: 25 - 29.9 - overweight       Lab/Procedures/Meds    Pertinent Labs Reviewed: reviewed  Pertinent Labs Comments: Na 147, glu 155  Pertinent Medications Reviewed: reviewed  Pertinent Medications Comments: Statin, heparin, insulin    Estimated/Assessed Needs    Weight Used For Calorie Calculations: 64.4 kg (141 lb 15.6 oz)  Energy Calorie Requirements (kcal): 8676-6197 kcal (25-30 kcal/kg)  Energy Need Method: Kcal/kg  Protein Requirements: 65-75 g (1.0-1.2 g/kg)  Weight Used For Protein Calculations: 64.4 kg (141 lb 15.6 oz)     Estimated Fluid Requirement Method: other (see comments) (1 mL/kcal or per MD)  RDA Method (mL): 1610  CHO Requirement: 200 g      Nutrition Prescription Ordered    Current Diet Order: NPO  Nutrition Order Comments: TF held  Current Nutrition Support Formula Ordered: Glucerna 1.5  Current Nutrition Support Rate Ordered: 40 (ml)  Current Nutrition Support Frequency Ordered: x 24 hrs    Evaluation of Received Nutrient/Fluid Intake    I/O: +7.1L since admit  Energy Calories Required: not meeting needs  Protein Required: not meeting needs  Fluid Required: other (see comments) (Fluid per MD)  Comments: LBM: 2/8  Tolerance: tolerating  % Intake of Estimated Energy Needs: 0 - 25 %  % Meal Intake: NPO    Nutrition Risk    Level of Risk/Frequency of Follow-up: low     Monitor and Evaluation    Food and Nutrient Intake: energy intake,enteral nutrition intake  Food and Nutrient Adminstration: enteral and parenteral nutrition administration  Knowledge/Beliefs/Attitudes: beliefs and attitudes  Physical Activity and Function: nutrition-related ADLs and IADLs  Anthropometric Measurements: weight,weight change,body mass index  Biochemical Data, Medical Tests and Procedures: electrolyte and renal panel,gastrointestinal profile,glucose/endocrine profile,inflammatory profile,lipid profile  Nutrition-Focused Physical Findings: overall appearance     Nutrition Follow-Up    RD Follow-up?: Yes

## 2022-02-15 NOTE — SUBJECTIVE & OBJECTIVE
Neurologic Chief Complaint: AMS, LSW    Subjective:     Interval History: Patient is seen for follow-up neurological assessment and treatment recommendations:     Continued the discussion for decision about PEG with son today. Decision made to move forward with PEG.  IR consulted. Patient will go tomorrow for peg. Patient pulled out NG, has been replaced in order to administer contrast this evening.     HPI, Past Medical, Family, and Social History remains the same as documented in the initial encounter.     Review of Systems   Constitutional: Positive for fatigue. Negative for fever.   HENT: Positive for trouble swallowing. Negative for drooling.    Respiratory: Negative for cough and shortness of breath.    Cardiovascular: Negative for chest pain.   Gastrointestinal: Negative for abdominal pain, diarrhea and vomiting.   Neurological: Positive for facial asymmetry, speech difficulty, weakness and numbness.   Psychiatric/Behavioral: Negative for agitation and behavioral problems. The patient is not nervous/anxious.      Scheduled Meds:   aspirin  81 mg Per NG tube Daily    atorvastatin  40 mg Per NG tube Daily    barium  450 mL Oral Once    clopidogreL  75 mg Per NG tube Daily    donepeziL  10 mg Per NG tube Daily    heparin (porcine)  5,000 Units Subcutaneous Q8H    insulin aspart U-100  8 Units Subcutaneous Q4H    insulin detemir U-100  12 Units Subcutaneous BID    memantine  10 mg Per NG tube BID    modafiniL  100 mg Per NG tube Daily     Continuous Infusions:   dextrose 10 % in water (D10W)       PRN Meds:barium, dextrose 10 % in water (D10W), dextrose 10 % in water (D10W), dextrose 10 % in water (D10W), glucagon (human recombinant), insulin aspart U-100, sodium chloride 0.9%    Objective:     Vital Signs (Most Recent):  Temp: 98.5 °F (36.9 °C) (02/14/22 2335)  Pulse: 110 (02/15/22 0700)  Resp: (!) 22 (02/14/22 2100)  BP: 97/72 (02/15/22 0354)  SpO2: 97 % (02/14/22 2100)  BP Location: Left  arm    Vital Signs Range (Last 24H):  Temp:  [97.8 °F (36.6 °C)-99.1 °F (37.3 °C)]   Pulse:  [107-116]   Resp:  [18-23]   BP: (93-99)/(60-72)   SpO2:  [93 %-97 %]   BP Location: Left arm    Physical Exam  Vitals and nursing note reviewed.   Constitutional:       General: She is not in acute distress.     Comments: Drowsy   HENT:      Head: Normocephalic and atraumatic.      Right Ear: External ear normal.      Left Ear: External ear normal.      Nose: Nose normal.      Mouth/Throat:      Mouth: Mucous membranes are dry.      Comments: White tinted secretions with thin coating on tongue   Eyes:      General: No scleral icterus.        Right eye: No discharge.         Left eye: No discharge.   Cardiovascular:      Rate and Rhythm: Normal rate.   Pulmonary:      Effort: Pulmonary effort is normal. No respiratory distress.   Abdominal:      General: Abdomen is flat. There is no distension.   Musculoskeletal:      Right lower leg: No edema.      Left lower leg: No edema.      Comments: LUE edema   Skin:     General: Skin is warm and dry.   Neurological:      Mental Status: She is alert.      Cranial Nerves: Facial asymmetry present.      Sensory: Sensory deficit present.      Motor: Weakness (L hemiparesis) present.   Psychiatric:         Behavior: Behavior normal.         Neurological Exam:   LOC: drowsy   Attention Span: poor  Language: No aphasia  Articulation: Dysarthria  EOM (CN III, IV, VI): Gaze preference  Right   Facial Movement (CN VII): Lower facial weakness on the Left  Motor: Arm left  Plegia 0/5  Leg left  Plegia 0/5  Arm right 3/5  Leg right 3/5  Sensation: Phan-hypoesthesia left  Tone: Flaccid LUE    Laboratory:  CMP:   Recent Labs   Lab 02/15/22  0440 02/15/22  0440 02/15/22  0819   CALCIUM 8.7  --   --    ALBUMIN 2.0*  --   --    PROT 5.8*  --   --    *   < > 147*   K 3.6  --   --    CO2 28  --   --      --   --    BUN 19  --   --    CREATININE 0.7  --   --    ALKPHOS 88  --   --    ALT 23   --   --    AST 41*  --   --    BILITOT 0.3  --   --     < > = values in this interval not displayed.     CBC:   Recent Labs   Lab 02/15/22  0440   WBC 13.51*   RBC 3.06*   HGB 8.9*   HCT 29.6*      MCV 97   MCH 29.1   MCHC 30.1*     Lipid Panel: No results for input(s): CHOL, LDLCALC, HDL, TRIG in the last 168 hours.  Coagulation:   No results for input(s): PT, INR, APTT in the last 168 hours.  Platelet Aggregation Study: No results for input(s): PLTAGG, PLTAGINTERP, PLTAGREGLACO, ADPPLTAGGREG in the last 168 hours.  Hgb A1C: No results for input(s): HGBA1C in the last 168 hours.  TSH:   No results for input(s): TSH in the last 168 hours.    Diagnostic Results     Brain imaging:  MRI Brain WO 2/5/2022    Moderate to large acute right MCA territory infarction with susceptibility associated with the right basal ganglia and insular components compatible with hemorrhagic conversion.  Mass effect without midline shift or hydrocephalus.     Small sized ipsilateral right cerebellar acute infarction with punctate contralateral left hippocampal focus of diffusion restriction concerning for possible superimposed transient global amnesia.    [facility] 2/5/2022  Findings consistent with recent ischemia/infarct involving the right MCA distribution again noted, previously identified hyperdensity involving the basal ganglia and caudate on the right again noted, configuration is stable, degree of density is mildly diminished, there is continued mass effect and mild right to left midline shift appearing stable without evidence for significant interval detrimental change.    [facility] 2/4/2022 1741  Hyperdensity within the right basal ganglia insular cortex confined to the gray matter and sparing the internal cortex.  Findings likely representing contrast staining from recent angiogram given altered flow dynamics in the infarcted territory.  Hemorrhagic conversion is less likely. 0.2 cm leftward midline shift.    [facility] (2/4/22) 1415:  Continued evolution of acute right MCA distribution infarct with developing minimal right-sided mass effect.  No midline shift or evidence for hemorrhagic conversion.       Vessel Imaging:  IR angio 2/4/2022:  Successful aspiration thrombectomy of the distal right internal carotid artery occlusion, with resulting TICI 3 flow.    CTA head/neck (2/4/22): Acute large vessel occlusion with distal right supraclinoid ICA filling defect extending into the anterior cerebral artery and middle cerebral artery branches as detailed above. Evidence of loss of gray-white matter interface of the deep right hemispheric structures consistent with early ischemia/infarction. Calcified plaque of the vertebral arteries, internal carotid arteries and of the common carotid artery bifurcation.     Cardiac Evaluation:   Last TTE (1/31/22): EF 45%, grade II LV diastolic dysfunction, no LA enlargement    Other:  CXR 2/9/2022  Increase in parenchymal and pleural disease, with obscuration of the heart borders due to increased perihilar consolidative opacities.

## 2022-02-15 NOTE — PT/OT/SLP PROGRESS
"Speech Language Pathology Treatment    Patient Name:  Julia Schroeder   MRN:  [id number]  Admitting Diagnosis: Stroke due to occlusion of right carotid artery    Recommendations:                 General Recommendations:  Dysphagia therapy, Speech/language therapy and Cognitive-linguistic therapy  Diet recommendations:  NPO, Liquid Diet Level: NPO   Aspiration Precautions: Frequent oral care and Strict aspiration precautions   General Precautions: Standard, aspiration,fall,NPO  Communication strategies:  provide increased time to answer and go to room if call light pushed    Subjective     "uh huh"     Pain/Comfort:  · Pain Rating 1: 0/10  · Pain Rating Post-Intervention 1: 0/10    Respiratory Status: Room air    Objective:     Has the patient been evaluated by SLP for swallowing?   Yes  Keep patient NPO? No     Pt seen bedside, lethargic with eyes closed t/o session.  Pt followed simple commands x2 only today.  Simple y/n questions answered with 80% accy.  She was oriented to self and place via y/n questions.  Frequent no response elicited.  No verbalizations in response to open ended questions today.  Head positing was turned to L today though when eyes opened briefly x1, gaze remained to far R.  Oral care provided 2/2 pooled cloudy secretions.  Pt did relax jaw to allow for cleaning of lingual surface.  White coating evident.  Stroke team notified.  PO trials not attempted 2/2 wet vocal quality and decreased alertness.  Education provided re: ongoing SLP POC, cont npo, and aspiration risk.  Education to be ongoing.       Assessment:     Julia Schroeder is a 76 y.o. female with an SLP diagnosis of Dysphagia, Dysarthria, Cognitive-Linguistic Impairment and Visio-Spatial Impairment.      Goals:   Multidisciplinary Problems     SLP Goals        Problem: SLP Goal    Goal Priority Disciplines Outcome   SLP Goal     SLP Ongoing, Progressing   Description: Goals to be met 2/21  1 Pt will participate in ongoing swallow eval  2 Pt " will Ox3 given mod cues.   3 Pt will answer simple y/n questions with 90% accy.   4. Pt will answer simple open ended questions with 80% accy.   5. Pt will participate in ongoing assessment of language and cognition as participation improves.   6. Pt will attend to SLP at midline given max cues x1.                    Plan:     · Patient to be seen:  4 x/week   · Plan of Care expires:  03/07/22  · Plan of Care reviewed with:  patient   · SLP Follow-Up:  Yes       Discharge recommendations:  nursing facility, skilled   Barriers to Discharge:  Level of Skilled Assistance Needed      Time Tracking:     SLP Treatment Date:   02/15/22  Speech Start Time:  0851  Speech Stop Time:  0858     Speech Total Time (min):  7 min    Billable Minutes: Speech Therapy Individual 7    02/15/2022
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
## 2022-02-15 NOTE — ASSESSMENT & PLAN NOTE
Na 151->154->156->154>152-->151--> 149-->147  Increased FWF to 400 QID  Sodium q4h  Received bolus of 1/2 NS overnight   Will continue to monitor, plan to decrease FWF based on next sodium check

## 2022-02-16 PROBLEM — E87.0 HYPERNATREMIA: Status: RESOLVED | Noted: 2022-01-01 | Resolved: 2022-01-01

## 2022-02-16 NOTE — SUBJECTIVE & OBJECTIVE
Neurologic Chief Complaint: AMS, LSW    Subjective:     Interval History: Patient is seen for follow-up neurological assessment and treatment recommendations:     Neuro exam stable. Na stable at 141. FWF reduced from 400 to 200. S/p PEG placement with IR.     HPI, Past Medical, Family, and Social History remains the same as documented in the initial encounter.     Review of Systems   Unable to perform ROS: Mental status change     Scheduled Meds:   aspirin  81 mg Per NG tube Daily    atorvastatin  40 mg Per NG tube Daily    clopidogreL  75 mg Per NG tube Daily    donepeziL  10 mg Per NG tube Daily    insulin aspart U-100  8 Units Subcutaneous Q4H    insulin detemir U-100  12 Units Subcutaneous BID    memantine  10 mg Per NG tube BID    modafiniL  100 mg Per NG tube Daily     Continuous Infusions:   dextrose 10 % in water (D10W)       PRN Meds:barium, dextrose 10 % in water (D10W), dextrose 10 % in water (D10W), dextrose 10 % in water (D10W), glucagon (human recombinant), insulin aspart U-100, sodium chloride 0.9%    Objective:     Vital Signs (Most Recent):  Temp: 97.3 °F (36.3 °C) (02/16/22 1435)  Pulse: 94 (02/16/22 1418)  Resp: (!) 22 (02/16/22 1418)  BP: 109/70 (02/16/22 1418)  SpO2: 97 % (02/16/22 1418)  BP Location: Left arm    Vital Signs Range (Last 24H):  Temp:  [96.7 °F (35.9 °C)-98.8 °F (37.1 °C)]   Pulse:  []   Resp:  [10-24]   BP: ()/(59-75)   SpO2:  [90 %-100 %]   BP Location: Left arm    Physical Exam  Vitals and nursing note reviewed.   Constitutional:       General: She is not in acute distress.     Comments: Drowsy   HENT:      Head: Normocephalic and atraumatic.      Right Ear: External ear normal.      Left Ear: External ear normal.      Nose: Nose normal.      Mouth/Throat:      Mouth: Mucous membranes are dry.   Eyes:      General: No scleral icterus.        Right eye: No discharge.         Left eye: No discharge.   Cardiovascular:      Rate and Rhythm: Normal rate.    Pulmonary:      Effort: Pulmonary effort is normal. No respiratory distress.   Abdominal:      General: Abdomen is flat. There is no distension.   Musculoskeletal:      Right lower leg: No edema.      Left lower leg: No edema.      Comments: LUE edema   Skin:     General: Skin is warm and dry.   Neurological:      Mental Status: She is alert.      Cranial Nerves: Facial asymmetry present.      Sensory: Sensory deficit present.      Motor: Weakness (L hemiparesis) present.   Psychiatric:         Behavior: Behavior normal.         Neurological Exam:   LOC: drowsy   Attention Span: poor  Language: No aphasia  Articulation: Dysarthria  EOM (CN III, IV, VI): Gaze preference  Right   Facial Movement (CN VII): Lower facial weakness on the Left  Motor: Arm left  Plegia 0/5  Leg left  Plegia 0/5  Arm right 3/5  Leg right 3/5  Sensation: Phan-hypoesthesia left  Tone: Flaccid LUE    Laboratory:  CMP:   Recent Labs   Lab 02/16/22  0721 02/16/22  0721 02/16/22  0832   CALCIUM 8.4*  --   --    ALBUMIN 2.1*  --   --    PROT 5.8*  --   --       < > 140   K 3.5  --   --    CO2 27  --   --      --   --    BUN 17  --   --    CREATININE 0.6  --   --    ALKPHOS 91  --   --    ALT 26  --   --    AST 39  --   --    BILITOT 0.4  --   --     < > = values in this interval not displayed.     CBC:   Recent Labs   Lab 02/16/22  0721   WBC 12.47   RBC 2.92*   HGB 8.4*   HCT 28.2*      MCV 97   MCH 28.8   MCHC 29.8*     Lipid Panel: No results for input(s): CHOL, LDLCALC, HDL, TRIG in the last 168 hours.  Coagulation:   No results for input(s): PT, INR, APTT in the last 168 hours.  Platelet Aggregation Study: No results for input(s): PLTAGG, PLTAGINTERP, PLTAGREGLACO, ADPPLTAGGREG in the last 168 hours.  Hgb A1C: No results for input(s): HGBA1C in the last 168 hours.  TSH:   No results for input(s): TSH in the last 168 hours.    Diagnostic Results     Brain imaging:  MRI Brain WO 2/5/2022    Moderate to large acute right MCA  territory infarction with susceptibility associated with the right basal ganglia and insular components compatible with hemorrhagic conversion.  Mass effect without midline shift or hydrocephalus.     Small sized ipsilateral right cerebellar acute infarction with punctate contralateral left hippocampal focus of diffusion restriction concerning for possible superimposed transient global amnesia.    [facility] 2/5/2022  Findings consistent with recent ischemia/infarct involving the right MCA distribution again noted, previously identified hyperdensity involving the basal ganglia and caudate on the right again noted, configuration is stable, degree of density is mildly diminished, there is continued mass effect and mild right to left midline shift appearing stable without evidence for significant interval detrimental change.    [facility] 2/4/2022 1741  Hyperdensity within the right basal ganglia insular cortex confined to the gray matter and sparing the internal cortex.  Findings likely representing contrast staining from recent angiogram given altered flow dynamics in the infarcted territory.  Hemorrhagic conversion is less likely. 0.2 cm leftward midline shift.    [facility] (2/4/22) 1415: Continued evolution of acute right MCA distribution infarct with developing minimal right-sided mass effect.  No midline shift or evidence for hemorrhagic conversion.       Vessel Imaging:  IR angio 2/4/2022:  Successful aspiration thrombectomy of the distal right internal carotid artery occlusion, with resulting TICI 3 flow.    CTA head/neck (2/4/22): Acute large vessel occlusion with distal right supraclinoid ICA filling defect extending into the anterior cerebral artery and middle cerebral artery branches as detailed above. Evidence of loss of gray-white matter interface of the deep right hemispheric structures consistent with early ischemia/infarction. Calcified plaque of the vertebral arteries, internal carotid arteries and of the common  carotid artery bifurcation.     Cardiac Evaluation:   Last TTE (1/31/22): EF 45%, grade II LV diastolic dysfunction, no LA enlargement    Other:  CXR 2/9/2022  Increase in parenchymal and pleural disease, with obscuration of the heart borders due to increased perihilar consolidative opacities.
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
## 2022-02-16 NOTE — PT/OT/SLP PROGRESS
"Physical Therapy Treatment    Patient Name:  Julia Schroeder   MRN:  [id number]    Recommendations:     Discharge Recommendations:  nursing facility, skilled   Discharge Equipment Recommendations: bedside commode,bath bench,wheelchair,hospital bed,lift device   Barriers to discharge: impaired functional mobility requiring increased assistance    Assessment:     Julia Schroeder is a 76 y.o. female admitted with a medical diagnosis of Stroke due to occlusion of right carotid artery.  She presents with the following impairments/functional limitations:  weakness,impaired endurance,impaired self care skills,impaired functional mobilty,gait instability,impaired balance,impaired cognition,decreased upper extremity function,decreased coordination,decreased lower extremity function,decreased safety awareness,abnormal tone,decreased ROM,impaired coordination,impaired fine motor,impaired sensation. Pt tolerated session well with focus on bed mobility and EOB balance/endurance. Pt awake on therapists initial entry to room but upon beginning mobilization she closes her eyes and is somnolent/lethargic throughout session. Pt notably non-participatory in therapy intervention this day and returned to supine following lack of participation across multiple attempts. Pt will continue to benefit from therapy services to address impairments listed above.     Rehab Prognosis: Poor; patient would benefit from acute skilled PT services to address these deficits and reach maximum level of function.    Recent Surgery: * No surgery found *      Plan:     During this hospitalization, patient to be seen 4 x/week to address the identified rehab impairments via gait training,therapeutic activities,therapeutic exercises,neuromuscular re-education and progress toward the following goals:    · Plan of Care Expires:  03/08/22    Subjective     Chief Complaint: no c/o  Patient/Family Comments/goals:  "Y'all are trying to kill me." - only verbalization made " occurring during rolling to side lying to perform Supine>Sit  Pain/Comfort:  · Pain Rating 1: other (see comments) (does verbalize c/o pain; minimally communicative this session)      Objective:     Communicated with RN prior to session.  Patient found HOB elevated, pt slumped to R along EOB with bed alarm,NG tube,peripheral IV upon PTA entry to room. Son at bedside, exits as therapist begins to mobilize pt.     General Precautions: Standard, NPO,aspiration,fall   Orthopedic Precautions:N/A   Braces: N/A  Respiratory Status: Room air     Functional Mobility:  · Bed Mobility:     · Rolling Right: dependence  · Supine to Sit: dependence  · Sit to Supine: dependence      AM-PAC 6 CLICK MOBILITY  Turning over in bed (including adjusting bedclothes, sheets and blankets)?: 1  Sitting down on and standing up from a chair with arms (e.g., wheelchair, bedside commode, etc.): 1  Moving from lying on back to sitting on the side of the bed?: 1  Moving to and from a bed to a chair (including a wheelchair)?: 1  Need to walk in hospital room?: 1  Climbing 3-5 steps with a railing?: 1  Basic Mobility Total Score: 6       Therapeutic Activities and Exercises:  Pt assisted with functional mobility as noted above.   Pt at EOB ~ 15 minutes with Dependence for sitting balance. Pt with no protective response or attempts to stabilize LOBs in all directions.   Pt assisted with head/neck support with therapist assisting head into neutral positioning with pt not responding to cues to engage neck musculature and hold head from slumped positioning.     Patient left HOB elevated with all lines intact and PCT present to perform bad bath and linen change.    GOALS:   Multidisciplinary Problems     Physical Therapy Goals        Problem: Physical Therapy Goal    Goal Priority Disciplines Outcome Goal Variances Interventions   Physical Therapy Goal     PT, PT/OT Ongoing, Progressing     Description: PT goals until 2/20/22    1. Pt supine to sit with  moderate assist-not met  2. Pt sit to supine with moderate assist-not met  3. Pt sit to stand with LRAD as needed for safety with max assist-not met  4. Pt to perform gait 2 steps with LRAD as needed for safety with max assist.-not met  5. Pt to transfer bed to/from bedside chair with LRAD for safety with max assist.-not met  6. Pt to perform B LE exs in sitting or supine x 15 reps to strengthen B LE to improve functional mobility.-not met  7. Pt to propel w/c 10ft with R UE/LE on level surface with minimal assist-not met  8. Pt to be able to sit on the EOB 10 min with CGA to perform functional activities-not met                       Time Tracking:     PT Received On: 02/16/22  PT Start Time: 1105     PT Stop Time: 1128  PT Total Time (min): 23 min     Billable Minutes: Therapeutic Activity 23    Treatment Type: Treatment  PT/PTA: PTA     PTA Visit Number: 1     02/16/2022

## 2022-02-16 NOTE — NURSING
Pt transported back to room with transport and RN (pt in soft restraint). Receiving RN at bedside. Pt marlon well.

## 2022-02-16 NOTE — PT/OT/SLP PROGRESS
"Occupational Therapy   Treatment    Name: Julia Schroeder  MRN: [id number]  Admitting Diagnosis:  Stroke due to occlusion of right carotid artery       Recommendations:     Discharge Recommendations: nursing facility, skilled  Discharge Equipment Recommendations:  bath bench,bedside commode,wheelchair,hospital bed,lift device  Barriers to discharge:  None    Assessment:     Julia Schroeder is a 76 y.o. female with a medical diagnosis of Stroke due to occlusion of right carotid artery.  She presents with performance deficits affecting function are weakness,impaired endurance,impaired cognition,abnormal tone,decreased ROM,impaired coordination,decreased coordination,impaired sensation,impaired self care skills,decreased upper extremity function,impaired fine motor,decreased lower extremity function,impaired functional mobilty,decreased safety awareness,gait instability,impaired balance.     Rehab Prognosis:  Good; patient would benefit from acute skilled OT services to address these deficits and reach maximum level of function.       Plan:     Patient to be seen 4 x/week to address the above listed problems via self-care/home management,therapeutic activities,neuromuscular re-education,cognitive retraining,sensory integration  · Plan of Care Expires: 03/05/22  · Plan of Care Reviewed with: patient,son    Subjective   Patient:  "Please"--response when asked if she would like to sit up.  During sit-supine, patient stating:  "Don't let me fall."  Son:  "We don't want to give up on her; she is strong."  "She pulled out her NG tube night before last."  Pain/Comfort:  · Pain Rating 1: 0/10  · Pain Rating Post-Intervention 1: 0/10    Objective:     Communicated with: Nurse prior to session.  Patient found supine with bed alarm,telemetry,peripheral IV,PureWick,NG tube upon OT entry to room.  Son present.    General Precautions: Standard, aspiration,fall,NPO   Orthopedic Precautions:N/A   Braces: N/A  Respiratory Status: Room air   "   Occupational Performance:     Bed Mobility:    · Patient completed Rolling/Turning to Left with  maximal assistance  · Patient completed Rolling/Turning to Right with total assistance  · Patient completed Supine to Sit with total assistance  · Patient completed Sit to Supine with total assistance     Functional Mobility/Transfers:  · Dependent drawsheet transfers    Activities of Daily Living:  · Feeding:  NPO    · Grooming: total assistance while supine with left UE in weight bearing  · Upper Body Dressing: total assistance while seated EOB    [facility] 6 Click ADL: 6    Treatment & Education:  Patient education provided on role of OT and need for continued OT services upon discharge.  Patient education provided on hemiplegic dressing technique, left UE weight bearing / positioning, postural control, and attention to the left.  Daily orientation provided.   PROM performed left UE/LEs one set x 10 rep in all planes of motion with stretches provided at end range; sustained stretch provided for ankle dorsiflexion.  Assistance and facilitation provided for upward rotation of the scapula during shoulder flexion and abduction to promote orientation of glenoid fossa of scapula to humeral head for prevention of post-stroke hemiplegic pain.  Mod-max assist for postural control while seated EOB with left UE in weight bearing.  Patient initiating lifting head up while seated EOB.  Poor control of oral secretions noted.  Patient kept eyes open throughout the session.  Patient able to follow 3/3 simple one step commands.  Addressed left UE weight bearing while seated EOB.  Addressed increasing attention to the left and midline positioning. Continued education, patient/ family training recommended.    OT asked if there were any other questions; patient/ family had no further questions.    Patient left supine with all lines intact, call button in reach, bed alarm on and restraints reapplied at end of sessionEducation:      GOALS:    Multidisciplinary Problems     Occupational Therapy Goals        Problem: Occupational Therapy Goal    Goal Priority Disciplines Outcome Interventions   Occupational Therapy Goal     OT, PT/OT Ongoing, Progressing    Description: Goals set 2/13 to be addressed for 14 days with expiration date, 2/27:  Patient will increase functional independence with ADLs by performing:    Patient will demonstrate rolling to the right with max assist.  Not met   Patient will demonstrate rolling to the left with mod assist.   Not met  Patient will demonstrate supine -sit with mod assist.   Not met  Patient will demonstrate stand pivot transfers with max assist.   Not met  Patient will demonstrate grooming while seated with mod assist.   Not met  Patient will demonstrate upper body dressing with mod assist while seated EOB.   Not met  Patient will demonstrate lower body dressing with max assist while seated EOB.   Not met  Patient will demonstrate toileting with max assist.   Not met  Patient will demonstrate bathing while seated EOB with max assist.   Not met  Patient's family / caregiver will demonstrate independence and safety with assisting patient with self-care skills and functional mobility.     Not met  Patient's family / caregiver will demonstrate independence with providing ROM and changes in bed positioning.   Not met  Patient and/or patient's family will verbalize understanding of stroke prevention guidelines, personal risk factors and stroke warning signs via teachback method.  Not met                          Time Tracking:     OT Date of Treatment: 02/16/22  OT Start Time: 0626  OT Stop Time: 0650  OT Total Time (min): 24 min    Billable Minutes:Self Care/Home Management 12  Neuromuscular Re-education 12    OT/DEN: OT          2/16/2022

## 2022-02-16 NOTE — ASSESSMENT & PLAN NOTE
2/2 stroke  SLP consulted  Patient NPO at the moment with NGT in place  Initiation of discussion for possible need for PEG held by NCC and family  Failed repeat SLP evaluation.  Decision for PEG made and now patient s/p PEG by IR today 2/16

## 2022-02-16 NOTE — ASSESSMENT & PLAN NOTE
Na 151->154->156->154>152-->151--> 149-->147  Increased FWF to 400 QID  Sodium q4h  Received bolus of 1/2 NS overnight   2/16: FWF decreased from 400 to 200. Na wnl at 140

## 2022-02-16 NOTE — PLAN OF CARE
Problem: Diabetes Comorbidity  Goal: Blood Glucose Level Within Targeted Range  Outcome: Ongoing, Progressing  Intervention: Monitor and Manage Glycemia  Flowsheets (Taken 2/16/2022 4779)  Glycemic Management: blood glucose monitored    Plan of care reviewed with patients family at bedside. Family demonstrated understanding of POC. All questions addressed. Aspiration and fall precautions maintained throughout shift. VSS. PIV maintained. No neuro changes during shift. Patient is NPO, IV dextrose is running for hypoglycemia and patient appears to be resting comfortably.

## 2022-02-16 NOTE — NURSING
"Paged vascular stroke to advise patient is hypoglycemic at 2100. Orders revised by physician, and communication placed for parameters to "start dextrose infusion. Check blood glucose to goal 140-180 then stop infusion." Started IV dextrose 10% infusion at 15/ml. OC aware.   "

## 2022-02-16 NOTE — ASSESSMENT & PLAN NOTE
Stroke RF  A1c 8.3 on 1/7/2022  Insulin gtt d/c   Increased insulin to aspart 8U q4h and detemir 12U BID   IP Blood glucose goal 140-180   2/16-had hypoglycemic episode overnight to 57 requiring dextrose infusion
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
## 2022-02-16 NOTE — CARE UPDATE
RAPID RESPONSE NURSE ROUND       Rounding completed with charge RN, Sara. No concerns verbalized at this time. Instructed to call [phone] for further concerns or assistance.

## 2022-02-16 NOTE — PLAN OF CARE
Problem: Adjustment to Illness (Stroke, Ischemic/Transient Ischemic Attack)  Goal: Optimal Coping  Outcome: Ongoing, Progressing     Problem: Swallowing Impairment (Stroke, Ischemic/Transient Ischemic Attack)  Goal: Optimal Eating and Swallowing without Aspiration  Outcome: Ongoing, Progressing   Patient alert, forgetfully, vss, TUBE feeding running, NPO MN, PEG tomorrow, will cont to monitor.

## 2022-02-16 NOTE — PT/OT/SLP PROGRESS
Speech Language Pathology Treatment    Patient Name:  Julia Schroeder   MRN:  [id number]  Admitting Diagnosis: Stroke due to occlusion of right carotid artery    Recommendations:                 General Recommendations:  Dysphagia therapy, Speech/language therapy and Cognitive-linguistic therapy  Diet recommendations:  NPO, Liquid Diet Level: NPO   Aspiration Precautions: Continue alternate means of nutrition, Frequent oral care and Strict aspiration precautions   General Precautions: Standard, NPO,aspiration,fall  Communication strategies:  provide increased time to answer and go to room if call light pushed    Subjective     Patient awake but lethargic  Communicated with nurse prior to session    Pain/Comfort:  · Pain Rating 1: 0/10  · Pain Rating Post-Intervention 1: 0/10    Respiratory Status: Room air    Objective:     Has the patient been evaluated by SLP for swallowing?   Yes  Keep patient NPO? No     Patient sitting up in bed with NG tube and right wrist restraint in place with her son present. Patient would intermittently open her eyes during the session with limited interaction. She answered 3/4 simple yes/no questions with mod assist and was 50% of binary choice questions with max assist. She did not followed simple oral mech commands despite max assist. SLP performed labial and lingual stim with cold sponge with minimal response and no swallows appreciated. Similarly, patient unable to accept ice chip trials when presented. Patient with intermittent anterior spillage of secretions. Further PO trials deferred 2' to aspiration risk. SLP educated patient and son regarding POC for ST. Patient left in bed with restraint in place.     Assessment:     Julia Schroeder is a 76 y.o. female with an SLP diagnosis of Dysphagia, Dysarthria, Cognitive-Linguistic Impairment and Visio-Spatial Impairment. ST to continue to follow.    Goals:   Multidisciplinary Problems     SLP Goals        Problem: SLP Goal    Goal Priority  Disciplines Outcome   SLP Goal     SLP Ongoing, Progressing   Description: Goals to be met 2/21  1 Pt will participate in ongoing swallow eval  2 Pt will Ox3 given mod cues.   3 Pt will answer simple y/n questions with 90% accy.   4. Pt will answer simple open ended questions with 80% accy.   5. Pt will participate in ongoing assessment of language and cognition as participation improves.   6. Pt will attend to SLP at midline given max cues x1.                    Plan:     · Patient to be seen:  4 x/week   · Plan of Care expires:  03/07/22  · Plan of Care reviewed with:  patient,son   · SLP Follow-Up:  Yes       Discharge recommendations:  nursing facility, skilled   Barriers to Discharge:  Level of Skilled Assistance Needed     Time Tracking:     SLP Treatment Date:   02/16/22  Speech Start Time:  0843  Speech Stop Time:  0900     Speech Total Time (min):  17 min    Billable Minutes: Speech Therapy Individual 8 and Treatment Swallowing Dysfunction 9    Dagoberto Thorpe CCC-SLP  Speech-Language Pathology  Pager: [phone]   02/16/2022

## 2022-02-16 NOTE — PROCEDURES
Radiology Post-Procedure Note    Pre Op Diagnosis: Dysphagia    Post Op Diagnosis: Same    Procedure: G tube placement    Procedure performed by: Brice Bazzi MD    Written Informed Consent Obtained: Yes  Specimen Removed: NO  Estimated Blood Loss: Minimal    Findings:   20 Cuban pull type G tube placed. No complications. Keep gastrostomy open to bag for 12 hours. Can start using gastrostomy tube at 12 hours. See dictation.    Patient tolerated procedure well.    Brice Bazzi M.D.  Interventional Radiology  Department of Radiology  Pager: [phone]

## 2022-02-16 NOTE — PLAN OF CARE
Goals remain appropriate.  Problem: Occupational Therapy Goal  Goal: Occupational Therapy Goal  Description: Goals set 2/13 to be addressed for 14 days with expiration date, 2/27:  Patient will increase functional independence with ADLs by performing:    Patient will demonstrate rolling to the right with max assist.  Not met   Patient will demonstrate rolling to the left with mod assist.   Not met  Patient will demonstrate supine -sit with mod assist.   Not met  Patient will demonstrate stand pivot transfers with max assist.   Not met  Patient will demonstrate grooming while seated with mod assist.   Not met  Patient will demonstrate upper body dressing with mod assist while seated EOB.   Not met  Patient will demonstrate lower body dressing with max assist while seated EOB.   Not met  Patient will demonstrate toileting with max assist.   Not met  Patient will demonstrate bathing while seated EOB with max assist.   Not met  Patient's family / caregiver will demonstrate independence and safety with assisting patient with self-care skills and functional mobility.     Not met  Patient's family / caregiver will demonstrate independence with providing ROM and changes in bed positioning.   Not met  Patient and/or patient's family will verbalize understanding of stroke prevention guidelines, personal risk factors and stroke warning signs via teachback method.  Not met         Outcome: Ongoing, Progressing

## 2022-02-16 NOTE — NURSING
Pt received post Gtube placement, transported by RN via stretcher. Pt placed onstretcher low, locked, call bell in reach. No neuro changes noted. VSS, no signs of pain or distress. Will continue to monitor while awaiting transport.

## 2022-02-17 NOTE — PT/OT/SLP PROGRESS
"Speech Language Pathology Treatment    Patient Name:  Julia Schroeder   MRN:  [id number]  Admitting Diagnosis: Stroke due to occlusion of right carotid artery    Recommendations:                 General Recommendations:  Dysphagia therapy, Speech/language therapy and Cognitive-linguistic therapy  Diet recommendations:  NPO, Liquid Diet Level: NPO   Aspiration Precautions: Frequent oral care and Strict aspiration precautions   General Precautions: Standard, aspiration,fall,NPO  Communication strategies:  provide increased time to answer and go to room if call light pushed    Subjective     "I need a burger."       Pain/Comfort:  · Pain Rating 1: 0/10  · Pain Rating Post-Intervention 1: 0/10    Respiratory Status: Room air    Objective:     Has the patient been evaluated by SLP for swallowing?   Yes  Keep patient NPO? Yes     Pt seen bedside, improved alertness noted.  Eyes open for majority of session.  Increased responsiveness also noted in conversation with less frequent no response.  Dysarthria evident in connected speech.  Speech strategies reviewed though will need ot be reinforced.  Continued head turn to R with chin down posturing at rest.  Max cues including physical assist provided for pt to hold head at midline.  Drooling from R evident.  oral care/suction provided.  Pt did readily open mouth to allow SLP to clean tongue today.  White coating to lingual surface observed.  Ice chip presented x1 with continued max cues for oral acceptance.  Some minimal oral manipulation observed today with delayed swallow response elicited x1.  Majority of ice chip did remain in oral cavity when swallow elicited.  2nd swallow not elicited so oral suction utilized to aid clearance.  Nectar thick liquids presented x2 with active, appropriate oral acceptance x1/3.  Max cues with anterior loss for remaining trials x2.  Significantly delayed swallow initiated on 2/3 trials.  Oral suction required to clear stasis following the swallow.  " Nol overt s/s aspiration.  Education provided to pt and then son who entered near end of session re: cont SLP POC, cont npo, aspiration risk, progress made, and importance of volitional swallow of secretions.  Son verbalized understanding.          Assessment:     Julia Schroeder is a 76 y.o. female with an SLP diagnosis of Dysphagia, Dysarthria, Cognitive-Linguistic Impairment and Visio-Spatial Impairment.      Goals:   Multidisciplinary Problems     SLP Goals        Problem: SLP Goal    Goal Priority Disciplines Outcome   SLP Goal     SLP Ongoing, Progressing   Description: Goals to be met 2/21  1 Pt will participate in ongoing swallow eval  2 Pt will Ox3 given mod cues.   3 Pt will answer simple y/n questions with 90% accy.   4. Pt will answer simple open ended questions with 80% accy.   5. Pt will participate in ongoing assessment of language and cognition as participation improves.   6. Pt will attend to SLP at midline given max cues x1.                    Plan:     · Patient to be seen:  4 x/week   · Plan of Care expires:  03/07/22  · Plan of Care reviewed with:  patient,son   · SLP Follow-Up:  Yes       Discharge recommendations:  nursing facility, skilled   Barriers to Discharge:  Level of Skilled Assistance Needed      Time Tracking:     SLP Treatment Date:   02/17/22  Speech Start Time:  0906  Speech Stop Time:  0930     Speech Total Time (min):  24 min    Billable Minutes: Treatment Swallowing Dysfunction 14 and Self Care/Home Management Training 10    02/17/2022

## 2022-02-17 NOTE — PLAN OF CARE
Problem: Diabetes Comorbidity  Goal: Blood Glucose Level Within Targeted Range  Outcome: Ongoing, Progressing     Problem: Adjustment to Illness (Stroke, Ischemic/Transient Ischemic Attack)  Goal: Optimal Coping  Outcome: Ongoing, Progressing   Patient alert, vss, PEG tube with drain intact  , NG tube in place, no acute distress at this time iv dextrose infusing Bg stable,WCTM
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
## 2022-02-17 NOTE — PLAN OF CARE
Jeremiah Bautista - Neurosurgery ([facility])  Discharge Reassessment    Primary Care Provider: Devon Lovell MD    Expected Discharge Date: 2/21/2022     PEG placed yesterday.    No accepting SNF at this time. Multiple referrals out with Multiple declines:    [facility] [phone]  neg covid swab within 48 hours of admit.   * COVID-19: NOT able to accept COVID-19 positive patients,* COVID-19: Positive patients under care,* High-Risk Isolation Patients: Willing/Equipped to accept High-Risk Isolation Patients 2/7/2022 16:34 (CT) 2/8/2022 9:34 (CT) 2/10/2022 10:40 (CT) 2/10/2022 10:40 (CT) Response: No, unable to accept patient  Reason: Care Needs Exceed Current Capacity Waiting for [facility] [phone]  24 hours fever free   * COVID-19: Positive patients under care,* COVID-19: Willing/Equipped to accept COVID-19 positive patients 2/14/2022 13:17 (CT) 2/16/2022 17:45 (CT) 2/16/2022 15:53 (CT) 2/16/2022 15:53 (CT) Response: No, unable to accept patient  Reason: No Available Bed Waiting for [facility] [phone]  Negative COVID test prior to admission   * COVID-19: NOT able to accept COVID-19 positive patients,* COVID-19: Positive patients under care 2/14/2022 13:17 (CT) 2/14/2022 15:14 (CT) 2/16/2022 15:34 (CT) 2/16/2022 15:34 (CT) Response: No, unable to accept patient  Reason: Care Needs Exceed Current Capacity Waiting for [facility] [phone]  no testing required on day of d/c unless not vaccinated.   * COVID-19: Willing/Equipped to accept COVID-19 positive patients 2/7/2022 16:34 (CT) 2/8/2022 9:34 (CT) 2/8/2022 11:55 (CT) 2/8/2022 11:55 (CT) Response: No, unable to accept patient  Reason: No Available Bed Waiting for recipient     [facility] [phone]  Fully vaccinated. Negative Covid.   * COVID-19: NOT able to accept COVID-19 positive patients  2/14/2022 13:17 (CT) 2/16/2022 17:45 (CT) 2/15/2022 11:10 (CT) 2/15/2022 11:10 (CT) Response: No, unable to accept patient  Reason: No Available Bed Waiting for recipient     Response Notes [facility] [phone]  Only able to take vaccinated at [facility]   * COVID-19: NOT able to accept COVID-19 positive patients,* COVID-19: Positive patients under care 2/7/2022 16:34 (CT) 2/8/2022 9:34 (CT) 2/7/2022 17:08 (CT) 2/8/2022 8:38 (CT) Response: No, unable to accept patient  Reason: No Available Bed  Comments: No beds out this moment and unable to take ngt as well Waiting for recipient     [facility] [phone]    * COVID-19: NOT able to accept COVID-19 positive patients 2/14/2022 13:17 (CT) 2/14/2022 15:14 (CT) 2/15/2022 8:16 (CT) 2/15/2022 8:16 (CT) Response: No, unable to accept patient  Reason: Care Needs Exceed Current Capacity Waiting for recipient     Response Notes [facility] [phone]  Negavtive COVID TEST WITHIN 24 HOURS OF ADMISSION   * COVID-19: NOT able to accept COVID-19 positive patients 2/14/2022 13:17 (CT) 2/14/2022 15:14 (CT) 2/15/2022 8:37 (CT) 2/15/2022 8:37 (CT) Response: No, unable to accept patient  Reason: Care Needs Exceed Current Capacity Waiting for recipient     Response Notes [facility] [phone]  Per hospital admission or within 2 weeks of discharge.   * COVID-19: Positive patients under care,* COVID-19: Services not specified,* COVID-19: Willing/Equipped to accept COVID-19 positive patients,* High-Risk Isolation Patients: Willing/Equipped to accept High-Risk Isolation Patients 2/7/2022 16:34 (CT) 2/8/2022 9:34 (CT) 2/7/2022 17:29 (CT) 2/11/2022 11:34 (CT) Response: No, unable to accept patient  Reason: Care Needs Exceed Current Capacity  Comments: unable to accommodate NG tubes and needs to be restraint free for 24 hours before we can admit Waiting for recipient     [facility] [phone]  Negative Covid test 72 hours prior to discharge   * COVID-19: NOT able to accept COVID-19 positive patients,* High-Risk Isolation Patients: Willing/Equipped to accept High-Risk Isolation Patients 2/7/2022 16:34 (CT) 2/8/2022 9:34 (CT) 2/8/2022 15:50 (CT) 2/8/2022 15:50 (CT) Response: No, unable to accept patient  Reason: Other (see comments)  Comments: Unable to meet needs Waiting for recipient     [facility] [phone]  Negative Covid test within 24-48 hours of admission   * COVID-19: NOT able to accept COVID-19 positive patients 2/14/2022 13:17 (CT) 2/16/2022 17:45 (CT) 2/14/2022 13:24 (CT) 2/14/2022 13:24 (CT) Response: Referral Received Waiting for recipient     [facility] [phone]  negative within 48 hours of admission   * COVID-19: NOT able to accept COVID-19 positive patients 2/14/2022 13:16 (CT) 2/16/2022 17:45 (CT) - - - Waiting for recipient     Response Notes [facility] [phone]    * Closed for New Admissions,* COVID-19: NOT able to accept COVID-19 positive patients 2/14/2022 13:16 (CT) 2/16/2022 17:45 (CT) - - - Waiting for recipient     Response Notes [facility] [phone]    * COVID-19: NOT able to accept COVID-19 positive patients 2/14/2022 13:16 (CT) 2/16/2022 17:45 (CT) - - - Waiting for recipient     Response Notes [facility] [phone]    * COVID-19: NOT able to accept COVID-19 positive patients 2/14/2022 13:17 (CT) 2/16/2022 17:45 (CT) - - - Waiting for recipient     Response Notes [facility] [phone]  negative covid-19 test within last 48 hours   * COVID-19: Services not specified,* High-Risk Isolation Patients: Willing/Equipped to accept High-Risk Isolation Patients 2/14/2022 13:17 (CT) 2/16/2022 17:45 (CT) - - - Waiting for recipient     Response Notes [facility] [phone]    * COVID-19: NOT able to accept COVID-19  positive patients 2/14/2022 13:17 (CT) 2/16/2022 17:45 (CT) - - - Waiting for recipient     [facility] [phone]  Must be fever free x24 hours   * COVID-19: NOT able to accept COVID-19 positive patients,* COVID-19: Services not specified,* COVID-19: Willing/Equipped to accept COVID-19 positive patients,* High-Risk Isolation Patients: Willing/Equipped to accept High-Risk Isolation Patients 2/14/2022 13:17 (CT) 2/16/2022 17:45 (CT) - - - Waiting for recipient     [facility] [phone]  1 negative covid test 24 hours before admission   * COVID-19: NOT able to accept COVID-19 positive patients 2/14/2022 13:16 (CT) 2/16/2022 17:45 (CT) - - -       Reassessment (most recent)     Discharge Reassessment - 02/17/22 1834        Discharge Reassessment    Assessment Type Discharge Planning Reassessment     Did the patient's condition or plan change since previous assessment? No     Discharge Plan discussed with: Adult children     Communicated ROGER with patient/caregiver Yes     Discharge Plan A Skilled Nursing Facility     Discharge Plan B Hospice/home     DME Needed Upon Discharge  none     Discharge Barriers Identified None     Why the patient remains in the hospital Requires continued medical care        Post-Acute Status    Post-Acute Authorization Placement     Post-Acute Placement Status Pending post-acute provider review/more information requested     Home Health Status Discharge Plan Changed     Hospice Status Discharge Plan Changed     Discharge Delays None known at this time
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
## 2022-02-17 NOTE — PT/OT/SLP PROGRESS
"Physical Therapy Treatment    Patient Name:  Julia Schroeder   MRN:  [id number]    Recommendations:     Discharge Recommendations:  nursing facility, skilled   Discharge Equipment Recommendations: bedside commode,bath bench,wheelchair   Barriers to discharge: impaired functional mobility requiring increased assistance    Assessment:     Julia Schroeder is a 76 y.o. female admitted with a medical diagnosis of Stroke due to occlusion of right carotid artery.  She presents with the following impairments/functional limitations:  weakness,impaired endurance,impaired sensation,impaired self care skills,impaired functional mobilty,gait instability,impaired balance,impaired cognition,decreased coordination,decreased upper extremity function,decreased lower extremity function,decreased safety awareness,abnormal tone,impaired coordination,impaired fine motor.    Rehab Prognosis: Poor; patient would benefit from acute skilled PT services to address these deficits and reach maximum level of function.    Recent Surgery: * No surgery found *      Plan:     During this hospitalization, patient to be seen 4 x/week to address the identified rehab impairments via gait training,therapeutic activities,therapeutic exercises,neuromuscular re-education and progress toward the following goals:    · Plan of Care Expires:  03/08/22    Subjective     Chief Complaint: no c/o  Patient/Family Comments/goals: "My eyes are open."  Pain/Comfort:  · Pain Rating 1: 0/10  · Pain Rating Post-Intervention 1: 0/10      Objective:     Communicated with RN prior to session.  Patient found HOB elevated minimally, slumped to R side with bed alarm,NG tube,peripheral IV,pressure relief boots,pulse ox (continuous),PEG Tube,restraints,fernando catheter,telemetry upon PTA entry to room.     General Precautions: Standard, aspiration,fall,NPO   Orthopedic Precautions:N/A   Braces: N/A  Respiratory Status: Nasal cannula, flow 2 L/min     Functional Mobility:  · Bed Mobility:   "   · Rolling Left:  dependence  · Rolling Right: dependence  · Scooting: dependence  · Supine to Sit: dependence  · Sit to Supine: dependence      AM-PAC 6 CLICK MOBILITY  Turning over in bed (including adjusting bedclothes, sheets and blankets)?: 1  Sitting down on and standing up from a chair with arms (e.g., wheelchair, bedside commode, etc.): 1  Moving from lying on back to sitting on the side of the bed?: 1  Moving to and from a bed to a chair (including a wheelchair)?: 1  Need to walk in hospital room?: 1  Climbing 3-5 steps with a railing?: 1  Basic Mobility Total Score: 6       Therapeutic Activities and Exercises:  Pt assisted with functional mobility as noted above.   Pt sits at EOB with Total A to Dependence. Pt participates once at EOB by holding onto bed rail with RUE briefly before returning to dependence with no active attempts to follow commands or control balance. No protective reaction in any direction.     Patient left HOB elevated with all lines intact, call button in reach, bed alarm on and RN notified..    GOALS:   Multidisciplinary Problems     Physical Therapy Goals        Problem: Physical Therapy Goal    Goal Priority Disciplines Outcome Goal Variances Interventions   Physical Therapy Goal     PT, PT/OT Ongoing, Progressing     Description: PT goals until 2/20/22    1. Pt supine to sit with moderate assist-not met  2. Pt sit to supine with moderate assist-not met  3. Pt sit to stand with LRAD as needed for safety with max assist-not met  4. Pt to perform gait 2 steps with LRAD as needed for safety with max assist.-not met  5. Pt to transfer bed to/from bedside chair with LRAD for safety with max assist.-not met  6. Pt to perform B LE exs in sitting or supine x 15 reps to strengthen B LE to improve functional mobility.-not met  7. Pt to propel w/c 10ft with R UE/LE on level surface with minimal assist-not met  8. Pt to be able to sit on the EOB 10 min with CGA to perform functional  activities-not met                       Time Tracking:     PT Received On: 02/17/22  PT Start Time: 0820     PT Stop Time: 0850  PT Total Time (min): 30 min     Billable Minutes: Therapeutic Activity 30    Treatment Type: Treatment  PT/PTA: PTA     PTA Visit Number: 2     02/17/2022

## 2022-02-17 NOTE — NURSING
Paged neuro [facility] for clarification about the patients plan with her tube feeds and insulin schedule. At 0330 will be 12hr post g-tube placement which IR states in their note that it is okay to use at that point. Spoke with ZACK Gibbs NP who stated it was okay to give the long acting, but to discontinue the short acting insulin as well as hold off on any feeds until the morning to allow the team to make a plan for new orders. D/C'd q4 8u insulin.
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
## 2022-02-17 NOTE — SUBJECTIVE & OBJECTIVE
Neurologic Chief Complaint: AMS, LSW    Subjective:     Interval History: Patient is seen for follow-up neurological assessment and treatment recommendations: Neuro exam stable this AM. AM labs pending. S/P G-tube placement with IR previous day.    HPI, Past Medical, Family, and Social History remains the same as documented in the initial encounter.     Review of Systems   Unable to perform ROS: Mental status change     Scheduled Meds:   aspirin  81 mg Per NG tube Daily    atorvastatin  40 mg Per NG tube Daily    clopidogreL  75 mg Per NG tube Daily    donepeziL  10 mg Per NG tube Daily    insulin detemir U-100  12 Units Subcutaneous BID    memantine  10 mg Per NG tube BID    modafiniL  100 mg Per NG tube Daily     Continuous Infusions:   dextrose 10 % in water (D10W)       PRN Meds:barium, dextrose 10 % in water (D10W), dextrose 10 % in water (D10W), dextrose 10 % in water (D10W), glucagon (human recombinant), insulin aspart U-100, sodium chloride 0.9%    Objective:     Vital Signs (Most Recent):  Temp: 98 °F (36.7 °C) (02/17/22 0441)  Pulse: 99 (02/17/22 0700)  Resp: 16 (02/17/22 0441)  BP: (!) 88/55 (02/17/22 0441)  SpO2: (!) 91 % (02/17/22 0441)  BP Location: Left arm    Vital Signs Range (Last 24H):  Temp:  [97.3 °F (36.3 °C)-98.8 °F (37.1 °C)]   Pulse:  []   Resp:  [10-26]   BP: ()/(55-75)   SpO2:  [91 %-100 %]   BP Location: Left arm    Physical Exam  Vitals and nursing note reviewed.   Constitutional:       General: She is not in acute distress.     Appearance: She is not toxic-appearing.      Comments: Sleepy, though arousable   HENT:      Head: Normocephalic and atraumatic.      Right Ear: External ear normal.      Left Ear: External ear normal.      Nose: Nose normal.      Mouth/Throat:      Mouth: Mucous membranes are dry.   Eyes:      General: No scleral icterus.        Right eye: No discharge.         Left eye: No discharge.   Cardiovascular:      Rate and Rhythm: Normal rate.    Pulmonary:      Effort: Pulmonary effort is normal. No respiratory distress.   Abdominal:      General: Abdomen is flat. There is no distension.   Musculoskeletal:      Right lower leg: No edema.      Left lower leg: No edema.   Skin:     General: Skin is warm and dry.   Neurological:      Cranial Nerves: Facial asymmetry present.      Sensory: Sensory deficit present.      Motor: Weakness (L hemiparesis) present.   Psychiatric:         Behavior: Behavior normal.         Neurological Exam:   LOC: drowsy   Attention Span: poor  Language: No aphasia  Articulation: Dysarthria  EOM (CN III, IV, VI): Gaze preference  Right   Facial Movement (CN VII): Lower facial weakness on the Left  Motor: Arm left  Plegia 0/5  Leg left  Plegia 0/5  Arm right 3/5  Leg right 3/5  Sensation: Phan-hypoesthesia left  Tone: Flaccid LUE       Laboratory:  CMP:   Recent Labs   Lab 02/17/22  0000        CBC:   Recent Labs   Lab 02/16/22  0721   WBC 12.47   RBC 2.92*   HGB 8.4*   HCT 28.2*      MCV 97   MCH 28.8   MCHC 29.8*     Lipid Panel: No results for input(s): CHOL, LDLCALC, HDL, TRIG in the last 168 hours.  Hgb A1C: No results for input(s): HGBA1C in the last 168 hours.    Diagnostic Results      Brain imaging:  MRI Brain WO 2/5/2022    Moderate to large acute right MCA territory infarction with susceptibility associated with the right basal ganglia and insular components compatible with hemorrhagic conversion.  Mass effect without midline shift or hydrocephalus.     Small sized ipsilateral right cerebellar acute infarction with punctate contralateral left hippocampal focus of diffusion restriction concerning for possible superimposed transient global amnesia.     [facility] 2[facility]  Findings consistent with recent ischemia/infarct involving the right MCA distribution again noted, previously identified hyperdensity involving the basal ganglia and caudate on the right again noted, configuration is stable, degree of density is  mildly diminished, there is continued mass effect and mild right to left midline shift appearing stable without evidence for significant interval detrimental change.     [facility]  Hyperdensity within the right basal ganglia insular cortex confined to the gray matter and sparing the internal cortex.  Findings likely representing contrast staining from recent angiogram given altered flow dynamics in the infarcted territory.  Hemorrhagic conversion is less likely. 0.2 cm leftward midline shift.     [facility] (2/4/22) 1415: Continued evolution of acute right MCA distribution infarct with developing minimal right-sided mass effect.  No midline shift or evidence for hemorrhagic conversion.       Vessel Imaging:  IR angio 2/4/2022:  Successful aspiration thrombectomy of the distal right internal carotid artery occlusion, with resulting TICI 3 flow.     CTA head/neck (2/4/22): Acute large vessel occlusion with distal right supraclinoid ICA filling defect extending into the anterior cerebral artery and middle cerebral artery branches as detailed above. Evidence of loss of gray-white matter interface of the deep right hemispheric structures consistent with early ischemia/infarction. Calcified plaque of the vertebral arteries, internal carotid arteries and of the common carotid artery bifurcation.     Cardiac Evaluation:   Last TTE (1/31/22): EF 45%, grade II LV diastolic dysfunction, no LA enlargement     Other:  CXR 2/9/2022  Increase in parenchymal and pleural disease, with obscuration of the heart borders due to increased perihilar consolidative opacities.

## 2022-02-17 NOTE — PLAN OF CARE
Problem: Adjustment to Illness (Stroke, Ischemic/Transient Ischemic Attack)  Goal: Optimal Coping  Outcome: Ongoing, Progressing  Intervention: Support Psychosocial Response to Stroke  Flowsheets (Taken 2/17/2022 0505)  Supportive Measures:   positive reinforcement provided   goal-setting facilitated  Family/Support System Care:   support provided   involvement promoted   presence promoted     Problem: Diabetes Comorbidity  Goal: Blood Glucose Level Within Targeted Range  Outcome: Ongoing, Not Progressing  Intervention: Monitor and Manage Glycemia  Flowsheets (Taken 2/17/2022 0505)  Glycemic Management: blood glucose monitored     .Plan of care reviewed with patients family. Patients family demonstrated understanding of POC. All questions addressed. Aspiration and fall precautions maintained throughout shift. VSS. PIV maintained. No neuro changes during shift. Patient resting comfortably. Monitoring glucose levels regularly.

## 2022-02-17 NOTE — NURSING
IV Dextrose stopped at 2230, patients glucose was 179 which is in range of the goal 140-180 per physician communication.

## 2022-02-17 NOTE — NURSING
Patients glucose 79, dextrose 10% back on and infusing IV to prevent further hypoglycemia.    G-tube bag to gravity drained 200mL

## 2022-02-18 NOTE — PLAN OF CARE
Problem: Diabetes Comorbidity  Goal: Blood Glucose Level Within Targeted Range  Outcome: Ongoing, Progressing     Problem: Adjustment to Illness (Stroke, Ischemic/Transient Ischemic Attack)  Goal: Optimal Coping  Outcome: Ongoing, Progressing   Patient alert, forgetful, vss, TUBE feeding initiated, Abd binder in place. no discomfort at this time family at bedside will cont to monitor.WCTM

## 2022-02-18 NOTE — CARE UPDATE
Discussed patient's code status with patient's son Mr. Val Givens. Son confirming that patient does not want CPR performed but is amenable to intubation.    Christiano Christy MD  PGY3  02/18/2022
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
## 2022-02-18 NOTE — PT/OT/SLP PROGRESS
Physical Therapy Treatment    Patient Name:  Julia Schroeder   MRN:  [id number]    Recommendations:     Discharge Recommendations:  nursing facility, skilled   Discharge Equipment Recommendations: bedside commode,bath bench,wheelchair   Barriers to discharge: impaired functional mobility requiring increased assistance    Assessment:     Julia Schroeder is a 76 y.o. female admitted with a medical diagnosis of Stroke due to occlusion of right carotid artery.  She presents with the following impairments/functional limitations:  weakness,impaired endurance,impaired sensation,impaired self care skills,impaired functional mobilty,gait instability,impaired balance,impaired cognition,decreased upper extremity function,decreased lower extremity function,decreased safety awareness,abnormal tone,impaired coordination,impaired fine motor,decreased coordination. Pt with mild improvement in alertness this day with pt following a few simple commands for reaching with her RUE but pt remains dependent for functional mobility with no active participation in bed mobility or sitting balance. Pt continues to demonstrate slumped posturing with flexed neck and pt resistive or unengaged in bring her head to neutral positioning. No protective response to truncal LOB in all directions except for to her R side where pt has intermittent use of RUE. Pt will continue to benefit from therapy services to address impairments listed above.     Rehab Prognosis: Poor; patient would benefit from acute skilled PT services to address these deficits and reach maximum level of function.    Recent Surgery: * No surgery found *      Plan:     During this hospitalization, patient to be seen 4 x/week to address the identified rehab impairments via gait training,therapeutic activities,therapeutic exercises,neuromuscular re-education and progress toward the following goals:    · Plan of Care Expires:  03/08/22    Subjective     Chief Complaint: no c/o  Patient/Family  "Comments/goals: "Y'all are trying to kill me!" - clearest line of speech from pt during session, pt with other severely dysarthric remarks, therapist unable to make out words.   Pain/Comfort:  · Pain Rating 1: 0/10  · Pain Rating Post-Intervention 1: 0/10      Objective:     Communicated with RN prior to session.  Patient found HOB elevated, pt slumped to R side against bed rail with bed alarm,peripheral IV,PEG Tube,telemetry,SCD upon PTA entry to room. Son at bedside.     General Precautions: Standard, aspiration,fall,NPO   Orthopedic Precautions:N/A   Braces: N/A  Respiratory Status: Room air     Functional Mobility:  · Bed Mobility:     · Rolling Left:  dependence  · Supine to Sit: dependence  · Sit to Supine: dependence      AM-PAC 6 CLICK MOBILITY  Turning over in bed (including adjusting bedclothes, sheets and blankets)?: 1  Sitting down on and standing up from a chair with arms (e.g., wheelchair, bedside commode, etc.): 1  Moving from lying on back to sitting on the side of the bed?: 1  Moving to and from a bed to a chair (including a wheelchair)?: 1  Need to walk in hospital room?: 1  Climbing 3-5 steps with a railing?: 1  Basic Mobility Total Score: 6       Therapeutic Activities and Exercises:  Pt assisted with functional mobility as noted above.   Pt sits EOB ~18 minutes with dependence for sitting balance. Pt does not actively engage in sitting balance despite cues and assistance to position for best leverage of support.   Assisted cervical ROM, pt resistive or totally unengaged in bring head into neutral positioning.   Reaching with RUE to ipsilateral and midline targets x 4 trials. Only commands that pt follows for duration of session. Maximal cues and coaxing to perform.   Pt assisted with upright positioning with HOB elevated. Wedge and pillow support used to prevent R trunk lean and maintain head neutral.     Patient left HOB elevated with all lines intact, call button in reach, bed alarm on, RN " notified and son present.    GOALS:   Multidisciplinary Problems     Physical Therapy Goals        Problem: Physical Therapy Goal    Goal Priority Disciplines Outcome Goal Variances Interventions   Physical Therapy Goal     PT, PT/OT Ongoing, Progressing     Description: PT goals until 2/20/22    1. Pt supine to sit with moderate assist-not met  2. Pt sit to supine with moderate assist-not met  3. Pt sit to stand with LRAD as needed for safety with max assist-not met  4. Pt to perform gait 2 steps with LRAD as needed for safety with max assist.-not met  5. Pt to transfer bed to/from bedside chair with LRAD for safety with max assist.-not met  6. Pt to perform B LE exs in sitting or supine x 15 reps to strengthen B LE to improve functional mobility.-not met  7. Pt to propel w/c 10ft with R UE/LE on level surface with minimal assist-not met  8. Pt to be able to sit on the EOB 10 min with CGA to perform functional activities-not met                       Time Tracking:     PT Received On: 02/18/22  PT Start Time: 1012     PT Stop Time: 1040  PT Total Time (min): 28 min     Billable Minutes: Therapeutic Activity 15 and Neuromuscular Re-education 13    Treatment Type: Treatment  PT/PTA: PTA     PTA Visit Number: 3     02/18/2022
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
## 2022-02-18 NOTE — CARE UPDATE
RAPID RESPONSE NURSE ROUND       Rounding completed with NPU Charge RN, Vickey. No concerns verbalized at this time. Instructed to call [phone] for further concerns or assistance.
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
## 2022-02-18 NOTE — PLAN OF CARE
Problem: Fall Injury Risk  Goal: Absence of Fall and Fall-Related Injury  Outcome: Ongoing, Progressing     Problem: Adjustment to Illness (Stroke, Ischemic/Transient Ischemic Attack)  Goal: Optimal Coping  Outcome: Ongoing, Progressing     Problem: Cerebral Tissue Perfusion (Stroke, Ischemic/Transient Ischemic Attack)  Goal: Optimal Cerebral Tissue Perfusion  Outcome: Ongoing, Progressing     Problem: Functional Ability Impaired (Stroke, Ischemic/Transient Ischemic Attack)  Goal: Optimal Functional Ability  Outcome: Ongoing, Progressing     Problem: Diabetes Comorbidity  Goal: Blood Glucose Level Within Targeted Range  Outcome: Ongoing, Progressing   POC reviewed and updated with the patient.  VSS, see flow-sheets. Patient is AO X * 1 at this time. Fall/safety precautions maintained, no signs of injury noted during shift. Patient was repositioned for comfort, bed locked in low position with side rails X *4, bed alarm set, with call light within reach. Plan is to D/C to SNF at some point. No acute signs of distress noted at this time.
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
## 2022-02-19 NOTE — PROGRESS NOTES
1926- 95/69 (72)   2254- 76/42 (54)  Paged on call Stroke team. Orders to give 500 ml bolus.  Bolus administered.  2332- 88/59 (68)  0000 - 77/49 (59)  Paged on call stroke team. Instructed to keep watch on her.   Will assess BP every hour.

## 2022-02-19 NOTE — SUBJECTIVE & OBJECTIVE
Neurologic Chief Complaint: AMS, LSW    Subjective:     Interval History: Patient is seen for follow-up neurological assessment and treatment recommendations: Pt with respiratory decompensation previous day with CXR suggestive of pulm edema. S/P IV Lasix, and BiPAP overnight with improvement in WOB, RR, and tachycardia. See Treatment plan note dated 2/18/2022. CXR, ABG this AM pending. Will attempt to transition off bipap today.    HPI, Past Medical, Family, and Social History remains the same as documented in the initial encounter.     Review of Systems   Unable to perform ROS: Mental status change   Scheduled Meds:   aspirin  81 mg Per G Tube Daily    atorvastatin  40 mg Per G Tube Daily    clopidogreL  75 mg Per G Tube Daily    donepeziL  10 mg Per G Tube Daily    heparin (porcine)  5,000 Units Subcutaneous Q8H    insulin detemir U-100  15 Units Subcutaneous QHS    memantine  10 mg Per G Tube BID    modafiniL  100 mg Per G Tube Daily     Continuous Infusions:   dextrose 10 % in water (D10W)       PRN Meds:barium, dextrose 10 % in water (D10W), dextrose 10 % in water (D10W), dextrose 10 % in water (D10W), glucagon (human recombinant), insulin aspart U-100, sodium chloride 0.9%    Objective:     Vital Signs (Most Recent):  Temp: 98.3 °F (36.8 °C) (02/19/22 0749)  Pulse: 103 (02/19/22 0749)  Resp: (!) 21 (02/19/22 0749)  BP: (!) 87/58 (02/19/22 0749)  SpO2: 99 % (02/19/22 0749)  BP Location: Right arm    Vital Signs Range (Last 24H):  Temp:  [97.3 °F (36.3 °C)-98.3 °F (36.8 °C)]   Pulse:  []   Resp:  [18-26]   BP: ()/(42-68)   SpO2:  [97 %-99 %]   BP Location: Right arm    Physical Exam  Vitals and nursing note reviewed.   Constitutional:       General: She is not in acute distress.     Appearance: She is not toxic-appearing.      Comments: Sleepy, though arousable   HENT:      Head: Normocephalic and atraumatic.      Right Ear: External ear normal.      Left Ear: External ear normal.      Nose: Nose  normal.      Mouth/Throat:      Mouth: Mucous membranes are dry.   Eyes:      General: No scleral icterus.        Right eye: No discharge.         Left eye: No discharge.   Cardiovascular:      Rate and Rhythm: Normal rate.   Pulmonary:      Effort: Pulmonary effort is normal. No respiratory distress.      Comments: On Bipap, no significant accessory muscle use  Abdominal:      General: Abdomen is flat. There is no distension.   Musculoskeletal:      Right lower leg: No edema.      Left lower leg: No edema.   Skin:     General: Skin is warm and dry.   Neurological:      Cranial Nerves: Facial asymmetry present.      Sensory: Sensory deficit present.      Motor: Weakness (L hemiparesis) present.   Psychiatric:         Behavior: Behavior normal.       Neurological Exam:   LOC: drowsy   Attention Span: poor  Language: No aphasia  Articulation: Dysarthria  EOM (CN III, IV, VI): Gaze preference  Right   Facial Movement (CN VII): Lower facial weakness on the Left  Motor: Arm left  Plegia 0/5  Leg left  Plegia 0/5  Arm right 3/5  Leg right 3/5  Sensation: Phan-hypoesthesia left  Tone: Flaccid LUE       Laboratory:  CMP:   Recent Labs   Lab 02/19/22  0322   CALCIUM 8.3*   ALBUMIN 2.1*   PROT 5.9*      K 4.0   CO2 25      BUN 14   CREATININE 0.7   ALKPHOS 90   ALT 16   AST 20   BILITOT 0.6     CBC:   Recent Labs   Lab 02/19/22  0322   WBC 13.71*   RBC 2.93*   HGB 8.5*   HCT 29.3*      *   MCH 29.0   MCHC 29.0*       Diagnostic Results     Brain imaging:  MRI Brain WO 2/5/2022    Moderate to large acute right MCA territory infarction with susceptibility associated with the right basal ganglia and insular components compatible with hemorrhagic conversion.  Mass effect without midline shift or hydrocephalus.     Small sized ipsilateral right cerebellar acute infarction with punctate contralateral left hippocampal focus of diffusion restriction concerning for possible superimposed transient global  amnesia.     [facility] 2/5/2022  Findings consistent with recent ischemia/infarct involving the right MCA distribution again noted, previously identified hyperdensity involving the basal ganglia and caudate on the right again noted, configuration is stable, degree of density is mildly diminished, there is continued mass effect and mild right to left midline shift appearing stable without evidence for significant interval detrimental change.     [facility] 2/4/2022 1741  Hyperdensity within the right basal ganglia insular cortex confined to the gray matter and sparing the internal cortex.  Findings likely representing contrast staining from recent angiogram given altered flow dynamics in the infarcted territory.  Hemorrhagic conversion is less likely. 0.2 cm leftward midline shift.     [facility] (2/4/22) 1415: Continued evolution of acute right MCA distribution infarct with developing minimal right-sided mass effect.  No midline shift or evidence for hemorrhagic conversion.       Vessel Imaging:  IR angio 2/4/2022:  Successful aspiration thrombectomy of the distal right internal carotid artery occlusion, with resulting TICI 3 flow.     CTA head/neck (2/4/22): Acute large vessel occlusion with distal right supraclinoid ICA filling defect extending into the anterior cerebral artery and middle cerebral artery branches as detailed above. Evidence of loss of gray-white matter interface of the deep right hemispheric structures consistent with early ischemia/infarction. Calcified plaque of the vertebral arteries, internal carotid arteries and of the common carotid artery bifurcation.     Cardiac Evaluation:   Last TTE (1/31/22): EF 45%, grade II LV diastolic dysfunction, no LA enlargement     Other:  CXR 2/9/2022  Increase in parenchymal and pleural disease, with obscuration of the heart borders due to increased perihilar consolidative opacities.

## 2022-02-19 NOTE — NURSING
Patient blood pressure taken. 81/53 Map 61    MD notified. Seen at bedside. Recommended to continue to monitor.

## 2022-02-19 NOTE — PLAN OF CARE
Problem: Adjustment to Illness (Stroke, Ischemic/Transient Ischemic Attack)  Goal: Optimal Coping  Outcome: Ongoing, Progressing     Problem: Cerebral Tissue Perfusion (Stroke, Ischemic/Transient Ischemic Attack)  Goal: Optimal Cerebral Tissue Perfusion  Outcome: Ongoing, Progressing     Problem: Communication Impairment (Stroke, Ischemic/Transient Ischemic Attack)  Goal: Improved Communication Skills  Outcome: Ongoing, Progressing     Problem: Respiratory Compromise (Stroke, Ischemic/Transient Ischemic Attack)  Goal: Effective Oxygenation and Ventilation  Outcome: Ongoing, Progressing     Problem: Swallowing Impairment (Stroke, Ischemic/Transient Ischemic Attack)  Goal: Optimal Eating and Swallowing without Aspiration  Outcome: Ongoing, Progressing     Problem: Urinary Elimination Impaired (Stroke, Ischemic/Transient Ischemic Attack)  Goal: Effective Urinary Elimination  Outcome: Ongoing, Progressing     Problem: Diabetes Comorbidity  Goal: Blood Glucose Level Within Targeted Range  Outcome: Ongoing, Progressing       Pt stable, afebrile, nsr on monitor, rapid called earlier today due to pt forceful breathing, and pale color and o2 sats in 70s. Pt is now on continuous bipap until weaned off, abg drawn but wnl, son was called to make him aware and he is now at bedside. Pt is now more alert and oriented, has md flores wants mehdik I & o, and will monitor overnight.

## 2022-02-19 NOTE — RESPIRATORY THERAPY
RAPID RESPONSE RESPIRATORY CHART CHECK       Chart check completed  Please call [phone] for further concerns or assistance.

## 2022-02-19 NOTE — RESPIRATORY THERAPY
RAPID RESPONSE RESPIRATORY THERAPY FOLLOW-UP NOTE       Followed up with patient for proactive rounding. Now on nasal cannula. Cont bipap order changed to QHS. Will continue to follow.   No new acute issues at this time  Please call Rapid Response RT, Pretty Zaragoza, RRT at [phone] with any questions or concerns.

## 2022-02-19 NOTE — CARE UPDATE
RAPID RESPONSE NURSE ROUND       Rounding completed with charge RN, Vickey. No concerns verbalized at this time. Instructed to call [phone] for further concerns or assistance.
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
## 2022-02-19 NOTE — PLAN OF CARE
Problem: Fall Injury Risk  Goal: Absence of Fall and Fall-Related Injury  Outcome: Ongoing, Progressing     Problem: Adjustment to Illness (Stroke, Ischemic/Transient Ischemic Attack)  Goal: Optimal Coping  Outcome: Ongoing, Progressing     Problem: Cognitive Impairment (Stroke, Ischemic/Transient Ischemic Attack)  Goal: Optimal Cognitive Function  Outcome: Ongoing, Progressing     Problem: Respiratory Compromise (Stroke, Ischemic/Transient Ischemic Attack)  Goal: Effective Oxygenation and Ventilation   POC reviewed and updated with the patient and son. Questions regarding POC were encouraged and addressed with the patient and son. Patient is AO X * 1 at this time. Fall/safety precautions maintained, no signs of injury noted during shift. Patient was repositioned for comfort, bed locked in low position with side rails X *4, bed alarm set, with call light within reach. Pt became hypotensive over night. She received a 500ml bolus . BP slowly coming up. Pt remain NPO and tube feeding still on hold.

## 2022-02-20 NOTE — SUBJECTIVE & OBJECTIVE
Neurologic Chief Complaint: AMS, LSW    Subjective:     Interval History: Patient is seen for follow-up neurological assessment and treatment recommendations: Tolerated Bipap overnight. Neuro exam stable. Repeat Echo pending. BP with MAPs in high 60s while on Bipap, and Tfs not re-initiated yesterday for unclear reasons. Will restart TF today.    HPI, Past Medical, Family, and Social History remains the same as documented in the initial encounter.     Review of Systems   Unable to perform ROS: Mental status change   Scheduled Meds:   aspirin  81 mg Per G Tube Daily    atorvastatin  40 mg Per G Tube Daily    clopidogreL  75 mg Per G Tube Daily    donepeziL  10 mg Per G Tube Daily    furosemide  20 mg Per G Tube Daily    heparin (porcine)  5,000 Units Subcutaneous Q8H    insulin detemir U-100  12 Units Subcutaneous QHS    memantine  10 mg Per G Tube BID    modafiniL  100 mg Per G Tube Daily     Continuous Infusions:   dextrose 10 % in water (D10W)       PRN Meds:barium, dextrose 10 % in water (D10W), dextrose 10 % in water (D10W), dextrose 10 % in water (D10W), glucagon (human recombinant), insulin aspart U-100, sodium chloride 0.9%    Objective:     Vital Signs (Most Recent):  Temp: 98 °F (36.7 °C) (02/20/22 0830)  Pulse: 105 (02/20/22 0830)  Resp: 18 (02/20/22 0830)  BP: (!) 97/54 (02/20/22 0830)  SpO2: 100 % (02/20/22 0830)  BP Location: Right arm    Vital Signs Range (Last 24H):  Temp:  [96 °F (35.6 °C)-98.6 °F (37 °C)]   Pulse:  [104-109]   Resp:  [15-18]   BP: (84-97)/(54-58)   SpO2:  [93 %-100 %]   BP Location: Right arm    Physical Exam  Vitals and nursing note reviewed.   Constitutional:       General: She is not in acute distress.     Appearance: She is not toxic-appearing.      Comments: Sleepy, though arousable   HENT:      Head: Normocephalic and atraumatic.      Right Ear: External ear normal.      Left Ear: External ear normal.      Nose: Nose normal.      Mouth/Throat:      Mouth: Mucous membranes are  dry.   Eyes:      General: No scleral icterus.        Right eye: No discharge.         Left eye: No discharge.   Cardiovascular:      Rate and Rhythm: Normal rate.   Pulmonary:      Effort: Pulmonary effort is normal. No respiratory distress.      Comments: On Bipap, no significant accessory muscle use  Abdominal:      General: Abdomen is flat. There is no distension.   Musculoskeletal:      Right lower leg: No edema.      Left lower leg: No edema.   Skin:     General: Skin is warm and dry.   Neurological:      Cranial Nerves: Facial asymmetry present.      Sensory: Sensory deficit present.      Motor: Weakness (L hemiparesis) present.   Psychiatric:         Behavior: Behavior normal.       Neurological Exam:   LOC: drowsy   Attention Span: poor  Language: No aphasia  Articulation: Dysarthria  EOM (CN III, IV, VI): Gaze preference  Right   Facial Movement (CN VII): Lower facial weakness on the Left  Motor: Arm left  Plegia 0/5  Leg left  Plegia 0/5  Arm right 3/5  Leg right 3/5  Sensation: Phan-hypoesthesia left  Tone: Flaccid LUE      Laboratory:  CMP:   Recent Labs   Lab 02/20/22  0316   CALCIUM 8.8   ALBUMIN 2.1*   PROT 6.1      K 3.6   CO2 25      BUN 14   CREATININE 0.6   ALKPHOS 90   ALT 16   AST 22   BILITOT 0.7     CBC:   Recent Labs   Lab 02/20/22  0316   WBC 13.82*   RBC 2.93*   HGB 8.5*   HCT 28.2*      MCV 96   MCH 29.0   MCHC 30.1*       Diagnostic Results     Brain imaging:  MRI Brain WO 2/5/2022    Moderate to large acute right MCA territory infarction with susceptibility associated with the right basal ganglia and insular components compatible with hemorrhagic conversion.  Mass effect without midline shift or hydrocephalus.     Small sized ipsilateral right cerebellar acute infarction with punctate contralateral left hippocampal focus of diffusion restriction concerning for possible superimposed transient global amnesia.     [facility] 2/5/2022  Findings consistent with recent  ischemia/infarct involving the right MCA distribution again noted, previously identified hyperdensity involving the basal ganglia and caudate on the right again noted, configuration is stable, degree of density is mildly diminished, there is continued mass effect and mild right to left midline shift appearing stable without evidence for significant interval detrimental change.     [facility] 2/4/2022 1741  Hyperdensity within the right basal ganglia insular cortex confined to the gray matter and sparing the internal cortex.  Findings likely representing contrast staining from recent angiogram given altered flow dynamics in the infarcted territory.  Hemorrhagic conversion is less likely. 0.2 cm leftward midline shift.     [facility] (2/4/22) 1415: Continued evolution of acute right MCA distribution infarct with developing minimal right-sided mass effect.  No midline shift or evidence for hemorrhagic conversion.       Vessel Imaging:  IR angio 2/4/2022:  Successful aspiration thrombectomy of the distal right internal carotid artery occlusion, with resulting TICI 3 flow.     CTA head/neck (2/4/22): Acute large vessel occlusion with distal right supraclinoid ICA filling defect extending into the anterior cerebral artery and middle cerebral artery branches as detailed above. Evidence of loss of gray-white matter interface of the deep right hemispheric structures consistent with early ischemia/infarction. Calcified plaque of the vertebral arteries, internal carotid arteries and of the common carotid artery bifurcation.     Cardiac Evaluation:   Last TTE (1/31/22): EF 45%, grade II LV diastolic dysfunction, no LA enlargement     Other:  CXR 2/9/2022  Increase in parenchymal and pleural disease, with obscuration of the heart borders due to increased perihilar consolidative opacities.

## 2022-02-20 NOTE — PLAN OF CARE
Problem: Fall Injury Risk  Goal: Absence of Fall and Fall-Related Injury  Outcome: Ongoing, Progressing     Problem: Adjustment to Illness (Stroke, Ischemic/Transient Ischemic Attack)  Goal: Optimal Coping  Outcome: Ongoing, Progressing     Problem: Bowel Elimination Impaired (Stroke, Ischemic/Transient Ischemic Attack)  Goal: Effective Bowel Elimination  Outcome: Ongoing, Progressing     Problem: Cerebral Tissue Perfusion (Stroke, Ischemic/Transient Ischemic Attack)  Goal: Optimal Cerebral Tissue Perfusion  Outcome: Ongoing, Progressing     Problem: Cognitive Impairment (Stroke, Ischemic/Transient Ischemic Attack)  Goal: Optimal Cognitive Function  Outcome: Ongoing, Progressing     Problem: Communication Impairment (Stroke, Ischemic/Transient Ischemic Attack)  Goal: Improved Communication Skills  Outcome: Ongoing, Progressing    PoC reviewed with patient and family. Stroke booklet at bedside. Patient BP remained above 65 MAP for shift. Patient turned q2h. Safety measures maintained. Patient bed in low position. Bed alarm set. Patient call bell with in reach. Patient belongings with in reach. VSS. AoX1 (person) Full assessment in chart. NAEO.

## 2022-02-20 NOTE — PT/OT/SLP PROGRESS
Physical Therapy Treatment    Patient Name:  Julia Schroeder   MRN:  [id number]  Admitting Diagnosis: Stroke due to occlusion of right carotid artery  Recent Surgery: * No surgery found *      Recommendations:     Discharge Recommendations:  nursing facility, skilled   Discharge Equipment Recommendations:  (TBD)   Barriers to discharge: Decreased caregiver support Pt requiring increased assistance at current time.       Plan:     During this hospitalization, patient to be seen 4 x/week to address the above listed problems via gait training, therapeutic activities, therapeutic exercises, neuromuscular re-education  · Plan of Care Expires:  03/08/22   Plan of Care Reviewed with: patient    This Plan of care has been discussed with the patient who was involved in its development and understands and is in agreement with the identified goals and treatment plan    Subjective     Communicated with nurse (Stevie) prior to session.     Patient comments: Pt with no complaints  Pain/Comfort:  · Pain Rating 1: 0/10  · Pain Rating Post-Intervention 1: 0/10    Objective:     Patient found with: bed alarm, PEG Tube, PureWick, oxygen, telemetry    Patient found R side lying with HOB at 30* angle upon PT entry to room, agreeable to treatment.  Sister present in the room.    RESPIRATORY STATUS:   O2 via NC    General Precautions: Standard, aspiration, fall, NPO   Orthopedic Precautions:N/A   Braces: N/A       BED MOBILITY (vc's for hand placement sequencing of task):        Rolling to the R:  Total A of 2.       Rolling to the L:  NT.        Sup > sit at the EOB:  Total A 2 for trunk elevation and LE's, exiting on the R side.       Sit > sup:  Total A of 2 for trunk and LE's.       Scooting hips to EOB with total A                SITTING AT THE EDGE OF THE BED (10 min)   Assistance Level Required: total A of 2 for trunk and head control with R UE support and L UE resting on pillow to avoid subluxation of L shoulder   Postural deviations  noted: flexed trunk, post lean, R preferential gaze, PPT, L neglect, L UE flaccidity   Encouraged: midline orientation, B feet flat on floor, neutral pelvis, attention to the L, upright posture.  Pt able to hold head in neutral for brief periods of time and turn to the L for 1-2 sec, however, she was unable to track with eyes to the L past midline.  Pt requires max vc's and tactile cues to attend to task and remain engaged    Pt found to be wet with urine and soiled with BM at the end of session.   was notified immediately.      EDUCATION  Patient provided with daily orientation and goals of this PT session. They were educated to call for assistance and to transfer with hospital staff only.  Also, pt was educated on the effects of prolonged immobility and the importance of performing OOB activity and exercises to promote healing and reduce recovery time.      Whiteboard updated with correct mobility information. RN/PCT notified.  Pt requires total A of 2 to sit at the EOB.    Patient left HOB at 30* angle, with  call button in reach, nurse notified, niece and sister present and PEG tube disconnected to prevent aspiration.  Nurse and niece aware    AM-PAC 6 CLICK MOBILITY  Turning over in bed (including adjusting bedclothes, sheets and blankets)?: 1  Sitting down on and standing up from a chair with arms (e.g., wheelchair, bedside commode, etc.): 1  Moving from lying on back to sitting on the side of the bed?: 1  Moving to and from a bed to a chair (including a wheelchair)?: 1  Need to walk in hospital room?: 1  Climbing 3-5 steps with a railing?: 1  Basic Mobility Total Score: 6     Assessment:     Julia Schroeder is a 76 y.o. female admitted with a medical diagnosis of Stroke due to occlusion of right carotid artery.  She presents with the following impairments/functional limitations:  weakness, impaired endurance, impaired sensation, impaired self care skills, impaired functional mobilty, gait instability,  impaired balance, visual deficits, impaired cognition, decreased coordination, decreased upper extremity function, decreased lower extremity function, decreased safety awareness, abnormal tone, impaired coordination, impaired fine motor (L neglect). L hemiparesis requiring significant assistance and verbal cues for bed mob, static sitting at the EOB 2* weakness, post lean, L neglect, fatigue.   In light of pt's current functional level and deficits, it is anticipated that pt will need to participate in an intense rehab program consisting of PT, OT and ST in order to achieve full rehab potential to return to previous level of function and roles.  Pt remains motivated to participate in PT session and will cont to benefit from skilled PT intervention.    Rehab Prognosis:  Fair; patient would benefit from acute skilled PT services to address these deficits and reach maximum level of function.      GOALS:   Multidisciplinary Problems     Physical Therapy Goals        Problem: Physical Therapy Goal    Goal Priority Disciplines Outcome Goal Variances Interventions   Physical Therapy Goal     PT, PT/OT Ongoing, Progressing     Description: PT goals until 2/20/22    1. Pt supine to sit with moderate assist-not met  2. Pt sit to supine with moderate assist-not met  3. Pt sit to stand with LRAD as needed for safety with max assist-not met  4. Pt to perform gait 2 steps with LRAD as needed for safety with max assist.-not met  5. Pt to transfer bed to/from bedside chair with LRAD for safety with max assist.-not met  6. Pt to perform B LE exs in sitting or supine x 15 reps to strengthen B LE to improve functional mobility.-not met  7. Pt to propel w/c 10ft with R UE/LE on level surface with minimal assist-not met  8. Pt to be able to sit on the EOB 10 min with CGA to perform functional activities-not met                       Time Tracking:     PT Received On: 02/20/22  PT Start Time: 1447     PT Stop Time: 1510  PT Total Time  (min): 23 min     Billable Minutes: Therapeutic Activity 13 and Neuromuscular Re-education 10    Treatment Type: Treatment  PT/PTA: PTA     PTA Visit Number: 4       MINERVA Paulson.  Pager [phone]    2/20/2022    .
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
## 2022-02-20 NOTE — PLAN OF CARE
Problem: Fall Injury Risk  Goal: Absence of Fall and Fall-Related Injury  Outcome: Ongoing, Progressing     Problem: Adjustment to Illness (Stroke, Ischemic/Transient Ischemic Attack)  Goal: Optimal Coping  Outcome: Ongoing, Progressing     Problem: Cognitive Impairment (Stroke, Ischemic/Transient Ischemic Attack)  Goal: Optimal Cognitive Function  Outcome: Ongoing, Progressing     Problem: Respiratory Compromise (Stroke, Ischemic/Transient Ischemic Attack)  Goal: Effective Oxygenation and Ventilation  Outcome: Ongoing, Progressing     Problem: Urinary Elimination Impaired (Stroke, Ischemic/Transient Ischemic Attack)  Goal: Effective Urinary Elimination  Outcome: Ongoing, Progressing   POC reviewed and updated with the patient. Questions regarding POC were encouraged and addressed with the patient/caregiver.  VSS, see flow-sheets. Patient is AO X *1 at this time. Fall/safety precautions maintained, no signs of injury noted during shift. Patient was repositioned for comfort, bed locked in low position with side rails X *4, bed alarm set, with call light within reach. Pt remains hypotensive overnight with a MAP > 65. Pt is now on 2L NC and bipap at night. Tube feeding still on hold and continue to monitor BSG every 4 hours.  Plan is to discharge to SNF. No acute signs of distress noted at this time.

## 2022-02-21 PROBLEM — R65.21 SEPTIC SHOCK: Status: ACTIVE | Noted: 2022-01-01

## 2022-02-21 PROBLEM — A41.9 SEPTIC SHOCK: Status: ACTIVE | Noted: 2022-01-01

## 2022-02-21 NOTE — ASSESSMENT & PLAN NOTE
2/2 stroke  SLP consulted  Patient NPO at the moment with NGT in place  Initiation of discussion for possible need for PEG held by NCC and family  Failed repeat SLP evaluation.  Decision for PEG made and now patient s/p PEG by IR on 2/16
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
## 2022-02-21 NOTE — PLAN OF CARE
[facility] - Neuro Critical Care  Discharge Reassessment    Primary Care Provider: Devon Lovell MD    Expected Discharge Date: 2/21/2022     Patient is on ventilator and is not medically ready for discharge.    Reassessment (most recent)     Discharge Reassessment - 02/21/22 1623        Discharge Reassessment    Assessment Type Discharge Planning Reassessment     Discharge Plan A Skilled Nursing Facility     Discharge Plan B Hospice/home     DME Needed Upon Discharge  none     Discharge Barriers Identified None     Why the patient remains in the hospital Requires continued medical care        Post-Acute Status    Post-Acute Authorization Placement     Post-Acute Placement Status Pending post-acute provider review/more information requested     Discharge Delays Change in Medical Condition

## 2022-02-21 NOTE — PLAN OF CARE
Problem: Fall Injury Risk  Goal: Absence of Fall and Fall-Related Injury  Outcome: Ongoing, Progressing     Problem: Adjustment to Illness (Stroke, Ischemic/Transient Ischemic Attack)  Goal: Optimal Coping  Outcome: Ongoing, Progressing     Problem: Bowel Elimination Impaired (Stroke, Ischemic/Transient Ischemic Attack)  Goal: Effective Bowel Elimination  Outcome: Ongoing, Progressing     Problem: Cerebral Tissue Perfusion (Stroke, Ischemic/Transient Ischemic Attack)  Goal: Optimal Cerebral Tissue Perfusion  Outcome: Ongoing, Progressing     Problem: Cognitive Impairment (Stroke, Ischemic/Transient Ischemic Attack)  Goal: Optimal Cognitive Function  Outcome: Ongoing, Progressing     Problem: Communication Impairment (Stroke, Ischemic/Transient Ischemic Attack)  Goal: Improved Communication Skills  Outcome: Ongoing, Progressing     Problem: Functional Ability Impaired (Stroke, Ischemic/Transient Ischemic Attack)  Goal: Optimal Functional Ability  Outcome: Ongoing, Progressing     Problem: Respiratory Compromise (Stroke, Ischemic/Transient Ischemic Attack)  Goal: Effective Oxygenation and Ventilation  Outcome: Ongoing, Progressing     Problem: Sensorimotor Impairment (Stroke, Ischemic/Transient Ischemic Attack)  Goal: Improved Sensorimotor Function  Outcome: Ongoing, Progressing     Problem: Swallowing Impairment (Stroke, Ischemic/Transient Ischemic Attack)  Goal: Optimal Eating and Swallowing without Aspiration  Outcome: Ongoing, Progressing     Problem: Urinary Elimination Impaired (Stroke, Ischemic/Transient Ischemic Attack)  Goal: Effective Urinary Elimination  Outcome: Ongoing, Progressing     Problem: Diabetes Comorbidity  Goal: Blood Glucose Level Within Targeted Range  Outcome: Ongoing, Progressing

## 2022-02-21 NOTE — ASSESSMENT & PLAN NOTE
Intubation for hypoxic respiratory failure   -CXR with pulmonary edema, but patient needs aggressive fluid resuscitation for acute septic shock  -continuous pulse ox   -repeat CXR to assess ETT  -daily CXR and ABG   -wean vent as tolerated

## 2022-02-21 NOTE — ANESTHESIA PROCEDURE NOTES
Ad Hoc Intubation    Date/Time: 2/21/2022 6:35 AM  Performed by: Tiago Mondragon MD  Authorized by: Tiago Mondragon MD     Indications:  Respiratory distress, respiratory failure and hypoxemia  Diagnosis:  Hypoxic respiratory failure  Patient Location:  ICU  Timeout:  2/21/2022 6:30 AM  Procedure Start Time:  2/21/2022 6:35 AM  Procedure End Time:  2/21/2022 6:37 AM  Staff:     Anesthesiologist Present: No    Intubation:     Induction:  Intravenous    Intubated:  Postinduction    Mask Ventilation:  Easy mask    Attempts:  1    Attempted By:  Resident anesthesiologist    Blade:  Glidescope 4    Laryngeal View Grade: Grade I - full view of chords      Difficult Airway Encountered?: No      Complications:  None    Airway Device:  Oral endotracheal tube    Airway Device Size:  7.0    Style/Cuff Inflation:  Cuffed    Inflation Amount (mL):  5    Tube secured:  22    Secured at:  The lips    Placement Verified By:  Capnometry    Complicating Factors:  Anterior larynx and large prominent central incisors    Findings Post-Intubation:  BS equal bilateral

## 2022-02-21 NOTE — PT/OT/SLP PROGRESS
Physical Therapy      Patient Name:  Julia Schroeder   MRN:  [id number]    Patient not seen today secondary to Transfer to ICU, await clearance. Pt intubated and transferred to ICU for higher level of care. Therapy orders no longer valid. Will follow-up once new orders placed.

## 2022-02-21 NOTE — ANESTHESIA PROCEDURE NOTES
Intubation    Date/Time: 2/21/2022 6:40 AM  Performed by: Tiago Mondragon MD  Authorized by: Tiago Mondragon MD     Intubation:     Induction:  Intravenous    Intubated:  Postinduction    Mask Ventilation:  Easy mask    Attempts:  1    Attempted By:  Resident anesthesiologist    Method of Intubation:  Video laryngoscopy    Blade:  Zarina 4    Laryngeal View Grade: Grade I - full view of cords      Difficult Airway Encountered?: No      Complications:  None    Airway Device:  Oral endotracheal tube    Airway Device Size:  7.0    Style/Cuff Inflation:  Cuffed    Tube secured:  22    Secured at:  The lips    Placement Verified By:  Capnometry    Complicating Factors:  Anterior larynx and large prominent central incisors    Findings Post-Intubation:  BS equal bilateral

## 2022-02-21 NOTE — PT/OT/SLP DISCHARGE
Occupational Therapy Discharge Summary    Julia Schroeder  MRN: [id number]   Principal Problem: Stroke due to occlusion of right carotid artery      Patient Discharged from acute Occupational Therapy on 2/21.  Please refer to prior OT note dated 2/17 for functional status.    Assessment:      Patient appropriate for care in another setting.    Objective:     GOALS:   Multidisciplinary Problems     Occupational Therapy Goals        Problem: Occupational Therapy Goal    Goal Priority Disciplines Outcome Interventions   Occupational Therapy Goal     OT, PT/OT Ongoing, Progressing    Description: Goals set 2/13 to be addressed for 14 days with expiration date, 2/27:  Patient will increase functional independence with ADLs by performing:    Patient will demonstrate rolling to the right with max assist.  Not met   Patient will demonstrate rolling to the left with mod assist.   Not met  Patient will demonstrate supine -sit with mod assist.   Not met  Patient will demonstrate stand pivot transfers with max assist.   Not met  Patient will demonstrate grooming while seated with mod assist.   Not met  Patient will demonstrate upper body dressing with mod assist while seated EOB.   Not met  Patient will demonstrate lower body dressing with max assist while seated EOB.   Not met  Patient will demonstrate toileting with max assist.   Not met  Patient will demonstrate bathing while seated EOB with max assist.   Not met  Patient's family / caregiver will demonstrate independence and safety with assisting patient with self-care skills and functional mobility.     Not met  Patient's family / caregiver will demonstrate independence with providing ROM and changes in bed positioning.   Not met  Patient and/or patient's family will verbalize understanding of stroke prevention guidelines, personal risk factors and stroke warning signs via teachback method.  Not met                          Reasons for Discontinuation of Therapy  Services  Transfer to alternate level of care.      Plan:     Patient Discharged to: D.C OT services on acute 2* decline in status requiring TF to NCC.  Await new orders for reassessment.    2/21/2022

## 2022-02-21 NOTE — PT/OT/SLP PROGRESS
Speech Language Pathology      Julia Schroeder  MRN: [id number]    Patient discharged from ST 2' to transfer to ICU and intubation. Please re-consult ST when appropriate.     Dagoberto Thorpe CCC-SLP  Speech-Language Pathology  Pager: [phone]
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
## 2022-02-21 NOTE — PROGRESS NOTES
[facility] - Neuro Critical Care  Neurocritical Care  Progress Note    Admit Date: 2/4/2022  Service Date: 02/21/2022  Length of Stay: 17    Subjective:     Chief Complaint: Stroke due to occlusion of right carotid artery    History of Present Illness: Per ED evaluation:  Patient presents to the emergency department with symptoms concerning for acute CVA.  Last known normal per family was 2:00 a.m.; patient presented outside of the tPA window; tele stroke was activated immediately on arrival; neurologist suspects large vessel occlusion and recommend stat transfer for possible neuro intervention.  Neurology advised CTA head neck prior to transfer; CTA head and neck performed and pending at the time of transfer; numerous labs pending at time of transfer; rectal aspirin given    Patient presented initially to [facility] emergency department with L-sided weakness, L-sided facial droop, confusion, she arrives to the neuro ICU status post thrombectomy for right ICA occlusion. CTA shows acute large vessel occlusion with distal right supraclinoid ICA filling defect extending into the anterior cerebral artery and middle cerebral artery branches.  Patient was recently admitted to outside hospital with NSTEMI (days ago), at which time she was evaluated by cardiology and cardiac catheterization was performed showing severe multi-vessel disease. Medical management was decided upon by Cardiology and family at that time due to comorbidities.  Patient had been started on aspirin, Plavix, statin      Hospital Course: 02/05/2022 metabolic acidosis, tachypnea. Place arterial line.   02/06/2022 likely euglycemi DKA associated with SGLT2 inhibitors  02/07/2022 NAEO, DKA resolved, starting TF  02/08/2022 NAEO. Starting scheduled insuline. Increase water flushes    02/09/2022 C/o SOB overnight, repeat ECG unremarkable, ABG w/ metabolic alkalosis, no respiratory component. Sputum cx sent, however pt afebrile, no leukocytosis. CXR w/ slight  increase in b/l effusions -> given lasix 20 mg IV x1. Sating well on RA this AM. FWF decreased from 500 cc q6hrs -> 300 cc q6hrs given suspicion for hypervolemic hypernatremia  02/10/2022 Slept better last night s/p starting delirium precautions, more awake this AM per son at bedside. Sating well on RA, CXR w/ improvement in b/l effusions. Low grade temp of 100.4, no true fevers, no leukocytosis.   02/11/2022 NAEO. Remains boarding for vascular neuro floor. Na improved this AM to 151.     2/21/2022 rapid response from NPU for hypoxic respiratory failure, upgraded to ICU, intubation and treatment of septic shock          Review of Systems   Unable to perform ROS: Severe respiratory distress     Objective:     Vitals:  Temp: 100.1 °F (37.8 °C)  Pulse: (!) 130  Rhythm: sinus tachycardia  BP: (!) 88/58  MAP (mmHg): 67  Resp: (!) 33  SpO2: (!) 94 %  O2 Device (Oxygen Therapy): BiPAP    Temp  Min: 97.5 °F (36.4 °C)  Max: 100.1 °F (37.8 °C)  Pulse  Min: 100  Max: 134  BP  Min: 88/58  Max: 104/68  MAP (mmHg)  Min: 67  Max: 81  Resp  Min: 16  Max: 34  SpO2  Min: 91 %  Max: 100 %    02/20 0701 - 02/21 0700  In: 150   Out: -    Unmeasured Output  Urine Occurrence: 2  Stool Occurrence: 1  Pad Count: 1       Physical Exam  Constitutional:       Appearance: Normal appearance.   HENT:      Head: Normocephalic and atraumatic.      Mouth/Throat:      Mouth: Mucous membranes are moist.   Eyes:      Extraocular Movements: Extraocular movements intact.      Conjunctiva/sclera: Conjunctivae normal.      Pupils: Pupils are equal, round, and reactive to light.   Cardiovascular:      Rate and Rhythm: Regular rhythm. Tachycardia present.   Pulmonary:      Effort: Respiratory distress present.      Breath sounds: No wheezing.   Abdominal:      General: Abdomen is flat. There is no distension.      Palpations: Abdomen is soft.      Tenderness: There is no abdominal tenderness. There is no guarding.   Skin:     General: Skin is warm and dry.    Neurological:      Mental Status: She is alert.      Comments: --sedation: none  --GCS:  E3 V4 M6  --Mental Status: Awakens to voice, oriented to person(baseline), follows commands   --CN II-XII grossly intact.,  --PERRL   --Motor: L side hemiplegia, right follows 4/5   --sensory: diminished on left           Unable to test orientation, language, memory, judgment, insight, fund of knowledge, hearing, shoulder shrug, tongue protrusion, coordination, gait due to level of consciousness.    Medications:  Continuousdextrose 10 % in water (D10W)    Scheduledaspirin, 81 mg, Daily  atorvastatin, 40 mg, Daily  clopidogreL, 75 mg, Daily  donepeziL, 10 mg, Daily  furosemide, 20 mg, Daily  heparin (porcine), 5,000 Units, Q8H  insulin detemir U-100, 12 Units, QHS  memantine, 10 mg, BID  modafiniL, 100 mg, Daily  piperacillin-tazobactam (ZOSYN) IVPB, 4.5 g, Q8H    PRNbarium, 450 mL, ONCE PRN  dextrose 10 % in water (D10W), , PRN  dextrose 10 % in water (D10W), , PRN  dextrose 10 % in water (D10W), , Continuous PRN  glucagon (human recombinant), 1 mg, PRN  insulin aspart U-100, 1-10 Units, Q4H PRN  sodium chloride 0.9%, 10 mL, PRN  vancomycin - pharmacy to dose, , pharmacy to manage frequency      Today I personally reviewed pertinent medications, lines/drains/airways, imaging, cardiology results, laboratory results, microbiology results,     Diet  Diet NPO  Diet NPO          Assessment/Plan:     Neuro  * Stroke due to occlusion of right carotid artery  S/p mechanical thrombectomy w/ TICI 3    - Etiology likely large vessel atherosclerosis  - Neurological exam is unchanged, remains w/ full R MCA syndrome  - C/w ASA/plavix   - C/w atorvastatin 40 mg qhs   - PT/OT following    Dementia without behavioral disturbance  - C/w home donepezil 10 mg qday, memantine 10 mg qday       Ophtho  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, without long-term current use of insulin  DKA  at admission, now resolved    Glucose 300s now, NPO   Insulin gtt protocol     Pulmonary  Acute respiratory failure with hypoxia  Intubation for hypoxic respiratory failure   -CXR with pulmonary edema, but patient needs aggressive fluid resuscitation for acute septic shock  -continuous pulse ox   -repeat CXR to assess ETT  -daily CXR and ABG   -wean vent as tolerated     Cardiac/Vascular  CAD, multiple vessel  Con DAPT and statins     CHF (congestive heart failure)  Hx of EF 55% on TTE this admission, then 45% on repeat     -CXR with pulmonary edema   -      ID  Septic shock  Leukocytosis, tachycardia, tachypnea, elevated lactic, hypotension   -pan culture   -broad spectrum ABX   -procal pending   -IVF resuscitation   -trend lactic  -levo as needed to maintain MAP >65    GI  Dysphagia  G tube in place     Other  Impaired mobility and ADLs  PT/OT           The patient is being Prophylaxed for:  Venous Thromboembolism with: Chemical  Stress Ulcer with: H2B  Ventilator Pneumonia with: chlorhexidine oral care    Activity Orders          Diet NPO: NPO starting at 02/21 0437    Female, External Urine Collection Device starting at 02/18 1641    Turn patient starting at 02/07 1119    Progressive Mobility Protocol (mobilize patient to their highest level of functioning at least twice daily) starting at 02/04 2000    Elevate HOB starting at 02/04 1521        Partial Code     Critical condition in that Patient has a condition that poses threat to life and bodily function: acute hypoxic respiratory failure, septic shock      65 minutes of Critical care time was spent personally by me on the following activities: development of treatment plan with patient or surrogate and bedside caregivers, discussions with consultants, evaluation of patient's response to treatment, examination of patient, ordering and performing treatments and interventions, ordering and review of laboratory studies, ordering and review of radiographic  studies, pulse oximetry, antibiotic titration if applicable, vasopressor titration if applicable, re-evaluation of patient's condition. This critical care time did not overlap with that of any other provider or involve time for any procedures. There is high probability for acute neurological change leading to clinical and possibly life-threatening deterioration requiring highest level of physician preparedness for urgent intervention.         Helene Tineo PA-C  Neurocritical Care  [facility] - Neuro Critical Care

## 2022-02-21 NOTE — NURSING
Patient arrived to [facility] 9083 from  via bed with RN x3, RT on BiPAP    Type of stroke/diagnosis: stroke due to right coronary occlusion     Current symptoms: baseline dementia, arouses to voice, tachycardic, BiPAP, low grade fever     Skin assessment done: Yes    Wounds noted: sacral nonblanchable redness    King Completed? No, BiPAP in place at this time    Patient Belongings on Admit: red beanie    [facility] notified: SHAAN Sky    10/5 10 PEEP 60% FiO2

## 2022-02-21 NOTE — PLAN OF CARE
Patient seen and examined during morning rounds  Atrial ectopy noted, on 0.27 of levo- 2 grams of magnesium resolved problem, vaso added to help wean levo  Has received 30cc/kg of crystalloid and cultures, lactate drawn and abx started 1st 3 hours  Repeat lactate 3 hrs later improved  Right ij central line placed without difficulty, monitor cvp trends  Degree of encephalopathy improving  Substantial pulmonary edema, likely non cardiogenic on cxr,WV less then 150, Decadron started  CRC addl 35 min not including procedure time
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
## 2022-02-21 NOTE — SIGNIFICANT EVENT
Rapid called on patient due to unresponsiveness, temp 101.2, tachypnea with accessory muscle use,    Patient evaluated by NCCU and transferred to [facility]

## 2022-02-21 NOTE — TELEPHONE ENCOUNTER
No new care gaps identified.  Powered by [employer]. Reference number: [id number].   2/21/2022 6:45:13 AM CST

## 2022-02-21 NOTE — PROGRESS NOTES
Pharmacokinetic Initial Assessment: IV Vancomycin    Assessment/Plan:    Initiate intravenous vancomycin with loading dose of 1500 mg once followed by a maintenance dose of vancomycin 1000mg IV every 24 hours  Desired empiric serum trough concentration is 15 to 20 mcg/mL  Draw vancomycin trough level 60 min prior to third dose on 2/23 at approximately 0500  Pharmacy will continue to follow and monitor vancomycin.      Please contact pharmacy at extension [phone] with any questions regarding this assessment.     Thank you for the consult,   Jael Gaetano       Patient brief summary:  Julia Schroeder is a 76 y.o. female initiated on antimicrobial therapy with IV Vancomycin for treatment of suspected bacteremia    Drug Allergies:   Review of patient's allergies indicates:  No Known Allergies    Actual Body Weight:   64 kg    Renal Function:   Estimated Creatinine Clearance: 72.3 mL/min (based on SCr of 0.6 mg/dL).     Dialysis Method (if applicable):  N/A    CBC (last 72 hours):  Recent Labs   Lab Result Units 02/19/22  0322 02/20/22  0316   WBC K/uL 13.71* 13.82*   Hemoglobin g/dL 8.5* 8.5*   Hematocrit % 29.3* 28.2*   Platelets K/uL 298 327   Gran % % 84.6* 88.4*   Lymph % % 7.7* 5.0*   Mono % % 6.0 5.9   Eosinophil % % 0.7 0.2   Basophil % % 0.3 0.1   Differential Method  Automated Automated       Metabolic Panel (last 72 hours):  Recent Labs   Lab Result Units 02/19/22  0322 02/20/22  0316   Sodium mmol/L 140 142   Potassium mmol/L 4.0 3.6   Chloride mmol/L 103 103   CO2 mmol/L 25 25   Glucose mg/dL 197* 169*   BUN mg/dL 14 14   Creatinine mg/dL 0.7 0.6   Albumin g/dL 2.1* 2.1*   Total Bilirubin mg/dL 0.6 0.7   Alkaline Phosphatase U/L 90 90   AST U/L 20 22   ALT U/L 16 16       Drug levels (last 3 results):  No results for input(s): VANCOMYCINRA, VANCOMYCINPE, VANCOMYCINTR in the last 72 hours.    Microbiologic Results:  Microbiology Results (last 7 days)     Procedure Component Value Units Date/Time    Blood culture  [[id number]] Collected: 02/21/22 0515    Order Status: Sent Specimen: Blood from Peripheral, Ankle, Right Updated: 02/21/22 0516    Blood culture [[id number]] Collected: 02/21/22 0504    Order Status: Sent Specimen: Blood from Peripheral, Forearm, Right Updated: 02/21/22 0505

## 2022-02-21 NOTE — ASSESSMENT & PLAN NOTE
EF 55% with segmental WMA  Pulmonary edema and pleural effusion present on CXR in neuro ICU  Follow up CXR on 2/12 with improved pulmonary edema

## 2022-02-21 NOTE — ASSESSMENT & PLAN NOTE
Leukocytosis, tachycardia, tachypnea, elevated lactic, hypotension   -pan culture   -broad spectrum ABX   -procal pending   -IVF resuscitation   -trend lactic  -levo as needed to maintain MAP >65
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
## 2022-02-21 NOTE — PROGRESS NOTES
Manometry for Central Line Procedure     Manometry Performed: yes    Manometry performed by: MD Itzel/Fay/MD

## 2022-02-21 NOTE — PROCEDURES
"Julia Schroeder is a 76 y.o. female patient.    Temp: 100.1 °F (37.8 °C) (02/21/22 0340)  Pulse: (!) 119 (02/21/22 0639)  Resp: 20 (02/21/22 0639)  BP: 99/60 (02/21/22 0639)  SpO2: 100 % (02/21/22 0639)  Weight: 64 kg (141 lb) (02/19/22 1918)  Height: 5' 2" (157.5 cm) (02/19/22 1918)       Arterial Line    Date/Time: 2/21/2022 7:00 AM  Location procedure was performed: [facility] NEURO CRITICAL CARE  Performed by: Helene Tineo PA-C  Authorized by: Helene Tineo PA-C   Pre-op Diagnosis: septic shock  Post-operative diagnosis: septic shock  Consent Done: Emergent Situation  Preparation: Patient was prepped and draped in the usual sterile fashion.  Indications: multiple ABGs, respiratory failure and hemodynamic monitoring  Location: left radial    Patient sedated: no  Ray's test normal: yes  Needle gauge: 20  Number of attempts: 2  Complications: No  Estimated blood loss (mL): 10  Post-procedure: dressing applied  Post-procedure CMS: unchanged  Patient tolerance: Patient tolerated the procedure well with no immediate complications          2/21/2022  "

## 2022-02-21 NOTE — PROCEDURES
"Julia Schroeder is a 76 y.o. female patient.    Temp: 97.88 °F (36.6 °C) (02/21/22 1212)  Pulse: 78 (02/21/22 1212)  Resp: 20 (02/21/22 1212)  BP: 109/65 (02/21/22 1005)  SpO2: 96 % (02/21/22 1212)  Weight: 64 kg (141 lb) (02/19/22 1918)  Height: 5' 2" (157.5 cm) (02/19/22 1918)       Central Line    Date/Time: 2/21/2022 12:23 PM  Performed by: Fay Woo MD  Authorized by: Fay Woo MD     Supervisor Name:  Jarred Robbins MD   Location procedure was performed:  [facility] NEURO CRITICAL CARE  Assisting Provider:  Jarred Robbins MD  Pre-operative diagnosis:  Septic shock  Post-operative diagnosis:  Septic shock  Consent Done ?:  Yes  Time out complete?: Verified correct patient, procedure, equipment, staff, and site/side    Indications:  Vascular access, hemodynamic monitoring and med administration  Anesthesia:  See MAR for details  Preparation:  Skin prepped with ChloraPrep  Skin prep agent dried: Skin prep agent completely dried prior to procedure    Sterile barriers: All five maximal sterile barriers used - gloves, gown, cap, mask and large sterile sheet    Hand hygiene: Hand hygiene performed immediately prior to central venous catheter insertion    Location:  Left internal jugular  Catheter type:  Triple lumen  Catheter size:  7 Fr  Ultrasound guidance: Yes    Comprressibility:  Normal  Needle advanced into vessel with real time ultrasound guidance.    Manometry: Yes    Number of attempts:  2  Securement:  Line sutured, chlorhexidine patch, sterile dressing applied and blood return through all ports  Estimated blood loss (mL):  10  XRay:  Placement verified by x-ray  Adverse Events:  NoneTermination Site: superior vena cava        2/21/2022    "
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
## 2022-02-21 NOTE — NURSING
During patient rounding found patient to have a bowel movement, difficulty to arouse/response, pt using accessory muscle to breath, tachypnic, obtain a set of vitals , BP 91/60 Respiration 39, temp 101, O2 at 91% on BIPAP at 60%  0336 Rapid Response called  Continue to monitor VSS Q5 minutes   0338 Rapid response at the bedside  0345 Tynelol given via peg tube for elevated temperature   0340 VS , /68, Respiration 32, ,   0343 ABG done  0350 Notified Stroke Team Dora Cox, Per physician refer response team for further interventions   0402 Bloodwork  completed   0402 Continue to monitor VS Q5 Minute  0404 VSS 96/67(78) , O2 93% on BIPAP at 60% Respiration 38  0430 Transfer patient to ICU   0445 ICU Nurse given report

## 2022-02-21 NOTE — SUBJECTIVE & OBJECTIVE
Review of Systems   Unable to perform ROS: Severe respiratory distress     Objective:     Vitals:  Temp: 100.1 °F (37.8 °C)  Pulse: (!) 130  Rhythm: sinus tachycardia  BP: (!) 88/58  MAP (mmHg): 67  Resp: (!) 33  SpO2: (!) 94 %  O2 Device (Oxygen Therapy): BiPAP    Temp  Min: 97.5 °F (36.4 °C)  Max: 100.1 °F (37.8 °C)  Pulse  Min: 100  Max: 134  BP  Min: 88/58  Max: 104/68  MAP (mmHg)  Min: 67  Max: 81  Resp  Min: 16  Max: 34  SpO2  Min: 91 %  Max: 100 %    02/20 0701 - 02/21 0700  In: 150   Out: -    Unmeasured Output  Urine Occurrence: 2  Stool Occurrence: 1  Pad Count: 1       Physical Exam  Constitutional:       Appearance: Normal appearance.   HENT:      Head: Normocephalic and atraumatic.      Mouth/Throat:      Mouth: Mucous membranes are moist.   Eyes:      Extraocular Movements: Extraocular movements intact.      Conjunctiva/sclera: Conjunctivae normal.      Pupils: Pupils are equal, round, and reactive to light.   Cardiovascular:      Rate and Rhythm: Regular rhythm. Tachycardia present.   Pulmonary:      Effort: Respiratory distress present.      Breath sounds: No wheezing.   Abdominal:      General: Abdomen is flat. There is no distension.      Palpations: Abdomen is soft.      Tenderness: There is no abdominal tenderness. There is no guarding.   Skin:     General: Skin is warm and dry.   Neurological:      Mental Status: She is alert.      Comments: --sedation: none  --GCS:  E3 V4 M6  --Mental Status: Awakens to voice, oriented to person(baseline), follows commands   --CN II-XII grossly intact.,  --PERRL   --Motor: L side hemiplegia, right follows 4/5   --sensory: diminished on left           Unable to test orientation, language, memory, judgment, insight, fund of knowledge, hearing, shoulder shrug, tongue protrusion, coordination, gait due to level of consciousness.    Medications:  Continuousdextrose 10 % in water (D10W)    Scheduledaspirin, 81 mg, Daily  atorvastatin, 40 mg, Daily  clopidogreL,  75 mg, Daily  donepeziL, 10 mg, Daily  furosemide, 20 mg, Daily  heparin (porcine), 5,000 Units, Q8H  insulin detemir U-100, 12 Units, QHS  memantine, 10 mg, BID  modafiniL, 100 mg, Daily  piperacillin-tazobactam (ZOSYN) IVPB, 4.5 g, Q8H    PRNbarium, 450 mL, ONCE PRN  dextrose 10 % in water (D10W), , PRN  dextrose 10 % in water (D10W), , PRN  dextrose 10 % in water (D10W), , Continuous PRN  glucagon (human recombinant), 1 mg, PRN  insulin aspart U-100, 1-10 Units, Q4H PRN  sodium chloride 0.9%, 10 mL, PRN  vancomycin - pharmacy to dose, , pharmacy to manage frequency      Today I personally reviewed pertinent medications, lines/drains/airways, imaging, cardiology results, laboratory results, microbiology results,     Diet  Diet NPO  Diet NPO

## 2022-02-22 PROBLEM — N17.0 ATN (ACUTE TUBULAR NECROSIS): Status: ACTIVE | Noted: 2022-01-01

## 2022-02-22 NOTE — ASSESSMENT & PLAN NOTE
Hx of EF 55% on TTE this admission, then 45% on repeat     -CXR with pulmonary edema   CVPs trending up, stop fluids and attempt to diurese  -

## 2022-02-22 NOTE — PROGRESS NOTES
[facility]  Neurocritical Care  Progress Note    Admit Date: 2/4/2022  Service Date: 02/22/2022  Length of Stay: 18    Subjective:     Chief Complaint: Stroke due to occlusion of right carotid artery    History of Present Illness: Per ED evaluation:  Patient presents to the emergency department with symptoms concerning for acute CVA.  Last known normal per family was 2:00 a.m.; patient presented outside of the tPA window; tele stroke was activated immediately on arrival; neurologist suspects large vessel occlusion and recommend stat transfer for possible neuro intervention.  Neurology advised CTA head neck prior to transfer; CTA head and neck performed and pending at the time of transfer; numerous labs pending at time of transfer; rectal aspirin given    Patient presented initially to [facility] emergency department with L-sided weakness, L-sided facial droop, confusion, she arrives to the neuro ICU status post thrombectomy for right ICA occlusion. CTA shows acute large vessel occlusion with distal right supraclinoid ICA filling defect extending into the anterior cerebral artery and middle cerebral artery branches.  Patient was recently admitted to outside hospital with NSTEMI (days ago), at which time she was evaluated by cardiology and cardiac catheterization was performed showing severe multi-vessel disease. Medical management was decided upon by Cardiology and family at that time due to comorbidities.  Patient had been started on aspirin, Plavix, statin      Hospital Course: 02/05/2022 metabolic acidosis, tachypnea. Place arterial line.   02/06/2022 likely euglycemi DKA associated with SGLT2 inhibitors  02/07/2022 NAEO, DKA resolved, starting TF  02/08/2022 NAEO. Starting scheduled insuline. Increase water flushes    02/09/2022 C/o SOB overnight, repeat ECG unremarkable, ABG w/ metabolic alkalosis, no respiratory component. Sputum cx sent, however pt afebrile, no leukocytosis. CXR w/ slight  increase in b/l effusions -> given lasix 20 mg IV x1. Sating well on RA this AM. FWF decreased from 500 cc q6hrs -> 300 cc q6hrs given suspicion for hypervolemic hypernatremia  02/10/2022 Slept better last night s/p starting delirium precautions, more awake this AM per son at bedside. Sating well on RA, CXR w/ improvement in b/l effusions. Low grade temp of 100.4, no true fevers, no leukocytosis.   02/11/2022 NAEO. Remains boarding for vascular neuro floor. Na improved this AM to 151.     2/21/2022 rapid response from NPU for hypoxic respiratory failure, upgraded to ICU, intubation and treatment of septic shock  2/22/2022 improved pressor requirements, but extremely oliguric, furosemide challenge, if no benefit increase dose and add diuril followed by lasix gtt, cultures remain negative, lactate nl      Interval History:      Review of Systems   Unable to perform ROS: Intubated     Objective:     Vitals:  Temp: 96.8 °F (36 °C)  Pulse: 100  Rhythm: normal sinus rhythm  BP: 92/65  MAP (mmHg): 74  CVP (mean): 248 mmHg  Resp: 20  SpO2: (!) 94 %  Oxygen Concentration (%): 40  O2 Device (Oxygen Therapy): ventilator  Vent Mode: A/C  Set Rate: 20 BPM  Vt Set: 400 mL  PEEP/CPAP: 5 cmH20  Peak Airway Pressure: 31 cmH2O  Mean Airway Pressure: 11 cmH20  Plateau Pressure: 0 cmH20    Temp  Min: 96.8 °F (36 °C)  Max: 99.32 °F (37.4 °C)  Pulse  Min: 93  Max: 102  BP  Min: 86/57  Max: 110/69  MAP (mmHg)  Min: 67  Max: 84  CVP (mean)  Min: 11 mmHg  Max: 248 mmHg  Resp  Min: 20  Max: 22  SpO2  Min: 93 %  Max: 98 %  Oxygen Concentration (%)  Min: 40  Max: 40    02/21 0701 - 02/22 0700  In: 6423.1 [I.V.:3642]  Out: 458 [Urine:458]   Unmeasured Output  Urine Occurrence: 2  Stool Occurrence: 0  Pad Count: 1       Physical Exam  Constitutional:       Comments: Awakens to voice   HENT:      Mouth/Throat:      Mouth: Mucous membranes are moist.   Eyes:      Extraocular Movements: Extraocular movements intact.      Pupils: Pupils are equal,  round, and reactive to light.   Cardiovascular:      Rate and Rhythm: Normal rate and regular rhythm.   Pulmonary:      Breath sounds: Normal breath sounds.   Abdominal:      Palpations: Abdomen is soft. There is mass.   Musculoskeletal:         General: No swelling.      Cervical back: Neck supple.   Skin:     General: Skin is warm.      Capillary Refill: Capillary refill takes less than 2 seconds.   Neurological:      Comments: Spontaneous on right side but not to command  Left hemiplegia         Medications:  Continuousfentanyl, Last Rate: 50 mcg/hr (02/22/22 1105)  furosemide (LASIX) 10 mg/mL infusion (titrating)  insulin regular 1 units/mL infusion orderable (DKA), Last Rate: 1.135 Units/hr (02/22/22 1105)  NORepinephrine bitartrate-D5W, Last Rate: 0.04 mcg/kg/min (02/22/22 1105)    Scheduledalbumin human 25%, 12.5 g, Q8H  aspirin, 81 mg, Daily  atorvastatin, 40 mg, Daily  chlorothiazide (DIURIL) IVPB, 500 mg, Once  clopidogreL, 75 mg, Daily  dexamethasone, 20 mg, Daily   Followed by  [START ON 2/26/2022] dexamethasone, 10 mg, Daily  donepeziL, 10 mg, Daily  famotidine, 20 mg, Daily  furosemide (LASIX) injection, 150 mg, Once  heparin (porcine), 5,000 Units, Q8H  memantine, 10 mg, BID  piperacillin-tazobactam (ZOSYN) IVPB, 4.5 g, Q8H  vancomycin (VANCOCIN) IVPB, 1,000 mg, Q12H    PRNacetaminophen, 650 mg, Q6H PRN  barium, 450 mL, ONCE PRN  dextrose 10 % in water (D10W), , PRN  dextrose 10 % in water (D10W), , PRN  dextrose 10%, 12.5 g, PRN  dextrose 10%, 25 g, PRN  sodium chloride 0.9%, 10 mL, PRN  sodium chloride 0.9%, 10 mL, PRN  vancomycin - pharmacy to dose, , pharmacy to manage frequency      Today I personally reviewed pertinent medications, lines/drains/airways, imaging, laboratory results, microbiology results, notably:    Diet  Diet NPO  Diet NPO          Assessment/Plan:     Ophtho  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, without  long-term current use of insulin  DKA at admission, now resolved    Glucose 300s now, NPO   Insulin gtt protocol     Pulmonary  Acute respiratory failure with hypoxia  Intubation for hypoxic respiratory failure   -CXR with pulmonary edema, but patient needs aggressive fluid resuscitation for acute septic shock  -continuous pulse ox   -repeat CXR to assess ETT  -daily CXR and ABG   -wean vent as tolerated   02/22: keep on A/C until has regained urine output    Cardiac/Vascular  CHF (congestive heart failure)  Hx of EF 55% on TTE this admission, then 45% on repeat     -CXR with pulmonary edema   CVPs trending up, stop fluids and attempt to diurese  -      Renal/  ATN (acute tubular necrosis)  Secondary to sepsis  Lasix 100mg iv followed by 150mg lasix iv and diuril 500mg iv followed by lasix gtt    ID  Septic shock  Leukocytosis, tachycardia, tachypnea, elevated lactic, hypotension   -pan culture   -broad spectrum ABX   -procal pending   -IVF resuscitation   -trend lactic  -levo as needed to maintain MAP >65  02/22: lactate nl, weaning off pressors, cultures negative, remains oliguric          The patient is being Prophylaxed for:  Venous Thromboembolism with: Mechanical  Stress Ulcer with: H2B  Ventilator Pneumonia with: chlorhexidine oral care    Activity Orders          Diet NPO: NPO starting at 02/21 0437    Female, External Urine Collection Device starting at 02/18 1641    Turn patient starting at 02/07 1119    Progressive Mobility Protocol (mobilize patient to their highest level of functioning at least twice daily) starting at 02/04 2000    Elevate HOB starting at 02/04 1521      CRC 40 min  Partial Code    Jarred Perez MD  Neurocritical Care  [facility]

## 2022-02-22 NOTE — PLAN OF CARE
[facility] Care Plan    POC reviewed with Julia Schroeder and family at 0300. Pt unable to verbalize understanding d/t neuro exam. Questions and concerns addressed. No acute events overnight.     -levophed gtt down titrated   -fentanyl infusing for comfort with ETT  -bath completed   -urine output about 15-20ml/hour; team made aware.  -CVP 11-14       Pt progressing toward goals. Will continue to monitor. See below and flowsheets for full assessment and VS info.       Neuro:  Marshall Coma Scale  Best Eye Response: 2-->(E2) to pain  Best Motor Response: 5-->(M5) localizes pain  Best Verbal Response: 1-->(V1) none  Marshall Coma Scale Score: 8  Assessment Qualifiers: patient chemically sedated or paralyzed  Pupil PERRLA: no     24hr Temp:  [97.7 °F (36.5 °C)-99.32 °F (37.4 °C)]     CV:   Rhythm: normal sinus rhythm  BP goals:   SBP < 160  MAP > 65    Resp:   O2 Device (Oxygen Therapy): ventilator  Vent Mode: A/C  Set Rate: 20 BPM  Oxygen Concentration (%): 40  Vt Set: 400 mL  PEEP/CPAP: 5 cmH20    Plan: wean to extubate    GI/:     Diet/Nutrition Received: NPO  Last Bowel Movement: 02/21/22  Voiding Characteristics: external catheter    Intake/Output Summary (Last 24 hours) at 2/22/2022 0811  Last data filed at 2/22/2022 0645  Gross per 24 hour   Intake 5170.16 ml   Output 458 ml   Net 4712.16 ml     Unmeasured Output  Urine Occurrence: 2  Stool Occurrence: 0  Pad Count: 1    Labs/Accuchecks:  Recent Labs   Lab 02/22/22  0314   WBC 11.29   RBC 2.62*   HGB 7.4*   HCT 24.6*         Recent Labs   Lab 02/22/22  0314      K 3.9   CO2 21*      BUN 25*   CREATININE 0.8   ALKPHOS 140*   ALT 39   AST 51*   BILITOT 0.5    No results for input(s): PROTIME, INR, APTT, HEPANTIXA in the last 168 hours.   Recent Labs   Lab 02/19/22  1416   TROPONINI 3.256*       Electrolytes: No replacement orders  Accuchecks: Q1H    Gtts:   dextrose 5 % and 0.9 % NaCl 100 mL/hr at 02/22/22 0645    fentanyl 50 mcg/hr (02/22/22 0645)     insulin regular 1 units/mL infusion orderable (DKA) 1.135 Units/hr (02/22/22 0645)    NORepinephrine bitartrate-D5W 0.04 mcg/kg/min (02/22/22 0645)    vasopressin 0.04 Units/min (02/22/22 0645)       LDA/Wounds:  Lines/Drains/Airways       Central Venous Catheter Line  Duration             Percutaneous Central Line Insertion/Assessment - Triple Lumen  02/21/22 1905 left internal jugular <1 day              Drain  Duration                  Gastrostomy/Enterostomy 02/16/22 1421 Gastrostomy tube w/ balloon midline 5 days         Urethral Catheter 02/21/22 0930 Temperature probe <1 day              Airway  Duration                  Airway - Non-Surgical 02/21/22 0635 1 day              Arterial Line  Duration             Arterial Line 02/21/22 0630 Left Radial 1 day              Peripheral Intravenous Line  Duration                  Peripheral IV - Single Lumen 02/21/22 0600 20 G Anterior;Left Ankle 1 day         Peripheral IV - Single Lumen 02/21/22 0600 20 G Anterior;Right Ankle 1 day         Peripheral IV - Single Lumen 02/21/22 0600 20 G Posterior;Right Forearm 1 day                  Wounds: Yes  Wound care consulted: Yes

## 2022-02-22 NOTE — PROGRESS NOTES
Pharmacokinetic Assessment Follow Up: IV Vancomycin  · 2/22 random level resulted at 6.9 mcg/mL  · Goal 15-20 mcg/mL  · Serum creatinine back to baseline, plan to schedule regimen  · Administer 1000 mg q12h  · Collect trough 2/23 at 09:00    Drug levels (last 3 results):  Recent Labs   Lab Result Units 02/22/22 0314   Vancomycin, Random ug/mL 6.9       Pharmacy will continue to follow and monitor vancomycin.    Please contact pharmacy at extension [phone] for questions regarding this assessment.    Thank you for the consult,   Mila Olvera       Patient brief summary:  Julia Schroeder is a 76 y.o. female initiated on antimicrobial therapy with IV Vancomycin for treatment of sepsis    Drug Allergies:   Review of patient's allergies indicates:  No Known Allergies    Actual Body Weight:   64 kg    Renal Function:   Estimated Creatinine Clearance: 54.2 mL/min (based on SCr of 0.8 mg/dL).,    Dialysis Method (if applicable):  N/A    CBC (last 72 hours):  Recent Labs   Lab Result Units 02/20/22 0316 02/21/22  0401 02/22/22  0314   WBC K/uL 13.82* 11.98  11.98 11.29   Hemoglobin g/dL 8.5* 8.3*  8.3* 7.4*   Hematocrit % 28.2* 27.8*  27.8* 24.6*   Platelets K/uL 327 427  427 340   Gran % % 88.4* 89.7*  89.7* 87.7*   Lymph % % 5.0* 4.9*  4.9* 7.2*   Mono % % 5.9 4.6  4.6 4.3   Eosinophil % % 0.2 0.0  0.0 0.0   Basophil % % 0.1 0.1  0.1 0.1   Differential Method  Automated Automated  Automated Automated       Metabolic Panel (last 72 hours):  Recent Labs   Lab Result Units 02/20/22  0316 02/21/22  0401 02/21/22  0938 02/22/22  0314   Sodium mmol/L 142 141  140  --  139   Potassium mmol/L 3.6 4.2  4.2  --  3.9   Chloride mmol/L 103 101  103  --  107   CO2 mmol/L 25 21*  21*  --  21*   Glucose mg/dL 169* 283*  295*  --  144*   Glucose, UA   --   --  3+*  --    BUN mg/dL 14 25*  25*  --  25*   Creatinine mg/dL 0.6 0.9  1.0  --  0.8   Albumin g/dL 2.1* 2.1*  --  2.0*   Total Bilirubin mg/dL 0.7 0.9  --  0.5    Alkaline Phosphatase U/L 90 152*  --  140*   AST U/L 22 83*  --  51*   ALT U/L 16 36  --  39   Magnesium mg/dL  --  2.3  --  2.3   Phosphorus mg/dL  --   --   --  2.0*       Vancomycin Administrations:  vancomycin given in the last 96 hours                   vancomycin 1.5 g in dextrose 5 % 250 mL IVPB (ready to mix) (mg) 1,500 mg New Bag 02/21/22 0705                Microbiologic Results:  Microbiology Results (last 7 days)     Procedure Component Value Units Date/Time    Blood culture [[id number]] Collected: 02/21/22 0515    Order Status: Completed Specimen: Blood from Peripheral, Ankle, Right Updated: 02/22/22 0812     Blood Culture, Routine No Growth to date      No Growth to date    Blood culture [[id number]] Collected: 02/21/22 0504    Order Status: Completed Specimen: Blood from Peripheral, Forearm, Right Updated: 02/22/22 0812     Blood Culture, Routine No Growth to date      No Growth to date

## 2022-02-22 NOTE — NURSING
4673- [facility] urine output of about 15-20ml/hour. Marlo NP verbalized understanding. No orders placed at this time.

## 2022-02-22 NOTE — ASSESSMENT & PLAN NOTE
Leukocytosis, tachycardia, tachypnea, elevated lactic, hypotension   -pan culture   -broad spectrum ABX   -procal pending   -IVF resuscitation   -trend lactic  -levo as needed to maintain MAP >65  02/22: lactate nl, weaning off pressors, cultures negative, remains oliguric

## 2022-02-22 NOTE — PROGRESS NOTES
"[facility]  Adult Nutrition  Progress Note    SUMMARY       Recommendations    1. Suggest initiating TF of Impact Peptide 1.5 advancing as tolerated to goal rate of 35 mL/hr to provide 1260 kcal, 79 gm protein, and 647 mL free fluid.     2. As medically able, ADAT to diabetic with texture per SLP.     3. RD following.     Goals: Pt to receive nutrition by RD f/u  Nutrition Goal Status: goal not met  Communication of RD Recs:  (POC)    Assessment and Plan    Nutrition Problem  Inadequate Energy Intake    Related to (etiology):   Inability to consume sufficient energy     Signs and Symptoms (as evidenced by):   NPO with no alternative means of nutrition      Intervention (treatment strategy):  Collaboration of nutrition care with other providers    Nutrition Diagnosis Status:   New    Reason for Assessment    Reason For Assessment: RD follow-up  Diagnosis: other (see comments) (Possible acute CVA)  Relevant Medical History: T2DM, HTN, HLD, dementia  Interdisciplinary Rounds: did not attend  General Information Comments: Rapid yesterday, transferred to [facility] and intubated. No TF running at this time. Family reports pt with good po intake and no significant wt changes PTA. Pt appears at baseline. Pt does not meet malnutrition criteria with current information.   Nutrition Discharge Planning: Pending medical course    Nutrition Risk Screen    Nutrition Risk Screen: dysphagia or difficulty swallowing    Nutrition/Diet History    Spiritual, Cultural Beliefs, Restorationism Practices, Values that Affect Care: no  Food Allergies:  (MIGUEL ANGEL)  Factors Affecting Nutritional Intake: NPO, on mechanical ventilation    Anthropometrics    Temp: 97.16 °F (36.2 °C)  Height: 5' 2" (157.5 cm)  Height (inches): 62 in  Weight Method: Bed Scale  Weight: 64 kg (141 lb)  Weight (lb): 141 lb  Ideal Body Weight (IBW), Female: 110 lb  % Ideal Body Weight, Female (lb): 128.18 %  BMI (Calculated): 25.8  BMI Grade: 25 - 29.9 - " overweight     Lab/Procedures/Meds    Pertinent Labs Reviewed: reviewed  Pertinent Labs Comments: BUN 25, glucose 144, phos 2, AST 51  Pertinent Medications Reviewed: reviewed  Pertinent Medications Comments: aspirin, statin, famotidine, heparin, IVF    Estimated/Assessed Needs    Weight Used For Calorie Calculations: 64 kg (141 lb 1.5 oz)  Energy Calorie Requirements (kcal): 1331 kcal/day  Energy Need Method: Kcal/kg  Protein Requirements: 77-96 gm/day (1.2-1.5 mg/kg)  Weight Used For Protein Calculations: 64 kg (141 lb 1.5 oz)     Estimated Fluid Requirement Method: other (see comments) (1 mL/kcal or per MD)  RDA Method (mL): 1331  CHO Requirement: 166 gm/day    Nutrition Prescription Ordered    Current Diet Order: NPO    Evaluation of Received Nutrient/Fluid Intake    I/O: +5.965L x 24 hrs; +13.667L since 2/8  Comments: LBM 2/21  Tolerance: tolerating  % Intake of Estimated Energy Needs: 0 - 25 %  % Meal Intake: NPO    Nutrition Risk    Level of Risk/Frequency of Follow-up: low     Monitor and Evaluation    Food and Nutrient Intake: energy intake  Food and Nutrient Adminstration: diet order, enteral and parenteral nutrition administration  Knowledge/Beliefs/Attitudes: beliefs and attitudes  Physical Activity and Function: nutrition-related ADLs and IADLs  Anthropometric Measurements: weight, weight change, body mass index  Biochemical Data, Medical Tests and Procedures: electrolyte and renal panel, gastrointestinal profile, glucose/endocrine profile, inflammatory profile, lipid profile  Nutrition-Focused Physical Findings: overall appearance     Nutrition Follow-Up    RD Follow-up?: Yes

## 2022-02-22 NOTE — PT/OT/SLP PROGRESS
Physical Therapy      Patient Name:  Julia Schroeder   MRN:  [id number]    Patient not seen today secondary to Transfer to ICU, await clearance. New orders needed when medically appropriate.    Maite Chan, PT, DPT  2/22/2022

## 2022-02-22 NOTE — PLAN OF CARE
[facility] Care Plan    POC reviewed with Julia Schroeder and son yair at 1500. Pt intubated and unable to verbalize understanding, but sonYair verbalized understanding. Questions and concerns addressed. No acute events today. Pt progressing toward goals. Will continue to monitor. See below and flowsheets for full assessment and VS info.     Patient transferred this am as a rapid from the floor  [place] [facility]  L IJ TLC placed  Temp fernando placed  Levo/vaso/fentanyl and insulin gtt started  Mass to abdomen- team notified- old fibroid.  Low urine output, bladder scan showing 0 mls.      Neuro:  [place] Coma Scale  Best Eye Response: 3-->(E3) to speech  Best Motor Response: 6-->(M6) obeys commands  Best Verbal Response: 1-->(V1) none  [place] Coma Scale Score: 10  Assessment Qualifiers: patient chemically sedated or paralyzed, patient intubated, no eye obstruction present  Pupil PERRLA: no     24 hr Temp:  [97.5 °F (36.4 °C)-100.1 °F (37.8 °C)]     CV:   Rhythm: sinus tachycardia  BP goals:   SBP < 160  MAP > 65    Resp:   O2 Device (Oxygen Therapy): ventilator  Vent Mode: A/C  Set Rate: 20 BPM  Oxygen Concentration (%): 40  Vt Set: 400 mL  PEEP/CPAP: 5 cmH20    Plan:needs increased through shift    GI/:     Diet/Nutrition Received: NPO  Last Bowel Movement: 02/21/22  Voiding Characteristics: external catheter    Intake/Output Summary (Last 24 hours) at 2/21/2022 1924  Last data filed at 2/21/2022 1824  Gross per 24 hour   Intake 4425.15 ml   Output 243 ml   Net 4182.15 ml     Unmeasured Output  Urine Occurrence: 2  Stool Occurrence: 0  Pad Count: 1    Labs/Accuchecks:  Recent Labs   Lab 02/21/22  0401   WBC 11.98  11.98   RBC 2.85*  2.85*   HGB 8.3*  8.3*   HCT 27.8*  27.8*     427      Recent Labs   Lab 02/21/22  0401     140   K 4.2  4.2   CO2 21*  21*     103   BUN 25*  25*   CREATININE 0.9  1.0   ALKPHOS 152*   ALT 36   AST 83*   BILITOT 0.9    No results for input(s): PROTIME, INR, APTT,  HEPANTIXA in the last 168 hours.   Recent Labs   Lab 02/19/22  1416   TROPONINI 3.256*       Electrolytes: none needed  Accuchecks: every hour      Gtts:   sodium chloride 0.9% 100 mL/hr at 02/21/22 1824    fentanyl 25 mcg/hr (02/21/22 1824)    insulin regular 1 units/mL infusion orderable (DKA) 1.175 Units/hr (02/21/22 1914)    NORepinephrine bitartrate-D5W 0.16 mcg/kg/min (02/21/22 1824)    vasopressin 0.04 Units/min (02/21/22 1824)       LDA/Wounds:  Lines/Drains/Airways       Drain  Duration                  Gastrostomy/Enterostomy 02/16/22 1421 Gastrostomy tube w/ balloon midline 5 days         Urethral Catheter 02/21/22 0930 Temperature probe <1 day              Airway  Duration                  Airway - Non-Surgical 02/21/22 0635 <1 day              Arterial Line  Duration             Arterial Line 02/21/22 0630 Left Radial <1 day              Peripheral Intravenous Line  Duration                  Peripheral IV - Single Lumen 02/21/22 0600 20 G Anterior;Left Ankle <1 day         Peripheral IV - Single Lumen 02/21/22 0600 20 G Anterior;Right Ankle <1 day         Peripheral IV - Single Lumen 02/21/22 0600 20 G Posterior;Right Forearm <1 day                  Wounds: yes- DTI on sacrum  Wound care consulted:yes

## 2022-02-22 NOTE — PLAN OF CARE
[facility] Care Plan    POC reviewed with Julia Schroeder and family at 1500. Son verbalized understanding. Questions and concerns addressed. No acute events today. Progressing toward goals. Will continue to monitor. See below and flowsheets for full assessment and VS info.   - 100mg of Lasix given x1.  - Diuril x1 dose, UO increased.   - Albumin 12.5g q 8 hours.  - TF started at 10cc/hr  - Vasopressin and D5/NS stopped.   - Insulin gtt, Levo, Fentanyl continued.   - Hourly accu checks continued    Neuro:  [place] Coma Scale  Best Eye Response: 3-->(E3) to speech  Best Motor Response: 5-->(M5) localizes pain  Best Verbal Response: 1-->(V1) none  [place] Coma Scale Score: 9  Assessment Qualifiers: patient chemically sedated or paralyzed, patient intubated  Pupil PERRLA: no     24 hr Temp:  [96.62 °F (35.9 °C)-99.32 °F (37.4 °C)]     CV:   Rhythm: normal sinus rhythm  BP goals:   SBP < 160  MAP > 65    Resp:   O2 Device (Oxygen Therapy): ventilator  Vent Mode: A/C  Set Rate: 20 BPM  Oxygen Concentration (%): 40  Vt Set: 400 mL  PEEP/CPAP: 5 cmH20    Plan: wean to extubate    GI/:     Diet/Nutrition Received: tube feeding  Last Bowel Movement: 02/21/22  Voiding Characteristics: urethral catheter (bladder)    Intake/Output Summary (Last 24 hours) at 2/22/2022 1500  Last data filed at 2/22/2022 1405  Gross per 24 hour   Intake 4319.16 ml   Output 1180 ml   Net 3139.16 ml     Unmeasured Output  Urine Occurrence: 2  Stool Occurrence: 0  Pad Count: 1    Labs/Accuchecks:  Recent Labs   Lab 02/22/22  0314   WBC 11.29   RBC 2.62*   HGB 7.4*   HCT 24.6*         Recent Labs   Lab 02/22/22  0314      K 3.9   CO2 21*      BUN 25*   CREATININE 0.8   ALKPHOS 140*   ALT 39   AST 51*   BILITOT 0.5    No results for input(s): PROTIME, INR, APTT, HEPANTIXA in the last 168 hours.   Recent Labs   Lab 02/19/22  1416   TROPONINI 3.256*       Electrolytes: N/A - electrolytes WDL  Accuchecks: Q1H    Gtts:   fentanyl 50 mcg/hr  (02/22/22 1405)    insulin regular 1 units/mL infusion orderable (DKA) 1.135 Units/hr (02/22/22 1205)    NORepinephrine bitartrate-D5W 0.02 mcg/kg/min (02/22/22 1405)       LDA/Wounds:  Lines/Drains/Airways       Central Venous Catheter Line  Duration             Percutaneous Central Line Insertion/Assessment - Triple Lumen  02/21/22 1905 left internal jugular <1 day              Drain  Duration                  Gastrostomy/Enterostomy 02/16/22 1421 Gastrostomy tube w/ balloon midline 6 days         Urethral Catheter 02/21/22 0930 Temperature probe 1 day              Airway  Duration                  Airway - Non-Surgical 02/21/22 0635 1 day              Arterial Line  Duration             Arterial Line 02/21/22 0630 Left Radial 1 day              Peripheral Intravenous Line  Duration                  Peripheral IV - Single Lumen 02/21/22 0600 20 G Anterior;Left Ankle 1 day         Peripheral IV - Single Lumen 02/21/22 0600 20 G Anterior;Right Ankle 1 day         Peripheral IV - Single Lumen 02/21/22 0600 20 G Posterior;Right Forearm 1 day                  Wounds: Yes  Wound care consulted: Yes

## 2022-02-22 NOTE — ASSESSMENT & PLAN NOTE
Secondary to sepsis  Lasix 100mg iv followed by 150mg lasix iv and diuril 500mg iv followed by lasix gtt

## 2022-02-22 NOTE — PLAN OF CARE
Recommendations    1. Suggest initiating TF of Impact Peptide 1.5 advancing as tolerated to goal rate of 35 mL/hr to provide 1260 kcal, 79 gm protein, and 647 mL free fluid.     2. As medically able, ADAT to diabetic with texture per SLP.     3. RD following.     Goals: Pt to receive nutrition by RD f/u  Nutrition Goal Status: goal not met

## 2022-02-22 NOTE — ASSESSMENT & PLAN NOTE
Intubation for hypoxic respiratory failure   -CXR with pulmonary edema, but patient needs aggressive fluid resuscitation for acute septic shock  -continuous pulse ox   -repeat CXR to assess ETT  -daily CXR and ABG   -wean vent as tolerated   02/22: keep on A/C until has regained urine output

## 2022-02-22 NOTE — PT/OT/SLP DISCHARGE
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  Please refer to prior PT noted date on 2/20/22 for functional status.     Assessment:     Patient was discharged unexpectedly.  Information required to complete an accurate discharge summary is unknown.  Refer to therapy initial evaluation and last progress note for initial and most recent functional status and goal achievement.  Recommendations made may be found in medical record. Patient transferred to ICU. New orders needed when medically appropriate.    Objective:     GOALS:   Multidisciplinary Problems     Physical Therapy Goals        Problem: Physical Therapy Goal    Goal Priority Disciplines Outcome Goal Variances Interventions   Physical Therapy Goal     PT, PT/OT Ongoing, Progressing     Description: PT goals until 2/20/22    1. Pt supine to sit with moderate assist-not met  2. Pt sit to supine with moderate assist-not met  3. Pt sit to stand with LRAD as needed for safety with max assist-not met  4. Pt to perform gait 2 steps with LRAD as needed for safety with max assist.-not met  5. Pt to transfer bed to/from bedside chair with LRAD for safety with max assist.-not met  6. Pt to perform B LE exs in sitting or supine x 15 reps to strengthen B LE to improve functional mobility.-not met  7. Pt to propel w/c 10ft with R UE/LE on level surface with minimal assist-not met  8. Pt to be able to sit on the EOB 10 min with CGA to perform functional activities-not met                       Reasons for Discontinuation of Therapy Services  Transfer to alternate level of care.      Plan:     Patient Discharged to: in house/ICU.      2/22/2022

## 2022-02-22 NOTE — SUBJECTIVE & OBJECTIVE
Interval History:      Review of Systems   Unable to perform ROS: Intubated     Objective:     Vitals:  Temp: 96.8 °F (36 °C)  Pulse: 100  Rhythm: normal sinus rhythm  BP: 92/65  MAP (mmHg): 74  CVP (mean): 248 mmHg  Resp: 20  SpO2: (!) 94 %  Oxygen Concentration (%): 40  O2 Device (Oxygen Therapy): ventilator  Vent Mode: A/C  Set Rate: 20 BPM  Vt Set: 400 mL  PEEP/CPAP: 5 cmH20  Peak Airway Pressure: 31 cmH2O  Mean Airway Pressure: 11 cmH20  Plateau Pressure: 0 cmH20    Temp  Min: 96.8 °F (36 °C)  Max: 99.32 °F (37.4 °C)  Pulse  Min: 93  Max: 102  BP  Min: 86/57  Max: 110/69  MAP (mmHg)  Min: 67  Max: 84  CVP (mean)  Min: 11 mmHg  Max: 248 mmHg  Resp  Min: 20  Max: 22  SpO2  Min: 93 %  Max: 98 %  Oxygen Concentration (%)  Min: 40  Max: 40    02/21 0701 - 02/22 0700  In: 6423.1 [I.V.:3642]  Out: 458 [Urine:458]   Unmeasured Output  Urine Occurrence: 2  Stool Occurrence: 0  Pad Count: 1       Physical Exam  Constitutional:       Comments: Awakens to voice   HENT:      Mouth/Throat:      Mouth: Mucous membranes are moist.   Eyes:      Extraocular Movements: Extraocular movements intact.      Pupils: Pupils are equal, round, and reactive to light.   Cardiovascular:      Rate and Rhythm: Normal rate and regular rhythm.   Pulmonary:      Breath sounds: Normal breath sounds.   Abdominal:      Palpations: Abdomen is soft. There is mass.   Musculoskeletal:         General: No swelling.      Cervical back: Neck supple.   Skin:     General: Skin is warm.      Capillary Refill: Capillary refill takes less than 2 seconds.   Neurological:      Comments: Spontaneous on right side but not to command  Left hemiplegia         Medications:  Continuousfentanyl, Last Rate: 50 mcg/hr (02/22/22 1105)  furosemide (LASIX) 10 mg/mL infusion (titrating)  insulin regular 1 units/mL infusion orderable (DKA), Last Rate: 1.135 Units/hr (02/22/22 1105)  NORepinephrine bitartrate-D5W, Last Rate: 0.04 mcg/kg/min (02/22/22 1105)    Scheduledalbumin  human 25%, 12.5 g, Q8H  aspirin, 81 mg, Daily  atorvastatin, 40 mg, Daily  chlorothiazide (DIURIL) IVPB, 500 mg, Once  clopidogreL, 75 mg, Daily  dexamethasone, 20 mg, Daily   Followed by  [START ON 2/26/2022] dexamethasone, 10 mg, Daily  donepeziL, 10 mg, Daily  famotidine, 20 mg, Daily  furosemide (LASIX) injection, 150 mg, Once  heparin (porcine), 5,000 Units, Q8H  memantine, 10 mg, BID  piperacillin-tazobactam (ZOSYN) IVPB, 4.5 g, Q8H  vancomycin (VANCOCIN) IVPB, 1,000 mg, Q12H    PRNacetaminophen, 650 mg, Q6H PRN  barium, 450 mL, ONCE PRN  dextrose 10 % in water (D10W), , PRN  dextrose 10 % in water (D10W), , PRN  dextrose 10%, 12.5 g, PRN  dextrose 10%, 25 g, PRN  sodium chloride 0.9%, 10 mL, PRN  sodium chloride 0.9%, 10 mL, PRN  vancomycin - pharmacy to dose, , pharmacy to manage frequency      Today I personally reviewed pertinent medications, lines/drains/airways, imaging, laboratory results, microbiology results, notably:    Diet  Diet NPO  Diet NPO

## 2022-02-23 NOTE — SUBJECTIVE & OBJECTIVE
Interval History:      Review of Systems   Unable to perform ROS: Intubated     Objective:     Vitals:  Temp: 98.24 °F (36.8 °C)  Pulse: 97  Rhythm: normal sinus rhythm  BP: (!) 88/55  CVP (mean): 23 mmHg  Resp: (!) 38  SpO2: 96 %  Oxygen Concentration (%): 40  O2 Device (Oxygen Therapy): ventilator  Vent Mode: SIMV  Set Rate: 12 BPM  Vt Set: 400 mL  Pressure Support: 8 cmH20  PEEP/CPAP: 5 cmH20  Peak Airway Pressure: 23 cmH2O  Mean Airway Pressure: 7.4 cmH20  Plateau Pressure: 0 cmH20    Temp  Min: 96.62 °F (35.9 °C)  Max: 98.24 °F (36.8 °C)  Pulse  Min: 45  Max: 101  BP  Min: 88/55  Max: 108/66  CVP (mean)  Min: 12 mmHg  Max: 23 mmHg  Resp  Min: 15  Max: 38  SpO2  Min: 88 %  Max: 99 %  Oxygen Concentration (%)  Min: 40  Max: 40    02/22 0701 - 02/23 0700  In: 2913.6 [I.V.:1224.7]  Out: 4510 [Urine:4510]   Unmeasured Output  Urine Occurrence: 2  Stool Occurrence: 0  Pad Count: 1       Physical Exam  Constitutional:       Comments: Awakens to voice   HENT:      Mouth/Throat:      Mouth: Mucous membranes are moist.   Eyes:      Extraocular Movements: Extraocular movements intact.      Pupils: Pupils are equal, round, and reactive to light.   Cardiovascular:      Rate and Rhythm: Normal rate and regular rhythm.   Pulmonary:      Breath sounds: Normal breath sounds.   Abdominal:      Palpations: Abdomen is soft. There is mass.   Musculoskeletal:         General: No swelling.      Cervical back: Neck supple.   Skin:     General: Skin is warm.      Capillary Refill: Capillary refill takes less than 2 seconds.   Neurological:      Comments: Spontaneous on right side but not to command  Left hemiplegia         Medications:  Continuousfentanyl, Last Rate: 50 mcg/hr (02/23/22 0605)  insulin regular 1 units/mL infusion orderable (DKA), Last Rate: 2.335 Units/hr (02/23/22 0605)  NORepinephrine bitartrate-D5W, Last Rate: 0.02 mcg/kg/min (02/23/22 1035)  Scheduledaspirin, 81 mg, Daily  atorvastatin, 40 mg, Daily  clopidogreL, 75  mg, Daily  dexamethasone, 20 mg, Daily   Followed by  [START ON 2/26/2022] dexamethasone, 10 mg, Daily  donepeziL, 10 mg, Daily  famotidine, 20 mg, Daily  heparin (porcine), 5,000 Units, Q8H  insulin aspart U-100, 3 Units, Q4H  insulin detemir U-100, 8 Units, BID  memantine, 10 mg, BID  piperacillin-tazobactam (ZOSYN) IVPB, 4.5 g, Q8H  vancomycin (VANCOCIN) IVPB, 1,000 mg, Q12H  PRNsodium chloride, , Q24H PRN  acetaminophen, 650 mg, Q6H PRN  barium, 450 mL, ONCE PRN  dextrose 10 % in water (D10W), , PRN  dextrose 10 % in water (D10W), , PRN  dextrose 10%, 12.5 g, PRN  dextrose 10%, 25 g, PRN  magnesium oxide, 800 mg, PRN  magnesium oxide, 800 mg, PRN  potassium bicarbonate, 35 mEq, PRN  potassium bicarbonate, 50 mEq, PRN  potassium bicarbonate, 60 mEq, PRN  potassium, sodium phosphates, 2 packet, PRN  potassium, sodium phosphates, 2 packet, PRN  potassium, sodium phosphates, 2 packet, PRN  sodium chloride 0.9%, 10 mL, PRN  sodium chloride 0.9%, 10 mL, PRN  vancomycin - pharmacy to dose, , pharmacy to manage frequency    Today I personally reviewed pertinent medications, lines/drains/airways, imaging, laboratory results, microbiology results, notably:    Diet  Diet NPO  Diet NPO
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
## 2022-02-23 NOTE — ASSESSMENT & PLAN NOTE
Intubation for hypoxic respiratory failure   -CXR with pulmonary edema, but patient needs aggressive fluid resuscitation for acute septic shock  -continuous pulse ox   -repeat CXR to assess ETT  -daily CXR and ABG   -wean vent as tolerated   02/22: keep on A/C until has regained urine output  02/23: resumed urine output, spontaneous trial today

## 2022-02-23 NOTE — ASSESSMENT & PLAN NOTE
Secondary to sepsis  Lasix 100mg iv followed by 150mg lasix iv and diuril 500mg iv followed by lasix gtt  02/23: did not require lasix gtt yesterday, diuresed over 1.5L overnight, replace potassium

## 2022-02-23 NOTE — ASSESSMENT & PLAN NOTE
DKA at admission, now resolved    Glucose 300s now, NPO   Insulin gtt protocol   02/23: moving to scheduled insulin

## 2022-02-23 NOTE — PROGRESS NOTES
Pharmacokinetic Assessment Follow Up: IV Vancomycin  · 2/23 trough resulted at 18.3 mcg/mL  · Goal 15-20 mcg/mL  · Continue regimen of 1000 mg q12h  · Collect next  trough 2/26 at 09:00    Drug levels (last 3 results):  Recent Labs   Lab Result Units 02/22/22 0314 02/23/22  0936   Vancomycin, Random ug/mL 6.9  --    Vancomycin-Trough ug/mL  --  18.3     Pharmacy will continue to follow and monitor vancomycin.    Please contact pharmacy at extension [phone] for questions regarding this assessment.    Thank you for the consult,   Mila Olvera     Patient brief summary:  Julia Schroeder is a 76 y.o. female initiated on antimicrobial therapy with IV Vancomycin for treatment of sepsis    Drug Allergies:   Review of patient's allergies indicates:  No Known Allergies    Actual Body Weight:   64 kg    Renal Function:   Estimated Creatinine Clearance: 49.7 mL/min (based on SCr of 0.9 mg/dL).,    Dialysis Method (if applicable):  N/A    CBC (last 72 hours):  Recent Labs   Lab Result Units 02/21/22  0401 02/22/22 0314 02/23/22  0210   WBC K/uL 11.98  11.98 11.29 10.16   Hemoglobin g/dL 8.3*  8.3* 7.4* 6.9*   Hematocrit % 27.8*  27.8* 24.6* 22.1*   Platelets K/uL 427  427 340 280   Gran % % 89.7*  89.7* 87.7* 89.3*   Lymph % % 4.9*  4.9* 7.2* 6.1*   Mono % % 4.6  4.6 4.3 3.9*   Eosinophil % % 0.0  0.0 0.0 0.0   Basophil % % 0.1  0.1 0.1 0.1   Differential Method  Automated  Automated Automated Automated       Metabolic Panel (last 72 hours):  Recent Labs   Lab Result Units 02/21/22  0401 02/21/22  0938 02/22/22 0314 02/23/22  0210   Sodium mmol/L 141  140  --  139 139   Potassium mmol/L 4.2  4.2  --  3.9 2.7*   Chloride mmol/L 101  103  --  107 101   CO2 mmol/L 21*  21*  --  21* 25   Glucose mg/dL 283*  295*  --  144* 261*   Glucose, UA   --  3+*  --   --    BUN mg/dL 25*  25*  --  25* 27*   Creatinine mg/dL 0.9  1.0  --  0.8 0.9   Albumin g/dL 2.1*  --  2.0* 2.5*   Total Bilirubin mg/dL 0.9  --  0.5 0.7    Alkaline Phosphatase U/L 152*  --  140* 164*   AST U/L 83*  --  51* 199*   ALT U/L 36  --  39 133*   Magnesium mg/dL 2.3  --  2.3 2.1   Phosphorus mg/dL  --   --  2.0* 1.6*       Vancomycin Administrations:  vancomycin given in the last 96 hours                   vancomycin 1.5 g in dextrose 5 % 250 mL IVPB (ready to mix) (mg) 1,500 mg New Bag 02/21/22 0728                Microbiologic Results:  Microbiology Results (last 7 days)     Procedure Component Value Units Date/Time    Blood culture [[id number]] Collected: 02/21/22 0515    Order Status: Completed Specimen: Blood from Peripheral, Ankle, Right Updated: 02/23/22 0812     Blood Culture, Routine No Growth to date      No Growth to date      No Growth to date    Blood culture [[id number]] Collected: 02/21/22 0504    Order Status: Completed Specimen: Blood from Peripheral, Forearm, Right Updated: 02/23/22 0812     Blood Culture, Routine No Growth to date      No Growth to date      No Growth to date

## 2022-02-23 NOTE — PROGRESS NOTES
[facility]  Neurocritical Care  Progress Note    Admit Date: 2/4/2022  Service Date: 02/23/2022  Length of Stay: 19    Subjective:     Chief Complaint: Stroke due to occlusion of right carotid artery    History of Present Illness: Per ED evaluation:  Patient presents to the emergency department with symptoms concerning for acute CVA.  Last known normal per family was 2:00 a.m.; patient presented outside of the tPA window; tele stroke was activated immediately on arrival; neurologist suspects large vessel occlusion and recommend stat transfer for possible neuro intervention.  Neurology advised CTA head neck prior to transfer; CTA head and neck performed and pending at the time of transfer; numerous labs pending at time of transfer; rectal aspirin given    Patient presented initially to [facility] emergency department with L-sided weakness, L-sided facial droop, confusion, she arrives to the neuro ICU status post thrombectomy for right ICA occlusion. CTA shows acute large vessel occlusion with distal right supraclinoid ICA filling defect extending into the anterior cerebral artery and middle cerebral artery branches.  Patient was recently admitted to outside hospital with NSTEMI (days ago), at which time she was evaluated by cardiology and cardiac catheterization was performed showing severe multi-vessel disease. Medical management was decided upon by Cardiology and family at that time due to comorbidities.  Patient had been started on aspirin, Plavix, statin      Hospital Course: 02/05/2022 metabolic acidosis, tachypnea. Place arterial line.   02/06/2022 likely euglycemi DKA associated with SGLT2 inhibitors  02/07/2022 NAEO, DKA resolved, starting TF  02/08/2022 NAEO. Starting scheduled insuline. Increase water flushes    02/09/2022 C/o SOB overnight, repeat ECG unremarkable, ABG w/ metabolic alkalosis, no respiratory component. Sputum cx sent, however pt afebrile, no leukocytosis. CXR w/ slight  increase in b/l effusions -> given lasix 20 mg IV x1. Sating well on RA this AM. FWF decreased from 500 cc q6hrs -> 300 cc q6hrs given suspicion for hypervolemic hypernatremia  02/10/2022 Slept better last night s/p starting delirium precautions, more awake this AM per son at bedside. Sating well on RA, CXR w/ improvement in b/l effusions. Low grade temp of 100.4, no true fevers, no leukocytosis.   02/11/2022 NAEO. Remains boarding for vascular neuro floor. Na improved this AM to 151.     2/21/2022 rapid response from NPU for hypoxic respiratory failure, upgraded to ICU, intubation and treatment of septic shock  2/22/2022 improved pressor requirements, but extremely oliguric, furosemide challenge, if no benefit increase dose and add diuril followed by lasix gtt, cultures remain negative, lactate nl  2/23/2022:: on low dose levo at 0.02 and most of time off levo, tolerating TFs-transition to scheduled insulin, wean to spontaneous setting      Interval History:      Review of Systems   Unable to perform ROS: Intubated     Objective:     Vitals:  Temp: 98.24 °F (36.8 °C)  Pulse: 97  Rhythm: normal sinus rhythm  BP: (!) 88/55  CVP (mean): 23 mmHg  Resp: (!) 38  SpO2: 96 %  Oxygen Concentration (%): 40  O2 Device (Oxygen Therapy): ventilator  Vent Mode: SIMV  Set Rate: 12 BPM  Vt Set: 400 mL  Pressure Support: 8 cmH20  PEEP/CPAP: 5 cmH20  Peak Airway Pressure: 23 cmH2O  Mean Airway Pressure: 7.4 cmH20  Plateau Pressure: 0 cmH20    Temp  Min: 96.62 °F (35.9 °C)  Max: 98.24 °F (36.8 °C)  Pulse  Min: 45  Max: 101  BP  Min: 88/55  Max: 108/66  CVP (mean)  Min: 12 mmHg  Max: 23 mmHg  Resp  Min: 15  Max: 38  SpO2  Min: 88 %  Max: 99 %  Oxygen Concentration (%)  Min: 40  Max: 40    02/22 0701 - 02/23 0700  In: 2913.6 [I.V.:1224.7]  Out: 4510 [Urine:4510]   Unmeasured Output  Urine Occurrence: 2  Stool Occurrence: 0  Pad Count: 1       Physical Exam  Constitutional:       Comments: Awakens to voice   HENT:      Mouth/Throat:       Mouth: Mucous membranes are moist.   Eyes:      Extraocular Movements: Extraocular movements intact.      Pupils: Pupils are equal, round, and reactive to light.   Cardiovascular:      Rate and Rhythm: Normal rate and regular rhythm.   Pulmonary:      Breath sounds: Normal breath sounds.   Abdominal:      Palpations: Abdomen is soft. There is mass.   Musculoskeletal:         General: No swelling.      Cervical back: Neck supple.   Skin:     General: Skin is warm.      Capillary Refill: Capillary refill takes less than 2 seconds.   Neurological:      Comments: Spontaneous on right side but not to command  Left hemiplegia         Medications:  Continuousfentanyl, Last Rate: 50 mcg/hr (02/23/22 0605)  insulin regular 1 units/mL infusion orderable (DKA), Last Rate: 2.335 Units/hr (02/23/22 0605)  NORepinephrine bitartrate-D5W, Last Rate: 0.02 mcg/kg/min (02/23/22 1035)  Scheduledaspirin, 81 mg, Daily  atorvastatin, 40 mg, Daily  clopidogreL, 75 mg, Daily  dexamethasone, 20 mg, Daily   Followed by  [START ON 2/26/2022] dexamethasone, 10 mg, Daily  donepeziL, 10 mg, Daily  famotidine, 20 mg, Daily  heparin (porcine), 5,000 Units, Q8H  insulin aspart U-100, 3 Units, Q4H  insulin detemir U-100, 8 Units, BID  memantine, 10 mg, BID  piperacillin-tazobactam (ZOSYN) IVPB, 4.5 g, Q8H  vancomycin (VANCOCIN) IVPB, 1,000 mg, Q12H  PRNsodium chloride, , Q24H PRN  acetaminophen, 650 mg, Q6H PRN  barium, 450 mL, ONCE PRN  dextrose 10 % in water (D10W), , PRN  dextrose 10 % in water (D10W), , PRN  dextrose 10%, 12.5 g, PRN  dextrose 10%, 25 g, PRN  magnesium oxide, 800 mg, PRN  magnesium oxide, 800 mg, PRN  potassium bicarbonate, 35 mEq, PRN  potassium bicarbonate, 50 mEq, PRN  potassium bicarbonate, 60 mEq, PRN  potassium, sodium phosphates, 2 packet, PRN  potassium, sodium phosphates, 2 packet, PRN  potassium, sodium phosphates, 2 packet, PRN  sodium chloride 0.9%, 10 mL, PRN  sodium chloride 0.9%, 10 mL, PRN  vancomycin - pharmacy  to dose, , pharmacy to manage frequency    Today I personally reviewed pertinent medications, lines/drains/airways, imaging, laboratory results, microbiology results, notably:    Diet  Diet NPO  Diet NPO          Assessment/Plan:     Ophtho  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, without long-term current use of insulin  DKA at admission, now resolved    Glucose 300s now, NPO   Insulin gtt protocol   02/23: moving to scheduled insulin    Pulmonary  Acute respiratory failure with hypoxia  Intubation for hypoxic respiratory failure   -CXR with pulmonary edema, but patient needs aggressive fluid resuscitation for acute septic shock  -continuous pulse ox   -repeat CXR to assess ETT  -daily CXR and ABG   -wean vent as tolerated   02/22: keep on A/C until has regained urine output  02/23: resumed urine output, spontaneous trial today    Renal/  ATN (acute tubular necrosis)  Secondary to sepsis  Lasix 100mg iv followed by 150mg lasix iv and diuril 500mg iv followed by lasix gtt  02/23: did not require lasix gtt yesterday, diuresed over 1.5L overnight, replace potassium    ID  Septic shock  Leukocytosis, tachycardia, tachypnea, elevated lactic, hypotension   -pan culture   -broad spectrum ABX   -procal pending   -IVF resuscitation   -trend lactic  -levo as needed to maintain MAP >65  02/22: lactate nl, weaning off pressors, cultures negative, remains oliguric  02/23: resumed urine output after 250mg of lasix and 500 mg of diuril, cultures remain negative,cont abx          The patient is being Prophylaxed for:  Venous Thromboembolism with: Chemical  Stress Ulcer with: PPI  Ventilator Pneumonia with: chlorhexidine oral care    Activity Orders          Diet NPO: NPO starting at 02/21 0437    Female, External Urine Collection Device starting at 02/18 1641    Turn patient starting at 02/07 1119    Progressive Mobility Protocol (mobilize patient to their highest  level of functioning at least twice daily) starting at 02/04 2000    Elevate HOB starting at 02/04 1521      CRC 35 min  Partial Code    Jarred Perez MD  Neurocritical Care  [facility]

## 2022-02-23 NOTE — ASSESSMENT & PLAN NOTE
Leukocytosis, tachycardia, tachypnea, elevated lactic, hypotension   -pan culture   -broad spectrum ABX   -procal pending   -IVF resuscitation   -trend lactic  -levo as needed to maintain MAP >65  02/22: lactate nl, weaning off pressors, cultures negative, remains oliguric  02/23: resumed urine output after 250mg of lasix and 500 mg of diuril, cultures remain negative,cont abx

## 2022-02-23 NOTE — PLAN OF CARE
[facility] Care Plan    POC reviewed with Julia Schroeder and family at 0300. Pt unable to verbalize understanding r/t neuro exam. Questions and concerns addressed with family at bedside . No acute events overnight.       -levo on for a short period and able to titrate off.   -replacing critical K of 2.7 and phos   -CHG bath complete       Pt progressing toward goals. Will continue to monitor. See below and flowsheets for full assessment and VS info.       Neuro:  [place] Coma Scale  Best Eye Response: 2-->(E2) to pain  Best Motor Response: 5-->(M5) localizes pain  Best Verbal Response: 1-->(V1) none  [place] Coma Scale Score: 8  Assessment Qualifiers: patient chemically sedated or paralyzed  Pupil PERRLA: no     24hr Temp:  [96.62 °F (35.9 °C)-97.34 °F (36.3 °C)]     CV:   Rhythm: normal sinus rhythm  BP goals:   SBP < 160  MAP > 65    Resp:   O2 Device (Oxygen Therapy): ventilator  Vent Mode: A/C  Set Rate: 20 BPM  Oxygen Concentration (%): 40  Vt Set: 400 mL  PEEP/CPAP: 5 cmH20    Plan: wean to extubate    GI/:     Diet/Nutrition Received: NPO, tube feeding  Last Bowel Movement: 02/21/22  Voiding Characteristics: external catheter    Intake/Output Summary (Last 24 hours) at 2/23/2022 0644  Last data filed at 2/23/2022 0605  Gross per 24 hour   Intake 3025.36 ml   Output 4510 ml   Net -1484.64 ml     Unmeasured Output  Urine Occurrence: 2  Stool Occurrence: 0  Pad Count: 1    Labs/Accuchecks:  Recent Labs   Lab 02/23/22  0210   WBC 10.16   RBC 2.43*   HGB 6.9*   HCT 22.1*         Recent Labs   Lab 02/23/22  0210      K 2.7*   CO2 25      BUN 27*   CREATININE 0.9   ALKPHOS 164*   *   *   BILITOT 0.7    No results for input(s): PROTIME, INR, APTT, HEPANTIXA in the last 168 hours.   Recent Labs   Lab 02/19/22  1416   TROPONINI 3.256*       Electrolytes: Electrolytes replaced  Accuchecks: Q1H    Gtts:   fentanyl 50 mcg/hr (02/23/22 0605)    insulin regular 1 units/mL infusion orderable (DKA)  2.335 Units/hr (02/23/22 0605)    NORepinephrine bitartrate-D5W Stopped (02/23/22 0455)       LDA/Wounds:  Lines/Drains/Airways       Central Venous Catheter Line  Duration             Percutaneous Central Line Insertion/Assessment - Triple Lumen  02/21/22 1905 left internal jugular 1 day              Drain  Duration                  Gastrostomy/Enterostomy 02/16/22 1421 Gastrostomy tube w/ balloon midline 6 days         Urethral Catheter 02/21/22 0930 Temperature probe 1 day              Airway  Duration                  Airway - Non-Surgical 02/21/22 0635 2 days              Arterial Line  Duration             Arterial Line 02/21/22 0630 Left Radial 2 days              Peripheral Intravenous Line  Duration                  Peripheral IV - Single Lumen 02/21/22 0600 20 G Anterior;Left Ankle 2 days         Peripheral IV - Single Lumen 02/21/22 0600 20 G Anterior;Right Ankle 2 days         Peripheral IV - Single Lumen 02/21/22 0600 20 G Posterior;Right Forearm 2 days                  Wounds: Yes  Wound care consulted: yes

## 2022-02-24 NOTE — SUBJECTIVE & OBJECTIVE
Interval History:      Review of Systems   Unable to perform ROS: Intubated     Objective:     Vitals:  Temp: 99.14 °F (37.3 °C)  Pulse: 102  Rhythm: normal sinus rhythm  BP: 99/63  MAP (mmHg): 76  Resp: 20  SpO2: 98 %  Oxygen Concentration (%): 40  O2 Device (Oxygen Therapy): ventilator  Vent Mode: Spont  Pressure Support: 8 cmH20  PEEP/CPAP: 5 cmH20  Peak Airway Pressure: 14 cmH2O  Mean Airway Pressure: 8.3 cmH20  Plateau Pressure: 0 cmH20    Temp  Min: 98.42 °F (36.9 °C)  Max: 99.32 °F (37.4 °C)  Pulse  Min: 95  Max: 103  BP  Min: 99/63  Max: 107/69  MAP (mmHg)  Min: 76  Max: 84  CVP (mean)  Min: 15 mmHg  Max: 28 mmHg  Resp  Min: 12  Max: 35  SpO2  Min: 90 %  Max: 98 %  Oxygen Concentration (%)  Min: 40  Max: 40    02/23 0701 - 02/24 0700  In: 3460.7 [I.V.:556.1]  Out: 835 [Urine:835]   Unmeasured Output  Urine Occurrence: 2  Stool Occurrence: 0  Pad Count: 1       Physical Exam  Constitutional:       Comments: Awakens to voice   HENT:      Mouth/Throat:      Mouth: Mucous membranes are moist.   Eyes:      Extraocular Movements: Extraocular movements intact.      Pupils: Pupils are equal, round, and reactive to light.   Cardiovascular:      Rate and Rhythm: Normal rate and regular rhythm.   Pulmonary:      Breath sounds: Normal breath sounds.   Abdominal:      Palpations: Abdomen is soft. There is mass.   Musculoskeletal:         General: No swelling.      Cervical back: Neck supple.   Skin:     General: Skin is warm.      Capillary Refill: Capillary refill takes less than 2 seconds.   Neurological:      Comments: Spontaneous on right side but not to command  Left hemiplegia         Medications:  Continuousfentanyl, Last Rate: Stopped (02/24/22 0929)  insulin regular 1 units/mL infusion orderable (DKA), Last Rate: 1.2 Units/hr (02/24/22 1000)  Scheduledaspirin, 81 mg, Daily  atorvastatin, 40 mg, Daily  clopidogreL, 75 mg, Daily  dexamethasone, 20 mg, Daily   Followed by  [START ON 2/26/2022] dexamethasone, 10 mg,  Daily  donepeziL, 10 mg, Daily  famotidine, 20 mg, Daily  heparin (porcine), 5,000 Units, Q8H  memantine, 10 mg, BID  piperacillin-tazobactam (ZOSYN) IVPB, 4.5 g, Q8H  vancomycin (VANCOCIN) IVPB, 1,000 mg, Q12H  PRNacetaminophen, 650 mg, Q6H PRN  barium, 450 mL, ONCE PRN  dextrose 10 % in water (D10W), , PRN  dextrose 10 % in water (D10W), , PRN  dextrose 10%, 12.5 g, PRN  dextrose 10%, 25 g, PRN  magnesium oxide, 800 mg, PRN  magnesium oxide, 800 mg, PRN  potassium bicarbonate, 35 mEq, PRN  potassium bicarbonate, 50 mEq, PRN  potassium bicarbonate, 60 mEq, PRN  potassium, sodium phosphates, 2 packet, PRN  potassium, sodium phosphates, 2 packet, PRN  potassium, sodium phosphates, 2 packet, PRN  sodium chloride 0.9%, 10 mL, PRN  sodium chloride 0.9%, 10 mL, PRN  vancomycin - pharmacy to dose, , pharmacy to manage frequency    Today I personally reviewed pertinent medications, lines/drains/airways, imaging, laboratory results, microbiology results, notably:    Diet  Diet NPO  Diet NPO

## 2022-02-24 NOTE — ASSESSMENT & PLAN NOTE
Secondary to sepsis  Lasix 100mg iv followed by 150mg lasix iv and diuril 500mg iv followed by lasix gtt  02/23: did not require lasix gtt yesterday, diuresed over 1.5L overnight, replace potassium  02/24: continues to make urine, creatinine stable

## 2022-02-24 NOTE — PLAN OF CARE
[facility] Neuro Critical Care  Discharge Reassessment    Primary Care Provider: Devon Lovell MD    Expected Discharge Date: 3/3/2022       Per MD: 2/24/2022: completing course of dexamethasone for ARDS related to sepsis, off TFs for abdominal ultrasound to exclude biliary obstruction, kolbye x 1, has cuff leak- pssible extubation attempt in am      Patient intubated on vent.  Not medically stable for discharge.      Reassessment (most recent)     Discharge Reassessment - 02/24/22 1506        Discharge Reassessment    Assessment Type Discharge Planning Reassessment     Did the patient's condition or plan change since previous assessment? No     Communicated ROGER with patient/caregiver Date not available/Unable to determine     Discharge Plan A Skilled Nursing Facility     Discharge Plan B Long-term acute care facility (LTAC)     DME Needed Upon Discharge  none     Discharge Barriers Identified None     Why the patient remains in the hospital Requires continued medical care               Rhiannon Lorenzo RN, CCRN-K, [facility]  Neuro-Critical Care   X [phone]

## 2022-02-24 NOTE — SUBJECTIVE & OBJECTIVE
"Interval HPI:   Overnight events:  Eating:   NPO  Nausea: No  Hypoglycemia and intervention: No  Fever: No  TPN and/or TF: No      PMH, PSH, FH, SH  reviewed     Review of Systems  Unable to obtain due to: Sedation,Intubation,Altered mental status,Critical illness,Reviewed ROS from note dated 2/2/22 per Dr. Deal.       Current Medications and/or Treatments Impacting Glycemic Control  Immunotherapy:    Immunosuppressants       None          Steroids:   Hormones (From admission, onward)                Start     Stop Route Frequency Ordered    02/26/22 0900  dexamethasone injection 10 mg        "Followed by" Linked Group Details    03/03 0859 IV Daily 02/21/22 0853    02/21/22 1000  dexamethasone injection 20 mg        "Followed by" Linked Group Details    02/26 0859 IV Daily 02/21/22 0853          Pressors:    Autonomic Drugs (From admission, onward)                Start     Stop Route Frequency Ordered    02/19/22 0900  donepeziL tablet 10 mg         -- PER G TUBE Daily 02/18/22 1015          Hyperglycemia/Diabetes Medications:   Antihyperglycemics (From admission, onward)                Start     Stop Route Frequency Ordered    02/21/22 1000  insulin regular in 0.9 % NaCl 100 unit/100 mL (1 unit/mL) infusion        Question Answer Comment   Insulin Rate Adjustment (DO NOT MODIFY ANSWER) \\[employer] [email or web address]    Enter initial dose from Infusion Protocol Chart (Units/hr): 1.5        -- IV Continuous 02/21/22 0857             PHYSICAL EXAMINATION:  Vitals:    02/24/22 1302   BP: 99/61   Pulse: 106   Resp: (!) 28   Temp: 99.32 °F (37.4 °C)     Body mass index is 28.63 kg/m².    Physical Exam  Constitutional: Well developed, well nourished.   ENT: External ears no masses with nose patent; normal hearing.  Neck: Supple; trachea midline  Cardiovascular: Normal heart sounds, no LE edema. DP +2 bilaterally.  Lungs: Intubated on ventilator lungs anterior bilaterally clear " to auscultation.  Abdomen: Soft, no masses, no hernias.  MS: No clubbing or cyanosis of nails noted; unable to assess gait.  Skin: No rashes, lesions, or ulcers; no nodules.   Foot: nails in good condition, no amputations noted.

## 2022-02-24 NOTE — HPI
Reason for Consult: Management of type 2 DM Hyperglycemia     Surgical Procedure and Date: none    Diabetes diagnosis year: renetta    Home Diabetes Medications:  Trulicity 1.5 mg, Jardiance 25 mg daily, Metformin 1000 mg BID - per chart review   Lab Results   Component Value Date    HGBA1C 8.3 (H) 01/07/2022         How often checking glucose at home? renetta  BG readings on regimen: renetta  Hypoglycemia on the regimen?  renetta  Missed doses on regimen?  renetta    Diabetes Complications include:     Hyperglycemia    Complicating diabetes co morbidities:   History of CVA      HPI:   Patient is a 76 y.o. female with a diagnosis of type 2 DM, CAD and CHF. Patient presented initially to [facility] emergency department with L-sided weakness, L-sided facial droop, confusion, she arrives to the neuro ICU status post thrombectomy for right ICA occlusion. CTA shows acute large vessel occlusion with distal right supraclinoid ICA filling defect extending into the anterior cerebral artery and middle cerebral artery branches.  Patient was recently admitted to outside hospital with NSTEMI (days ago), at which time she was evaluated by cardiology and cardiac catheterization was performed showing severe multi-vessel disease. Medical management was decided upon by Cardiology and family at that time due to comorbidities.  Endocrinology consutled for BG/ DM management.

## 2022-02-24 NOTE — PLAN OF CARE
[facility] Care Plan    POC reviewed with Julia Schroeder and family at 1400. Pt verbalized understanding. Questions and concerns addressed. No acute events today. Pt progressing toward goals. Will continue to monitor. See below and flowsheets for full assessment and VS info.   -Pt put on spontaneous vent setting  -Pt off of levophed  Neuro:  [place] Coma Scale  Best Eye Response: 4-->(E4) spontaneous  Best Motor Response: 6-->(M6) obeys commands  Best Verbal Response: 1-->(V1) none  [place] Coma Scale Score: 11  Assessment Qualifiers: patient chemically sedated or paralyzed, patient intubated  Pupil PERRLA: yes     24 hr Temp:  [96.98 °F (36.1 °C)-98.6 °F (37 °C)]     CV:   Rhythm: normal sinus rhythm  BP goals:   SBP < 160  MAP > 65    Resp:   O2 Device (Oxygen Therapy): ventilator  Vent Mode: Spont  Set Rate: 12 BPM  Oxygen Concentration (%): 40  Vt Set: 400 mL  PEEP/CPAP: 5 cmH20  Pressure Support: 8 cmH20    Plan: wean to extubate    GI/:     Diet/Nutrition Received: NPO, tube feeding  Last Bowel Movement: 02/21/22  Voiding Characteristics: urethral catheter (bladder)    Intake/Output Summary (Last 24 hours) at 2/23/2022 1804  Last data filed at 2/23/2022 1748  Gross per 24 hour   Intake 3510.4 ml   Output 2715 ml   Net 795.4 ml     Unmeasured Output  Urine Occurrence: 2  Stool Occurrence: 0  Pad Count: 1    Labs/Accuchecks:  Recent Labs   Lab 02/23/22  1723   WBC 11.41   RBC 2.90*   HGB 8.5*   HCT 26.8*         Recent Labs   Lab 02/23/22  1723      K 4.9   CO2 25      BUN 36*   CREATININE 1.2   ALKPHOS 265*   *   *   BILITOT 0.9    No results for input(s): PROTIME, INR, APTT, HEPANTIXA in the last 168 hours.   Recent Labs   Lab 02/19/22  1416   TROPONINI 3.256*       Electrolytes: Electrolytes replaced  Accuchecks: Q1H    Gtts:   fentanyl 50 mcg/hr (02/23/22 1748)    insulin regular 1 units/mL infusion orderable (DKA) 2.4 Units/hr (02/23/22 1748)    NORepinephrine bitartrate-D5W  Stopped (02/23/22 1053)       LDA/Wounds:  Lines/Drains/Airways       Central Venous Catheter Line  Duration             Percutaneous Central Line Insertion/Assessment - Triple Lumen  02/21/22 1905 left internal jugular 1 day              Drain  Duration                  Gastrostomy/Enterostomy 02/16/22 1421 Gastrostomy tube w/ balloon midline 7 days         Urethral Catheter 02/21/22 0930 Temperature probe 2 days              Airway  Duration                  Airway - Non-Surgical 02/21/22 0635 2 days              Arterial Line  Duration             Arterial Line 02/21/22 0630 Left Radial 2 days              Peripheral Intravenous Line  Duration                  Peripheral IV - Single Lumen 02/21/22 0600 20 G Anterior;Left Ankle 2 days         Peripheral IV - Single Lumen 02/21/22 0600 20 G Anterior;Right Ankle 2 days         Peripheral IV - Single Lumen 02/21/22 0600 20 G Posterior;Right Forearm 2 days                  Wounds: Yes  Wound care consulted: Yes

## 2022-02-24 NOTE — ASSESSMENT & PLAN NOTE
BG goal 140 - 180     Continue IV insulin infusion protocol  Requires intensive BG monitoring while on protocol

## 2022-02-24 NOTE — NURSING
0412- Fatou NP notified that the distal port in the patient's central line has clotted with blood. Fatou NP notified that this RN is unable to aspirate clot. CVP monitoring discontinued. Fatou GUTIERREZ verbalized understanding and stated he would discuss with AM team.

## 2022-02-24 NOTE — PLAN OF CARE
[facility] Care Plan    POC reviewed with Julia Schroeder and family at 0300. Pt unable to verbalize understanding r/t neuro exam. Questions and concerns addressed with sister at bedside. No acute events overnight.     -tolerated Spontaneous setting well  -remained off levophed   -fentanyl infusing for comfort with ETT  -remains on Insulin gtt   -intermittently follows commands on R side  -bath complete        Pt progressing toward goals. Will continue to monitor. See below and flowsheets for full assessment and VS info.       Neuro:  Marshall Coma Scale  Best Eye Response: 3-->(E3) to speech  Best Motor Response: 6-->(M6) obeys commands  Best Verbal Response: 1-->(V1) none  [place] Coma Scale Score: 10  Assessment Qualifiers: patient chemically sedated or paralyzed  Pupil PERRLA: yes     24hr Temp:  [97.34 °F (36.3 °C)-99.32 °F (37.4 °C)]     CV:   Rhythm: normal sinus rhythm  BP goals:   SBP < 160  MAP > 65    Resp:   O2 Device (Oxygen Therapy): ventilator  Vent Mode: Spont  Set Rate: 12 BPM  Oxygen Concentration (%): 40  Vt Set: 400 mL  PEEP/CPAP: 5 cmH20  Pressure Support: 8 cmH20    Plan: wean to extubate    GI/:     Diet/Nutrition Received: NPO, tube feeding  Last Bowel Movement: 02/21/22  Voiding Characteristics: external catheter    Intake/Output Summary (Last 24 hours) at 2/24/2022 0737  Last data filed at 2/24/2022 0605  Gross per 24 hour   Intake 3338.3 ml   Output 735 ml   Net 2603.3 ml     Unmeasured Output  Urine Occurrence: 2  Stool Occurrence: 0  Pad Count: 1    Labs/Accuchecks:  Recent Labs   Lab 02/24/22  0209   WBC 10.36   RBC 2.90*   HGB 8.3*   HCT 27.1*         Recent Labs   Lab 02/24/22  0209      K 4.9   CO2 26   CL 99   BUN 41*   CREATININE 1.1   ALKPHOS 386*   *   *   BILITOT 1.2*    No results for input(s): PROTIME, INR, APTT, HEPANTIXA in the last 168 hours.   Recent Labs   Lab 02/19/22  1416   TROPONINI 3.256*       Electrolytes: N/A - electrolytes WDL  Accuchecks:  Q1H    Gtts:   fentanyl 50 mcg/hr (02/24/22 0605)    insulin regular 1 units/mL infusion orderable (DKA) 1.2 Units/hr (02/24/22 0605)    NORepinephrine bitartrate-D5W Stopped (02/23/22 1053)       LDA/Wounds:  Lines/Drains/Airways       Central Venous Catheter Line  Duration             Percutaneous Central Line Insertion/Assessment - Triple Lumen  02/21/22 1905 left internal jugular 2 days              Drain  Duration                  Gastrostomy/Enterostomy 02/16/22 1421 Gastrostomy tube w/ balloon midline 7 days         Urethral Catheter 02/21/22 0930 Temperature probe 2 days              Airway  Duration                  Airway - Non-Surgical 02/21/22 0635 3 days              Arterial Line  Duration             Arterial Line 02/21/22 0630 Left Radial 3 days              Peripheral Intravenous Line  Duration                  Peripheral IV - Single Lumen 02/21/22 0600 20 G Anterior;Left Ankle 3 days         Peripheral IV - Single Lumen 02/21/22 0600 20 G Anterior;Right Ankle 3 days         Peripheral IV - Single Lumen 02/21/22 0600 20 G Posterior;Right Forearm 3 days                  Wounds: Yes  Wound care consulted: Yes

## 2022-02-24 NOTE — PLAN OF CARE
[facility] Care Plan    POC reviewed with Julia Schroeder and family at 1400. Pt unable to verbalize understanding 2/2 intubated. Questions and concerns addressed. No acute events today. Pt progressing toward goals. Will continue to monitor. See below and flowsheets for full assessment and VS info.   -fentanyl gtt d/c  -tube feeds off for abdominal ultrasound scheduled at 1830  -IJ central line d/c  -insulin gtt titrated per nomogram  -80mg lasix given    Neuro:  Marshall Coma Scale  Best Eye Response: 3-->(E3) to speech  Best Motor Response: 6-->(M6) obeys commands  Best Verbal Response: 1-->(V1) none  [place] Coma Scale Score: 10  Assessment Qualifiers: patient chemically sedated or paralyzed, patient intubated  Pupil PERRLA: yes     24 hr Temp:  [98.24 °F (36.8 °C)-99.32 °F (37.4 °C)]     CV:   Rhythm: normal sinus rhythm  BP goals:   SBP < 160  MAP > 65    Resp:   O2 Device (Oxygen Therapy): ventilator  Vent Mode: Spont  Set Rate: 12 BPM  Oxygen Concentration (%): 40  Vt Set: 400 mL  PEEP/CPAP: 5 cmH20  Pressure Support: 8 cmH20    Plan: wean to extubate    GI/:     Diet/Nutrition Received: NPO  Last Bowel Movement: 02/24/22  Voiding Characteristics: external catheter    Intake/Output Summary (Last 24 hours) at 2/24/2022 1719  Last data filed at 2/24/2022 1600  Gross per 24 hour   Intake 3010.46 ml   Output 1935 ml   Net 1075.46 ml     Unmeasured Output  Urine Occurrence: 2  Stool Occurrence: 2  Pad Count: 1    Labs/Accuchecks:  Recent Labs   Lab 02/24/22  0209   WBC 10.36   RBC 2.90*   HGB 8.3*   HCT 27.1*         Recent Labs   Lab 02/24/22  0209      K 4.9   CO2 26   CL 99   BUN 41*   CREATININE 1.1   ALKPHOS 386*   *   *   BILITOT 1.2*    No results for input(s): PROTIME, INR, APTT, HEPANTIXA in the last 168 hours.   Recent Labs   Lab 02/19/22  1416   TROPONINI 3.256*       Electrolytes: N/A - electrolytes WDL  Accuchecks: Q1H    Gtts:   fentanyl Stopped (02/24/22 0929)    insulin regular 1  units/mL infusion orderable (DKA) 2.4 Units/hr (02/24/22 1600)       LDA/Wounds:  Lines/Drains/Airways       Drain  Duration                  Gastrostomy/Enterostomy 02/16/22 1421 Gastrostomy tube w/ balloon midline 8 days         Urethral Catheter 02/21/22 0930 Temperature probe 3 days              Airway  Duration                  Airway - Non-Surgical 02/21/22 0635 3 days              Arterial Line  Duration             Arterial Line 02/21/22 0630 Left Radial 3 days              Peripheral Intravenous Line  Duration                  Peripheral IV - Single Lumen 02/21/22 0600 20 G Anterior;Left Ankle 3 days         Peripheral IV - Single Lumen 02/24/22 1636 22 G Anterior;Right Forearm <1 day         Peripheral IV - Single Lumen 02/24/22 1636 22 G Posterior;Right Hand <1 day                  Wounds: Yes  Wound care consulted: Yes

## 2022-02-24 NOTE — ASSESSMENT & PLAN NOTE
DKA at admission, now resolved    Glucose 300s now, NPO   Insulin gtt protocol   02/24: endocrine consult

## 2022-02-24 NOTE — ASSESSMENT & PLAN NOTE
Intubation for hypoxic respiratory failure   -CXR with pulmonary edema, but patient needs aggressive fluid resuscitation for acute septic shock  -continuous pulse ox   -repeat CXR to assess ETT  -daily CXR and ABG   -wean vent as tolerated   02/22: keep on A/C until has regained urine output  02/23: resumed urine output, spontaneous trial today  02/24: tolerating SBT and has cuff leak, will require diuresis before extubation attempt

## 2022-02-24 NOTE — PROGRESS NOTES
[facility] - Neuro Critical Care  Neurocritical Care  Progress Note    Admit Date: 2/4/2022  Service Date: 02/24/2022  Length of Stay: 20    Subjective:     Chief Complaint: Stroke due to occlusion of right carotid artery    History of Present Illness: Per ED evaluation:  Patient presents to the emergency department with symptoms concerning for acute CVA.  Last known normal per family was 2:00 a.m.; patient presented outside of the tPA window; tele stroke was activated immediately on arrival; neurologist suspects large vessel occlusion and recommend stat transfer for possible neuro intervention.  Neurology advised CTA head neck prior to transfer; CTA head and neck performed and pending at the time of transfer; numerous labs pending at time of transfer; rectal aspirin given    Patient presented initially to [facility] emergency department with L-sided weakness, L-sided facial droop, confusion, she arrives to the neuro ICU status post thrombectomy for right ICA occlusion. CTA shows acute large vessel occlusion with distal right supraclinoid ICA filling defect extending into the anterior cerebral artery and middle cerebral artery branches.  Patient was recently admitted to outside hospital with NSTEMI (days ago), at which time she was evaluated by cardiology and cardiac catheterization was performed showing severe multi-vessel disease. Medical management was decided upon by Cardiology and family at that time due to comorbidities.  Patient had been started on aspirin, Plavix, statin      Hospital Course: 02/05/2022 metabolic acidosis, tachypnea. Place arterial line.   02/06/2022 likely euglycemi DKA associated with SGLT2 inhibitors  02/07/2022 NAEO, DKA resolved, starting TF  02/08/2022 NAEO. Starting scheduled insuline. Increase water flushes    02/09/2022 C/o SOB overnight, repeat ECG unremarkable, ABG w/ metabolic alkalosis, no respiratory component. Sputum cx sent, however pt afebrile, no leukocytosis. CXR w/ slight  increase in b/l effusions -> given lasix 20 mg IV x1. Sating well on RA this AM. FWF decreased from 500 cc q6hrs -> 300 cc q6hrs given suspicion for hypervolemic hypernatremia  02/10/2022 Slept better last night s/p starting delirium precautions, more awake this AM per son at bedside. Sating well on RA, CXR w/ improvement in b/l effusions. Low grade temp of 100.4, no true fevers, no leukocytosis.   02/11/2022 NAEO. Remains boarding for vascular neuro floor. Na improved this AM to 151.     2/21/2022 rapid response from NPU for hypoxic respiratory failure, upgraded to ICU, intubation and treatment of septic shock  2/22/2022 improved pressor requirements, but extremely oliguric, furosemide challenge, if no benefit increase dose and add diuril followed by lasix gtt, cultures remain negative, lactate nl  2/23/2022:: on low dose levo at 0.02 and most of time off levo, tolerating TFs-transition to scheduled insulin, wean to spontaneous setting  2/24/2022: completing course of dexamethasone for ARDS related to sepsis, off TFs for abdominal ultrasound to exclude biliary obstruction, diurese x 1, has cuff leak- pssible extubation attempt in am      Interval History:      Review of Systems   Unable to perform ROS: Intubated     Objective:     Vitals:  Temp: 99.14 °F (37.3 °C)  Pulse: 102  Rhythm: normal sinus rhythm  BP: 99/63  MAP (mmHg): 76  Resp: 20  SpO2: 98 %  Oxygen Concentration (%): 40  O2 Device (Oxygen Therapy): ventilator  Vent Mode: Spont  Pressure Support: 8 cmH20  PEEP/CPAP: 5 cmH20  Peak Airway Pressure: 14 cmH2O  Mean Airway Pressure: 8.3 cmH20  Plateau Pressure: 0 cmH20    Temp  Min: 98.42 °F (36.9 °C)  Max: 99.32 °F (37.4 °C)  Pulse  Min: 95  Max: 103  BP  Min: 99/63  Max: 107/69  MAP (mmHg)  Min: 76  Max: 84  CVP (mean)  Min: 15 mmHg  Max: 28 mmHg  Resp  Min: 12  Max: 35  SpO2  Min: 90 %  Max: 98 %  Oxygen Concentration (%)  Min: 40  Max: 40    02/23 0701 - 02/24 0700  In: 3460.7 [I.V.:556.1]  Out: 835  [Urine:835]   Unmeasured Output  Urine Occurrence: 2  Stool Occurrence: 0  Pad Count: 1       Physical Exam  Constitutional:       Comments: Awakens to voice   HENT:      Mouth/Throat:      Mouth: Mucous membranes are moist.   Eyes:      Extraocular Movements: Extraocular movements intact.      Pupils: Pupils are equal, round, and reactive to light.   Cardiovascular:      Rate and Rhythm: Normal rate and regular rhythm.   Pulmonary:      Breath sounds: Normal breath sounds.   Abdominal:      Palpations: Abdomen is soft. There is mass.   Musculoskeletal:         General: No swelling.      Cervical back: Neck supple.   Skin:     General: Skin is warm.      Capillary Refill: Capillary refill takes less than 2 seconds.   Neurological:      Comments: Spontaneous on right side but not to command  Left hemiplegia         Medications:  Continuousfentanyl, Last Rate: Stopped (02/24/22 0929)  insulin regular 1 units/mL infusion orderable (DKA), Last Rate: 1.2 Units/hr (02/24/22 1000)  Scheduledaspirin, 81 mg, Daily  atorvastatin, 40 mg, Daily  clopidogreL, 75 mg, Daily  dexamethasone, 20 mg, Daily   Followed by  [START ON 2/26/2022] dexamethasone, 10 mg, Daily  donepeziL, 10 mg, Daily  famotidine, 20 mg, Daily  heparin (porcine), 5,000 Units, Q8H  memantine, 10 mg, BID  piperacillin-tazobactam (ZOSYN) IVPB, 4.5 g, Q8H  vancomycin (VANCOCIN) IVPB, 1,000 mg, Q12H  PRNacetaminophen, 650 mg, Q6H PRN  barium, 450 mL, ONCE PRN  dextrose 10 % in water (D10W), , PRN  dextrose 10 % in water (D10W), , PRN  dextrose 10%, 12.5 g, PRN  dextrose 10%, 25 g, PRN  magnesium oxide, 800 mg, PRN  magnesium oxide, 800 mg, PRN  potassium bicarbonate, 35 mEq, PRN  potassium bicarbonate, 50 mEq, PRN  potassium bicarbonate, 60 mEq, PRN  potassium, sodium phosphates, 2 packet, PRN  potassium, sodium phosphates, 2 packet, PRN  potassium, sodium phosphates, 2 packet, PRN  sodium chloride 0.9%, 10 mL, PRN  sodium chloride 0.9%, 10 mL, PRN  vancomycin -  pharmacy to dose, , pharmacy to manage frequency    Today I personally reviewed pertinent medications, lines/drains/airways, imaging, laboratory results, microbiology results, notably:    Diet  Diet NPO  Diet NPO          Assessment/Plan:     Ophtho  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, without long-term current use of insulin  DKA at admission, now resolved    Glucose 300s now, NPO   Insulin gtt protocol   02/24: endocrine consult    Pulmonary  Acute respiratory failure with hypoxia  Intubation for hypoxic respiratory failure   -CXR with pulmonary edema, but patient needs aggressive fluid resuscitation for acute septic shock  -continuous pulse ox   -repeat CXR to assess ETT  -daily CXR and ABG   -wean vent as tolerated   02/22: keep on A/C until has regained urine output  02/23: resumed urine output, spontaneous trial today  02/24: tolerating SBT and has cuff leak, will require diuresis before extubation attempt    Renal/  ATN (acute tubular necrosis)  Secondary to sepsis  Lasix 100mg iv followed by 150mg lasix iv and diuril 500mg iv followed by lasix gtt  02/23: did not require lasix gtt yesterday, diuresed over 1.5L overnight, replace potassium  02/24: continues to make urine, creatinine stable    ID  Septic shock  Leukocytosis, tachycardia, tachypnea, elevated lactic, hypotension   -pan culture   -broad spectrum ABX   -procal pending   -IVF resuscitation   -trend lactic  -levo as needed to maintain MAP >65  02/22: lactate nl, weaning off pressors, cultures negative, remains oliguric  02/23: resumed urine output after 250mg of lasix and 500 mg of diuril, cultures remain negative,cont abx  02/24: essentially resolved, cont abx for 7 day course-no source detected          The patient is being Prophylaxed for:  Venous Thromboembolism with: Chemical  Stress Ulcer with: H2B  Ventilator Pneumonia with: chlorhexidine oral care    Activity Orders           Diet NPO: NPO starting at 02/21 0437    Turn patient starting at 02/07 1119    Progressive Mobility Protocol (mobilize patient to their highest level of functioning at least twice daily) starting at 02/04 2000    Elevate HOB starting at 02/04 1521        Partial Code    Jarred Perez MD  Neurocritical Care  [facility] - Neuro Critical Care

## 2022-02-24 NOTE — ASSESSMENT & PLAN NOTE
Leukocytosis, tachycardia, tachypnea, elevated lactic, hypotension   -pan culture   -broad spectrum ABX   -procal pending   -IVF resuscitation   -trend lactic  -levo as needed to maintain MAP >65  02/22: lactate nl, weaning off pressors, cultures negative, remains oliguric  02/23: resumed urine output after 250mg of lasix and 500 mg of diuril, cultures remain negative,cont abx  02/24: essentially resolved, cont abx for 7 day course-no source detected

## 2022-02-24 NOTE — CONSULTS
[facility] - Neuro Critical Care  Endocrinology  Diabetes Consult Note    Consult Requested by: Jarred Robbins MD   Reason for admit: Stroke due to occlusion of right carotid artery    HISTORY OF PRESENT ILLNESS:  Reason for Consult: Management of type 2 DM Hyperglycemia     Surgical Procedure and Date: none    Diabetes diagnosis year: renetta    Home Diabetes Medications:  Trulicity 1.5 mg, Jardiance 25 mg daily, Metformin 1000 mg BID - per chart review   Lab Results   Component Value Date    HGBA1C 8.3 (H) 01/07/2022         How often checking glucose at home? renetta  BG readings on regimen: renetta  Hypoglycemia on the regimen?  renetta  Missed doses on regimen?  renetta    Diabetes Complications include:     Hyperglycemia    Complicating diabetes co morbidities:   History of CVA      HPI:   Patient is a 76 y.o. female with a diagnosis of type 2 DM, CAD and CHF. Patient presented initially to [facility] emergency department with L-sided weakness, L-sided facial droop, confusion, she arrives to the neuro ICU status post thrombectomy for right ICA occlusion. CTA shows acute large vessel occlusion with distal right supraclinoid ICA filling defect extending into the anterior cerebral artery and middle cerebral artery branches.  Patient was recently admitted to outside hospital with NSTEMI (days ago), at which time she was evaluated by cardiology and cardiac catheterization was performed showing severe multi-vessel disease. Medical management was decided upon by Cardiology and family at that time due to comorbidities.  Endocrinology consutled for BG/ DM management.         Interval HPI:   Overnight events:  Eating:   NPO  Nausea: No  Hypoglycemia and intervention: No  Fever: No  TPN and/or TF: No      PMH, PSH, FH, SH  reviewed     Review of Systems  Unable to obtain due to: Sedation,Intubation,Altered mental status,Critical illness,Reviewed ROS from note dated 2/2/22 per Dr. Deal.       Current Medications and/or Treatments Impacting  "Glycemic Control  Immunotherapy:    Immunosuppressants       None          Steroids:   Hormones (From admission, onward)                Start     Stop Route Frequency Ordered    02/26/22 0900  dexamethasone injection 10 mg        "Followed by" Linked Group Details    03/03 0859 IV Daily 02/21/22 0853    02/21/22 1000  dexamethasone injection 20 mg        "Followed by" Linked Group Details    02/26 0859 IV Daily 02/21/22 0853          Pressors:    Autonomic Drugs (From admission, onward)                Start     Stop Route Frequency Ordered    02/19/22 0900  donepeziL tablet 10 mg         -- PER G TUBE Daily 02/18/22 1015          Hyperglycemia/Diabetes Medications:   Antihyperglycemics (From admission, onward)                Start     Stop Route Frequency Ordered    02/21/22 1000  insulin regular in 0.9 % NaCl 100 unit/100 mL (1 unit/mL) infusion        Question Answer Comment   Insulin Rate Adjustment (DO NOT MODIFY ANSWER) \\[email or web address] [id number]    Enter initial dose from Infusion Protocol Chart (Units/hr): 1.5        -- IV Continuous 02/21/22 0857             PHYSICAL EXAMINATION:  Vitals:    02/24/22 1302   BP: 99/61   Pulse: 106   Resp: (!) 28   Temp: 99.32 °F (37.4 °C)     Body mass index is 28.63 kg/m².    Physical Exam  Constitutional: Well developed, well nourished.   ENT: External ears no masses with nose patent; normal hearing.  Neck: Supple; trachea midline  Cardiovascular: Normal heart sounds, no LE edema. DP +2 bilaterally.  Lungs: Intubated on ventilator lungs anterior bilaterally clear to auscultation.  Abdomen: Soft, no masses, no hernias.  MS: No clubbing or cyanosis of nails noted; unable to assess gait.  Skin: No rashes, lesions, or ulcers; no nodules.   Foot: nails in good condition, no amputations noted.        Labs Reviewed and Include   Recent Labs   Lab 02/24/22  0209   *   CALCIUM 8.0*   ALBUMIN 2.6*   PROT 6.1      K 4.9   CO2 " 26   CL 99   BUN 41*   CREATININE 1.1   ALKPHOS 386*   *   *   BILITOT 1.2*     Lab Results   Component Value Date    WBC 10.36 02/24/2022    HGB 8.3 (L) 02/24/2022    HCT 27.1 (L) 02/24/2022    MCV 93 02/24/2022     02/24/2022     No results for input(s): TSH, FREET4 in the last 168 hours.  Lab Results   Component Value Date    HGBA1C 8.3 (H) 01/07/2022       Nutritional status:   Body mass index is 28.63 kg/m².  Lab Results   Component Value Date    ALBUMIN 2.6 (L) 02/24/2022    ALBUMIN 2.5 (L) 02/23/2022    ALBUMIN 2.5 (L) 02/23/2022     No results found for: PREALBUMIN    Estimated Creatinine Clearance: 41.4 mL/min (based on SCr of 1.1 mg/dL).    Accu-Checks  Recent Labs     02/24/22  0721 02/24/22  0803 02/24/22  0856 02/24/22  1010 02/24/22  1106 02/24/22  1204 02/24/22  1313 02/24/22  1401 02/24/22  1510 02/24/22  1513   POCTGLUCOSE 221* 211* 221* 262* 314* 232* 260* 323* >500* 342*        ASSESSMENT and PLAN    * Stroke due to occlusion of right carotid artery  Managed per primary.   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             Plan discussed with  RN at bedside.     Stefania Greenberg NP  Endocrinology  [facility] - Neuro Critical Care

## 2022-02-25 NOTE — ASSESSMENT & PLAN NOTE
DKA at admission, now resolved    Glucose 300s now, NPO   Insulin gtt protocol   02/24: endocrine consult  02/25: appreciate endocrine imput, fixed dose insulin infusion

## 2022-02-25 NOTE — PROGRESS NOTES
"[facility] - Neuro Critical Care  Endocrinology  Progress Note    Admit Date: 2/4/2022     Reason for Consult: Management of type 2 DM Hyperglycemia     Surgical Procedure and Date: none    Diabetes diagnosis year: renetta    Home Diabetes Medications:  Trulicity 1.5 mg, Jardiance 25 mg daily, Metformin 1000 mg BID - per chart review   Lab Results   Component Value Date    HGBA1C 8.3 (H) 01/07/2022         How often checking glucose at home? renetta  BG readings on regimen: renetta  Hypoglycemia on the regimen?  renetta  Missed doses on regimen?  renetta    Diabetes Complications include:     Hyperglycemia    Complicating diabetes co morbidities:   History of CVA      HPI:   Patient is a 76 y.o. female with a diagnosis of type 2 DM, CAD and CHF. Patient presented initially to [facility] emergency department with L-sided weakness, L-sided facial droop, confusion, she arrives to the neuro ICU status post thrombectomy for right ICA occlusion. CTA shows acute large vessel occlusion with distal right supraclinoid ICA filling defect extending into the anterior cerebral artery and middle cerebral artery branches.  Patient was recently admitted to outside hospital with NSTEMI (days ago), at which time she was evaluated by cardiology and cardiac catheterization was performed showing severe multi-vessel disease. Medical management was decided upon by Cardiology and family at that time due to comorbidities.  Endocrinology consutled for BG/ DM management.         Interval HPI:   Overnight events: Remains in NCC. Intubated. BG trending down on IV insulin infusion rates 0.4- 2.4 u/hr.   Eating: Diet NPO    Nausea: No  Hypoglycemia and intervention: No  Fever: No  TPN and/or TF: No      BP (!) 93/53 (BP Location: Left leg, Patient Position: Lying)   Pulse 95   Temp 99.5 °F (37.5 °C)   Resp (!) 21   Ht 5' 2" (1.575 m)   Wt 71 kg (156 lb 8.4 oz)   LMP  (LMP Unknown)   SpO2 100%   BMI 28.63 kg/m²     Labs Reviewed and Include    Recent Labs   Lab " "02/25/22  0336   *   CALCIUM 7.7*   ALBUMIN 2.2*   PROT 5.3*      K 3.0*   CO2 28   CL 99   BUN 29*   CREATININE 0.9   ALKPHOS 369*   *   *   BILITOT 0.9     Lab Results   Component Value Date    WBC 7.99 02/25/2022    HGB 8.3 (L) 02/25/2022    HCT 26.6 (L) 02/25/2022    MCV 92 02/25/2022     02/25/2022     No results for input(s): TSH, FREET4 in the last 168 hours.  Lab Results   Component Value Date    HGBA1C 8.3 (H) 01/07/2022       Nutritional status:   Body mass index is 28.63 kg/m².  Lab Results   Component Value Date    ALBUMIN 2.2 (L) 02/25/2022    ALBUMIN 2.6 (L) 02/24/2022    ALBUMIN 2.5 (L) 02/23/2022     No results found for: PREALBUMIN    Estimated Creatinine Clearance: 50.6 mL/min (based on SCr of 0.9 mg/dL).    Accu-Checks  Recent Labs     02/24/22  2057 02/24/22  2203 02/24/22  2257 02/24/22  2355 02/25/22  0054 02/25/22  0158 02/25/22  0251 02/25/22  0411 02/25/22  0456 02/25/22  0610   POCTGLUCOSE 210* 238* 244* 211* 234* 219* 201* 187* 165* 124*       Current Medications and/or Treatments Impacting Glycemic Control  Immunotherapy:    Immunosuppressants       None          Steroids:   Hormones (From admission, onward)                Start     Stop Route Frequency Ordered    02/26/22 0900  dexamethasone injection 10 mg        "Followed by" Linked Group Details    03/03 0859 IV Daily 02/21/22 0853    02/21/22 1000  dexamethasone injection 20 mg        "Followed by" Linked Group Details    02/26 0859 IV Daily 02/21/22 0853          Pressors:    Autonomic Drugs (From admission, onward)                Start     Stop Route Frequency Ordered    02/19/22 0900  donepeziL tablet 10 mg         -- PER G TUBE Daily 02/18/22 1015          Hyperglycemia/Diabetes Medications:   Antihyperglycemics (From admission, onward)                Start     Stop Route Frequency Ordered    02/21/22 1000  insulin regular in 0.9 % NaCl 100 unit/100 mL (1 unit/mL) infusion        Question Answer " Comment   Insulin Rate Adjustment (DO NOT MODIFY ANSWER) [email or web address] [id number]    Enter initial dose from Infusion Protocol Chart (Units/hr): 1.5        -- IV Continuous 02/21/22 0857            ASSESSMENT and PLAN    * Stroke due to occlusion of right carotid artery  Managed per primary.   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  BG monitoring every 4 hours and moderate dose correction scale         Septic shock  Infection may increase insulin resistance.             Stefania Greenberg NP  Endocrinology  [facility] - Neuro Critical Care

## 2022-02-25 NOTE — ASSESSMENT & PLAN NOTE
Intubation for hypoxic respiratory failure   -CXR with pulmonary edema, but patient needs aggressive fluid resuscitation for acute septic shock  -continuous pulse ox   -repeat CXR to assess ETT  -daily CXR and ABG   -wean vent as tolerated   02/22: keep on A/C until has regained urine output  02/23: resumed urine output, spontaneous trial today  02/24: tolerating SBT and has cuff leak, will require diuresis before extubation attempt  02/25: extubation delayed with new decline in mental status

## 2022-02-25 NOTE — NURSING
Pt MAPs decreasing to low 60s. SHAAN Melara with [facility] made aware. 250cc bolus ordered. WCTM.

## 2022-02-25 NOTE — ASSESSMENT & PLAN NOTE
BG goal 140 - 180     Change to transition insulin infusion at 0.5 u/hr.  BG monitoring every 4 hours and moderate dose correction scale

## 2022-02-25 NOTE — PROGRESS NOTES
[facility]  Neurocritical Care  Progress Note    Admit Date: 2/4/2022  Service Date: 02/25/2022  Length of Stay: 21    Subjective:     Chief Complaint: Stroke due to occlusion of right carotid artery    History of Present Illness: Per ED evaluation:  Patient presents to the emergency department with symptoms concerning for acute CVA.  Last known normal per family was 2:00 a.m.; patient presented outside of the tPA window; tele stroke was activated immediately on arrival; neurologist suspects large vessel occlusion and recommend stat transfer for possible neuro intervention.  Neurology advised CTA head neck prior to transfer; CTA head and neck performed and pending at the time of transfer; numerous labs pending at time of transfer; rectal aspirin given    Patient presented initially to [facility] emergency department with L-sided weakness, L-sided facial droop, confusion, she arrives to the neuro ICU status post thrombectomy for right ICA occlusion. CTA shows acute large vessel occlusion with distal right supraclinoid ICA filling defect extending into the anterior cerebral artery and middle cerebral artery branches.  Patient was recently admitted to outside hospital with NSTEMI (days ago), at which time she was evaluated by cardiology and cardiac catheterization was performed showing severe multi-vessel disease. Medical management was decided upon by Cardiology and family at that time due to comorbidities.  Patient had been started on aspirin, Plavix, statin      Hospital Course: 02/05/2022 metabolic acidosis, tachypnea. Place arterial line.   02/06/2022 likely euglycemi DKA associated with SGLT2 inhibitors  02/07/2022 NAEO, DKA resolved, starting TF  02/08/2022 NAEO. Starting scheduled insuline. Increase water flushes    02/09/2022 C/o SOB overnight, repeat ECG unremarkable, ABG w/ metabolic alkalosis, no respiratory component. Sputum cx sent, however pt afebrile, no leukocytosis. CXR w/ slight  increase in b/l effusions -> given lasix 20 mg IV x1. Sating well on RA this AM. FWF decreased from 500 cc q6hrs -> 300 cc q6hrs given suspicion for hypervolemic hypernatremia  02/10/2022 Slept better last night s/p starting delirium precautions, more awake this AM per son at bedside. Sating well on RA, CXR w/ improvement in b/l effusions. Low grade temp of 100.4, no true fevers, no leukocytosis.   02/11/2022 NAEO. Remains boarding for vascular neuro floor. Na improved this AM to 151.     2/21/2022 rapid response from NPU for hypoxic respiratory failure, upgraded to ICU, intubation and treatment of septic shock  2/22/2022 improved pressor requirements, but extremely oliguric, furosemide challenge, if no benefit increase dose and add diuril followed by lasix gtt, cultures remain negative, lactate nl  2/23/2022:: on low dose levo at 0.02 and most of time off levo, tolerating TFs-transition to scheduled insulin, wean to spontaneous setting  2/24/2022: completing course of dexamethasone for ARDS related to sepsis, off TFs for abdominal ultrasound to exclude biliary obstruction, diurese x 1, has cuff leak- pssible extubation attempt in am  2/25/2022: responded well to diuresis, lungs clear, level of consciousness-concern that episodes of tachypnea without pulmonary explanation may be related to NCSE, prolonged EEG monitoring ordered      Interval History:      Review of Systems   Unable to perform ROS: Intubated     Objective:     Vitals:  Temp: 98.78 °F (37.1 °C)  Pulse: 100  Rhythm: normal sinus rhythm  BP: 99/63  MAP (mmHg): 75  Resp: (!) 21  SpO2: 100 %  Oxygen Concentration (%): 40  O2 Device (Oxygen Therapy): ventilator  Vent Mode: Spont  Pressure Support: 8 cmH20  PEEP/CPAP: 5 cmH20  Peak Airway Pressure: 14 cmH2O  Mean Airway Pressure: 8.3 cmH20  Plateau Pressure: 0 cmH20    Temp  Min: 98.24 °F (36.8 °C)  Max: 100.58 °F (38.1 °C)  Pulse  Min: 94  Max: 108  BP  Min: 75/51  Max: 118/62  MAP (mmHg)  Min: 59  Max:  88  Resp  Min: 18  Max: 37  SpO2  Min: 97 %  Max: 100 %  Oxygen Concentration (%)  Min: 40  Max: 40    02/24 0701 - 02/25 0700  In: 1578.9 [I.V.:76.2]  Out: 2590 [Urine:2590]   Unmeasured Output  Urine Occurrence: 2  Stool Occurrence: 1  Pad Count: 1       Physical Exam  Constitutional:       Comments: Requires repeated physical stimulation for eye opening   HENT:      Mouth/Throat:      Mouth: Mucous membranes are moist.   Eyes:      Extraocular Movements: Extraocular movements intact.      Pupils: Pupils are equal, round, and reactive to light.   Cardiovascular:      Rate and Rhythm: Normal rate and regular rhythm.   Pulmonary:      Breath sounds: Normal breath sounds.   Abdominal:      Palpations: Abdomen is soft. There is mass.   Musculoskeletal:         General: No swelling.      Cervical back: Neck supple.   Skin:     General: Skin is warm.      Capillary Refill: Capillary refill takes less than 2 seconds.   Neurological:      Comments: Withdrawal on right  Left hemiplegia         Medications:  Continuousfentanyl, Last Rate: Stopped (02/24/22 0929)  insulin regular 1 units/mL infusion orderable (TRANSFER), Last Rate: 0.5 Units/hr (02/25/22 1329)  Scheduledaspirin, 81 mg, Daily  atorvastatin, 40 mg, Daily  clopidogreL, 75 mg, Daily  [START ON 2/26/2022] dexamethasone, 10 mg, Daily  donepeziL, 10 mg, Daily  famotidine, 20 mg, Daily  heparin (porcine), 5,000 Units, Q8H  memantine, 10 mg, BID  piperacillin-tazobactam (ZOSYN) IVPB, 4.5 g, Q8H  vancomycin (VANCOCIN) IVPB, 1,000 mg, Q12H  PRNacetaminophen, 650 mg, Q6H PRN  barium, 450 mL, ONCE PRN  dextrose 10%, 12.5 g, PRN  dextrose 10%, 25 g, PRN  glucagon (human recombinant), 1 mg, PRN  glucose, 16 g, PRN  glucose, 24 g, PRN  insulin aspart U-100, 0-10 Units, PRN  magnesium oxide, 800 mg, PRN  magnesium oxide, 800 mg, PRN  potassium bicarbonate, 35 mEq, PRN  potassium bicarbonate, 50 mEq, PRN  potassium bicarbonate, 60 mEq, PRN  potassium, sodium phosphates, 2  packet, PRN  potassium, sodium phosphates, 2 packet, PRN  potassium, sodium phosphates, 2 packet, PRN  vancomycin - pharmacy to dose, , pharmacy to manage frequency    Today I personally reviewed pertinent medications, lines/drains/airways, imaging, laboratory results, microbiology results, notably:    Diet  Diet NPO  Diet NPO          Assessment/Plan:     Ophtho  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, without long-term current use of insulin  DKA at admission, now resolved    Glucose 300s now, NPO   Insulin gtt protocol   02/24: endocrine consult  02/25: appreciate endocrine imput, fixed dose insulin infusion    Pulmonary  Acute respiratory failure with hypoxia  Intubation for hypoxic respiratory failure   -CXR with pulmonary edema, but patient needs aggressive fluid resuscitation for acute septic shock  -continuous pulse ox   -repeat CXR to assess ETT  -daily CXR and ABG   -wean vent as tolerated   02/22: keep on A/C until has regained urine output  02/23: resumed urine output, spontaneous trial today  02/24: tolerating SBT and has cuff leak, will require diuresis before extubation attempt  02/25: extubation delayed with new decline in mental status    Renal/  ATN (acute tubular necrosis)  Secondary to sepsis  Lasix 100mg iv followed by 150mg lasix iv and diuril 500mg iv followed by lasix gtt  02/23: did not require lasix gtt yesterday, diuresed over 1.5L overnight, replace potassium  02/24: continues to make urine, creatinine stable          The patient is being Prophylaxed for:  Venous Thromboembolism with: Chemical  Stress Ulcer with: H2B  Ventilator Pneumonia with: chlorhexidine oral care    Activity Orders          Diet NPO: NPO starting at 02/21 0437    Turn patient starting at 02/07 1119    Progressive Mobility Protocol (mobilize patient to their highest level of functioning at least twice daily) starting at 02/04 2000    Elevate HOB  starting at 02/04 1521        Partial Code    Jarred Perez MD  Neurocritical Care  [facility] - Neuro Critical Care

## 2022-02-25 NOTE — NURSING
Notified Fatou NP, pt breathing 40 breaths a min, sat 100%, vent is spontaneous. ABG results given. No new orders, will continue to monitor.

## 2022-02-25 NOTE — SUBJECTIVE & OBJECTIVE
Interval History:      Review of Systems   Unable to perform ROS: Intubated     Objective:     Vitals:  Temp: 98.78 °F (37.1 °C)  Pulse: 100  Rhythm: normal sinus rhythm  BP: 99/63  MAP (mmHg): 75  Resp: (!) 21  SpO2: 100 %  Oxygen Concentration (%): 40  O2 Device (Oxygen Therapy): ventilator  Vent Mode: Spont  Pressure Support: 8 cmH20  PEEP/CPAP: 5 cmH20  Peak Airway Pressure: 14 cmH2O  Mean Airway Pressure: 8.3 cmH20  Plateau Pressure: 0 cmH20    Temp  Min: 98.24 °F (36.8 °C)  Max: 100.58 °F (38.1 °C)  Pulse  Min: 94  Max: 108  BP  Min: 75/51  Max: 118/62  MAP (mmHg)  Min: 59  Max: 88  Resp  Min: 18  Max: 37  SpO2  Min: 97 %  Max: 100 %  Oxygen Concentration (%)  Min: 40  Max: 40    02/24 0701 - 02/25 0700  In: 1578.9 [I.V.:76.2]  Out: 2590 [Urine:2590]   Unmeasured Output  Urine Occurrence: 2  Stool Occurrence: 1  Pad Count: 1       Physical Exam  Constitutional:       Comments: Requires repeated physical stimulation for eye opening   HENT:      Mouth/Throat:      Mouth: Mucous membranes are moist.   Eyes:      Extraocular Movements: Extraocular movements intact.      Pupils: Pupils are equal, round, and reactive to light.   Cardiovascular:      Rate and Rhythm: Normal rate and regular rhythm.   Pulmonary:      Breath sounds: Normal breath sounds.   Abdominal:      Palpations: Abdomen is soft. There is mass.   Musculoskeletal:         General: No swelling.      Cervical back: Neck supple.   Skin:     General: Skin is warm.      Capillary Refill: Capillary refill takes less than 2 seconds.   Neurological:      Comments: Withdrawal on right  Left hemiplegia         Medications:  Continuousfentanyl, Last Rate: Stopped (02/24/22 0929)  insulin regular 1 units/mL infusion orderable (TRANSFER), Last Rate: 0.5 Units/hr (02/25/22 1329)  Scheduledaspirin, 81 mg, Daily  atorvastatin, 40 mg, Daily  clopidogreL, 75 mg, Daily  [START ON 2/26/2022] dexamethasone, 10 mg, Daily  donepeziL, 10 mg, Daily  famotidine, 20 mg,  Daily  heparin (porcine), 5,000 Units, Q8H  memantine, 10 mg, BID  piperacillin-tazobactam (ZOSYN) IVPB, 4.5 g, Q8H  vancomycin (VANCOCIN) IVPB, 1,000 mg, Q12H  PRNacetaminophen, 650 mg, Q6H PRN  barium, 450 mL, ONCE PRN  dextrose 10%, 12.5 g, PRN  dextrose 10%, 25 g, PRN  glucagon (human recombinant), 1 mg, PRN  glucose, 16 g, PRN  glucose, 24 g, PRN  insulin aspart U-100, 0-10 Units, PRN  magnesium oxide, 800 mg, PRN  magnesium oxide, 800 mg, PRN  potassium bicarbonate, 35 mEq, PRN  potassium bicarbonate, 50 mEq, PRN  potassium bicarbonate, 60 mEq, PRN  potassium, sodium phosphates, 2 packet, PRN  potassium, sodium phosphates, 2 packet, PRN  potassium, sodium phosphates, 2 packet, PRN  vancomycin - pharmacy to dose, , pharmacy to manage frequency    Today I personally reviewed pertinent medications, lines/drains/airways, imaging, laboratory results, microbiology results, notably:    Diet  Diet NPO  Diet NPO

## 2022-02-25 NOTE — SUBJECTIVE & OBJECTIVE
"Interval HPI:   Overnight events: Remains in NCC. Intubated. BG trending down on IV insulin infusion rates 0.4- 2.4 u/hr.   Eating: Diet NPO    Nausea: No  Hypoglycemia and intervention: No  Fever: No  TPN and/or TF: No      BP (!) 93/53 (BP Location: Left leg, Patient Position: Lying)   Pulse 95   Temp 99.5 °F (37.5 °C)   Resp (!) 21   Ht 5' 2" (1.575 m)   Wt 71 kg (156 lb 8.4 oz)   LMP  (LMP Unknown)   SpO2 100%   BMI 28.63 kg/m²     Labs Reviewed and Include    Recent Labs   Lab 02/25/22  0336   *   CALCIUM 7.7*   ALBUMIN 2.2*   PROT 5.3*      K 3.0*   CO2 28   CL 99   BUN 29*   CREATININE 0.9   ALKPHOS 369*   *   *   BILITOT 0.9     Lab Results   Component Value Date    WBC 7.99 02/25/2022    HGB 8.3 (L) 02/25/2022    HCT 26.6 (L) 02/25/2022    MCV 92 02/25/2022     02/25/2022     No results for input(s): TSH, FREET4 in the last 168 hours.  Lab Results   Component Value Date    HGBA1C 8.3 (H) 01/07/2022       Nutritional status:   Body mass index is 28.63 kg/m².  Lab Results   Component Value Date    ALBUMIN 2.2 (L) 02/25/2022    ALBUMIN 2.6 (L) 02/24/2022    ALBUMIN 2.5 (L) 02/23/2022     No results found for: PREALBUMIN    Estimated Creatinine Clearance: 50.6 mL/min (based on SCr of 0.9 mg/dL).    Accu-Checks  Recent Labs     02/24/22 2057 02/24/22  2203 02/24/22  2257 02/24/22  2355 02/25/22  0054 02/25/22  0158 02/25/22  0251 02/25/22  0411 02/25/22  0456 02/25/22  0610   POCTGLUCOSE 210* 238* 244* 211* 234* 219* 201* 187* 165* 124*       Current Medications and/or Treatments Impacting Glycemic Control  Immunotherapy:    Immunosuppressants       None          Steroids:   Hormones (From admission, onward)                Start     Stop Route Frequency Ordered    02/26/22 0900  dexamethasone injection 10 mg        "Followed by" Linked Group Details    03/03 0859 IV Daily 02/21/22 0853    02/21/22 1000  dexamethasone injection 20 mg        "Followed by" Linked Group " Details    02/26 0859 IV Daily 02/21/22 0853          Pressors:    Autonomic Drugs (From admission, onward)                Start     Stop Route Frequency Ordered    02/19/22 0900  donepeziL tablet 10 mg         -- PER G TUBE Daily 02/18/22 1015          Hyperglycemia/Diabetes Medications:   Antihyperglycemics (From admission, onward)                Start     Stop Route Frequency Ordered    02/21/22 1000  insulin regular in 0.9 % NaCl 100 unit/100 mL (1 unit/mL) infusion        Question Answer Comment   Insulin Rate Adjustment (DO NOT MODIFY ANSWER) [email or web address]    Enter initial dose from Infusion Protocol Chart (Units/hr): 1.5        -- IV Continuous 02/21/22 0857

## 2022-02-26 PROBLEM — Z51.5 COMFORT MEASURES ONLY STATUS: Status: ACTIVE | Noted: 2022-01-01

## 2022-02-26 NOTE — PROGRESS NOTES
[facility] Neuro Critical Care  Endocrinology  Progress Note    Admit Date: 2/4/2022     Reason for Consult: Management of type 2 DM Hyperglycemia     Surgical Procedure and Date: none    Diabetes diagnosis year: renetta    Home Diabetes Medications:  Trulicity 1.5 mg, Jardiance 25 mg daily, Metformin 1000 mg BID - per chart review   Lab Results   Component Value Date    HGBA1C 8.3 (H) 01/07/2022         How often checking glucose at home? renetta  BG readings on regimen: renetta  Hypoglycemia on the regimen?  renetta  Missed doses on regimen?  renetta    Diabetes Complications include:     Hyperglycemia    Complicating diabetes co morbidities:   History of CVA      HPI:   Patient is a 76 y.o. female with a diagnosis of type 2 DM, CAD and CHF. Patient presented initially to [facility] emergency department with L-sided weakness, L-sided facial droop, confusion, she arrives to the neuro ICU status post thrombectomy for right ICA occlusion. CTA shows acute large vessel occlusion with distal right supraclinoid ICA filling defect extending into the anterior cerebral artery and middle cerebral artery branches.  Patient was recently admitted to outside hospital with NSTEMI (days ago), at which time she was evaluated by cardiology and cardiac catheterization was performed showing severe multi-vessel disease. Medical management was decided upon by Cardiology and family at that time due to comorbidities.  Endocrinology consutled for BG/ DM management.         Interval HPI:   Overnight events: Remains in NCC. BG reasonably well controlled on IV insulin infusion at 0.5 u/hr overnight. Hypogylcemia this AM. No BG checks overnight. Remains intubated. Dexamethasone 10 mg x1.   Eating:   NPO  Nausea: No  Hypoglycemia and intervention: Yes insulin stopped; d10 infusion   Fever: No  TPN and/or TF: TF on hold     BP (!) 83/55 (BP Location: Left leg, Patient Position: Lying)   Pulse (!) 124   Temp (!) 100.6 °F (38.1 °C) (Axillary)   Resp (!) 31   Ht 5'  "2" (1.575 m)   Wt 71 kg (156 lb 8.4 oz)   LMP  (LMP Unknown)   SpO2 100%   BMI 28.63 kg/m²     Labs Reviewed and Include    Recent Labs   Lab 02/26/22  0233   *   CALCIUM 8.3*   ALBUMIN 2.1*   PROT 5.9*      K 3.0*   CO2 24      BUN 15   CREATININE 0.8   ALKPHOS 308*   *   AST 73*   BILITOT 1.5*     Lab Results   Component Value Date    WBC 0.83 (LL) 02/26/2022    HGB 10.2 (L) 02/26/2022    HCT 32.3 (L) 02/26/2022    MCV 90 02/26/2022     02/26/2022     No results for input(s): TSH, FREET4 in the last 168 hours.  Lab Results   Component Value Date    HGBA1C 8.3 (H) 01/07/2022       Nutritional status:   Body mass index is 28.63 kg/m².  Lab Results   Component Value Date    ALBUMIN 2.1 (L) 02/26/2022    ALBUMIN 2.2 (L) 02/25/2022    ALBUMIN 2.6 (L) 02/24/2022     No results found for: PREALBUMIN    Estimated Creatinine Clearance: 57 mL/min (based on SCr of 0.8 mg/dL).    Accu-Checks  Recent Labs     02/25/22  0711 02/25/22  0757 02/25/22  0858 02/25/22  0959 02/25/22  1109 02/25/22  1205 02/25/22  1321 02/25/22  1404 02/25/22  1541 02/25/22  1915   POCTGLUCOSE 140* 177* 135* 166* 188* 219* 188* 236* 190* 182*       Current Medications and/or Treatments Impacting Glycemic Control  Immunotherapy:    Immunosuppressants       None          Steroids:   Hormones (From admission, onward)                Start     Stop Route Frequency Ordered    02/26/22 0900  dexamethasone injection 10 mg        "Followed by" Linked Group Details    03/03 0859 IV Daily 02/21/22 0853          Pressors:    Autonomic Drugs (From admission, onward)                Start     Stop Route Frequency Ordered    02/19/22 0900  donepeziL tablet 10 mg         -- PER G TUBE Daily 02/18/22 1015          Hyperglycemia/Diabetes Medications:   Antihyperglycemics (From admission, onward)                Start     Stop Route Frequency Ordered    02/25/22 1403  insulin aspart U-100 pen 0-10 Units         -- SubQ As needed (PRN) " 02/25/22 1303    02/25/22 1400  insulin regular in 0.9 % NaCl 100 unit/100 mL (1 unit/mL) infusion        Question:  Enter initial dose (Units/hr):  Answer:  0.5    -- IV Continuous 02/25/22 1303            ASSESSMENT and PLAN    * Stroke due to occlusion of right carotid artery  Managed per primary.   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   Will likely need to restart.   TF held overnight and no BG checks noted.   BG monitoring every 4 hours and moderate dose correction scale         Septic shock  Infection may increase insulin resistance.             Stefania Greenberg NP  Endocrinology  [facility] - [facility]

## 2022-02-26 NOTE — NURSING
0735 Arterial line positional, dampened, and not correlating with cuff. Dressing changed and line flushed without improvement. MATT Carbajal notified. To go by cuff pressures per MATT Carbajal. WCTM    0820 Arterial line now correlating. Automatic BP 88/52 MAP 64 Arterial line MAP 53. MATT Carbajal notified. To give 250cc NS bolus per NP. NS bolus ordered and started.    0836 BG reading 47. Dextrose infusion to be started. Insulin infusion stopped.     0847 Dextrose PRN infusion started. BG frequently checked. BP not improving with a MAP of 61. NCC team notified. CXR, levo gtt and LR bolus ordered per team.  Levo gtt and LR bolus initiated.    0930 Labs drawn per order and lianne trac system to be initiated    1100 MD Juma discussed options with family. To withdraw life support this afternoon. Per MD Juma to no longer escalate care or perform assessments    1300 EEG discontinued    1510 Morphine gtt initiated and titrated. Pt DNR; Restraint d/sada; pt terminally extubated by RT per MD order. Juliane kevin [facility] notified. To call with PROMISE Natarajan to the bedside. Levo gtt discontinued after extubation. Family at bedside

## 2022-02-26 NOTE — ASSESSMENT & PLAN NOTE
BG goal 140 - 180     Hold IV insulin infusion at this time.   Will likely need to restart.   TF held overnight and no BG checks noted.   BG monitoring every 4 hours and moderate dose correction scale

## 2022-02-26 NOTE — SUBJECTIVE & OBJECTIVE
"Interval HPI:   Overnight events: Remains in NCC. BG reasonably well controlled on IV insulin infusion at 0.5 u/hr overnight. Hypogylcemia this AM. No BG checks overnight. Remains intubated. Dexamethasone 10 mg x1.   Eating:   NPO  Nausea: No  Hypoglycemia and intervention: Yes insulin stopped; d10 infusion   Fever: No  TPN and/or TF: TF on hold     BP (!) 83/55 (BP Location: Left leg, Patient Position: Lying)   Pulse (!) 124   Temp (!) 100.6 °F (38.1 °C) (Axillary)   Resp (!) 31   Ht 5' 2" (1.575 m)   Wt 71 kg (156 lb 8.4 oz)   LMP  (LMP Unknown)   SpO2 100%   BMI 28.63 kg/m²     Labs Reviewed and Include    Recent Labs   Lab 02/26/22  0233   *   CALCIUM 8.3*   ALBUMIN 2.1*   PROT 5.9*      K 3.0*   CO2 24      BUN 15   CREATININE 0.8   ALKPHOS 308*   *   AST 73*   BILITOT 1.5*     Lab Results   Component Value Date    WBC 0.83 (LL) 02/26/2022    HGB 10.2 (L) 02/26/2022    HCT 32.3 (L) 02/26/2022    MCV 90 02/26/2022     02/26/2022     No results for input(s): TSH, FREET4 in the last 168 hours.  Lab Results   Component Value Date    HGBA1C 8.3 (H) 01/07/2022       Nutritional status:   Body mass index is 28.63 kg/m².  Lab Results   Component Value Date    ALBUMIN 2.1 (L) 02/26/2022    ALBUMIN 2.2 (L) 02/25/2022    ALBUMIN 2.6 (L) 02/24/2022     No results found for: PREALBUMIN    Estimated Creatinine Clearance: 57 mL/min (based on SCr of 0.8 mg/dL).    Accu-Checks  Recent Labs     02/25/22  0711 02/25/22  0757 02/25/22  0858 02/25/22  0959 02/25/22  1109 02/25/22  1205 02/25/22  1321 02/25/22  1404 02/25/22  1541 02/25/22  1915   POCTGLUCOSE 140* 177* 135* 166* 188* 219* 188* 236* 190* 182*       Current Medications and/or Treatments Impacting Glycemic Control  Immunotherapy:    Immunosuppressants       None          Steroids:   Hormones (From admission, onward)                Start     Stop Route Frequency Ordered    02/26/22 0900  dexamethasone injection 10 mg      " "  "Followed by" Linked Group Details    03/03 0859 IV Daily 02/21/22 0853          Pressors:    Autonomic Drugs (From admission, onward)                Start     Stop Route Frequency Ordered    02/19/22 0900  donepeziL tablet 10 mg         -- PER G TUBE Daily 02/18/22 1015          Hyperglycemia/Diabetes Medications:   Antihyperglycemics (From admission, onward)                Start     Stop Route Frequency Ordered    02/25/22 1403  insulin aspart U-100 pen 0-10 Units         -- SubQ As needed (PRN) 02/25/22 1303    02/25/22 1400  insulin regular in 0.9 % NaCl 100 unit/100 mL (1 unit/mL) infusion        Question:  Enter initial dose (Units/hr):  Answer:  0.5    -- IV Continuous 02/25/22 1303          "

## 2022-02-26 NOTE — PROGRESS NOTES
Pharmacokinetic Assessment Follow Up: IV Vancomycin    Vancomycin Regimen Assessment & Plan:  - Vancomycin trough drawn this morning resulted as 22.5 mcg/mL, supra therapeutic for goal trough 10-20 mcg/mL.  - Decreasing scheduled vancomycin regimen from 1000 mg IV q12h to 750 mg IV q12, first dose ~6h from supra therapeutic level.  - Noted primary team's plan to complete vancomycin therapy after tomorrow's doses. No need for additional TDM in light of planned duration of therapy. Will plan to sign off tomorrow if no changes in clinical course.    Drug levels (last 3 results):  Recent Labs   Lab Result Units 02/25/22  2225 02/26/22  0914   Vancomycin-Trough ug/mL 66.3* 22.5*     Pharmacy will continue to follow and monitor vancomycin.    Please contact pharmacy at extension [phone] for questions regarding this assessment.    Thank you for the consult,   Froylan Mendoza     Patient brief summary:  Julia Schroeder is a 76 y.o. female initiated on antimicrobial therapy with IV Vancomycin for empiric treatment.    Drug Allergies:   Review of patient's allergies indicates:  No Known Allergies    Actual Body Weight:   71 kg    Renal Function:   Estimated Creatinine Clearance: 57 mL/min (based on SCr of 0.8 mg/dL).,     Dialysis Method (if applicable):  N/A

## 2022-02-26 NOTE — PROGRESS NOTES
[facility] Neuro Critical [facility]  Neurocritical Care  Progress Note    Admit Date: 2/4/2022  Service Date: 02/26/2022  Length of Stay: 22    Subjective:     Chief Complaint: Stroke due to occlusion of right carotid artery    History of Present Illness: Per ED evaluation:  Patient presents to the emergency department with symptoms concerning for acute CVA.  Last known normal per family was 2:00 a.m.; patient presented outside of the tPA window; tele stroke was activated immediately on arrival; neurologist suspects large vessel occlusion and recommend stat transfer for possible neuro intervention.  Neurology advised CTA head neck prior to transfer; CTA head and neck performed and pending at the time of transfer; numerous labs pending at time of transfer; rectal aspirin given    Patient presented initially to [facility] emergency department with L-sided weakness, L-sided facial droop, confusion, she arrives to the neuro ICU status post thrombectomy for right ICA occlusion. CTA shows acute large vessel occlusion with distal right supraclinoid ICA filling defect extending into the anterior cerebral artery and middle cerebral artery branches.  Patient was recently admitted to outside hospital with NSTEMI (days ago), at which time she was evaluated by cardiology and cardiac catheterization was performed showing severe multi-vessel disease. Medical management was decided upon by Cardiology and family at that time due to comorbidities.  Patient had been started on aspirin, Plavix, statin      Hospital Course: 02/05/2022 metabolic acidosis, tachypnea. Place arterial line.   02/06/2022 likely euglycemi DKA associated with SGLT2 inhibitors  02/07/2022 NAEO, DKA resolved, starting TF  02/08/2022 NAEO. Starting scheduled insuline. Increase water flushes    02/09/2022 C/o SOB overnight, repeat ECG unremarkable, ABG w/ metabolic alkalosis, no respiratory component. Sputum cx sent, however pt afebrile, no leukocytosis. CXR w/ slight  increase in b/l effusions -> given lasix 20 mg IV x1. Sating well on RA this AM. FWF decreased from 500 cc q6hrs -> 300 cc q6hrs given suspicion for hypervolemic hypernatremia  02/10/2022 Slept better last night s/p starting delirium precautions, more awake this AM per son at bedside. Sating well on RA, CXR w/ improvement in b/l effusions. Low grade temp of 100.4, no true fevers, no leukocytosis.   02/11/2022 NAEO. Remains boarding for vascular neuro floor. Na improved this AM to 151.     2/21/2022 rapid response from NPU for hypoxic respiratory failure, upgraded to ICU, intubation and treatment of septic shock  2/22/2022 improved pressor requirements, but extremely oliguric, furosemide challenge, if no benefit increase dose and add diuril followed by lasix gtt, cultures remain negative, lactate nl  2/23/2022:: on low dose levo at 0.02 and most of time off levo, tolerating TFs-transition to scheduled insulin, wean to spontaneous setting  2/24/2022: completing course of dexamethasone for ARDS related to sepsis, off TFs for abdominal ultrasound to exclude biliary obstruction, diurese x 1, has cuff leak- pssible extubation attempt in am  2/25/2022: responded well to diuresis, lungs clear, level of consciousness-concern that episodes of tachypnea without pulmonary explanation may be related to NCSE, prolonged EEG monitoring ordered  2/26/2022: hooked up to cEEG overnight, notable for diffuse slowing, no epileptiform discharges. Remains on PS. Received  cc x1 overnight for hypotension      Interval History:    - Noted to have worsening hypotension this AM, no improvement s/p repeat fluid bolus (received total of 500 cc IVFs), pt started on levophed. Remains volume overloaded on CXR, Justin-trac w/ low SVV suggestive of patient adequately fluid resuscitated  - Minimal UOP overnight/this AM, ~10 cc/hr  - Pt w/ low grade temp of 100.6 overnight, new leukopenia and hypoglycemia -> concern for worsening septic shock.  Procal elevated to 7.4  - Pt minimally responsive on exam, off sedation   - Noted to have area of fluctuance around PEG tube site w/ purulent discharge, ? Abscess as source of worsening sepsis despite patient on broad spectrum abx (vanc/zosyn) x5 days  - Discussed w/ family at bedside -> family feeling that patient has been through enough, decision made to transition patient to comfort measures w/ plan for terminal extubation s/p arrival of patient's sisters to hospital. Please see ACP note for full details.    Review of Systems   Unable to perform ROS: Intubated     Objective:     Vitals:  Temp: 99.9 °F (37.7 °C)  Pulse: 109  Rhythm: normal sinus rhythm  BP: (!) 100/57  MAP (mmHg): 76  Resp: (!) 22  SpO2: 100 %  Oxygen Concentration (%): 40  O2 Device (Oxygen Therapy): ventilator  Vent Mode: Spont  Pressure Support: 8 cmH20  PEEP/CPAP: 5 cmH20  Peak Airway Pressure: 14 cmH2O  Mean Airway Pressure: 8.5 cmH20  Plateau Pressure: 0 cmH20    Temp  Min: 97.7 °F (36.5 °C)  Max: 100.6 °F (38.1 °C)  Pulse  Min: 98  Max: 129  BP  Min: 78/52  Max: 125/78  MAP (mmHg)  Min: 61  Max: 97  Resp  Min: 17  Max: 37  SpO2  Min: 99 %  Max: 100 %  Oxygen Concentration (%)  Min: 40  Max: 40    02/25 0701 - 02/26 0700  In: 1311.8 [I.V.:9.6]  Out: 1885 [Urine:1885]   Unmeasured Output  Urine Occurrence: 0  Stool Occurrence: 0  Pad Count: 1       Physical Exam  General Appearance: Thin elderly woman resting in bed, intubated, off sedation   Mental Status Exam: Obtunded. Minimal grimace to strong noxious stimuli, not opening eyes, not tracking/regarding,   Cranial Nerves: R gaze preference, able to cross midline w/ OCRs, unable to bury to left. Pupils 3->2 mm b/l, sluggishly reactive.   Motor: Weakly localizing in RUE, w/d RLE to noxious. Flicker in LLE to noxious. LUE mute  Sensory: Grimaces to noxious x4 extremities, R>L  Coordination: Unable to assess  Vascular: S1/S2 of normal intensity, no S3/S4 appreciated, no murmurs  appreciated  Lungs: Coarse breath sounds b/l   Abdomen: Soft, non-distended, +BS; tender around site of PEG tube w/ ? Fluctuance at site. +purulent exudate at site.     Medications:  Continuousmorphine, Last Rate: 2 mg/hr (02/26/22 1305)    Scheduled PRNacetaminophen, 650 mg, Q6H PRN  atropine 1%, 2 drop, Q4H PRN  barium, 450 mL, ONCE PRN  dextrose 10%, 12.5 g, PRN  dextrose 10%, 25 g, PRN  LORazepam, 1 mg, Q30 Min PRN      Today I personally reviewed pertinent medications, lines/drains/airways, imaging, laboratory results, microbiology results, notably: leukopenic to 0.83. Procal uptrending to 7.4. Hypoglycemic. Lactate wnl. 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, without long-term current use of insulin  DKA at admission, now resolved    - Hypoglycemic this AM, suspect 2/2 sepsis  - Hold insulin gtt    Pulmonary  Acute respiratory failure with hypoxia  Intubation for hypoxic respiratory failure     - CXR w/ worsening pulmonary edema in setting of fluid resuscitation for worsening shock  - Unable to diuresis 2/2 increasing vasopressor requirements  - Tolerating PS -> unable to extubate 2/2 mental status     Cardiac/Vascular  CAD, multiple vessel  Con DAPT and statins     CHF (congestive heart failure)  Hx of EF 55% on TTE this admission, then 45% on repeat     -CXR with pulmonary edema, unable to further diuresis given worsening pressor requirement  -Justin-trac w/ borderline low CO/CI, appears stable from prior  -Low clinical suspicion for cardiogenic shock at  this time      Renal/  ATN (acute tubular necrosis)  Secondary to sepsis    - Worsening UOP overnight, likely in setting of worsening sepsis   - Moya in place for strict I&Os    ID  Septic shock  Leukopenia, tachycardia, tachypnea, hypoglycemia, uptrending procal, hypotensive    -Prior cx data on 2/22 unrevealing -> concern for possible abscess at site of PEG tube on exam given apparent worsening infection on broad spectrum abx (vanc/zosyn)  -Lactate wnl  -Levophed for goal MAP>65  -Hold on CT abdomen/panculture given GOC    GI  Dysphagia  G tube in place     - Area of purulence at site -> discussed obtaining CT abdomen w/ family to further evaluate, family declining at this time in light of decision to move patient to comfort based care    Other  Comfort measures only status  Met w/ family on 2/26, given significant decline in medical status overnight, decision made to transition patient to comfort measures    - Morphine gtt  - Plan to d/c vasopressor support and terminal extubation s/p arrival of patient's sisters to hospital           The patient is being Prophylaxed for:  Venous Thromboembolism with: Not Applicable   Stress Ulcer with: H2B  Ventilator Pneumonia with: chlorhexidine oral care    Activity Orders          Diet NPO: NPO starting at 02/21 0437    Turn patient starting at 02/07 1119    Progressive Mobility Protocol (mobilize patient to their highest level of functioning at least twice daily) starting at 02/04 2000    Elevate HOB starting at 02/04 1521        Comfort measures only w/o escalation of care. Plan to d/c vasopressors and terminal extubation s/p arrival of additional family members to bedside.     Uninterrupted Critical Care/Counseling Time (not including procedures): 74 minutes  This patient is critically ill due to septic shock, ATN/oliguric renal failure, leukopenia, hypoglycemia, transaminitis, dysphagia s/p PEG tube placement, R MCA stroke, encounter for mechanical ventilation, acute  encephalopathy, goals of care discussion. There is high probability for acute neurological change leading to clinical and possibly life-threatening deterioration requiring highest level of physician preparedness for urgent intervention.      Mi Dennison MD  Neurocritical Care  [facility] - Neuro Critical Care

## 2022-02-26 NOTE — ACP (ADVANCE CARE PLANNING)
[facility]  I engaged the family in a conversation about advance care planning and we specifically addressed what the goals of care would be moving forward, in light of the patient's change in clinical status, specifically worsening mental status and new vasopressor requirement.  We did specifically address the patient's likely prognosis, which is poor.  We explored the patient's values and preferences for future care.  The family endorses that what is most important right now is to focus on comfort and QOL .     Accordingly, we have decided that the best plan to meet the patient's goals includes discontinuing treatment upon arrival of additional family members to hospital. No further escalation of care pending family arrival, d/c further assessments not focused on patient comfort.     I spent a total of 40 minutes engaging the patient in this advance care planning discussion.    Mi Dennison MD, MPH  Neurocritical Care Physician

## 2022-02-26 NOTE — ASSESSMENT & PLAN NOTE
Secondary to sepsis    - Worsening UOP overnight, likely in setting of worsening sepsis   - Moya in place for strict I&Os

## 2022-02-26 NOTE — ASSESSMENT & PLAN NOTE
G tube in place     - Area of purulence at site -> discussed obtaining CT abdomen w/ family to further evaluate, family declining at this time in light of decision to move patient to comfort based care

## 2022-02-26 NOTE — ASSESSMENT & PLAN NOTE
S/p mechanical thrombectomy w/ TICI 3    - Etiology likely large vessel atherosclerosis  - C/w ASA/plavix   - C/w atorvastatin 40 mg qhs

## 2022-02-26 NOTE — ASSESSMENT & PLAN NOTE
Hx of EF 55% on TTE this admission, then 45% on repeat     -CXR with pulmonary edema, unable to further diuresis given worsening pressor requirement  -Justin-trac w/ borderline low CO/CI, appears stable from prior  -Low clinical suspicion for cardiogenic shock at this time

## 2022-02-26 NOTE — SUBJECTIVE & OBJECTIVE
"Interval HPI:   Overnight events: Remains in NCC. Intubated. BG reasonably well controlled off IV insulin infusion. Dexamethasone 10 mg.   Eating: Diet NPO  Nausea: No  Hypoglycemia and intervention: No  Fever: 99.9  TPN and/or TF: No      BP (!) 100/57   Pulse 109   Temp 99.9 °F (37.7 °C) (Axillary)   Resp (!) 22   Ht 5' 2" (1.575 m)   Wt 71 kg (156 lb 8.4 oz)   LMP  (LMP Unknown)   SpO2 100%   BMI 28.63 kg/m²     Labs Reviewed and Include    Recent Labs   Lab 02/26/22  0233   *   CALCIUM 8.3*   ALBUMIN 2.1*   PROT 5.9*      K 3.0*   CO2 24      BUN 15   CREATININE 0.8   ALKPHOS 308*   *   AST 73*   BILITOT 1.5*     Lab Results   Component Value Date    WBC 0.83 (LL) 02/26/2022    HGB 10.2 (L) 02/26/2022    HCT 32.3 (L) 02/26/2022    MCV 90 02/26/2022     02/26/2022     No results for input(s): TSH, FREET4 in the last 168 hours.  Lab Results   Component Value Date    HGBA1C 8.3 (H) 01/07/2022       Nutritional status:   Body mass index is 28.63 kg/m².  Lab Results   Component Value Date    ALBUMIN 2.1 (L) 02/26/2022    ALBUMIN 2.2 (L) 02/25/2022    ALBUMIN 2.6 (L) 02/24/2022     No results found for: PREALBUMIN    Estimated Creatinine Clearance: 57 mL/min (based on SCr of 0.8 mg/dL).    Accu-Checks  Recent Labs     02/25/22  1404 02/25/22  1541 02/25/22  1915 02/26/22  0008 02/26/22  0433 02/26/22  0834 02/26/22  0836 02/26/22  0900 02/26/22  0914 02/26/22  1101   POCTGLUCOSE 236* 190* 182* 133* 94 47* 55* 239* 157* 158*       Current Medications and/or Treatments Impacting Glycemic Control  Immunotherapy:    Immunosuppressants       None          Steroids:   Hormones (From admission, onward)                None          Pressors:    Autonomic Drugs (From admission, onward)                None          Hyperglycemia/Diabetes Medications:   Antihyperglycemics (From admission, onward)                None          "

## 2022-02-26 NOTE — PLAN OF CARE
[facility] Care Plan    POC reviewed with Julia Schroeder and family at 1400. Pt unable to verbalize understanding. Questions and concerns addressed. No acute events today. Pt progressing toward goals. Will continue to monitor. See below and flowsheets for full assessment and VS info.   -tube feeds restarted  -fernando replaced  -pt placed on cEEG for decreased LOC\  -250cc bolus given for hypotensive episode  -insulin gtt changed by endocrine team, new orders followed. See MAR for details.         Neuro:  [place] Coma Scale  Best Eye Response: 3-->(E3) to speech  Best Motor Response: 6-->(M6) obeys commands  Best Verbal Response: 1-->(V1) none  [place] Coma Scale Score: 10  Assessment Qualifiers: patient chemically sedated or paralyzed, patient intubated  Pupil PERRLA: yes     24 hr Temp:  [97.7 °F (36.5 °C)-100.58 °F (38.1 °C)]     CV:   Rhythm: normal sinus rhythm  BP goals:   SBP < 160  MAP > 65    Resp:   O2 Device (Oxygen Therapy): ventilator  Vent Mode: Spont  Set Rate: 12 BPM  Oxygen Concentration (%): 40  Vt Set: 400 mL  PEEP/CPAP: 5 cmH20  Pressure Support: 8 cmH20    Plan: wean to extubate    GI/:     Diet/Nutrition Received: NPO, tube feeding  Last Bowel Movement: 02/25/22  Voiding Characteristics: external catheter    Intake/Output Summary (Last 24 hours) at 2/25/2022 1819  Last data filed at 2/25/2022 1800  Gross per 24 hour   Intake 1599.73 ml   Output 1805 ml   Net -205.27 ml     Unmeasured Output  Urine Occurrence: 2  Stool Occurrence: 1  Pad Count: 1    Labs/Accuchecks:  Recent Labs   Lab 02/25/22  0336   WBC 7.99   RBC 2.88*   HGB 8.3*   HCT 26.6*         Recent Labs   Lab 02/25/22  0336      K 3.0*   CO2 28   CL 99   BUN 29*   CREATININE 0.9   ALKPHOS 369*   *   *   BILITOT 0.9    No results for input(s): PROTIME, INR, APTT, HEPANTIXA in the last 168 hours.   Recent Labs   Lab 02/19/22  1416   TROPONINI 3.256*       Electrolytes: N/A - electrolytes WDL  Accuchecks: Q4H    Gtts:    insulin regular 1 units/mL infusion orderable (TRANSFER) 0.5 Units/hr (02/25/22 1800)       LDA/Wounds:  Lines/Drains/Airways       Drain  Duration                  Gastrostomy/Enterostomy 02/16/22 1421 Gastrostomy tube w/ balloon midline 9 days         Urethral Catheter 02/25/22 1448 <1 day              Airway  Duration                  Airway - Non-Surgical 02/21/22 0635 4 days              Arterial Line  Duration             Arterial Line 02/21/22 0630 Left Radial 4 days              Peripheral Intravenous Line  Duration                  Peripheral IV - Single Lumen 02/24/22 1636 22 G Anterior;Right Forearm 1 day         Peripheral IV - Single Lumen 02/24/22 1636 22 G Posterior;Right Hand 1 day         Peripheral IV - Single Lumen 02/25/22 0302 20 G Anterior;Distal;Right Lower leg <1 day                  Wounds: Yes  Wound care consulted: Yes

## 2022-02-26 NOTE — ASSESSMENT & PLAN NOTE
Leukopenia, tachycardia, tachypnea, hypoglycemia, uptrending procal, hypotensive    -Prior cx data on 2/22 unrevealing -> concern for possible abscess at site of PEG tube on exam given apparent worsening infection on broad spectrum abx (vanc/zosyn)  -Lactate wnl  -Levophed for goal MAP>65  -Hold on CT abdomen/panculture given GOC

## 2022-02-26 NOTE — ASSESSMENT & PLAN NOTE
Met w/ family on 2/26, given significant decline in medical status overnight, decision made to transition patient to comfort measures    - Morphine gtt  - Plan to d/c vasopressor support and terminal extubation s/p arrival of patient's sisters to hospital

## 2022-02-26 NOTE — PLAN OF CARE
[facility] Care Plan    POC reviewed with Julia Schroeder and family at 0500. Pt unable to verbalize understanding. Questions and concerns addressed. Pt on insulin drip, had one episode of vomiting.Zofran given x 1, tube feeds held.  Pt progressing toward goals. Will continue to monitor. See below and flowsheets for full assessment and VS info.       Neuro:  [place] Coma Scale  Best Eye Response: 3-->(E3) to speech  Best Motor Response: 6-->(M6) obeys commands  Best Verbal Response: 1-->(V1) none  [place] Coma Scale Score: 10  Assessment Qualifiers: patient intubated, no eye obstruction present  Pupil PERRLA: yes     24hr Temp:  [97.7 °F (36.5 °C)-100.6 °F (38.1 °C)]     CV:   Rhythm: normal sinus rhythm  BP goals:   SBP < 160  MAP > 65    Resp:   O2 Device (Oxygen Therapy): ventilator  Vent Mode: Spont  Set Rate: 12 BPM  Oxygen Concentration (%): 40  Vt Set: 400 mL  PEEP/CPAP: 5 cmH20  Pressure Support: 8 cmH20    Plan: N/A    GI/:     Diet/Nutrition Received: NPO, other (see comments) (tube feeding held)  Last Bowel Movement: 02/26/22  Voiding Characteristics: external catheter    Intake/Output Summary (Last 24 hours) at 2/26/2022 0533  Last data filed at 2/26/2022 0502  Gross per 24 hour   Intake 1461.39 ml   Output 1965 ml   Net -503.61 ml     Unmeasured Output  Urine Occurrence: 2  Stool Occurrence: 1  Pad Count: 1    Labs/Accuchecks:  Recent Labs   Lab 02/26/22  0233   WBC 0.83*   RBC 3.61*   HGB 10.2*   HCT 32.3*         Recent Labs   Lab 02/26/22  0233      K 3.0*   CO2 24      BUN 15   CREATININE 0.8   ALKPHOS 308*   *   AST 73*   BILITOT 1.5*    No results for input(s): PROTIME, INR, APTT, HEPANTIXA in the last 168 hours.   Recent Labs   Lab 02/19/22  1416   TROPONINI 3.256*       Electrolytes: Contraindicated  Accuchecks: Q4H    Gtts:   insulin regular 1 units/mL infusion orderable (TRANSFER) 0.5 Units/hr (02/26/22 0502)       LDA/Wounds:  Lines/Drains/Airways       Drain  Duration                   Gastrostomy/Enterostomy 02/16/22 1421 Gastrostomy tube w/ balloon midline 9 days         Urethral Catheter 02/25/22 1448 <1 day              Airway  Duration                  Airway - Non-Surgical 02/21/22 0635 4 days              Arterial Line  Duration             Arterial Line 02/21/22 0630 Left Radial 4 days              Peripheral Intravenous Line  Duration                  Peripheral IV - Single Lumen 02/24/22 1636 22 G Anterior;Right Forearm 1 day         Peripheral IV - Single Lumen 02/24/22 1636 22 G Posterior;Right Hand 1 day         Peripheral IV - Single Lumen 02/25/22 0302 20 G Anterior;Distal;Right Lower leg 1 day                  Wounds: Yes  Wound care consulted: No

## 2022-02-26 NOTE — PROGRESS NOTES
Pharmacokinetic Assessment Follow Up: IV Vancomycin    Vancomycin serum concentration assessment(s):    The trough level was drawn incorrectly and cannot be used to guide therapy at this time.    Vancomycin Regimen Plan:    Continue regimen to Vancomycin 1000 mg IV every 12 hours with next serum trough concentration measured at 0900 prior to next dose on 2/26    Drug levels (last 3 results):  Recent Labs   Lab Result Units 02/23/22  0936 02/25/22  2225   Vancomycin-Trough ug/mL 18.3 66.3*       Pharmacy will continue to follow and monitor vancomycin.    Please contact pharmacy at extension [phone] for questions regarding this assessment.    Thank you for the consult,   Jael Salter       Patient brief summary:  Julia Schroeder is a 76 y.o. female initiated on antimicrobial therapy with IV Vancomycin for treatment of sepsis      Drug Allergies:   Review of patient's allergies indicates:  No Known Allergies    Actual Body Weight:   71 kg    Renal Function:   Estimated Creatinine Clearance: 50.6 mL/min (based on SCr of 0.9 mg/dL).     Dialysis Method (if applicable):  N/A    CBC (last 72 hours):  Recent Labs   Lab Result Units 02/23/22  0210 02/23/22 1723 02/24/22  0209 02/25/22  0336   WBC K/uL 10.16 11.41 10.36 7.99   Hemoglobin g/dL 6.9* 8.5* 8.3* 8.3*   Hematocrit % 22.1* 26.8* 27.1* 26.6*   Platelets K/uL 280 277 280 225   Gran % % 89.3* 92.6* 88.2* 76.0*   Lymph % % 6.1* 3.4* 3.7* 1.0*   Mono % % 3.9* 3.1* 6.9 1.0*   Eosinophil % % 0.0 0.0 0.0 0.0   Basophil % % 0.1 0.1 0.1 0.0   Differential Method  Automated Automated Automated Manual       Metabolic Panel (last 72 hours):  Recent Labs   Lab Result Units 02/23/22  0210 02/23/22  1723 02/24/22  0209 02/25/22  0336   Sodium mmol/L 139 140 138 140   Potassium mmol/L 2.7* 4.9 4.9 3.0*   Chloride mmol/L 101 102 99 99   CO2 mmol/L 25 25 26 28   Glucose mg/dL 261* 203* 212* 181*   BUN mg/dL 27* 36* 41* 29*   Creatinine mg/dL 0.9 1.2 1.1 0.9   Albumin g/dL 2.5* 2.5*  2.6* 2.2*   Total Bilirubin mg/dL 0.7 0.9 1.2* 0.9   Alkaline Phosphatase U/L 164* 265* 386* 369*   AST U/L 199* 173* 203* 136*   ALT U/L 133* 153* 177* 165*   Magnesium mg/dL 2.1  --  2.2 2.0   Phosphorus mg/dL 1.6*  --  3.0 1.8*       Vancomycin Administrations:  vancomycin given in the last 96 hours                   vancomycin in dextrose 5 % 1 gram/250 mL IVPB 1,000 mg (mg) 1,000 mg New Bag 02/25/22 2230     1,000 mg New Bag  1025     1,000 mg New Bag 02/24/22 2104     1,000 mg New Bag  0901     1,000 mg New Bag 02/23/22 2200     1,000 mg New Bag  1212     1,000 mg New Bag 02/22/22 2258     1,000 mg New Bag  1012                Microbiologic Results:  Microbiology Results (last 7 days)     Procedure Component Value Units Date/Time    Blood culture [[id number]] Collected: 02/21/22 0515    Order Status: Completed Specimen: Blood from Peripheral, Ankle, Right Updated: 02/25/22 0812     Blood Culture, Routine No Growth to date      No Growth to date      No Growth to date      No Growth to date      No Growth to date    Blood culture [[id number]] Collected: 02/21/22 0504    Order Status: Completed Specimen: Blood from Peripheral, Forearm, Right Updated: 02/25/22 0812     Blood Culture, Routine No Growth to date      No Growth to date      No Growth to date      No Growth to date      No Growth to date

## 2022-02-26 NOTE — SUBJECTIVE & OBJECTIVE
Interval History:    - Noted to have worsening hypotension this AM, no improvement s/p repeat fluid bolus (received total of 500 cc IVFs), pt started on levophed. Remains volume overloaded on CXR, Justin-trac w/ low SVV suggestive of patient adequately fluid resuscitated  - Minimal UOP overnight/this AM, ~10 cc/hr  - Pt w/ low grade temp of 100.6 overnight, new leukopenia and hypoglycemia -> concern for worsening septic shock. Procal elevated to 7.4  - Pt minimally responsive on exam, off sedation   - Noted to have area of fluctuance around PEG tube site w/ purulent discharge, ? Abscess as source of worsening sepsis despite patient on broad spectrum abx (vanc/zosyn) x5 days  - Discussed w/ family at bedside -> family feeling that patient has been through enough, decision made to transition patient to comfort measures w/ plan for terminal extubation s/p arrival of patient's sisters to hospital. Please see ACP note for full details.    Review of Systems   Unable to perform ROS: Intubated     Objective:     Vitals:  Temp: 99.9 °F (37.7 °C)  Pulse: 109  Rhythm: normal sinus rhythm  BP: (!) 100/57  MAP (mmHg): 76  Resp: (!) 22  SpO2: 100 %  Oxygen Concentration (%): 40  O2 Device (Oxygen Therapy): ventilator  Vent Mode: Spont  Pressure Support: 8 cmH20  PEEP/CPAP: 5 cmH20  Peak Airway Pressure: 14 cmH2O  Mean Airway Pressure: 8.5 cmH20  Plateau Pressure: 0 cmH20    Temp  Min: 97.7 °F (36.5 °C)  Max: 100.6 °F (38.1 °C)  Pulse  Min: 98  Max: 129  BP  Min: 78/52  Max: 125/78  MAP (mmHg)  Min: 61  Max: 97  Resp  Min: 17  Max: 37  SpO2  Min: 99 %  Max: 100 %  Oxygen Concentration (%)  Min: 40  Max: 40    02/25 0701 - 02/26 0700  In: 1311.8 [I.V.:9.6]  Out: 1885 [Urine:1885]   Unmeasured Output  Urine Occurrence: 0  Stool Occurrence: 0  Pad Count: 1       Physical Exam  General Appearance: Thin elderly woman resting in bed, intubated, off sedation   Mental Status Exam: Obtunded. Minimal grimace to strong noxious stimuli, not  opening eyes, not tracking/regarding,   Cranial Nerves: R gaze preference, able to cross midline w/ OCRs, unable to bury to left. Pupils 3->2 mm b/l, sluggishly reactive.   Motor: Weakly localizing in RUE, w/d RLE to noxious. Flicker in LLE to noxious. LUE mute  Sensory: Grimaces to noxious x4 extremities, R>L  Coordination: Unable to assess  Vascular: S1/S2 of normal intensity, no S3/S4 appreciated, no murmurs appreciated  Lungs: Coarse breath sounds b/l   Abdomen: Soft, non-distended, +BS; tender around site of PEG tube w/ ? Fluctuance at site. +purulent exudate at site.     Medications:  Continuousmorphine, Last Rate: 2 mg/hr (02/26/22 1305)    Scheduled PRNacetaminophen, 650 mg, Q6H PRN  atropine 1%, 2 drop, Q4H PRN  barium, 450 mL, ONCE PRN  dextrose 10%, 12.5 g, PRN  dextrose 10%, 25 g, PRN  LORazepam, 1 mg, Q30 Min PRN      Today I personally reviewed pertinent medications, lines/drains/airways, imaging, laboratory results, microbiology results, notably: leukopenic to 0.83. Procal uptrending to 7.4. Hypoglycemic. Lactate wnl. CXR w/ worsening pulmonary edema     Diet  Diet NPO  Diet NPO  NPO

## 2022-02-26 NOTE — PROCEDURES
Date of service  2/25/2022-2/26/2022    Introduction  Electroencephalographic (EEG) is recorded with electrodes placed according to the International 10-20 placement system.  Thirty two (32) channels  of digital signal (sampling rate of 512/sec), including T1 and T2, were simultaneously recorded from the scalp and may include EKG, EMG, and/or eye monitors.  Recording band pass was 0.1 to 512 Hz.  Digital video recording of the patient is simultaneously recorded with the EEG.  The patient is instructed to report clinical symptoms which may occur during the recording session.  EEG and video recording are stored and archived in digital format.  Activation procedures, which include photic stimulation, hyperventilation and instructing patients to perform simple tasks, are done in selected patients.    The EEG is displayed on a monitor screen and can be reviewed using different montages.  Computer-assisted analysis is employed to detect spike and electrographic seizure activity.   The entire record is submitted for computer analysis.  The entire recording is visually reviewed, and the times identified by computer analysis as being spikes or seizures are reviewed again.      Compressed spectral analysis (CSA) is also performed on the activity recorded from each individual channel.  This is displayed as a power display of frequencies from 0 to 30 Hz over time.   The CSA is reviewed looking for asymmetries in power between homologous areas of the scalp, then compared with the original EEG recording.      [employer] software was also utilized in the review of this study. This software suite analyzes the EEG recording in multiple domains. Coherence and rhythmicity are computed to identify EEG sections which may contain organized seizures. Each channel undergoes analysis to detect the presence of spike and sharp waves which have special and morphological characteristics of epileptic activity. The routine EEG recording is converted  from special into frequency domain. This is then displayed comparing homologous areas to identify areas of significant asymmetry. Algorithm to identify non-cortically generated artifact is used to separate artifact from the EEG.    Recording Times  Start on 2/25/22 at 12:30  Stop on 2/26/22 at 07:00  A total of 18.5 hours of EEG/video telemetry was recorded.    Findings  The patient's background consists of diffuse slowing, with predominantly theta to delta range frequencies appreciated.  There is no focality to the slowing.  There are no focal, lateralized, or epileptiform transients.  No electrographic seizures are seen.    Normal sleep architecture is not noted.    Hyperventilation and photic stimulation were not performed.     EKG demonstrates sinus rhythm.    Interpretation  This is an abnormal LTM-EEG due to the presence of diffuse slowing as described above.  These findings are indicative of a moderate to severe encephalopathy, though they are not specific for a particular underlying etiology.

## 2022-02-26 NOTE — ASSESSMENT & PLAN NOTE
Intubation for hypoxic respiratory failure     - CXR w/ worsening pulmonary edema in setting of fluid resuscitation for worsening shock  - Unable to diuresis 2/2 increasing vasopressor requirements  - Tolerating PS -> unable to extubate 2/2 mental status

## 2022-02-26 NOTE — NURSING
Pt had a vomiting episode, followed by hypotension and tachycardia. Tube feed held and patient suctioned, O2 sat 100%. Solomon GUTIERREZ was notified and gave verbal order for Zofran. Will continue to monitor.

## 2022-02-27 PROBLEM — I63.9 CEREBROVASCULAR ACCIDENT (CVA): Status: ACTIVE | Noted: 2022-01-01

## 2022-02-27 NOTE — NURSING
Mary from [facility] reported that eye bank will defer, and the body can be released.

## 2022-02-27 NOTE — SIGNIFICANT EVENT
Critical Care Medicine                                                              Death Note      Called to bedside by patient's nurse. Nursing supervisor notified. Family at bedside.  has been called and is also at bedside.  Patient is not responding to verbal or tactile stimuli. Patient does not have a papillary or corneal reflex. Her pupils are fixed and dilated. No heart or breath sounds on auscultation. No respirations. No palpable pulses.     Time of death: 23:46.      Cause of Death: ARDS    Angie Perdomo MD  Emergency Medicine PGY-2   Medical ICU - [facility]

## 2022-02-27 NOTE — PROCEDURES
Date of service  2/26/2022    Introduction  Electroencephalographic (EEG) is recorded with electrodes placed according to the International 10-20 placement system.  Thirty two (32) channels  of digital signal (sampling rate of 512/sec), including T1 and T2, were simultaneously recorded from the scalp and may include EKG, EMG, and/or eye monitors.  Recording band pass was 0.1 to 512 Hz.  Digital video recording of the patient is simultaneously recorded with the EEG.  The patient is instructed to report clinical symptoms which may occur during the recording session.  EEG and video recording are stored and archived in digital format.  Activation procedures, which include photic stimulation, hyperventilation and instructing patients to perform simple tasks, are done in selected patients.    The EEG is displayed on a monitor screen and can be reviewed using different montages.  Computer-assisted analysis is employed to detect spike and electrographic seizure activity.   The entire record is submitted for computer analysis.  The entire recording is visually reviewed, and the times identified by computer analysis as being spikes or seizures are reviewed again.      Compressed spectral analysis (CSA) is also performed on the activity recorded from each individual channel.  This is displayed as a power display of frequencies from 0 to 30 Hz over time.   The CSA is reviewed looking for asymmetries in power between homologous areas of the scalp, then compared with the original EEG recording.      [employer] software was also utilized in the review of this study. This software suite analyzes the EEG recording in multiple domains. Coherence and rhythmicity are computed to identify EEG sections which may contain organized seizures. Each channel undergoes analysis to detect the presence of spike and sharp waves which have special and morphological characteristics of epileptic activity. The routine EEG recording is converted from  special into frequency domain. This is then displayed comparing homologous areas to identify areas of significant asymmetry. Algorithm to identify non-cortically generated artifact is used to separate artifact from the EEG.    Recording Times  Start on 2/26/22 at 0700 running for 6 hrs and 9 min    Findings  The patient's background consists of diffuse slowing, with predominantly theta to delta range frequencies appreciated.  There is no focality to the slowing. There are no focal, lateralized, or epileptiform transients.  No electrographic seizures are seen.    Normal sleep architecture is not noted.    Hyperventilation and photic stimulation were not performed.     EKG demonstrates sinus rhythm.    Interpretation  This is an abnormal LTM-EEG due to the presence of diffuse slowing as described above.  These findings are indicative of a moderate to severe encephalopathy, though they are not specific for a particular underlying etiology.        Isis Edwards MD  Neurocritical Care   Neurology-Epilepsy

## 2022-02-27 NOTE — DISCHARGE SUMMARY
Death Note  Critical Care Medicine      Admit Date: 2022    Date of Death: 2022    Time of Death: 2346    Attending Physician: Mi Dennison MD    Principal Diagnoses: Stroke due to occlusion of right carotid artery    Preliminary Cause of Death: Stroke due to occlusion of right carotid artery    Secondary Diagnoses:   Active Hospital Problems    Diagnosis  POA    *Stroke due to occlusion of right carotid artery [I63.231]  Yes    Comfort measures only status [Z51.5]  Not Applicable    ATN (acute tubular necrosis) [N17.0]  No    Septic shock [A41.9, R65.21]  No    Impaired mobility and ADLs [Z74.09, Z78.9]  Yes    Cytotoxic cerebral edema [G93.6]  Yes    Dysphagia [R13.10]  Yes    DKA (diabetic ketoacidosis) [E11.10]  Yes    CAD, multiple vessel [I25.10]  Yes    CHF (congestive heart failure) [I50.9]  Yes    Acute respiratory failure with hypoxia [J96.01]  No    Dementia without behavioral disturbance [F03.90]  Yes    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  Last known normal per family was 2:00 a.m.; patient presented outside of the tPA window; tele stroke was activated immediately on arrival; neurologist suspects large vessel occlusion and recommend stat transfer for possible neuro intervention.  Neurology advised CTA head neck prior to transfer; CTA head and neck performed and pending at the time of transfer; numerous labs pending at time of transfer; rectal aspirin given    Patient presented initially to [facility] emergency department with L-sided weakness, L-sided  facial droop, confusion, she arrives to the neuro ICU status post thrombectomy for right ICA occlusion. CTA shows acute large vessel occlusion with distal right supraclinoid ICA filling defect extending into the anterior cerebral artery and middle cerebral artery branches.  Patient was recently admitted to outside hospital with NSTEMI (days ago), at which time she was evaluated by cardiology and cardiac catheterization was performed showing severe multi-vessel disease. Medical management was decided upon by Cardiology and family at that time due to comorbidities.  Patient had been started on aspirin, Plavix, statin      Hospital/ICU Course:  02/05/2022 metabolic acidosis, tachypnea. Place arterial line.   02/06/2022 likely euglycemi DKA associated with SGLT2 inhibitors  02/07/2022 NAEO, DKA resolved, starting TF  02/08/2022 NAEO. Starting scheduled insuline. Increase water flushes    02/09/2022 C/o SOB overnight, repeat ECG unremarkable, ABG w/ metabolic alkalosis, no respiratory component. Sputum cx sent, however pt afebrile, no leukocytosis. CXR w/ slight increase in b/l effusions -> given lasix 20 mg IV x1. Sating well on RA this AM. FWF decreased from 500 cc q6hrs -> 300 cc q6hrs given suspicion for hypervolemic hypernatremia  02/10/2022 Slept better last night s/p starting delirium precautions, more awake this AM per son at bedside. Sating well on RA, CXR w/ improvement in b/l effusions. Low grade temp of 100.4, no true fevers, no leukocytosis.   02/11/2022 NAEO. Remains boarding for vascular neuro floor. Na improved this AM to 151.     2/21/2022 rapid response from NPU for hypoxic respiratory failure, upgraded to ICU, intubation and treatment of septic shock  2/22/2022 improved pressor requirements, but extremely oliguric, furosemide challenge, if no benefit increase dose and add diuril followed by lasix gtt, cultures remain negative, lactate nl  2/23/2022:: on low dose levo at 0.02 and most of time off levo,  tolerating TFs-transition to scheduled insulin, wean to spontaneous setting  2/24/2022: completing course of dexamethasone for ARDS related to sepsis, off TFs for abdominal ultrasound to exclude biliary obstruction, diurese x 1, has cuff leak- pssible extubation attempt in am  2/25/2022: responded well to diuresis, lungs clear, level of consciousness-concern that episodes of tachypnea without pulmonary explanation may be related to NCSE, prolonged EEG monitoring ordered  2/26/2022: hooked up to cEEG overnight, notable for diffuse slowing, no epileptiform discharges. Remains on PS. Received  cc x1 overnight for hypotension. Pt was transitioned to comfort care measures at request of family.           Consultations were held with the family regarding the patient's expected poor prognosis. At the direction of the family, the patient was extubated  and measures to ensure the comfort of the patient including, but not limited to, morphine as needed for pain and air hunger as well as benzodiazepines as needed for agitation. The patient was subsequently declared dead.

## 2022-05-02 NOTE — PROGRESS NOTES
[facility] - Intensive Care ([facility] Medicine  Progress Note    Patient Name: Julia Schroeder  MRN: [id number]  Patient Class: IP- Inpatient   Admission Date: 1/30/2022  Length of Stay: 1 days  Attending Physician: Malik Mcnamara MD  Primary Care Provider: Devon Lovell MD        Subjective:     Principal Problem:NSTEMI (non-ST elevated myocardial infarction)        HPI:  Julia Schroeder is a 76 y.o. Black or  female with PMH significant for dementia, HTN, hyperlipidemia, in IDDM, presented to the ED complaining of shortness of breath.  Patient is a very poor historian due to dementia.  No family at the bedside.  Chart review reveals that patient has seen Dr. Perez in the cardiology clinic three days ago for intermittent chest pain.  Patient is scheduled for outpatient nuclear stress test and echocardiogram, that have not been done yet.  Initially found to be hypotensive and hypoxic, currently on 4 L O2 N/C, to maintain saturations in the low to mid 90s.  Blood pressure was low per EMS, in the /74.  Currently receiving 30 mL/kg bolus IV fluids.  Denies chest pain.  Troponin elevated at 11. EKG reveals mild ST depression in leads V3, V4, V5.  Started on heparin drip.  .  CXR reveals increased vascular congestion, with opacity in the right lung.  Denies fever, chills.  Received IV Rocephin empirically.  WBC 30189, 0% bands, lactic acid 1.9.    Admitting diagnosis:  NSTEMI.  Acute hypoxemic respiratory failure.  Right lower lobe infiltrate (? pneumonia versus CHF).  Dementia        Overview/Hospital Course:  1/31:  Patient able to be weaned off of BiPAP.  Currently being diuresed.  Troponin trended up to 14.  Cardiology recommended left heart catheterization however patient refused.  Family currently in discussion with patient on proceeding with left heart catheterization.  Continue heparin.  Start cardiac diet.  NPO at midnight should patient agree to left heart catheterization.      Interval  Retinal exam findings communicated to Physician managing diabetes. History:  No acute events overnight.  Patient weaned off of BiPAP.  Left heart catheterization was recommended by Cardiology however patient refused.     Review of Systems   Constitutional: Negative for fatigue and fever.   HENT: Negative for sinus pressure.    Eyes: Negative for visual disturbance.   Respiratory: Positive for shortness of breath (improved).    Cardiovascular: Negative for chest pain.   Gastrointestinal: Negative for nausea and vomiting.   Genitourinary: Negative for difficulty urinating.   Musculoskeletal: Negative for back pain.   Skin: Negative for rash.   Neurological: Negative for headaches.   Psychiatric/Behavioral: Negative for confusion.     Objective:     Vital Signs (Most Recent):  Temp: 98.8 °F (37.1 °C) (01/30/22 1833)  Pulse: 91 (01/31/22 0945)  Resp: 16 (01/31/22 0945)  BP: (!) 94/59 (01/31/22 0945)  SpO2: (!) 94 % (01/31/22 0945) Vital Signs (24h Range):  Temp:  [98.8 °F (37.1 °C)] 98.8 °F (37.1 °C)  Pulse:  [] 91  Resp:  [14-41] 16  SpO2:  [83 %-100 %] 94 %  BP: ()/(52-74) 94/59     Weight: 66.7 kg (147 lb)  Body mass index is 27.78 kg/m².    Intake/Output Summary (Last 24 hours) at 1/31/2022 1206  Last data filed at 1/31/2022 0830  Gross per 24 hour   Intake 2050 ml   Output 1450 ml   Net 600 ml      Physical Exam  Constitutional:       General: She is not in acute distress.     Appearance: She is well-developed. She is not diaphoretic.   HENT:      Head: Normocephalic and atraumatic.   Eyes:      Pupils: Pupils are equal, round, and reactive to light.   Cardiovascular:      Rate and Rhythm: Normal rate and regular rhythm.      Heart sounds: Normal heart sounds. No murmur heard.  No friction rub. No gallop.    Pulmonary:      Effort: Pulmonary effort is normal. No respiratory distress.      Breath sounds: No stridor. Rales present. No wheezing.   Abdominal:      General: Bowel sounds are normal. There is no distension.      Palpations: Abdomen is soft. There is no mass.       Tenderness: There is no abdominal tenderness. There is no guarding.   Musculoskeletal:      Right lower leg: Edema present.      Left lower leg: Edema present.   Skin:     General: Skin is warm.      Findings: No erythema.   Neurological:      Mental Status: She is alert and oriented to person, place, and time.         Significant Labs: All pertinent labs within the past 24 hours have been reviewed.    Significant Imaging: I have reviewed all pertinent imaging results/findings within the past 24 hours.      Assessment/Plan:      * NSTEMI (non-ST elevated myocardial infarction)  Troponin elevated at 11.  Mild ST depressions noted in the anterolateral leads.  Denies chest pain.  Continue heparin drip.  Dr. Montgomery with cardiology aware.  Keep NPO past midnight.  Received aspirin 325 mg p.o. x1 in the ED.    1/31:  Troponin trended up to 14  Cardiology recommended left heart catheterization  Patient refused at this time  Will continue heparin  Start cardiac diet  Should patient change her mind  Will need to be made NPO and notify Cardiology  Echo showed EF of 45%      Acute congestive heart failure  Patient is identified as having unknwon heart failure that is Acute. CHF is currently uncontrolled due to Rales/crackles on pulmonary exam. Latest ECHO performed and demonstrates- No results found for this or any previous visit.  . Continue Furosemide and monitor clinical status closely. Monitor on telemetry. Patient is off CHF pathway.  Monitor strict Is&Os and daily weights.  Place on fluid restriction of 1.5 L. Continue to stress to patient importance of self efficacy and  on diet for CHF. Last BNP reviewed- and noted below   Recent Labs   Lab 01/30/22 1944   *   .    Increased vascular congestion on CXR.  Unable to administer IV diuretics due to hypotension requiring IV hydration.  Monitor closely for diuretic need.    Pneumonia of right lung due to infectious organism  Right lower lobe infiltrate,  unclear if represents pneumonia versus increased vascular congestion.  Afebrile.  No leukocytosis or bandemia.  Lactic acid within normal limits.  Received Rocephin IV one time dose in the ED.  BNP elevated, suggesting possibly vascular congestion/pulmonary edema.  However unable to administer IV diuretics as patient is receiving 30 mL/kg bolus IV fluids for hypotension in the ED/EMS.  Monitor closely.  Hopefully we can get some clarity by tomorrow if this is pneumonia versus CHF      1/31:   Procalcitonin normal  BNP elevated  Shortness of breath likely due to CHF    Acute hypoxemic respiratory failure  Patient with Hypoxic Respiratory failure which is Acute.  she is not on home oxygen. Supplemental oxygen was provided and noted-  .   Signs/symptoms of respiratory failure include- increased work of breathing. Contributing diagnoses includes - CHF Labs and images were reviewed. Patient Has not had a recent ABG. Will treat underlying causes and adjust management of respiratory failure as follows-     Hypoxemia likely due to pulmonary edema/CHF.  Continue supplemental oxygen to maintain saturations greater than 92%.  COVID-19 negative.    1/31:  Patient diuresing well  Weaned off of BiPAP  Stepped down from ICU    Dementia without behavioral disturbance         Hyperlipidemia associated with type 2 diabetes mellitus           VTE Risk Mitigation (From admission, onward)         Ordered     heparin 25,000 units in dextrose 5% 250 mL (100 units/mL) infusion LOW INTENSITY nomogram - [facility]  Continuous        Question Answer Comment   Heparin Infusion Adjustment (DO NOT MODIFY ANSWER) [email or web address]\epic\Images\Pharmacy\HeparinInfusions\heparin LOW INTENSITY nomogram for [facility] [id number]    Begin at (in units/kg/hr) 12        01/30/22 2043     heparin 25,000 units in dextrose 5% (100 units/ml) IV bolus from bag - ADDITIONAL PRN BOLUS - 60 units/kg (max bolus 4000 units)  As needed (PRN)        Question:  Heparin Infusion  Adjustment (DO NOT MODIFY ANSWER)  Answer:  \\[email or web address]\epic\Images\Pharmacy\HeparinInfusions\heparin LOW INTENSITY nomogram for [facility] [id number].pdf    01/30/22 2043     heparin 25,000 units in dextrose 5% (100 units/ml) IV bolus from bag - ADDITIONAL PRN BOLUS - 30 units/kg (max bolus 4000 units)  As needed (PRN)        Question:  Heparin Infusion Adjustment (DO NOT MODIFY ANSWER)  Answer:  \\[email or web address]\epic\Images\Pharmacy\HeparinInfusions\heparin LOW INTENSITY nomogram for [facility] [id number].pdf    01/30/22 2043     Place sequential compression device  Until discontinued         01/30/22 2056                Discharge Planning   ROGER:      Code Status: Not on file   Is the patient medically ready for discharge?:     Reason for patient still in hospital (select all that apply): Patient trending condition               Critical care time spent on the evaluation and treatment of severe organ dysfunction, review of pertinent labs and imaging studies, discussions with consulting providers and discussions with patient/family:  39 minutes.      Malik Mcnamara MD  Department of Hospital Medicine   '[facility] - Intensive Care ([facility])

## 2022-05-08 DIAGNOSIS — E11.3212 TYPE 2 DIABETES MELLITUS WITH LEFT EYE AFFECTED BY MILD NONPROLIFERATIVE RETINOPATHY AND MACULAR EDEMA, WITHOUT LONG-TERM CURRENT USE OF INSULIN: ICD-10-CM

## 2022-05-08 DIAGNOSIS — E11.69 HYPERLIPIDEMIA ASSOCIATED WITH TYPE 2 DIABETES MELLITUS: ICD-10-CM

## 2022-05-08 DIAGNOSIS — E78.5 HYPERLIPIDEMIA ASSOCIATED WITH TYPE 2 DIABETES MELLITUS: ICD-10-CM

## 2022-05-08 RX ORDER — EMPAGLIFLOZIN 25 MG/1
TABLET, FILM COATED ORAL
Qty: 90 TABLET | Refills: 0 | OUTPATIENT
Start: 2022-05-08

## 2022-05-08 RX ORDER — METFORMIN HYDROCHLORIDE 1000 MG/1
TABLET ORAL
Qty: 180 TABLET | Refills: 0 | OUTPATIENT
Start: 2022-05-08

## 2022-05-08 RX ORDER — ROSUVASTATIN CALCIUM 10 MG/1
TABLET, COATED ORAL
Qty: 90 TABLET | Refills: 0 | OUTPATIENT
Start: 2022-05-08

## 2025-06-21 NOTE — ASSESSMENT & PLAN NOTE
Suspect hypervolemic hypernatremia, improved w/ lasix     - Downtrending this AM, 161 -> 159 -> 153  - C/w lasix 20 mg PO qday  - Decrease  cc q6hrs -> 300 cc q8hrs  - Trend BID   (E4) spontaneous

## (undated) DEVICE — KIT GLIDESHEATH SLEND 6FR 10CM

## (undated) DEVICE — PACK CATH LAB CUSTOM BR

## (undated) DEVICE — CATH JL3.5 5FR

## (undated) DEVICE — CATH INFINITI MP JL3.5 JR4 5FR

## (undated) DEVICE — ANGIOTOUCH KIT

## (undated) DEVICE — WIRE GUIDE TEFLON 3CM .035 145

## (undated) DEVICE — KIT MANIFOLD LOW PRESS TUBING

## (undated) DEVICE — BAND TR COMP DEVICE REG 24CM

## (undated) DEVICE — SEE MEDLINE ITEM 157187

## (undated) DEVICE — OMNIPAQUE 300MG 150ML VIAL

## (undated) DEVICE — CATH PIG145 INFINITI 5X110CM

## (undated) DEVICE — CATH JR4 5FR

## (undated) DEVICE — KIT SYR REUSABLE

## (undated) DEVICE — GUIDEWIRE WHOLEY HI TORQ 175CM